# Patient Record
Sex: FEMALE | Race: BLACK OR AFRICAN AMERICAN | NOT HISPANIC OR LATINO | URBAN - METROPOLITAN AREA
[De-identification: names, ages, dates, MRNs, and addresses within clinical notes are randomized per-mention and may not be internally consistent; named-entity substitution may affect disease eponyms.]

---

## 2023-07-03 ENCOUNTER — TELEPHONE (OUTPATIENT)
Dept: PRIMARY CARE CLINIC | Facility: CLINIC | Age: 63
End: 2023-07-03
Payer: MEDICAID

## 2023-07-03 NOTE — TELEPHONE ENCOUNTER
Patient called regarding new patient appointment. Patient given number to schedule at [facility] location.  ----- Message from Bria Curry sent at 7/3/2023  9:21 AM CDT -----  Pt request a call back to be scheduled [phone].Thanks

## 2023-10-02 ENCOUNTER — HOSPITAL ENCOUNTER (OUTPATIENT)
Dept: RADIATION THERAPY | Facility: HOSPITAL | Age: 63
Discharge: HOME | End: 2023-10-02
Attending: SPECIALIST
Payer: MEDICAID

## 2023-10-10 ENCOUNTER — HOSPITAL ENCOUNTER (OUTPATIENT)
Dept: RADIATION THERAPY | Facility: HOSPITAL | Age: 63
Discharge: HOME | End: 2023-10-10
Attending: INTERNAL MEDICINE
Payer: MEDICAID

## 2023-10-10 ENCOUNTER — HOSPITAL ENCOUNTER (OUTPATIENT)
Dept: RADIOLOGY | Facility: HOSPITAL | Age: 63
Discharge: HOME | End: 2023-10-10
Attending: INTERNAL MEDICINE
Payer: MEDICAID

## 2023-10-10 ENCOUNTER — OFFICE VISIT (OUTPATIENT)
Dept: RADIATION ONCOLOGY | Facility: CLINIC | Age: 63
End: 2023-10-10
Payer: MEDICAID

## 2023-10-10 VITALS
WEIGHT: 205.94 LBS | SYSTOLIC BLOOD PRESSURE: 133 MMHG | HEART RATE: 80 BPM | HEIGHT: 60 IN | BODY MASS INDEX: 40.43 KG/M2 | TEMPERATURE: 97 F | OXYGEN SATURATION: 97 % | DIASTOLIC BLOOD PRESSURE: 79 MMHG | RESPIRATION RATE: 18 BRPM

## 2023-10-10 DIAGNOSIS — C50.412 PRIMARY MALIGNANT NEOPLASM OF UPPER OUTER QUADRANT OF BREAST, LEFT: Primary | ICD-10-CM

## 2023-10-10 PROCEDURE — 77334 RADIATION TREATMENT AID(S): CPT | Mod: TC | Performed by: SPECIALIST

## 2023-10-10 PROCEDURE — 77290 THER RAD SIMULAJ FIELD CPLX: CPT | Mod: TC | Performed by: SPECIALIST

## 2023-10-10 PROCEDURE — 77263 THER RADIOLOGY TX PLNG CPLX: CPT | Mod: ,,, | Performed by: SPECIALIST

## 2023-10-10 PROCEDURE — 4010F ACE/ARB THERAPY RXD/TAKEN: CPT | Mod: CPTII,,, | Performed by: SPECIALIST

## 2023-10-10 PROCEDURE — 77290 THER RAD SIMULAJ FIELD CPLX: CPT | Mod: 26,,, | Performed by: SPECIALIST

## 2023-10-10 PROCEDURE — 1159F MED LIST DOCD IN RCRD: CPT | Mod: CPTII,,, | Performed by: SPECIALIST

## 2023-10-10 PROCEDURE — 99212 OFFICE O/P EST SF 10 MIN: CPT | Mod: S$PBB,25,, | Performed by: SPECIALIST

## 2023-10-10 PROCEDURE — 3008F PR BODY MASS INDEX (BMI) DOCUMENTED: ICD-10-PCS | Mod: CPTII,,, | Performed by: SPECIALIST

## 2023-10-10 PROCEDURE — 3075F PR MOST RECENT SYSTOLIC BLOOD PRESS GE 130-139MM HG: ICD-10-PCS | Mod: CPTII,,, | Performed by: SPECIALIST

## 2023-10-10 PROCEDURE — 99213 OFFICE O/P EST LOW 20 MIN: CPT | Mod: PBBFAC | Performed by: SPECIALIST

## 2023-10-10 PROCEDURE — 99212 PR OFFICE/OUTPT VISIT, EST, LEVL II, 10-19 MIN: ICD-10-PCS | Mod: S$PBB,25,, | Performed by: SPECIALIST

## 2023-10-10 PROCEDURE — 77334 RADIATION TREATMENT AID(S): CPT | Mod: 26,,, | Performed by: SPECIALIST

## 2023-10-10 PROCEDURE — 77014 HC CT GUIDANCE RADIATION THERAPY FLDS PLACEMENT: CPT | Mod: TC | Performed by: SPECIALIST

## 2023-10-10 PROCEDURE — 3008F BODY MASS INDEX DOCD: CPT | Mod: CPTII,,, | Performed by: SPECIALIST

## 2023-10-10 PROCEDURE — 77334 PR  RADN TREATMENT AID(S) COMPLX: ICD-10-PCS | Mod: 26,,, | Performed by: SPECIALIST

## 2023-10-10 PROCEDURE — 3075F SYST BP GE 130 - 139MM HG: CPT | Mod: CPTII,,, | Performed by: SPECIALIST

## 2023-10-10 PROCEDURE — 4010F PR ACE/ARB THEARPY RXD/TAKEN: ICD-10-PCS | Mod: CPTII,,, | Performed by: SPECIALIST

## 2023-10-10 PROCEDURE — 77290 PR  SET RADN THERAPY FIELD COMPLEX: ICD-10-PCS | Mod: 26,,, | Performed by: SPECIALIST

## 2023-10-10 PROCEDURE — 3044F PR MOST RECENT HEMOGLOBIN A1C LEVEL <7.0%: ICD-10-PCS | Mod: CPTII,,, | Performed by: SPECIALIST

## 2023-10-10 PROCEDURE — 1159F PR MEDICATION LIST DOCUMENTED IN MEDICAL RECORD: ICD-10-PCS | Mod: CPTII,,, | Performed by: SPECIALIST

## 2023-10-10 PROCEDURE — 77263 PR  RADIATION THERAPY PLAN COMPLEX: ICD-10-PCS | Mod: ,,, | Performed by: SPECIALIST

## 2023-10-10 PROCEDURE — 3078F PR MOST RECENT DIASTOLIC BLOOD PRESSURE < 80 MM HG: ICD-10-PCS | Mod: CPTII,,, | Performed by: SPECIALIST

## 2023-10-10 PROCEDURE — 3044F HG A1C LEVEL LT 7.0%: CPT | Mod: CPTII,,, | Performed by: SPECIALIST

## 2023-10-10 PROCEDURE — 3078F DIAST BP <80 MM HG: CPT | Mod: CPTII,,, | Performed by: SPECIALIST

## 2023-10-10 PROCEDURE — 99999 PR PBB SHADOW E&M-EST. PATIENT-LVL III: CPT | Mod: PBBFAC,,,

## 2023-10-10 PROCEDURE — 99999 PR PBB SHADOW E&M-EST. PATIENT-LVL III: ICD-10-PCS | Mod: PBBFAC,,,

## 2023-10-10 RX ORDER — TIZANIDINE 4 MG/1
TABLET ORAL
COMMUNITY
Start: 2023-10-02

## 2023-10-10 RX ORDER — LISINOPRIL AND HYDROCHLOROTHIAZIDE 20; 25 MG/1; MG/1
1 TABLET ORAL EVERY MORNING
COMMUNITY
Start: 2023-09-12

## 2023-10-10 RX ORDER — ONDANSETRON 8 MG/1
TABLET, ORALLY DISINTEGRATING ORAL
COMMUNITY
Start: 2023-01-11

## 2023-10-10 RX ORDER — FAMOTIDINE 20 MG/1
20 TABLET, FILM COATED ORAL
COMMUNITY
Start: 2023-08-12

## 2023-10-10 RX ORDER — GABAPENTIN 300 MG/1
300 CAPSULE ORAL
COMMUNITY
Start: 2023-08-14

## 2023-10-10 RX ORDER — PROCHLORPERAZINE MALEATE 10 MG
10 TABLET ORAL
COMMUNITY
Start: 2023-01-11

## 2023-10-10 RX ORDER — TIRZEPATIDE 2.5 MG/.5ML
2.5 INJECTION, SOLUTION SUBCUTANEOUS
COMMUNITY
Start: 2023-09-18

## 2023-10-10 RX ORDER — PANTOPRAZOLE SODIUM 40 MG/1
40 TABLET, DELAYED RELEASE ORAL
COMMUNITY
Start: 2023-05-01

## 2023-10-10 RX ORDER — ATORVASTATIN CALCIUM 40 MG/1
40 TABLET, FILM COATED ORAL
COMMUNITY
Start: 2023-08-14

## 2023-10-10 RX ORDER — METFORMIN HYDROCHLORIDE 500 MG/1
1000 TABLET, EXTENDED RELEASE ORAL 2 TIMES DAILY
COMMUNITY
Start: 2023-09-12

## 2023-10-10 NOTE — PROGRESS NOTES
HISTORY OF PRESENT ILLNESS:  63-year-old woman, well known to me, having 1st been seen in consultation on 08/09/2023 with a triple negative ypT2 N0 M0 upper outer left breast cancer status post neoadjuvant TC Keytruda 1st on 02/02/2023, followed by AC Keytruda from 04/06/2023 08/26/2023 with good clinical response, followed by clinical progression prior to her lumpectomy on 08/02/2023 recovering a high-grade 3.5 cm infiltrating ductal carcinoma resected with 1 mm medial surgical margins.  A 3 mm high-grade DCIS was also recovered, resected with 2 mm posterior margin and negative separately submitted posterior margin.  Both sentinel lymph nodes were benign without apparent treatment effect.  She underwent oncoplastic reduction at the time of her primary resection with some wound healing difficulty in the mid inframammary fold at that time.    INTERVAL HISTORY:  She returns today for routine short interval follow-up.  She was seen by Dr. Burch in plastics in the interval and given clearance to proceed with radiotherapy.  She denies complaint related to her surgical or chemotherapeutic management, or complaints worrisome for local regional or metastatic recurrence    PHYSICAL EXAMINATION:  Vitals:    10/10/23 1318   BP: 133/79   Pulse: 80   Resp: 18   Temp: 97.4 °F (36.3 °C)     PHYSICAL EXAM:      Lymphatics:  no cervical/sclav LAD  Lungs:  CTAB  Heart:  RRR  Breast:  Complete interval closure and healing of her lambda scar in the left breast with a focus centrally consistent with small volume secondary intent healing.  The breasts remain soft and a nodular with a negative axilla    ASSESSMENT:  Interval healing sufficient to proceed with planned adjuvant radiotherapy in the setting of breast conservation    PLAN:  We again reviewed the rationale for inclusion of whole breast radiotherapy in the setting of breast conservation, improved local regional control equivalent to that achieved with mastectomy.  We  reviewed the logistics of therapy, including the planning and treatment visits, as well as possible acute and chronic side effects in great detail.  She reiterates her desire to proceed.  Informed written consent was obtained and she was given the original after scanning into the EMR     She will now undergo CT simulation as separately documented

## 2023-10-10 NOTE — PROGRESS NOTES
HISTORY OF PRESENT ILLNESS:   ***      REVIEW OF SYSTEMS:   ROS      PAST MEDICAL HISTORY:  Past Medical History:   Diagnosis Date    Allergy     Anemia     Breast cancer     Diabetes mellitus     Hypertension        PAST SURGICAL HISTORY:  Past Surgical History:   Procedure Laterality Date    HYSTERECTOMY      tubaligation         ALLERGIES:   Review of patient's allergies indicates:   Allergen Reactions    Cephalexin     Hydrocodone-acetaminoph-supp11     Propoxyphene-acetaminophen        MEDICATIONS:  Current Outpatient Medications   Medication Sig    atorvastatin (LIPITOR) 40 MG tablet Take 40 mg by mouth.    famotidine (PEPCID) 20 MG tablet Take 20 mg by mouth.    gabapentin (NEURONTIN) 300 MG capsule Take 300 mg by mouth.    lisinopriL-hydrochlorothiazide (PRINZIDE,ZESTORETIC) 20-25 mg Tab Take 1 tablet by mouth every morning.    metFORMIN (GLUCOPHAGE-XR) 500 MG ER 24hr tablet Take 1,000 mg by mouth 2 (two) times daily.    ondansetron (ZOFRAN-ODT) 8 MG TbDL DISSOLVE ONE TABLET UNDER THE TONGUE EVERY 8 HOURS AS NEEDED FOR NAUSEA    pantoprazole (PROTONIX) 40 MG tablet Take 40 mg by mouth.    prochlorperazine (COMPAZINE) 10 MG tablet Take 10 mg by mouth.    tirzepatide (MOUNJARO) 2.5 mg/0.5 mL PnIj Inject 2.5 mg into the skin every 7 days.    tiZANidine (ZANAFLEX) 4 MG tablet      No current facility-administered medications for this visit.       SOCIAL HISTORY:  Social History     Socioeconomic History    Marital status: Single   Tobacco Use    Smoking status: Former     Current packs/day: 0.00     Types: Cigarettes     Quit date:      Years since quittin.7    Smokeless tobacco: Never   Substance and Sexual Activity    Alcohol use: Not Currently    Drug use: Never       FAMILY HISTORY:  History reviewed. No pertinent family history.      PHYSICAL EXAMINATION:  Vitals:    10/10/23 1318   BP: 133/79   Pulse: 80   Resp: 18   Temp: 97.4 °F (36.3 °C)     Physical Exam    ASSESSMENT/PLAN:  There are  no diagnoses linked to this encounter.    ECOG:  ***    I spent approximately 60 minutes reviewing the available records and evaluating the patient, out of which over 50% of the time was spent face to face with the patient in counseling and coordinating this patient's care.

## 2023-10-16 PROCEDURE — 77300 PR RADIATION THERAPY,DOSIMETRY PLAN: ICD-10-PCS | Mod: 26,,, | Performed by: SPECIALIST

## 2023-10-16 PROCEDURE — 77293 RESPIRATOR MOTION MGMT SIMUL: CPT | Mod: TC | Performed by: SPECIALIST

## 2023-10-16 PROCEDURE — 77295 3-D RADIOTHERAPY PLAN: CPT | Mod: TC | Performed by: SPECIALIST

## 2023-10-16 PROCEDURE — 77295 PR 3D RADIOTHERAPY PLAN: ICD-10-PCS | Mod: 26,,, | Performed by: SPECIALIST

## 2023-10-16 PROCEDURE — 77293 RESPIRATOR MOTION MGMT SIMUL: CPT | Mod: 26,,, | Performed by: SPECIALIST

## 2023-10-16 PROCEDURE — 77295 3-D RADIOTHERAPY PLAN: CPT | Mod: 26,,, | Performed by: SPECIALIST

## 2023-10-16 PROCEDURE — 77334 RADIATION TREATMENT AID(S): CPT | Mod: TC | Performed by: SPECIALIST

## 2023-10-16 PROCEDURE — 77334 PR  RADN TREATMENT AID(S) COMPLX: ICD-10-PCS | Mod: 26,,, | Performed by: SPECIALIST

## 2023-10-16 PROCEDURE — 77293 PR RESPIRATORY MOTION MGMT SIMULATION: ICD-10-PCS | Mod: 26,,, | Performed by: SPECIALIST

## 2023-10-16 PROCEDURE — 77334 RADIATION TREATMENT AID(S): CPT | Mod: 26,,, | Performed by: SPECIALIST

## 2023-10-16 PROCEDURE — 77300 RADIATION THERAPY DOSE PLAN: CPT | Mod: 26,,, | Performed by: SPECIALIST

## 2023-10-16 PROCEDURE — 77300 RADIATION THERAPY DOSE PLAN: CPT | Mod: TC | Performed by: SPECIALIST

## 2023-10-17 ENCOUNTER — DOCUMENTATION ONLY (OUTPATIENT)
Dept: RADIATION ONCOLOGY | Facility: CLINIC | Age: 63
End: 2023-10-17
Payer: MEDICAID

## 2023-10-17 PROCEDURE — 77427 PR CHG RADIATION,MANGEMENT,5 TX'S: ICD-10-PCS | Mod: ,,, | Performed by: SPECIALIST

## 2023-10-17 PROCEDURE — 77412 RADIATION TX DELIVERY LVL 3: CPT | Performed by: SPECIALIST

## 2023-10-17 PROCEDURE — 77417 THER RADIOLOGY PORT IMAGE(S): CPT | Performed by: SPECIALIST

## 2023-10-17 PROCEDURE — 77387 GUIDANCE FOR RADJ TX DLVR: CPT | Mod: 26,,, | Performed by: SPECIALIST

## 2023-10-17 PROCEDURE — 77387 GUIDANCE FOR RADJ TX DLVR: CPT | Mod: TC | Performed by: SPECIALIST

## 2023-10-17 PROCEDURE — 77427 RADIATION TX MANAGEMENT X5: CPT | Mod: ,,, | Performed by: SPECIALIST

## 2023-10-17 PROCEDURE — 77387 PR GUIDANCE FOR RADIATION TREATMENT DELIVERY: ICD-10-PCS | Mod: 26,,, | Performed by: SPECIALIST

## 2023-10-17 NOTE — PLAN OF CARE
Day 1 outpatient xrt to left breast. Breast handout and verbal instructions given. Miaderm cream and contact info provided. Skin care and side effects reviewed. Patient verbalized understanding.

## 2023-10-18 PROCEDURE — 77412 RADIATION TX DELIVERY LVL 3: CPT | Performed by: SPECIALIST

## 2023-10-18 PROCEDURE — 77387 GUIDANCE FOR RADJ TX DLVR: CPT | Mod: TC | Performed by: SPECIALIST

## 2023-10-18 PROCEDURE — 77387 GUIDANCE FOR RADJ TX DLVR: CPT | Mod: 26,,, | Performed by: SPECIALIST

## 2023-10-18 PROCEDURE — 77387 PR GUIDANCE FOR RADIATION TREATMENT DELIVERY: ICD-10-PCS | Mod: 26,,, | Performed by: SPECIALIST

## 2023-10-19 PROCEDURE — 77387 GUIDANCE FOR RADJ TX DLVR: CPT | Mod: 26,,, | Performed by: SPECIALIST

## 2023-10-19 PROCEDURE — 77412 RADIATION TX DELIVERY LVL 3: CPT | Performed by: SPECIALIST

## 2023-10-19 PROCEDURE — 77387 GUIDANCE FOR RADJ TX DLVR: CPT | Mod: TC | Performed by: SPECIALIST

## 2023-10-19 PROCEDURE — 77387 PR GUIDANCE FOR RADIATION TREATMENT DELIVERY: ICD-10-PCS | Mod: 26,,, | Performed by: SPECIALIST

## 2023-10-20 PROCEDURE — 77387 GUIDANCE FOR RADJ TX DLVR: CPT | Mod: TC | Performed by: SPECIALIST

## 2023-10-20 PROCEDURE — 77387 GUIDANCE FOR RADJ TX DLVR: CPT | Mod: 26,,, | Performed by: SPECIALIST

## 2023-10-20 PROCEDURE — 77412 RADIATION TX DELIVERY LVL 3: CPT | Performed by: SPECIALIST

## 2023-10-20 PROCEDURE — 77387 PR GUIDANCE FOR RADIATION TREATMENT DELIVERY: ICD-10-PCS | Mod: 26,,, | Performed by: SPECIALIST

## 2023-10-23 ENCOUNTER — DOCUMENTATION ONLY (OUTPATIENT)
Dept: RADIATION ONCOLOGY | Facility: CLINIC | Age: 63
End: 2023-10-23
Payer: MEDICAID

## 2023-10-23 PROCEDURE — 77387 PR GUIDANCE FOR RADIATION TREATMENT DELIVERY: ICD-10-PCS | Mod: 26,,, | Performed by: SPECIALIST

## 2023-10-23 PROCEDURE — 77336 RADIATION PHYSICS CONSULT: CPT | Performed by: SPECIALIST

## 2023-10-23 PROCEDURE — 77387 GUIDANCE FOR RADJ TX DLVR: CPT | Mod: TC | Performed by: SPECIALIST

## 2023-10-23 PROCEDURE — 77387 GUIDANCE FOR RADJ TX DLVR: CPT | Mod: 26,,, | Performed by: SPECIALIST

## 2023-10-23 PROCEDURE — 77412 RADIATION TX DELIVERY LVL 3: CPT | Performed by: SPECIALIST

## 2023-10-23 NOTE — PLAN OF CARE
Day 5 outpatient xrt to left breast. Tolerating therapy well; no skin issues. Will continue to monitor.

## 2023-10-24 PROCEDURE — 77427 PR CHG RADIATION,MANGEMENT,5 TX'S: ICD-10-PCS | Mod: ,,, | Performed by: SPECIALIST

## 2023-10-24 PROCEDURE — 77427 RADIATION TX MANAGEMENT X5: CPT | Mod: ,,, | Performed by: SPECIALIST

## 2023-10-24 PROCEDURE — 77412 RADIATION TX DELIVERY LVL 3: CPT | Performed by: SPECIALIST

## 2023-10-24 PROCEDURE — 77387 GUIDANCE FOR RADJ TX DLVR: CPT | Mod: TC | Performed by: SPECIALIST

## 2023-10-24 PROCEDURE — 77387 GUIDANCE FOR RADJ TX DLVR: CPT | Mod: 26,,, | Performed by: SPECIALIST

## 2023-10-24 PROCEDURE — 77387 PR GUIDANCE FOR RADIATION TREATMENT DELIVERY: ICD-10-PCS | Mod: 26,,, | Performed by: SPECIALIST

## 2023-10-24 PROCEDURE — 77417 THER RADIOLOGY PORT IMAGE(S): CPT | Performed by: SPECIALIST

## 2023-10-25 PROCEDURE — 77412 RADIATION TX DELIVERY LVL 3: CPT | Performed by: SPECIALIST

## 2023-10-25 PROCEDURE — 77387 GUIDANCE FOR RADJ TX DLVR: CPT | Mod: TC | Performed by: SPECIALIST

## 2023-10-25 PROCEDURE — 77387 GUIDANCE FOR RADJ TX DLVR: CPT | Mod: 26,,, | Performed by: SPECIALIST

## 2023-10-25 PROCEDURE — 77387 PR GUIDANCE FOR RADIATION TREATMENT DELIVERY: ICD-10-PCS | Mod: 26,,, | Performed by: SPECIALIST

## 2023-10-26 PROCEDURE — 77387 GUIDANCE FOR RADJ TX DLVR: CPT | Mod: TC | Performed by: SPECIALIST

## 2023-10-26 PROCEDURE — 77387 PR GUIDANCE FOR RADIATION TREATMENT DELIVERY: ICD-10-PCS | Mod: 26,,, | Performed by: SPECIALIST

## 2023-10-26 PROCEDURE — 77412 RADIATION TX DELIVERY LVL 3: CPT | Performed by: SPECIALIST

## 2023-10-26 PROCEDURE — 77387 GUIDANCE FOR RADJ TX DLVR: CPT | Mod: 26,,, | Performed by: SPECIALIST

## 2023-10-27 PROCEDURE — 77387 GUIDANCE FOR RADJ TX DLVR: CPT | Mod: TC | Performed by: SPECIALIST

## 2023-10-27 PROCEDURE — 77387 PR GUIDANCE FOR RADIATION TREATMENT DELIVERY: ICD-10-PCS | Mod: 26,,, | Performed by: SPECIALIST

## 2023-10-27 PROCEDURE — 77387 GUIDANCE FOR RADJ TX DLVR: CPT | Mod: 26,,, | Performed by: SPECIALIST

## 2023-10-27 PROCEDURE — 77412 RADIATION TX DELIVERY LVL 3: CPT | Performed by: SPECIALIST

## 2023-10-30 ENCOUNTER — DOCUMENTATION ONLY (OUTPATIENT)
Dept: RADIATION ONCOLOGY | Facility: CLINIC | Age: 63
End: 2023-10-30
Payer: MEDICAID

## 2023-10-30 PROCEDURE — 77387 GUIDANCE FOR RADJ TX DLVR: CPT | Mod: 26,,, | Performed by: SPECIALIST

## 2023-10-30 PROCEDURE — 77336 RADIATION PHYSICS CONSULT: CPT | Performed by: SPECIALIST

## 2023-10-30 PROCEDURE — 77412 RADIATION TX DELIVERY LVL 3: CPT | Performed by: SPECIALIST

## 2023-10-30 PROCEDURE — 77387 GUIDANCE FOR RADJ TX DLVR: CPT | Mod: TC | Performed by: SPECIALIST

## 2023-10-30 PROCEDURE — 77387 PR GUIDANCE FOR RADIATION TREATMENT DELIVERY: ICD-10-PCS | Mod: 26,,, | Performed by: SPECIALIST

## 2023-10-30 NOTE — PLAN OF CARE
Day 10 outpatient xrt to left breast. subtle erythema with intact skin throughout. Will continue to monitor.

## 2023-10-31 PROCEDURE — 77387 GUIDANCE FOR RADJ TX DLVR: CPT | Mod: 26,,, | Performed by: SPECIALIST

## 2023-10-31 PROCEDURE — 77417 THER RADIOLOGY PORT IMAGE(S): CPT | Performed by: SPECIALIST

## 2023-10-31 PROCEDURE — 77412 RADIATION TX DELIVERY LVL 3: CPT | Performed by: SPECIALIST

## 2023-10-31 PROCEDURE — 77387 PR GUIDANCE FOR RADIATION TREATMENT DELIVERY: ICD-10-PCS | Mod: 26,,, | Performed by: SPECIALIST

## 2023-10-31 PROCEDURE — 77387 GUIDANCE FOR RADJ TX DLVR: CPT | Mod: TC | Performed by: SPECIALIST

## 2023-10-31 PROCEDURE — 77427 PR CHG RADIATION,MANGEMENT,5 TX'S: ICD-10-PCS | Mod: ,,, | Performed by: SPECIALIST

## 2023-10-31 PROCEDURE — 77427 RADIATION TX MANAGEMENT X5: CPT | Mod: ,,, | Performed by: SPECIALIST

## 2023-11-01 ENCOUNTER — HOSPITAL ENCOUNTER (OUTPATIENT)
Dept: RADIATION THERAPY | Facility: HOSPITAL | Age: 63
Discharge: HOME | End: 2023-11-01
Attending: SPECIALIST
Payer: MEDICAID

## 2023-11-01 PROCEDURE — 77412 RADIATION TX DELIVERY LVL 3: CPT | Performed by: SPECIALIST

## 2023-11-01 PROCEDURE — 77387 PR GUIDANCE FOR RADIATION TREATMENT DELIVERY: ICD-10-PCS | Mod: 26,,, | Performed by: SPECIALIST

## 2023-11-01 PROCEDURE — 77387 GUIDANCE FOR RADJ TX DLVR: CPT | Mod: TC | Performed by: SPECIALIST

## 2023-11-01 PROCEDURE — 77387 GUIDANCE FOR RADJ TX DLVR: CPT | Mod: 26,,, | Performed by: SPECIALIST

## 2023-11-02 PROCEDURE — 77387 GUIDANCE FOR RADJ TX DLVR: CPT | Mod: 26,,, | Performed by: SPECIALIST

## 2023-11-02 PROCEDURE — 77387 GUIDANCE FOR RADJ TX DLVR: CPT | Mod: TC | Performed by: SPECIALIST

## 2023-11-02 PROCEDURE — 77387 PR GUIDANCE FOR RADIATION TREATMENT DELIVERY: ICD-10-PCS | Mod: 26,,, | Performed by: SPECIALIST

## 2023-11-02 PROCEDURE — 77412 RADIATION TX DELIVERY LVL 3: CPT | Performed by: SPECIALIST

## 2023-11-03 PROCEDURE — 77387 GUIDANCE FOR RADJ TX DLVR: CPT | Mod: 26,,, | Performed by: SPECIALIST

## 2023-11-03 PROCEDURE — 77387 GUIDANCE FOR RADJ TX DLVR: CPT | Mod: TC | Performed by: SPECIALIST

## 2023-11-03 PROCEDURE — 77412 RADIATION TX DELIVERY LVL 3: CPT | Performed by: SPECIALIST

## 2023-11-03 PROCEDURE — 77387 PR GUIDANCE FOR RADIATION TREATMENT DELIVERY: ICD-10-PCS | Mod: 26,,, | Performed by: SPECIALIST

## 2023-11-06 ENCOUNTER — DOCUMENTATION ONLY (OUTPATIENT)
Dept: RADIATION ONCOLOGY | Facility: CLINIC | Age: 63
End: 2023-11-06
Payer: MEDICAID

## 2023-11-06 PROCEDURE — 77412 RADIATION TX DELIVERY LVL 3: CPT | Performed by: SPECIALIST

## 2023-11-06 PROCEDURE — 77387 GUIDANCE FOR RADJ TX DLVR: CPT | Mod: 26,,, | Performed by: SPECIALIST

## 2023-11-06 PROCEDURE — 77387 PR GUIDANCE FOR RADIATION TREATMENT DELIVERY: ICD-10-PCS | Mod: 26,,, | Performed by: SPECIALIST

## 2023-11-06 PROCEDURE — 77387 GUIDANCE FOR RADJ TX DLVR: CPT | Mod: TC | Performed by: SPECIALIST

## 2023-11-06 NOTE — PLAN OF CARE
Day 15 outpatient xrt to the breast. she describes discomfort consistent with a exam showing mixed mild hyperpigmentation and erythema. Subtle early breakdown at the central aspect of her lambda incision in the inframammary fold, otherwise intact throughout. Using topicals to good effect. Will continue to monitor.

## 2023-11-07 ENCOUNTER — DOCUMENTATION ONLY (OUTPATIENT)
Dept: RADIATION ONCOLOGY | Facility: CLINIC | Age: 63
End: 2023-11-07
Payer: MEDICAID

## 2023-11-07 PROCEDURE — 77412 RADIATION TX DELIVERY LVL 3: CPT | Performed by: SPECIALIST

## 2023-11-07 PROCEDURE — 77387 GUIDANCE FOR RADJ TX DLVR: CPT | Mod: 26,,, | Performed by: SPECIALIST

## 2023-11-07 PROCEDURE — 77387 PR GUIDANCE FOR RADIATION TREATMENT DELIVERY: ICD-10-PCS | Mod: 26,,, | Performed by: SPECIALIST

## 2023-11-07 PROCEDURE — 77387 GUIDANCE FOR RADJ TX DLVR: CPT | Mod: TC | Performed by: SPECIALIST

## 2023-11-07 PROCEDURE — 77336 RADIATION PHYSICS CONSULT: CPT | Performed by: SPECIALIST

## 2023-11-07 NOTE — PLAN OF CARE
Completed outpatient xrt to the breast today 11/7/23. Follow up appt made and given to the patient.

## 2023-12-08 ENCOUNTER — OFFICE VISIT (OUTPATIENT)
Dept: RADIATION ONCOLOGY | Facility: CLINIC | Age: 63
End: 2023-12-08
Payer: MEDICAID

## 2023-12-08 VITALS
OXYGEN SATURATION: 96 % | RESPIRATION RATE: 16 BRPM | WEIGHT: 201.75 LBS | DIASTOLIC BLOOD PRESSURE: 70 MMHG | HEIGHT: 60 IN | TEMPERATURE: 98 F | SYSTOLIC BLOOD PRESSURE: 111 MMHG | BODY MASS INDEX: 39.61 KG/M2 | HEART RATE: 66 BPM

## 2023-12-08 DIAGNOSIS — C50.412 PRIMARY MALIGNANT NEOPLASM OF UPPER OUTER QUADRANT OF BREAST, LEFT: Primary | ICD-10-CM

## 2023-12-08 PROCEDURE — 3008F BODY MASS INDEX DOCD: CPT | Mod: CPTII,,, | Performed by: SPECIALIST

## 2023-12-08 PROCEDURE — 3078F PR MOST RECENT DIASTOLIC BLOOD PRESSURE < 80 MM HG: ICD-10-PCS | Mod: CPTII,,, | Performed by: SPECIALIST

## 2023-12-08 PROCEDURE — 99999 PR PBB SHADOW E&M-EST. PATIENT-LVL III: CPT | Mod: PBBFAC,,, | Performed by: SPECIALIST

## 2023-12-08 PROCEDURE — 1159F PR MEDICATION LIST DOCUMENTED IN MEDICAL RECORD: ICD-10-PCS | Mod: CPTII,,, | Performed by: SPECIALIST

## 2023-12-08 PROCEDURE — 4010F PR ACE/ARB THEARPY RXD/TAKEN: ICD-10-PCS | Mod: CPTII,,, | Performed by: SPECIALIST

## 2023-12-08 PROCEDURE — 1159F MED LIST DOCD IN RCRD: CPT | Mod: CPTII,,, | Performed by: SPECIALIST

## 2023-12-08 PROCEDURE — 99999 PR PBB SHADOW E&M-EST. PATIENT-LVL III: ICD-10-PCS | Mod: PBBFAC,,, | Performed by: SPECIALIST

## 2023-12-08 PROCEDURE — 3074F PR MOST RECENT SYSTOLIC BLOOD PRESSURE < 130 MM HG: ICD-10-PCS | Mod: CPTII,,, | Performed by: SPECIALIST

## 2023-12-08 PROCEDURE — 3044F HG A1C LEVEL LT 7.0%: CPT | Mod: CPTII,,, | Performed by: SPECIALIST

## 2023-12-08 PROCEDURE — 3074F SYST BP LT 130 MM HG: CPT | Mod: CPTII,,, | Performed by: SPECIALIST

## 2023-12-08 PROCEDURE — 3078F DIAST BP <80 MM HG: CPT | Mod: CPTII,,, | Performed by: SPECIALIST

## 2023-12-08 PROCEDURE — 99213 OFFICE O/P EST LOW 20 MIN: CPT | Mod: PBBFAC | Performed by: SPECIALIST

## 2023-12-08 PROCEDURE — 99213 OFFICE O/P EST LOW 20 MIN: CPT | Mod: S$PBB,,, | Performed by: SPECIALIST

## 2023-12-08 PROCEDURE — 3044F PR MOST RECENT HEMOGLOBIN A1C LEVEL <7.0%: ICD-10-PCS | Mod: CPTII,,, | Performed by: SPECIALIST

## 2023-12-08 PROCEDURE — 3008F PR BODY MASS INDEX (BMI) DOCUMENTED: ICD-10-PCS | Mod: CPTII,,, | Performed by: SPECIALIST

## 2023-12-08 PROCEDURE — 4010F ACE/ARB THERAPY RXD/TAKEN: CPT | Mod: CPTII,,, | Performed by: SPECIALIST

## 2023-12-08 PROCEDURE — 99213 PR OFFICE/OUTPT VISIT, EST, LEVL III, 20-29 MIN: ICD-10-PCS | Mod: S$PBB,,, | Performed by: SPECIALIST

## 2023-12-08 RX ORDER — CAPECITABINE 500 MG/1
TABLET, FILM COATED ORAL 2 TIMES DAILY
COMMUNITY
Start: 2023-11-22

## 2023-12-08 NOTE — PROGRESS NOTES
Radiation oncology follow up note    HISTORY OF PRESENT ILLNESS:   C50.412 - Malignant neoplasm of upper-outer quadrant of left female breast, Diagnosed 10/11/2023 (Active) Stage IIA, ypT2, N0, M0, G3, HER2 Neg, ER Neg, WV Neg     63-year-old woman 1st seen in consultation on 08/09/2023 with a triple negative ypT2 N0 M0 upper outer left breast cancer status post neoadjuvant TC Keytruda 1st on 02/02/2023, followed by AC Keytruda from 04/06/2023 08/26/2023 with good clinical response, followed by clinical progression prior to her lumpectomy on 08/02/2023 recovering a high-grade 3.5 cm infiltrating ductal carcinoma resected with 1 mm medial surgical margins. A 3 mm high-grade DCIS was also recovered, resected with 2 mm posterior margin and negative separately submitted posterior margin. Both sentinel lymph nodes were benign without apparent treatment effect. She underwent oncoplastic reduction at the time of her primary resection with some wound healing difficulty in the mid inframammary fold resulting in modest delay in initiation of radiotherapy, delivering 42.4 Gy in 16 fractions, completed on 11/07/2023      INTERVAL HISTORY:  She returns for routine short interval follow-up.  She had tolerated therapy with the expected skin changes in today reports having gone on to experience rapid resolution over the last couple of weeks, today reporting only occasional shooting nerve pain, the physiology of which we reviewed.    Capecitabine was initiated today      PHYSICAL EXAMINATION:  Vitals:    12/08/23 0906   BP: 111/70   Pulse: 66   Resp: 16   Temp: 98.4 °F (36.9 °C)     PHYSICAL EXAM:    General:  A&O x4, NAD  Lungs:  CTAB  Heart:  RRR  Breasts:  Resolving hyperpigmentation with intact skin throughout, including the site of prior dehiscence at the 6 o'clock position.  The breasts are soft and a nodular with a negative axilla  Abdomen:  NTND      ASSESSMENT:  She has recovered appropriately and remains clinically MANISHA  with capecitabine underway      PLAN:  We reviewed the importance of continued self exam and regular mammography.   Follow up in 6 months.  She sees Dr. Ni at the end of the month and Dr. Dos Santos in January

## 2024-06-10 NOTE — PROGRESS NOTES
[facility] - Radiation Oncology Follow Up Note    Medical oncologist: Dayo Ni MD   Surgical oncologist : Liat Dos Santos MD    HISTORY OF PRESENT ILLNESS: C50.412 - Malignant neoplasm of upper-outer quadrant of left female breast, Diagnosed 10/11/2023 (Active) Stage IIA, ypT2, N0, M0, G3, HER2 Neg, ER Neg, VT Neg      63-year-old woman 1st seen in consultation on 08/09/2023 with a triple negative ypT2 N0 M0 upper outer left breast cancer    Status post neoadjuvant TC Keytruda 1st on 02/02/2023, followed by AC Keytruda from 04/06/2023 08/26/2023 with good clinical response, followed by clinical progression prior to her lumpectomy on 08/02/2023 recovering a high-grade 3.5 cm infiltrating ductal carcinoma resected with 1 mm medial surgical margins. A 3 mm high-grade DCIS was also recovered, resected with 2 mm posterior margin and negative separately submitted posterior margin. Both sentinel lymph nodes were benign without apparent treatment effect.     She underwent oncoplastic reduction at the time of her primary resection with some wound healing difficulty in the mid inframammary fold resulting in modest delay in initiation of radiotherapy, delivering 42.4 Gy in 16 fractions, completed on 11/07/2023    INTERVAL HISTORY:  She returns for her 1st six-month follow up.  She has no complaints related to therapy are worrisome for local regional or metastatic recurrence.      She underwent benign bilateral diagnostic mammography on 02/26/2024      PHYSICAL EXAMINATION:  Vitals:    06/11/24 0957   BP: (!) 119/50   Pulse: 72   Resp: 16   Temp: 97.5 °F (36.4 °C)   SpO2: 98%   Weight: 89 kg (196 lb 3.4 oz)   Height: 5' (1.524 m)      General:  A&O x4, NAD  HEENT:  PEERLA, CN II-XII intact, EOM intact,   Lymphatics:  no cervical/sclav LAD  Lungs:  CTAB  Heart:  RRR  Breasts:  Near perfect symmetry with moderate residual hyperpigmentation in the treated left breast, with no nipple  discharge dimpling or worrisome pigmentary change bilaterally.  Both breasts are soft and a nodular with negative axillae  Abdomen:  NTND +BS, no HSM      ASSESSMENT:  Clinically and mammographically MANISHA      PLAN:  Follow up in 6 months.  She also maintains regular follow up with Dr. Ni and Dr. Dos Santos

## 2024-06-11 ENCOUNTER — OFFICE VISIT (OUTPATIENT)
Dept: RADIATION ONCOLOGY | Facility: CLINIC | Age: 64
End: 2024-06-11
Payer: MEDICAID

## 2024-06-11 VITALS
HEART RATE: 72 BPM | HEIGHT: 60 IN | DIASTOLIC BLOOD PRESSURE: 50 MMHG | BODY MASS INDEX: 38.52 KG/M2 | TEMPERATURE: 98 F | OXYGEN SATURATION: 98 % | RESPIRATION RATE: 16 BRPM | WEIGHT: 196.19 LBS | SYSTOLIC BLOOD PRESSURE: 119 MMHG

## 2024-06-11 DIAGNOSIS — C50.412 PRIMARY MALIGNANT NEOPLASM OF UPPER OUTER QUADRANT OF BREAST, LEFT: Primary | ICD-10-CM

## 2024-06-11 PROCEDURE — 3078F DIAST BP <80 MM HG: CPT | Mod: CPTII,,, | Performed by: SPECIALIST

## 2024-06-11 PROCEDURE — 4010F ACE/ARB THERAPY RXD/TAKEN: CPT | Mod: CPTII,,, | Performed by: SPECIALIST

## 2024-06-11 PROCEDURE — 3008F BODY MASS INDEX DOCD: CPT | Mod: CPTII,,, | Performed by: SPECIALIST

## 2024-06-11 PROCEDURE — 99213 OFFICE O/P EST LOW 20 MIN: CPT | Mod: S$PBB,,, | Performed by: SPECIALIST

## 2024-06-11 PROCEDURE — 99999 PR PBB SHADOW E&M-EST. PATIENT-LVL III: CPT | Mod: PBBFAC,,, | Performed by: SPECIALIST

## 2024-06-11 PROCEDURE — 3044F HG A1C LEVEL LT 7.0%: CPT | Mod: CPTII,,, | Performed by: SPECIALIST

## 2024-06-11 PROCEDURE — 1159F MED LIST DOCD IN RCRD: CPT | Mod: CPTII,,, | Performed by: SPECIALIST

## 2024-06-11 PROCEDURE — 99213 OFFICE O/P EST LOW 20 MIN: CPT | Mod: PBBFAC | Performed by: SPECIALIST

## 2024-06-11 PROCEDURE — 3074F SYST BP LT 130 MM HG: CPT | Mod: CPTII,,, | Performed by: SPECIALIST

## 2024-07-31 ENCOUNTER — DOCUMENTATION ONLY (OUTPATIENT)
Dept: HEMATOLOGY/ONCOLOGY | Facility: CLINIC | Age: 64
End: 2024-07-31
Payer: MEDICAID

## 2024-07-31 NOTE — PROGRESS NOTES
Contacted per request from Dr Hines's nurse. Introduced myself as ONN with Dr Hoffmann. Pt would like a 2nd opinion regarding dx of metastatic breast cancer. Offered new pt appt 8/9 at 220. Pt agreed and verbalized understanding of place, date and time. She confirms she has all records from the last month and will bring her with her and offered to get any other records we require. Told her we were working on getting them but would check back with her on 8/7 to make sure we had all we needed and will let her know at that time. Verified pt had ONN direct contact number, [phone] and let her know we are available for any further needs. PT again verbalized understanding.

## 2024-08-09 ENCOUNTER — OFFICE VISIT (OUTPATIENT)
Dept: HEMATOLOGY/ONCOLOGY | Facility: CLINIC | Age: 64
End: 2024-08-09
Payer: MEDICAID

## 2024-08-09 ENCOUNTER — TELEPHONE (OUTPATIENT)
Dept: HEMATOLOGY/ONCOLOGY | Facility: CLINIC | Age: 64
End: 2024-08-09
Payer: MEDICAID

## 2024-08-09 VITALS
HEIGHT: 60 IN | SYSTOLIC BLOOD PRESSURE: 108 MMHG | OXYGEN SATURATION: 97 % | TEMPERATURE: 99 F | DIASTOLIC BLOOD PRESSURE: 51 MMHG | BODY MASS INDEX: 38.2 KG/M2 | HEART RATE: 83 BPM | WEIGHT: 194.56 LBS

## 2024-08-09 DIAGNOSIS — C50.412 PRIMARY MALIGNANT NEOPLASM OF UPPER OUTER QUADRANT OF BREAST, LEFT: Primary | ICD-10-CM

## 2024-08-09 DIAGNOSIS — C78.2 SECONDARY MALIGNANCY OF PARIETAL PLEURA: ICD-10-CM

## 2024-08-09 PROBLEM — I10 HYPERTENSION: Status: ACTIVE | Noted: 2024-08-09

## 2024-08-09 PROBLEM — E66.9 OBESITY, CLASS II, BMI 35-39.9: Status: ACTIVE | Noted: 2023-09-18

## 2024-08-09 PROBLEM — E66.812 OBESITY, CLASS II, BMI 35-39.9: Status: ACTIVE | Noted: 2023-09-18

## 2024-08-09 PROCEDURE — 99999 PR PBB SHADOW E&M-EST. PATIENT-LVL V: CPT | Mod: PBBFAC,,, | Performed by: INTERNAL MEDICINE

## 2024-08-09 PROCEDURE — 99215 OFFICE O/P EST HI 40 MIN: CPT | Mod: PBBFAC | Performed by: INTERNAL MEDICINE

## 2024-08-09 RX ORDER — AMLODIPINE BESYLATE 5 MG/1
5 TABLET ORAL DAILY
COMMUNITY

## 2024-08-09 RX ORDER — TRAMADOL HYDROCHLORIDE 50 MG/1
100 TABLET ORAL EVERY 6 HOURS PRN
COMMUNITY
Start: 2024-08-06 | End: 2024-08-11

## 2024-08-09 RX ORDER — FOLIC ACID 1 MG/1
1 TABLET ORAL DAILY
COMMUNITY

## 2024-08-09 RX ORDER — LANOLIN ALCOHOL/MO/W.PET/CERES
400 CREAM (GRAM) TOPICAL DAILY
COMMUNITY

## 2024-08-12 ENCOUNTER — DOCUMENTATION ONLY (OUTPATIENT)
Dept: HEMATOLOGY/ONCOLOGY | Facility: CLINIC | Age: 64
End: 2024-08-12
Payer: MEDICAID

## 2024-08-12 NOTE — PROGRESS NOTES
Contacted pt and introduced myself at ON for Dr Hoffmann. Asked pt if she was willing to make appts to get her ordered PET and MRI done. She stated that she was ok with doing that. Pt scheduled at  for PET on 8/22 and MRI at [facility] on 8/29. Provided pt with ONN direct number,[phone] for any future needs. No questions or concerns at this time.     Oncology Navigation   Intake  Date of Diagnosis: 10/10/23  Cancer Type: Breast  Type of Referral: Internal  Date of Referral: 07/31/24  Initial Nurse Navigator Contact: 07/31/24  Referral to Initial Contact Timeline (days): 0  First Appointment Available: 08/09/24  Appointment Date: 08/09/24  First Available Date vs. Scheduled Date (days): 0  Multiple appointments: No     Treatment  Current Status: Staging work-up       Medical Oncologist: Yaya Hoffmann       Procedures: MRI; PET scan  MRI Schedule Date: 08/29/24  PET Scan Schedule Date: 08/22/24    General Referrals: Palliative Care; Social Work    ER: Negative  WV: Negative  Her2: Negative       Support Systems: Spouse/significant other; Family members     Acuity      Follow Up  No follow-ups on file.

## 2024-08-13 ENCOUNTER — TELEPHONE (OUTPATIENT)
Dept: HEMATOLOGY/ONCOLOGY | Facility: CLINIC | Age: 64
End: 2024-08-13
Payer: MEDICAID

## 2024-08-13 NOTE — TELEPHONE ENCOUNTER
Swer was consulted by Dr. Hoffmann on 8/9/24 to assess pt needs.     11:18 am - Swer called pt ([phone]) to assess needs and review most recent distress score of 8. Pt reports her anxiety/distress is related to her medical bills. Swer discussed applying for financial aid with [facility] as well as various grants pt may qualify for. Pt reports she isn't active on her [employer] and she does not have a computer at home, however she reports to return swer call with pt's daughter's email address. She also requests swer mail pt resources at the address on file. Swer to mail pt list of grants as well as information on applying for financial aid. Swer to also include [employer] business card. Aside form this, pt requests no other assistance. Swer will remain available.         8/9/2024     2:09 PM 10/10/2023     1:33 PM   DISTRESS SCREENING   Distress Score 8 0 - No Distress   Practical Concerns  None of these   Social Concerns  None of these   Emotional Concerns Worry or anxiety;Anger None of these   Retire Spiritual or Religion Concerns  No   Physical Concerns Sleep;Fatigue;Pain;Loss or change of physical abilities None of these

## 2024-08-13 NOTE — TELEPHONE ENCOUNTER
Contacted pt regarding having NGS test drawn. Pt agreed to have blood drawn the same day as PET scan. Appt made for 8/22 at  lab.

## 2024-08-13 NOTE — PLAN OF CARE
[facility] - Breast    [id number]        Paclitaxel     **Always confirm dose/schedule in your pharmacy ordering system**    Patient Characteristics:  Distant Metastases or Locoregional Recurrent Disease - Unresected, M0 or Locally   Advanced Unresectable Disease Progressing after Neoadjuvant and Local   Therapies, M0, HER2 Low/Negative, ER Negative, Chemotherapy, HER2 Negative,   First Line, PD-L1 Expression Negative/Unknown and gBRCA Wildtype, or Not a   Candidate for Immunotherapy/Molecular Targeted Therapy  Therapeutic Status: Distant Metastases  HER2 Status: Negative (-)  ER Status: Negative (-)  AL Status: Negative (-)  Therapy Approach Indicated: Standard Chemotherapy/Endocrine Therapy  Line of Therapy: First Line  Intent of Therapy:  Non-Curative / Palliative Intent, Discussed with Patient

## 2024-08-14 ENCOUNTER — DOCUMENTATION ONLY (OUTPATIENT)
Dept: HEMATOLOGY/ONCOLOGY | Facility: CLINIC | Age: 64
End: 2024-08-14
Payer: MEDICAID

## 2024-08-14 ENCOUNTER — LAB VISIT (OUTPATIENT)
Dept: LAB | Facility: HOSPITAL | Age: 64
End: 2024-08-14
Attending: INTERNAL MEDICINE
Payer: MEDICAID

## 2024-08-14 DIAGNOSIS — C50.412 PRIMARY MALIGNANT NEOPLASM OF UPPER OUTER QUADRANT OF BREAST, LEFT: ICD-10-CM

## 2024-08-14 DIAGNOSIS — C78.2 SECONDARY MALIGNANCY OF PARIETAL PLEURA: Primary | ICD-10-CM

## 2024-08-14 LAB — TEMPUS BLOOD ADD-ON: NORMAL

## 2024-08-14 PROCEDURE — 36415 COLL VENOUS BLD VENIPUNCTURE: CPT | Performed by: INTERNAL MEDICINE

## 2024-08-14 NOTE — PROGRESS NOTES
After consulting with family pt decided to start tx with [facility] as soon as possible. Contacted pt to go over scheduled appts for beginning infusion. Pt will receive drug ed 8/21 at 1pm at [facility]/ Next Friday 8/23 the pt will meet with Dr Hoffmann to sign consent and  review labs. She will  have infusion PACLITAXEL following appt at 11am @ [facility]. Pt verbalized understanding of all places, dates and times. Will contact ONN for any further questions or concerns. ONN will remain available.   Oncology Navigation   Intake  Date of Diagnosis: 10/10/23  Cancer Type: Breast  Type of Referral: Internal  Date of Referral: 07/31/24  Initial Nurse Navigator Contact: 07/31/24  Referral to Initial Contact Timeline (days): 0  First Appointment Available: 08/09/24  Appointment Date: 08/09/24  First Available Date vs. Scheduled Date (days): 0  Multiple appointments: No  Start of Treatment: 08/23/24     Treatment  Current Status: Staging work-up       Medical Oncologist: Yaya Hoffmann       Procedures: MRI; PET scan  MRI Schedule Date: 08/29/24  PET Scan Schedule Date: 08/22/24    General Referrals: Palliative Care; Social Work    ER: Negative  DC: Negative  Her2: Negative       Support Systems: Spouse/significant other; Family members     Acuity      Follow Up  No follow-ups on file.

## 2024-08-19 ENCOUNTER — TELEPHONE (OUTPATIENT)
Dept: HEMATOLOGY/ONCOLOGY | Facility: CLINIC | Age: 64
End: 2024-08-19
Payer: MEDICAID

## 2024-08-19 DIAGNOSIS — C50.412 PRIMARY MALIGNANT NEOPLASM OF UPPER OUTER QUADRANT OF BREAST, LEFT: ICD-10-CM

## 2024-08-19 DIAGNOSIS — C78.2 SECONDARY MALIGNANCY OF PARIETAL PLEURA: Primary | ICD-10-CM

## 2024-08-19 NOTE — TELEPHONE ENCOUNTER
[facility] representative contacted [facility] to inform of pt's enrollment in their HH and will be enrolled this week in their palliative program. SW will remain available.

## 2024-08-21 ENCOUNTER — DOCUMENTATION ONLY (OUTPATIENT)
Dept: HEMATOLOGY/ONCOLOGY | Facility: CLINIC | Age: 64
End: 2024-08-21
Payer: MEDICAID

## 2024-08-21 DIAGNOSIS — C78.2 SECONDARY MALIGNANCY OF PARIETAL PLEURA: Primary | ICD-10-CM

## 2024-08-21 RX ORDER — LIDOCAINE AND PRILOCAINE 25; 25 MG/G; MG/G
CREAM TOPICAL
Qty: 5 G | Refills: 11 | Status: SHIPPED | OUTPATIENT
Start: 2024-08-21

## 2024-08-21 RX ORDER — ONDANSETRON 8 MG/1
8 TABLET, ORALLY DISINTEGRATING ORAL 2 TIMES DAILY
Qty: 30 TABLET | Refills: 11 | Status: SHIPPED | OUTPATIENT
Start: 2024-08-21

## 2024-08-21 NOTE — PROGRESS NOTES
Met with patient to discuss upcoming systemic therapy initiation.  Discussed patient diagnosis including staging information. Also discussed that therapy regimen prescribed involves the following drugs:  Taxol and timeline of therapy administration. Went over what to expect on first day of treatment, including what to bring with you, visitor policy, and infusion suite guidelines. Also discussed supportive and shared services available to patient, including social work, financial counseling, oncology nutrition, and oncology psychology.      Covered with patient potential side effects and symptom management. Reviewed supportive medications that will be given before, during, and after treatment. Also stressed that other side effects are possible outside of those listed. Spent additional time on signs of infection, infection prevention, and proper nutrition/hydration.    Education provided to patient  via [employer] specific drug information and chemotherapy education binder. Also provided contact information for clinic and information related to [facility] communication. Discussed communication process for after-hours needs and some common scenarios in which patient should call provider for guidance vs. immediately report to the emergency room.     Finally, patient was given my contact information. Encouraged patient to call with any questions, concerns, or needs. Patient verbalized understanding of all above information.

## 2024-08-22 ENCOUNTER — TELEPHONE (OUTPATIENT)
Dept: HEMATOLOGY/ONCOLOGY | Facility: CLINIC | Age: 64
End: 2024-08-22
Payer: MEDICAID

## 2024-08-22 DIAGNOSIS — C78.2 SECONDARY MALIGNANCY OF PARIETAL PLEURA: Primary | ICD-10-CM

## 2024-08-22 RX ORDER — HYDROCODONE BITARTRATE AND ACETAMINOPHEN 10; 325 MG/1; MG/1
1 TABLET ORAL EVERY 4 HOURS PRN
Qty: 40 TABLET | Refills: 0 | Status: SHIPPED | OUTPATIENT
Start: 2024-08-22

## 2024-08-22 RX ORDER — HYDROCODONE BITARTRATE AND ACETAMINOPHEN 10; 325 MG/1; MG/1
1 TABLET ORAL EVERY 8 HOURS PRN
COMMUNITY
Start: 2024-08-13 | End: 2024-08-22 | Stop reason: SDUPTHER

## 2024-08-22 NOTE — TELEPHONE ENCOUNTER
Pt contacted ONN to request refill of NORCO 10 by Dr Hoffmann. Dr Hoffmann sent scrip to pt preferred pharmacy. Pt Notified refill called in.

## 2024-08-23 ENCOUNTER — OFFICE VISIT (OUTPATIENT)
Dept: HEMATOLOGY/ONCOLOGY | Facility: CLINIC | Age: 64
End: 2024-08-23
Payer: MEDICAID

## 2024-08-23 ENCOUNTER — DOCUMENTATION ONLY (OUTPATIENT)
Dept: NUTRITION | Facility: CLINIC | Age: 64
End: 2024-08-23
Payer: MEDICAID

## 2024-08-23 ENCOUNTER — INFUSION (OUTPATIENT)
Dept: INFUSION THERAPY | Facility: HOSPITAL | Age: 64
End: 2024-08-23
Attending: INTERNAL MEDICINE
Payer: MEDICAID

## 2024-08-23 VITALS
HEART RATE: 87 BPM | SYSTOLIC BLOOD PRESSURE: 145 MMHG | RESPIRATION RATE: 17 BRPM | DIASTOLIC BLOOD PRESSURE: 71 MMHG | OXYGEN SATURATION: 98 % | WEIGHT: 189.06 LBS | HEIGHT: 60 IN | TEMPERATURE: 99 F | BODY MASS INDEX: 37.12 KG/M2

## 2024-08-23 VITALS
HEIGHT: 60 IN | OXYGEN SATURATION: 97 % | DIASTOLIC BLOOD PRESSURE: 59 MMHG | WEIGHT: 189.06 LBS | HEART RATE: 88 BPM | TEMPERATURE: 97 F | BODY MASS INDEX: 37.12 KG/M2 | SYSTOLIC BLOOD PRESSURE: 139 MMHG

## 2024-08-23 DIAGNOSIS — C50.412 PRIMARY MALIGNANT NEOPLASM OF UPPER OUTER QUADRANT OF BREAST, LEFT: Primary | ICD-10-CM

## 2024-08-23 DIAGNOSIS — Z79.899 IMMUNODEFICIENCY DUE TO CHEMOTHERAPY: ICD-10-CM

## 2024-08-23 DIAGNOSIS — C78.2 SECONDARY MALIGNANCY OF PARIETAL PLEURA: ICD-10-CM

## 2024-08-23 DIAGNOSIS — T45.1X5A IMMUNODEFICIENCY DUE TO CHEMOTHERAPY: ICD-10-CM

## 2024-08-23 DIAGNOSIS — D84.821 IMMUNODEFICIENCY DUE TO CHEMOTHERAPY: ICD-10-CM

## 2024-08-23 PROBLEM — Z79.69 IMMUNODEFICIENCY DUE TO CHEMOTHERAPY: Status: ACTIVE | Noted: 2024-08-23

## 2024-08-23 PROCEDURE — 99215 OFFICE O/P EST HI 40 MIN: CPT | Mod: PBBFAC | Performed by: INTERNAL MEDICINE

## 2024-08-23 PROCEDURE — 96367 TX/PROPH/DG ADDL SEQ IV INF: CPT

## 2024-08-23 PROCEDURE — 99999 PR PBB SHADOW E&M-EST. PATIENT-LVL V: CPT | Mod: PBBFAC,,, | Performed by: INTERNAL MEDICINE

## 2024-08-23 PROCEDURE — 63600175 PHARM REV CODE 636 W HCPCS: Performed by: INTERNAL MEDICINE

## 2024-08-23 PROCEDURE — 96368 THER/DIAG CONCURRENT INF: CPT

## 2024-08-23 PROCEDURE — 25000003 PHARM REV CODE 250: Performed by: INTERNAL MEDICINE

## 2024-08-23 PROCEDURE — 96413 CHEMO IV INFUSION 1 HR: CPT

## 2024-08-23 RX ORDER — PROCHLORPERAZINE EDISYLATE 5 MG/ML
10 INJECTION INTRAMUSCULAR; INTRAVENOUS ONCE AS NEEDED
Status: CANCELLED
Start: 2024-08-23

## 2024-08-23 RX ORDER — EPINEPHRINE 0.3 MG/.3ML
0.3 INJECTION SUBCUTANEOUS ONCE AS NEEDED
OUTPATIENT
Start: 2024-08-31

## 2024-08-23 RX ORDER — FAMOTIDINE 10 MG/ML
20 INJECTION INTRAVENOUS
Status: CANCELLED | OUTPATIENT
Start: 2024-08-23

## 2024-08-23 RX ORDER — DIPHENHYDRAMINE HYDROCHLORIDE 50 MG/ML
50 INJECTION INTRAMUSCULAR; INTRAVENOUS ONCE AS NEEDED
OUTPATIENT
Start: 2024-08-24

## 2024-08-23 RX ORDER — EPINEPHRINE 0.3 MG/.3ML
0.3 INJECTION SUBCUTANEOUS ONCE AS NEEDED
Status: DISCONTINUED | OUTPATIENT
Start: 2024-08-23 | End: 2024-08-23 | Stop reason: HOSPADM

## 2024-08-23 RX ORDER — EPINEPHRINE 0.3 MG/.3ML
0.3 INJECTION SUBCUTANEOUS ONCE AS NEEDED
Status: CANCELLED | OUTPATIENT
Start: 2024-08-23

## 2024-08-23 RX ORDER — PROCHLORPERAZINE EDISYLATE 5 MG/ML
10 INJECTION INTRAMUSCULAR; INTRAVENOUS ONCE AS NEEDED
Start: 2024-08-24

## 2024-08-23 RX ORDER — SODIUM CHLORIDE 0.9 % (FLUSH) 0.9 %
10 SYRINGE (ML) INJECTION
OUTPATIENT
Start: 2024-08-24

## 2024-08-23 RX ORDER — HEPARIN 100 UNIT/ML
500 SYRINGE INTRAVENOUS
Status: CANCELLED | OUTPATIENT
Start: 2024-08-23

## 2024-08-23 RX ORDER — EPINEPHRINE 0.3 MG/.3ML
0.3 INJECTION SUBCUTANEOUS ONCE AS NEEDED
OUTPATIENT
Start: 2024-08-24

## 2024-08-23 RX ORDER — SODIUM CHLORIDE 0.9 % (FLUSH) 0.9 %
10 SYRINGE (ML) INJECTION
Status: CANCELLED | OUTPATIENT
Start: 2024-08-23

## 2024-08-23 RX ORDER — SODIUM CHLORIDE 0.9 % (FLUSH) 0.9 %
10 SYRINGE (ML) INJECTION
Status: DISCONTINUED | OUTPATIENT
Start: 2024-08-23 | End: 2024-08-23 | Stop reason: HOSPADM

## 2024-08-23 RX ORDER — FAMOTIDINE 10 MG/ML
20 INJECTION INTRAVENOUS
Status: COMPLETED | OUTPATIENT
Start: 2024-08-23 | End: 2024-08-23

## 2024-08-23 RX ORDER — DIPHENHYDRAMINE HYDROCHLORIDE 50 MG/ML
50 INJECTION INTRAMUSCULAR; INTRAVENOUS ONCE AS NEEDED
Status: CANCELLED | OUTPATIENT
Start: 2024-08-23

## 2024-08-23 RX ORDER — HEPARIN 100 UNIT/ML
500 SYRINGE INTRAVENOUS
OUTPATIENT
Start: 2024-08-31

## 2024-08-23 RX ORDER — DIPHENHYDRAMINE HYDROCHLORIDE 50 MG/ML
50 INJECTION INTRAMUSCULAR; INTRAVENOUS ONCE AS NEEDED
OUTPATIENT
Start: 2024-08-31

## 2024-08-23 RX ORDER — PROCHLORPERAZINE EDISYLATE 5 MG/ML
10 INJECTION INTRAMUSCULAR; INTRAVENOUS ONCE AS NEEDED
Start: 2024-08-31

## 2024-08-23 RX ORDER — SODIUM CHLORIDE 0.9 % (FLUSH) 0.9 %
10 SYRINGE (ML) INJECTION
OUTPATIENT
Start: 2024-08-31

## 2024-08-23 RX ORDER — DIPHENHYDRAMINE HYDROCHLORIDE 50 MG/ML
50 INJECTION INTRAMUSCULAR; INTRAVENOUS ONCE AS NEEDED
Status: DISCONTINUED | OUTPATIENT
Start: 2024-08-23 | End: 2024-08-23 | Stop reason: HOSPADM

## 2024-08-23 RX ORDER — FAMOTIDINE 10 MG/ML
20 INJECTION INTRAVENOUS
OUTPATIENT
Start: 2024-08-24

## 2024-08-23 RX ORDER — HEPARIN 100 UNIT/ML
500 SYRINGE INTRAVENOUS
Status: DISCONTINUED | OUTPATIENT
Start: 2024-08-23 | End: 2024-08-23 | Stop reason: HOSPADM

## 2024-08-23 RX ORDER — FAMOTIDINE 10 MG/ML
20 INJECTION INTRAVENOUS
OUTPATIENT
Start: 2024-08-31

## 2024-08-23 RX ORDER — HEPARIN 100 UNIT/ML
500 SYRINGE INTRAVENOUS
OUTPATIENT
Start: 2024-08-24

## 2024-08-23 RX ADMIN — DEXAMETHASONE SODIUM PHOSPHATE 10 MG: 4 INJECTION, SOLUTION INTRA-ARTICULAR; INTRALESIONAL; INTRAMUSCULAR; INTRAVENOUS; SOFT TISSUE at 11:08

## 2024-08-23 RX ADMIN — FAMOTIDINE 20 MG: 10 INJECTION, SOLUTION INTRAVENOUS at 11:08

## 2024-08-23 RX ADMIN — PACLITAXEL 156 MG: 6 INJECTION, SOLUTION INTRAVENOUS at 12:08

## 2024-08-23 RX ADMIN — DIPHENHYDRAMINE HYDROCHLORIDE 50 MG: 50 INJECTION, SOLUTION INTRAMUSCULAR; INTRAVENOUS at 11:08

## 2024-08-23 RX ADMIN — HEPARIN 500 UNITS: 100 SYRINGE at 01:08

## 2024-08-23 NOTE — PROGRESS NOTES
Subjective:       Patient ID: Sherlyn Saavedra is a 64 y.o. female.    Chief Complaint: Results, Chemotherapy, and Breast Cancer    HPI:  64-year-old male female with relapsed triple negative breast carcinoma.  The patient presents today for initiation of cycle 1 day 1 with Taxol patient's imaging was not completed insurance denied PET scan CT and bone scan being ordered daughter on phone ECOG status 1    Past Medical History:   Diagnosis Date    Allergy     Anemia     Breast cancer     Diabetes mellitus     HLD (hyperlipidemia) 2015    Hyperlipidemia (Problem)      Hypertension     Primary malignant neoplasm of upper outer quadrant of breast, left 10/10/2023    Secondary malignancy of parietal pleura 2024    Type 2 diabetes mellitus with diabetic polyneuropathy, without long-term current use of insulin 2015    Formatting of this note might be different from the original.   dx in 40's; has some neuropathic symptoms in right LE; (uncertain if from DMII)  Diabetes mellitus type 2 (Problem)       No family history on file.  Social History     Socioeconomic History    Marital status: Single   Tobacco Use    Smoking status: Former     Current packs/day: 0.00     Types: Cigarettes     Quit date:      Years since quittin.6    Smokeless tobacco: Never   Substance and Sexual Activity    Alcohol use: Not Currently    Drug use: Never     Social Determinants of Health     Financial Resource Strain: Low Risk  (2024)    Received from [employer] [facility] and Its Subsidiaries and Affiliates    Overall Financial Resource Strain (CARDIA)     Difficulty of Paying Living Expenses: Not hard at all   Food Insecurity: No Food Insecurity (2024)    Received from [employer] [facility] and Its Subsidiaries and Affiliates    Hunger Vital Sign     Worried About Running Out of Food in the Last Year: Never true     Ran Out of Food in the Last Year: Never true  "  Transportation Needs: No Transportation Needs (8/5/2024)    Received from [facility] and Its Subsidiaries and Affiliates    PRAPARE - Transportation     Lack of Transportation (Medical): No     Lack of Transportation (Non-Medical): No   Housing Stability: Unknown (8/5/2024)    [facility]    Housing Stability Vital Sign     Unable to Pay for Housing in the Last Year: No     Homeless in the Last Year: No     Past Surgical History:   Procedure Laterality Date    HYSTERECTOMY      tubaligation  1987       Labs:  Lab Results   Component Value Date    WBC 5.54 08/23/2024    HGB 8.6 (L) 08/23/2024    HCT 28.9 (L) 08/23/2024    MCV 97 08/23/2024     08/23/2024     BMP  Lab Results   Component Value Date     08/23/2024    K 4.0 08/23/2024     08/23/2024    CO2 26 08/23/2024    BUN 14 08/23/2024    CREATININE 0.8 08/23/2024    CALCIUM 9.9 08/23/2024    ANIONGAP 10 08/23/2024     Lab Results   Component Value Date    ALT 17 08/23/2024    AST 32 08/23/2024    ALKPHOS 91 08/23/2024    BILITOT 0.3 08/23/2024       Lab Results   Component Value Date    IRON 82 08/14/2023    TIBC 306 08/14/2023    FERRITIN 746 (H) 08/14/2023     No results found for: "XCHGHAQP15"  Lab Results   Component Value Date    FOLATE 4.1 (L) 07/23/2024     Lab Results   Component Value Date    TSH 2.500 08/14/2023         Review of Systems   Constitutional:  Positive for fatigue. Negative for activity change, appetite change, chills, diaphoresis, fever and unexpected weight change.   HENT:  Negative for congestion, dental problem, drooling, ear discharge, ear pain, facial swelling, hearing loss, mouth sores, nosebleeds, postnasal drip, rhinorrhea, sinus pressure, sneezing, sore throat, tinnitus, trouble swallowing and voice change.    Eyes:  Negative for photophobia, pain, discharge, redness, itching and visual " disturbance.   Respiratory:  Negative for cough, choking, chest tightness, shortness of breath, wheezing and stridor.    Cardiovascular:  Negative for chest pain, palpitations and leg swelling.   Gastrointestinal:  Negative for abdominal distention, abdominal pain, anal bleeding, blood in stool, constipation, diarrhea, nausea, rectal pain and vomiting.   Endocrine: Negative for cold intolerance, heat intolerance, polydipsia, polyphagia and polyuria.   Genitourinary:  Negative for decreased urine volume, difficulty urinating, dyspareunia, dysuria, enuresis, flank pain, frequency, genital sores, hematuria, menstrual problem, pelvic pain, urgency, vaginal bleeding, vaginal discharge and vaginal pain.   Musculoskeletal:  Negative for arthralgias, back pain, gait problem, joint swelling, myalgias, neck pain and neck stiffness.   Skin:  Negative for color change, pallor and rash.   Allergic/Immunologic: Negative for environmental allergies, food allergies and immunocompromised state.   Neurological:  Positive for weakness. Negative for dizziness, tremors, seizures, syncope, facial asymmetry, speech difficulty, light-headedness, numbness and headaches.   Hematological:  Negative for adenopathy. Does not bruise/bleed easily.   Psychiatric/Behavioral:  Positive for dysphoric mood. Negative for agitation, behavioral problems, confusion, decreased concentration, hallucinations, self-injury, sleep disturbance and suicidal ideas. The patient is nervous/anxious. The patient is not hyperactive.        Objective:      Physical Exam  Vitals reviewed.   Constitutional:       General: She is not in acute distress.     Appearance: She is well-developed. She is ill-appearing. She is not diaphoretic.   HENT:      Head: Normocephalic and atraumatic.      Right Ear: External ear normal.      Left Ear: External ear normal.      Nose: Nose normal.      Right Sinus: No maxillary sinus tenderness or frontal sinus tenderness.      Left Sinus:  No maxillary sinus tenderness or frontal sinus tenderness.      Mouth/Throat:      Pharynx: No oropharyngeal exudate.   Eyes:      General: Lids are normal. No scleral icterus.        Right eye: No discharge.         Left eye: No discharge.      Conjunctiva/sclera: Conjunctivae normal.      Right eye: Right conjunctiva is not injected. No hemorrhage.     Left eye: Left conjunctiva is not injected. No hemorrhage.     Pupils: Pupils are equal, round, and reactive to light.   Neck:      Thyroid: No thyromegaly.      Vascular: No JVD.      Trachea: No tracheal deviation.   Cardiovascular:      Rate and Rhythm: Normal rate.   Pulmonary:      Effort: Pulmonary effort is normal. No respiratory distress.      Breath sounds: No stridor.   Chest:      Chest wall: No tenderness.   Abdominal:      General: Bowel sounds are normal. There is no distension.      Palpations: Abdomen is soft. There is no hepatomegaly, splenomegaly or mass.      Tenderness: There is no abdominal tenderness. There is no rebound.   Musculoskeletal:         General: No tenderness. Normal range of motion.      Cervical back: Normal range of motion and neck supple.   Lymphadenopathy:      Cervical: No cervical adenopathy.      Upper Body:      Right upper body: No supraclavicular adenopathy.      Left upper body: No supraclavicular adenopathy.   Skin:     General: Skin is dry.      Findings: No erythema or rash.   Neurological:      Mental Status: She is alert and oriented to person, place, and time.      Cranial Nerves: No cranial nerve deficit.      Motor: Weakness present.      Coordination: Coordination abnormal.      Gait: Gait abnormal.   Psychiatric:         Behavior: Behavior normal.         Thought Content: Thought content normal.         Judgment: Judgment normal.             Assessment:      1. Primary malignant neoplasm of upper outer quadrant of breast, left    2. Secondary malignancy of parietal pleura    3. Immunodeficiency due to  chemotherapy           Med Onc Chart Routing      Follow up with physician . Return to clinic in 1 week for cycle 1 day 8.  Can be seen by [facility] CBC CMP   Follow up with SANTIAGO . I would like to see if possible on cycle 1 day 15 with CBC CMP; please put head or node to discuss germ line testing   Infusion scheduling note    Injection scheduling note    Labs    Imaging   If patient can have imaging studies move followed that would be good otherwise proceed with next week   Pharmacy appointment    Other referrals         Palliative care              Plan:     Extensive conversation with patient and daughter by phone.  At this time initiation of cycle 1 day 1 imaging studies initially denied by her insurance company had to switch from PET scan to bone scan as well as CT chest abdomen pelvis variant as new baseline initiation of treatment today consent for chemotherapy signedConsented the patient to the treatment plan and the patient was educated on the planned duration of the treatment and schedule of the treatment administration.  Referral for advanced care planning made againAdvance Care Planning              Yaya Hoffmann Jr, MD FACP

## 2024-08-23 NOTE — PROGRESS NOTES
Introduced myself to new oncology patient in infusion. Gave my dietitian contact information along with guidance on when to contact me about an appointment, for concerns like significant loss of appetite and weight loss. She asked if she qualified for Boost with Cancer Services. I checked, and she had a 6% weight loss in 6 months so did not qualify. Will remain available.  Signature: Renetta Polanco, MPH, RD, LDN

## 2024-08-26 ENCOUNTER — TELEPHONE (OUTPATIENT)
Dept: HEMATOLOGY/ONCOLOGY | Facility: CLINIC | Age: 64
End: 2024-08-26
Payer: MEDICAID

## 2024-08-26 NOTE — TELEPHONE ENCOUNTER
Pt called to review schedule for SANTIAGO appt and labs. ONN verified place and times for labs and and SANTIAGO visit and place and time for infusion on Friday. Pt verbalized understanding.

## 2024-08-27 NOTE — PROGRESS NOTES
Subjective:       Patient ID: Sherlyn Saavedra is a 64 y.o. female.    Chief Complaint: Breast Cancer and Chemotherapy    Primary Oncologist/Hematologist: Dr. Hoffmann    HPI: Ms. Saavedra is a 64 year old female who is following up for her relapsed triple negative breast carcinoma. She is here for cycle 1 day 8 (3 weeks on and 1 week off).   Pending Ct smith and bone scan due to insurance denying pet scan.   Cancer Hx: diagnosed in  with triple negative breast cancer patient treated with induction chemotherapy consisting of carboplatin Taxol cyclophosphamide doxorubicin and Keytruda patient had residual disease at time of resection. In treated with Xeloda with primary breast irradiation patient has had increasing chest pain shortness of breath patient underwent imaging demonstrating a pleural effusion on the left. Recent biopsy consistent with metastatic breast carcinoma     Today: Patient states her first cycle went well. She has had some pain but controlled with norco. She has been fatigued as well. She denies any fevers, illnesses, n/v/d/c. She states her appetite is good and she has been staying hydrated. She has Bone scan later today and CT and MRI tomorrow.     Social History     Socioeconomic History    Marital status: Single   Tobacco Use    Smoking status: Former     Current packs/day: 0.00     Types: Cigarettes     Quit date:      Years since quittin.6    Smokeless tobacco: Never   Substance and Sexual Activity    Alcohol use: Not Currently    Drug use: Never     Social Determinants of Health     Financial Resource Strain: Low Risk  (2024)    Received from [facility] and Its Subsidiaries and Affiliates    Overall Financial Resource Strain (CARDIA)     Difficulty of Paying Living Expenses: Not hard at all   Food Insecurity: No Food Insecurity (2024)    Received from [facility] and Its Subsidiaries and Affiliates     Hunger Vital Sign     Worried About Running Out of Food in the Last Year: Never true     Ran Out of Food in the Last Year: Never true   Transportation Needs: No Transportation Needs (8/5/2024)    Received from [facility] and Its Subsidiaries and Affiliates    PRAPARE - Transportation     Lack of Transportation (Medical): No     Lack of Transportation (Non-Medical): No   Housing Stability: Unknown (8/5/2024)    [facility]    Housing Stability Vital Sign     Unable to Pay for Housing in the Last Year: No     Homeless in the Last Year: No       Past Medical History:   Diagnosis Date    Allergy     Anemia     Breast cancer     Diabetes mellitus     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   dx in 40's; has some neuropathic symptoms in right LE; (uncertain if from DMII)  Diabetes mellitus type 2 (Problem)         No family history on file.    Past Surgical History:   Procedure Laterality Date    HYSTERECTOMY      tubaligation  1987       Review of Systems   Constitutional:  Positive for fatigue. Negative for activity change, appetite change, chills, diaphoresis, fever and unexpected weight change.   HENT:  Negative for congestion, nosebleeds and rhinorrhea.    Respiratory:  Negative for cough and shortness of breath.    Cardiovascular:  Negative for chest pain and leg swelling.   Gastrointestinal:  Negative for abdominal pain, anal bleeding, blood in stool, constipation, diarrhea, nausea and vomiting.   Genitourinary:  Negative for hematuria.    Musculoskeletal:  Positive for arthralgias.   Skin:  Negative for color change and pallor.   Allergic/Immunologic: Positive for immunocompromised state.   Neurological:  Negative for dizziness and headaches.         Medication List with Changes/Refills   Current Medications    AMLODIPINE (NORVASC) 5 MG TABLET    Take 5 mg by mouth once daily.    ATORVASTATIN (LIPITOR) 40 MG TABLET    Take 40 mg by mouth.    BLOOD SUGAR DIAGNOSTIC (FREESTYLE TEST) STRP    Test 4 times per day    CAPECITABINE (XELODA) 500 MG TAB    Take by mouth 2 (two) times daily.    FAMOTIDINE (PEPCID) 20 MG TABLET    Take 20 mg by mouth.    FOLIC ACID (FOLVITE) 1 MG TABLET    Take 1 mg by mouth once daily.    GABAPENTIN (NEURONTIN) 300 MG CAPSULE    Take 300 mg by mouth.    HYDROCODONE-ACETAMINOPHEN (NORCO)  MG PER TABLET    Take 1 tablet by mouth every 4 (four) hours as needed for Pain.    LIDOCAINE-PRILOCAINE (EMLA) CREAM    Apply to affected area once    LISINOPRIL-HYDROCHLOROTHIAZIDE (PRINZIDE,ZESTORETIC) 20-25 MG TAB    Take 1 tablet by mouth every morning.    MAGNESIUM OXIDE (MAG-OX) 400 MG (241.3 MG MAGNESIUM) TABLET    Take 400 mg by mouth once daily.    METFORMIN (GLUCOPHAGE-XR) 500 MG ER 24HR TABLET    Take 1,000 mg by mouth 2 (two) times daily.    ONDANSETRON (ZOFRAN-ODT) 8 MG TBDL    DISSOLVE ONE TABLET UNDER THE TONGUE EVERY 8 HOURS AS NEEDED FOR NAUSEA    ONDANSETRON (ZOFRAN-ODT) 8 MG TBDL    Take 1 tablet (8 mg total) by mouth 2 (two) times daily.    PANTOPRAZOLE (PROTONIX) 40 MG TABLET    Take 40 mg by mouth.    PROCHLORPERAZINE (COMPAZINE) 10 MG TABLET    Take 10 mg by mouth.    TIRZEPATIDE (MOUNJARO) 2.5 MG/0.5 ML PNIJ    Inject 2.5 mg into the skin every 7 days.    TIZANIDINE (ZANAFLEX) 4 MG TABLET         Objective:     Vitals:    08/28/24 1025   BP: 117/63   Pulse: 79   Temp: 98 °F (36.7 °C)     Physical Exam  Vitals reviewed.   Constitutional:       General: She is not in acute distress.     Appearance: She is not  ill-appearing, toxic-appearing or diaphoretic.   HENT:      Head: Normocephalic and atraumatic.   Cardiovascular:      Rate and Rhythm: Normal rate.      Pulses: Normal pulses.   Musculoskeletal:      Right lower leg: No edema.      Left lower leg: No edema.   Skin:     General: Skin is warm.      Coloration: Skin is not jaundiced or pale.      Findings: No bruising, erythema, lesion or rash.   Neurological:      Mental Status: She is alert.      Motor: No weakness.      Gait: Gait normal.   Psychiatric:         Mood and Affect: Mood normal.         Behavior: Behavior normal.         Thought Content: Thought content normal.          Labs/Results:  Lab Results   Component Value Date    WBC 4.42 08/28/2024    RBC 3.16 (L) 08/28/2024    HGB 9.0 (L) 08/28/2024    HCT 29.9 (L) 08/28/2024    MCV 95 08/28/2024    MCH 28.5 08/28/2024    MCHC 30.1 (L) 08/28/2024    RDW 14.3 08/28/2024     08/28/2024    MPV 9.5 08/28/2024    GRAN 2.9 08/28/2024    GRAN 66.5 08/28/2024    LYMPH 1.0 08/28/2024    LYMPH 23.3 08/28/2024    MONO 0.2 (L) 08/28/2024    MONO 4.3 08/28/2024    EOS 0.2 08/28/2024    BASO 0.02 08/28/2024    EOSINOPHIL 5.2 08/28/2024    BASOPHIL 0.5 08/28/2024     CMP  Sodium   Date Value Ref Range Status   08/28/2024 142 136 - 145 mmol/L Final     Potassium   Date Value Ref Range Status   08/28/2024 4.1 3.5 - 5.1 mmol/L Final     Chloride   Date Value Ref Range Status   08/28/2024 106 95 - 110 mmol/L Final     CO2   Date Value Ref Range Status   08/28/2024 25 23 - 29 mmol/L Final     Glucose   Date Value Ref Range Status   08/28/2024 73 70 - 110 mg/dL Final     BUN   Date Value Ref Range Status   08/28/2024 15 8 - 23 mg/dL Final     Creatinine   Date Value Ref Range Status   08/28/2024 0.8 0.5 - 1.4 mg/dL Final     Calcium   Date Value Ref Range Status   08/28/2024 9.8 8.7 - 10.5 mg/dL Final     Total Protein   Date Value Ref Range Status   08/28/2024 7.6 6.0 - 8.4 g/dL Final     Albumin   Date Value Ref Range  Status   08/28/2024 4.0 3.5 - 5.2 g/dL Final     Total Bilirubin   Date Value Ref Range Status   08/28/2024 0.4 0.1 - 1.0 mg/dL Final     Comment:     For infants and newborns, interpretation of results should be based  on gestational age, weight and in agreement with clinical  observations.    Premature Infant recommended reference ranges:  Up to 24 hours.............<8.0 mg/dL  Up to 48 hours............<12.0 mg/dL  3-5 days..................<15.0 mg/dL  6-29 days.................<15.0 mg/dL       Alkaline Phosphatase   Date Value Ref Range Status   08/28/2024 84 55 - 135 U/L Final     AST   Date Value Ref Range Status   08/28/2024 40 10 - 40 U/L Final     ALT   Date Value Ref Range Status   08/28/2024 30 10 - 44 U/L Final     Anion Gap   Date Value Ref Range Status   08/28/2024 11 8 - 16 mmol/L Final     eGFR   Date Value Ref Range Status   08/28/2024 >60 >60 mL/min/1.73 m^2 Final       Assessment:     Problem List Items Addressed This Visit          Immunology/Multi System    Immunodeficiency due to chemotherapy       Oncology    Primary malignant neoplasm of upper outer quadrant of breast, left - Primary    Secondary malignancy of parietal pleura     Plan:     Primary malignant neoplasm of upper outer quadrant of breast, left, Secondary malignancy of parietal pleura  --continue with cycle 1 day 8 of taxol weeks (3 weeks on and 1 week off)  --pending baseline CT and bone scan since pet scan was denied  --ANC:2.9, plts:295, tbili:0.4, AST:40, ALT:30, alk phos:84     Follow-Up: 1 week with cbc cmp prior for cycle 1 day 15 with primary oncoliogist to discuss germ line testing    Whitley Galarza PA-C  Hematology Oncology    Route Chart for Scheduling    Med Onc Chart Routing      Follow up with physician . 1 week with cbc cmp prior for cycle 1 day 15-discuss germline testing ([place] location)   Follow up with SANTIAGO    Infusion scheduling note   1 week for cycle 1 day 15 ([place] location)   Injection scheduling note     Labs CBC and CMP   Scheduling:  Preferred lab:  Lab interval:  standing orders in   Imaging    Pharmacy appointment    Other referrals                  Treatment Plan Information   OP PACLITAXEL 3 WEEKS ON, 1 WEEK OFF Yaya Hoffmann MD   Associated diagnosis: Primary malignant neoplasm of upper outer quadrant of breast, left No Stage Recommended ypT2, pN0, cM0, G3, ER-, AK-, HER2- noted on 10/10/2023   Line of treatment: First Line  Treatment Goal: Control     Upcoming Treatment Dates - OP PACLITAXEL 3 WEEKS ON, 1 WEEK OFF    8/24/2024       Pre-Medications       diphenhydrAMINE (BENADRYL) 50 mg in 0.9% NaCl 50 mL IVPB       dexAMETHasone (DECADRON) 10 mg in 0.9% NaCl 50 mL IVPB       famotidine (PF) injection 20 mg       Chemotherapy       PACLitaxeL (TAXOL) 80 mg/m2 = 156 mg in 0.9% NaCl 250 mL chemo infusion  8/31/2024       Pre-Medications       diphenhydrAMINE (BENADRYL) 50 mg in 0.9% NaCl 50 mL IVPB       dexAMETHasone (DECADRON) 10 mg in 0.9% NaCl 50 mL IVPB       famotidine (PF) injection 20 mg       Chemotherapy       PACLitaxeL (TAXOL) 80 mg/m2 = 156 mg in 0.9% NaCl 250 mL chemo infusion  9/14/2024       Pre-Medications       diphenhydrAMINE (BENADRYL) 50 mg in 0.9% NaCl 50 mL IVPB       dexAMETHasone (DECADRON) 10 mg in 0.9% NaCl 50 mL IVPB       famotidine (PF) injection 20 mg       Chemotherapy       PACLitaxeL (TAXOL) 80 mg/m2 = 156 mg in 0.9% NaCl 250 mL chemo infusion  9/21/2024       Pre-Medications       diphenhydrAMINE (BENADRYL) 50 mg in 0.9% NaCl 50 mL IVPB       dexAMETHasone (DECADRON) 10 mg in 0.9% NaCl 50 mL IVPB       famotidine (PF) injection 20 mg       Chemotherapy       PACLitaxeL (TAXOL) 80 mg/m2 = 156 mg in 0.9% NaCl 250 mL chemo infusion

## 2024-08-28 ENCOUNTER — HOSPITAL ENCOUNTER (OUTPATIENT)
Dept: RADIOLOGY | Facility: HOSPITAL | Age: 64
Discharge: HOME | End: 2024-08-28
Attending: INTERNAL MEDICINE
Payer: MEDICAID

## 2024-08-28 ENCOUNTER — OFFICE VISIT (OUTPATIENT)
Dept: HEMATOLOGY/ONCOLOGY | Facility: CLINIC | Age: 64
End: 2024-08-28
Payer: MEDICAID

## 2024-08-28 VITALS
HEART RATE: 79 BPM | WEIGHT: 189.63 LBS | TEMPERATURE: 98 F | BODY MASS INDEX: 37.23 KG/M2 | DIASTOLIC BLOOD PRESSURE: 63 MMHG | SYSTOLIC BLOOD PRESSURE: 117 MMHG | HEIGHT: 60 IN | OXYGEN SATURATION: 98 %

## 2024-08-28 DIAGNOSIS — C78.2 SECONDARY MALIGNANCY OF PARIETAL PLEURA: ICD-10-CM

## 2024-08-28 DIAGNOSIS — D84.821 IMMUNODEFICIENCY DUE TO CHEMOTHERAPY: ICD-10-CM

## 2024-08-28 DIAGNOSIS — Z79.899 IMMUNODEFICIENCY DUE TO CHEMOTHERAPY: ICD-10-CM

## 2024-08-28 DIAGNOSIS — C50.412 PRIMARY MALIGNANT NEOPLASM OF UPPER OUTER QUADRANT OF BREAST, LEFT: Primary | ICD-10-CM

## 2024-08-28 DIAGNOSIS — T45.1X5A IMMUNODEFICIENCY DUE TO CHEMOTHERAPY: ICD-10-CM

## 2024-08-28 DIAGNOSIS — C50.412 PRIMARY MALIGNANT NEOPLASM OF UPPER OUTER QUADRANT OF BREAST, LEFT: ICD-10-CM

## 2024-08-28 PROCEDURE — 3044F HG A1C LEVEL LT 7.0%: CPT | Mod: CPTII,,,

## 2024-08-28 PROCEDURE — 3074F SYST BP LT 130 MM HG: CPT | Mod: CPTII,,,

## 2024-08-28 PROCEDURE — 4010F ACE/ARB THERAPY RXD/TAKEN: CPT | Mod: CPTII,,,

## 2024-08-28 PROCEDURE — A9503 TC99M MEDRONATE: HCPCS | Performed by: INTERNAL MEDICINE

## 2024-08-28 PROCEDURE — 3078F DIAST BP <80 MM HG: CPT | Mod: CPTII,,,

## 2024-08-28 PROCEDURE — 99215 OFFICE O/P EST HI 40 MIN: CPT | Mod: S$PBB,,,

## 2024-08-28 PROCEDURE — 78306 BONE IMAGING WHOLE BODY: CPT | Mod: 26,,, | Performed by: RADIOLOGY

## 2024-08-28 PROCEDURE — 1159F MED LIST DOCD IN RCRD: CPT | Mod: CPTII,,,

## 2024-08-28 PROCEDURE — 3008F BODY MASS INDEX DOCD: CPT | Mod: CPTII,,,

## 2024-08-28 PROCEDURE — 78306 BONE IMAGING WHOLE BODY: CPT | Mod: TC

## 2024-08-28 PROCEDURE — 99214 OFFICE O/P EST MOD 30 MIN: CPT | Mod: PBBFAC,25

## 2024-08-28 PROCEDURE — 99999 PR PBB SHADOW E&M-EST. PATIENT-LVL IV: CPT | Mod: PBBFAC,,,

## 2024-08-28 RX ORDER — TC 99M MEDRONATE 20 MG/10ML
20 INJECTION, POWDER, LYOPHILIZED, FOR SOLUTION INTRAVENOUS
Status: COMPLETED | OUTPATIENT
Start: 2024-08-28 | End: 2024-08-28

## 2024-08-28 RX ADMIN — TC 99M MEDRONATE 20 MILLICURIE: 20 INJECTION, POWDER, LYOPHILIZED, FOR SOLUTION INTRAVENOUS at 10:08

## 2024-08-29 ENCOUNTER — HOSPITAL ENCOUNTER (OUTPATIENT)
Dept: RADIOLOGY | Facility: HOSPITAL | Age: 64
Discharge: HOME | End: 2024-08-29
Attending: INTERNAL MEDICINE
Payer: MEDICAID

## 2024-08-29 ENCOUNTER — PATIENT MESSAGE (OUTPATIENT)
Dept: HEMATOLOGY/ONCOLOGY | Facility: CLINIC | Age: 64
End: 2024-08-29
Payer: MEDICAID

## 2024-08-29 DIAGNOSIS — C78.2 SECONDARY MALIGNANCY OF PARIETAL PLEURA: ICD-10-CM

## 2024-08-29 DIAGNOSIS — C50.412 PRIMARY MALIGNANT NEOPLASM OF UPPER OUTER QUADRANT OF BREAST, LEFT: ICD-10-CM

## 2024-08-29 DIAGNOSIS — C79.31 SECONDARY ADENOCARCINOMA OF BRAIN: Primary | ICD-10-CM

## 2024-08-29 PROCEDURE — 71260 CT THORAX DX C+: CPT | Mod: 26,,, | Performed by: RADIOLOGY

## 2024-08-29 PROCEDURE — A9698 NON-RAD CONTRAST MATERIALNOC: HCPCS | Performed by: INTERNAL MEDICINE

## 2024-08-29 PROCEDURE — 70553 MRI BRAIN STEM W/O & W/DYE: CPT | Mod: 26,,, | Performed by: RADIOLOGY

## 2024-08-29 PROCEDURE — A9585 GADOBUTROL INJECTION: HCPCS | Performed by: INTERNAL MEDICINE

## 2024-08-29 PROCEDURE — 74177 CT ABD & PELVIS W/CONTRAST: CPT | Mod: 26,,, | Performed by: RADIOLOGY

## 2024-08-29 PROCEDURE — 25500020 PHARM REV CODE 255: Performed by: INTERNAL MEDICINE

## 2024-08-29 PROCEDURE — 74177 CT ABD & PELVIS W/CONTRAST: CPT | Mod: TC

## 2024-08-29 PROCEDURE — 70553 MRI BRAIN STEM W/O & W/DYE: CPT | Mod: TC

## 2024-08-29 RX ORDER — GADOBUTROL 604.72 MG/ML
10 INJECTION INTRAVENOUS
Status: COMPLETED | OUTPATIENT
Start: 2024-08-29 | End: 2024-08-29

## 2024-08-29 RX ADMIN — IOHEXOL 1000 ML: 12 SOLUTION ORAL at 11:08

## 2024-08-29 RX ADMIN — GADOBUTROL 8 ML: 604.72 INJECTION INTRAVENOUS at 09:08

## 2024-08-30 ENCOUNTER — DOCUMENTATION ONLY (OUTPATIENT)
Dept: HEMATOLOGY/ONCOLOGY | Facility: CLINIC | Age: 64
End: 2024-08-30
Payer: MEDICAID

## 2024-08-30 ENCOUNTER — INFUSION (OUTPATIENT)
Dept: INFUSION THERAPY | Facility: HOSPITAL | Age: 64
End: 2024-08-30
Attending: INTERNAL MEDICINE
Payer: MEDICAID

## 2024-08-30 VITALS
HEIGHT: 60 IN | RESPIRATION RATE: 16 BRPM | DIASTOLIC BLOOD PRESSURE: 79 MMHG | SYSTOLIC BLOOD PRESSURE: 152 MMHG | TEMPERATURE: 98 F | HEART RATE: 88 BPM | OXYGEN SATURATION: 100 % | BODY MASS INDEX: 37.33 KG/M2 | WEIGHT: 190.13 LBS

## 2024-08-30 DIAGNOSIS — C50.412 PRIMARY MALIGNANT NEOPLASM OF UPPER OUTER QUADRANT OF BREAST, LEFT: Primary | ICD-10-CM

## 2024-08-30 PROCEDURE — 96367 TX/PROPH/DG ADDL SEQ IV INF: CPT

## 2024-08-30 PROCEDURE — 25000003 PHARM REV CODE 250: Performed by: INTERNAL MEDICINE

## 2024-08-30 PROCEDURE — 96413 CHEMO IV INFUSION 1 HR: CPT

## 2024-08-30 PROCEDURE — 63600175 PHARM REV CODE 636 W HCPCS: Performed by: INTERNAL MEDICINE

## 2024-08-30 PROCEDURE — 96375 TX/PRO/DX INJ NEW DRUG ADDON: CPT

## 2024-08-30 PROCEDURE — A4216 STERILE WATER/SALINE, 10 ML: HCPCS | Performed by: INTERNAL MEDICINE

## 2024-08-30 RX ORDER — FAMOTIDINE 10 MG/ML
20 INJECTION INTRAVENOUS
Status: COMPLETED | OUTPATIENT
Start: 2024-08-30 | End: 2024-08-30

## 2024-08-30 RX ORDER — HEPARIN 100 UNIT/ML
500 SYRINGE INTRAVENOUS
Status: DISCONTINUED | OUTPATIENT
Start: 2024-08-30 | End: 2024-08-30 | Stop reason: HOSPADM

## 2024-08-30 RX ORDER — SODIUM CHLORIDE 0.9 % (FLUSH) 0.9 %
10 SYRINGE (ML) INJECTION
Status: DISCONTINUED | OUTPATIENT
Start: 2024-08-30 | End: 2024-08-30 | Stop reason: HOSPADM

## 2024-08-30 RX ADMIN — FAMOTIDINE 20 MG: 10 INJECTION INTRAVENOUS at 11:08

## 2024-08-30 RX ADMIN — HEPARIN 500 UNITS: 100 SYRINGE at 02:08

## 2024-08-30 RX ADMIN — SODIUM CHLORIDE, PRESERVATIVE FREE 10 ML: 5 INJECTION INTRAVENOUS at 11:08

## 2024-08-30 RX ADMIN — SODIUM CHLORIDE: 9 INJECTION, SOLUTION INTRAVENOUS at 11:08

## 2024-08-30 RX ADMIN — DEXAMETHASONE SODIUM PHOSPHATE 10 MG: 4 INJECTION, SOLUTION INTRA-ARTICULAR; INTRALESIONAL; INTRAMUSCULAR; INTRAVENOUS; SOFT TISSUE at 11:08

## 2024-08-30 RX ADMIN — DIPHENHYDRAMINE HYDROCHLORIDE 50 MG: 50 INJECTION, SOLUTION INTRAMUSCULAR; INTRAVENOUS at 12:08

## 2024-08-30 RX ADMIN — PACLITAXEL 156 MG: 6 INJECTION, SOLUTION INTRAVENOUS at 01:08

## 2024-08-30 NOTE — PLAN OF CARE
Problem: Adult Inpatient Plan of Care  Goal: Plan of Care Review  Outcome: Progressing  Flowsheets (Taken 8/30/2024 1520)  Plan of Care Reviewed With: patient  Goal: Patient-Specific Goal (Individualized)  Outcome: Progressing  Flowsheets (Taken 8/30/2024 1520)  Individualized Care Needs: none  Anxieties, Fears or Concerns: None  Patient/Family-Specific Goals (Include Timeframe): pt will tolerate 2nd Taxol infusion without adverse reactions  Goal: Optimal Comfort and Wellbeing  Outcome: Progressing     Problem: Chemotherapy Effects  Goal: Anemia Symptom Improvement  Outcome: Progressing  Goal: Safety Maintained  Outcome: Progressing  Goal: Absence of Hematuria  Outcome: Progressing  Goal: Nausea and Vomiting Relief  Outcome: Progressing  Goal: Neurotoxicity Symptom Control  Outcome: Progressing  Goal: Absence of Infection  Outcome: Progressing  Goal: Absence of Bleeding  Outcome: Progressing

## 2024-08-30 NOTE — NURSING
Patient to unit today for Taxol #2.Tolerated treatment well. Discharged ambulatory without distress.

## 2024-08-30 NOTE — PROGRESS NOTES
YANDY met with pt to introduce self and assess for any needs/concerns that pt may have r/t start of new treatment. YANDY provided a new pt folder and explained contents. Pt stated that she has financial concerns at this time, and needs help with her household bills. Pt confirmed that she received the juana information mailed by YANDY colleague but not able to apply online for most. YANDY assisted pt with completing the [employer] and [employer] applications and will send off on pt's behalf, once Dx letter is complete. Pt will also submit receipts for the one-time [employer] grants, and has already received the one-time [facility] juana ($150) when initially diagnosed. Pt will call SW at number provided if additional needs arise, and stated that she will f/u with YANDY next week. SW will remain available.

## 2024-08-30 NOTE — DISCHARGE INSTRUCTIONS
.THANKS FOR ALLOWING ME TO CARE FOR YOU!!!! ~Yola          THANKS FOR CHOOSING [facility]!!!        [facility]  [address] [facility].  or  [id number] [place] [address] Drive  [phone] phone     [phone] fax  Hours of Operation: Monday- Friday 8:00am- 5:00pm  After hours phone  [phone]  Hematology / Oncology Physicians on call      Dr. Shun Loredo, CARLOS Kingston, CARLOS      Please call with any concerns regarding your appointment today.     .WAYS TO HELP PREVENT INFECTION        WASH YOUR HANDS OFTEN DURING THE DAY, ESPECIALLY BEFORE YOU EAT, AFTER USING THE BATHROOM, AND AFTER TOUCHING ANIMALS    STAY AWAY FROM PEOPLE WHO HAVE ILLNESSES YOU CAN CATCH; SUCH AS COLDS, FLU, CHICKEN POX    TRY TO AVOID CROWDS    STAY AWAY FROM CHILDREN WHO RECENTLY HAVE RECEIVED LIVE VIRUS VACCINES    MAINTAIN GOOD MOUTH CARE    DO NOT SQUEEZE OR SCRATCH PIMPLES    CLEAN CUTS & SCRAPES RIGHT AWAY AND DAILY UNTIL HEALED WITH WARM WATER, SOAP & AN ANTISEPTIC    AVOID CONTACT WITH LITTER BOXES, BIRD CAGES, & FISH TANKS    AVOID STANDING WATER, IE., BIRD BATHS, FLOWER POTS/VASES, OR HUMIDIFIERS    WEAR GLOVES WHEN GARDENING OR CLEANING UP AFTER OTHERS, ESPECIALLY BABIES & SMALL CHILDREN    DO NOT EAT RAW FISH, SEAFOOD, MEAT, OR EGGS    .FALL PREVENTION   Falls often occur due to slipping, tripping or losing your balance. Here are ways to reduce your risk of falling again.   Was there anything that caused your fall that can be fixed, removed or replaced?   Make your home safe by keeping walkways clear of objects you may trip over.   Use non-slip pads under rugs.   Do not walk in poorly lit areas.   Do not stand on chairs or wobbly ladders.   Use caution when reaching overhead or looking upward. This position can cause a loss of balance.   Be sure your shoes fit properly, have non-slip bottoms and are in good condition.   Be cautious when going up and down  stairs, curbs, and when walking on uneven sidewalks.   If your balance is poor, consider using a cane or walker.   If your fall was related to alcohol use, stop or limit alcohol intake.   If your fall was related to use of sleeping medicines, talk to your doctor about this. You may need to reduce your dosage at bedtime if you awaken during the night to go to the bathroom.   To reduce the need for nighttime bathroom trips:   Avoid drinking fluids for several hours before going to bed   Empty your bladder before going to bed   Men can keep a urinal at the bedside   © 0321-3976 [employer] [facility], [address] [place] [address], [place] [address]. All rights reserved. This information is not intended as a substitute for professional medical care. Always follow your healthcare professional's instructions.    .YOU HAVE STARTED ON CHEMOTHERAPY. IF YOU EXPERIENCE ANY OF THE FOLLOWING PROBLEMS, CALL THE OFFICE IMMEDIATELY.    *FEVER .0 OR GREATER    *CHILLS, ESPECIALLY SHAKING CHILLS, OR SWEATING    *A SEVERE COUGH OR SORE THROAT, OR SINUS PAIN/     PRESSURE    *REDNESS, SWELLING, OR TENDERNESS AROUND A WOUND,     SORE, PIMPLE, RECTAL AREA, OR IV SITE    *SORES OR ULCERS IN THE MOUTH    *BLISTERS ON THE LIPS OR SKIN    *FREQUENT URGENCY TO URINATE OR A BURNING FEELING   WHEN YOU URINATE    *BLOOD IN THE URINE OR STOOL    *ANY UNEXPLAINED BRUISING OR PROLONGED BLEEDING,     (NOSEBLEEDS OR BLEEDING GUMS)    *LOOSE BOWEL MOVEMENTS THAT DO NOT RESPOND TO     IMODIUM OR MORE THAN THREE TIMES A DAY    *VOMITING UNRESPONSIVE TO ANTINAUSEA MEDICINE    *ANY UNUSUAL PHYSICAL SYMPTOMS THAT BEGAN AFTER     CHEMOTHERAPY    DURING WEEKDAYS, CALL AND ASK TO SPEAK DIRECTLY TO A NURSE.  AT OTHER TIMES, CALL THE OFFICE PHONE NUMBER; THE ANSWERING SERVICE WILL CONTACT THE ON-CALL PHYSICIAN.  SOMEONE IS AVAILABLE 24 HOURS A DAY, SEVEN DAYS A WEEK.

## 2024-09-01 ENCOUNTER — PATIENT MESSAGE (OUTPATIENT)
Dept: ADMINISTRATIVE | Facility: OTHER | Age: 64
End: 2024-09-01
Payer: MEDICAID

## 2024-09-03 ENCOUNTER — TELEPHONE (OUTPATIENT)
Dept: HEMATOLOGY/ONCOLOGY | Facility: CLINIC | Age: 64
End: 2024-09-03
Payer: MEDICAID

## 2024-09-03 DIAGNOSIS — C78.2 SECONDARY MALIGNANCY OF PARIETAL PLEURA: ICD-10-CM

## 2024-09-03 RX ORDER — HYDROCODONE BITARTRATE AND ACETAMINOPHEN 10; 325 MG/1; MG/1
1 TABLET ORAL EVERY 4 HOURS PRN
Qty: 40 TABLET | Refills: 0 | Status: SHIPPED | OUTPATIENT
Start: 2024-09-03 | End: 2024-09-04 | Stop reason: ALTCHOICE

## 2024-09-03 NOTE — TELEPHONE ENCOUNTER
Pt called to speak with Whitley regarding her pain meds. Pt states that [facility] did not have the prescription. Her chart states that the medication was sent to [facility] on 8/22. MARTHA talked with PA, she also states that she will call in another prescription for pt at [facility]. MARTHA returned pt's phone call and LVM informing her of the new script called in.

## 2024-09-04 ENCOUNTER — OFFICE VISIT (OUTPATIENT)
Dept: PALLIATIVE MEDICINE | Facility: CLINIC | Age: 64
End: 2024-09-04
Payer: MEDICAID

## 2024-09-04 ENCOUNTER — DOCUMENTATION ONLY (OUTPATIENT)
Dept: HEMATOLOGY/ONCOLOGY | Facility: CLINIC | Age: 64
End: 2024-09-04
Payer: MEDICAID

## 2024-09-04 VITALS
WEIGHT: 187.63 LBS | DIASTOLIC BLOOD PRESSURE: 54 MMHG | HEART RATE: 82 BPM | OXYGEN SATURATION: 98 % | SYSTOLIC BLOOD PRESSURE: 137 MMHG | HEIGHT: 60 IN | BODY MASS INDEX: 36.84 KG/M2 | RESPIRATION RATE: 18 BRPM | TEMPERATURE: 98 F

## 2024-09-04 DIAGNOSIS — G89.3 NEOPLASM RELATED PAIN: ICD-10-CM

## 2024-09-04 DIAGNOSIS — C50.412 PRIMARY MALIGNANT NEOPLASM OF UPPER OUTER QUADRANT OF BREAST, LEFT: Primary | ICD-10-CM

## 2024-09-04 DIAGNOSIS — Z51.5 PALLIATIVE CARE ENCOUNTER: ICD-10-CM

## 2024-09-04 PROCEDURE — 99497 ADVNCD CARE PLAN 30 MIN: CPT | Mod: S$PBB,,,

## 2024-09-04 PROCEDURE — 99999 PR PBB SHADOW E&M-EST. PATIENT-LVL V: CPT | Mod: PBBFAC,,,

## 2024-09-04 PROCEDURE — 3044F HG A1C LEVEL LT 7.0%: CPT | Mod: CPTII,,,

## 2024-09-04 PROCEDURE — 3078F DIAST BP <80 MM HG: CPT | Mod: CPTII,,,

## 2024-09-04 PROCEDURE — 99497 ADVNCD CARE PLAN 30 MIN: CPT | Mod: PBBFAC

## 2024-09-04 PROCEDURE — 4010F ACE/ARB THERAPY RXD/TAKEN: CPT | Mod: CPTII,,,

## 2024-09-04 PROCEDURE — 99204 OFFICE O/P NEW MOD 45 MIN: CPT | Mod: S$PBB,,,

## 2024-09-04 PROCEDURE — 99215 OFFICE O/P EST HI 40 MIN: CPT | Mod: PBBFAC,25

## 2024-09-04 PROCEDURE — 3075F SYST BP GE 130 - 139MM HG: CPT | Mod: CPTII,,,

## 2024-09-04 PROCEDURE — 3008F BODY MASS INDEX DOCD: CPT | Mod: CPTII,,,

## 2024-09-04 RX ORDER — MORPHINE SULFATE 15 MG/1
15 TABLET ORAL EVERY 6 HOURS PRN
Qty: 28 TABLET | Refills: 0 | Status: SHIPPED | OUTPATIENT
Start: 2024-09-04 | End: 2024-09-12 | Stop reason: SDUPTHER

## 2024-09-04 RX ORDER — MORPHINE SULFATE 15 MG/1
15 TABLET ORAL EVERY 6 HOURS PRN
Qty: 28 TABLET | Refills: 0 | Status: SHIPPED | OUTPATIENT
Start: 2024-09-04 | End: 2024-09-04 | Stop reason: SDUPTHER

## 2024-09-04 RX ORDER — NALOXONE HYDROCHLORIDE 4 MG/.1ML
1 SPRAY NASAL ONCE
Qty: 1 EACH | Refills: 0 | Status: SHIPPED | OUTPATIENT
Start: 2024-09-04 | End: 2024-09-04

## 2024-09-04 NOTE — PROGRESS NOTES
Pt brought receipts/bills for the one-time [facility] juana. SW submitted AP check request for $1,000. SW awaiting signature on Dx letter, so that SW can fax juana applications to [facility] [employer] [employer] for juana assistance as well. SW will remain available.

## 2024-09-04 NOTE — PATIENT INSTRUCTIONS
Ok to try Morphine short-acting every 6 hours as needed for pain.   Please stop taking Morphine if you experience any itching.   Contact the palliative care clinic if you have any reaction to morphine  Miralax 1pack/day or Senna 2 tabs at bedtime for opioid-induced constipation    Narcan has been prescribed if there is any concern of opioid overdose.   Signs of opioid overdose include:   Loss of consciousness  Unresponsive to outside stimulus  Awake, but unable to talk  Breathing is very slow and shallow, erratic, or has stopped  For lighter skinned people, the skin tone turns bluish purple, for darker skinned people, it turns grayish or ashen.  Choking sounds, or a snore-like gurgling noise (sometimes called the death rattle)  Vomiting  Body is very limp  Face is very pale or clammy  Fingernails and lips turn blue or purplish black  Pulse (heartbeat) is slow, erratic, or not there at all.   If you administer this medicine immediately go to the nearest emergency department for evaluation and stop taking all opioids.

## 2024-09-04 NOTE — PROGRESS NOTES
Palliative Medicine Clinic Note  Consult        Consult Requested By: Dr. Yaya Hoffmann      Reason for Consult: Symptom Management/Advance Care Planning/Goals of Care Discussion    Chief Complaint: Pain          ASSESSMENT/PLAN:      Plan/Recommendations:    Problem List Items Addressed This Visit          Oncology    Primary malignant neoplasm of upper outer quadrant of breast, left - Primary     Followed by Dr. Hoffmann and Khurram. Previous oncologist at [facility].   On Paclitaxel- Palliative intent   S/p Keytruda, left lumpectomy (Aug. 2023) w/ residual disease post resection, radiation (Nov. 2023).    S/P right VATS washout with partial decortication, pleural biopsy, intercostal nerve blocks, Pleurx catheter insertion Aug 2024. Pleurx removed due to low output.    MRI: 08/29/2024: Two small nodular foci of enhancement within the left frontal lobe measuring 3 mm in size and within the left cerebellum measuring 6 mm in size consistent with central nervous system metastatic disease. Minor associated T2 hyperintensity/edema. No mass effect.   CT C/A/P: Compared to 06/29/2024, interval significant progression of disease in the right hemithorax, with development of suspicious right inguinal and right external iliac chain lymphadenopathy.              Neoplasm related pain     Pain description correlates with scans  DC [place] due to itching  Morphine IR 15mg Q6H PRN- Will continue if no itching.  Narcan RX given and explained use to pt and family. Pt and family verbalized understanding.    Miralax 1pack/day or Senna 2 tabs at bedtime for opioid-induced constipation    Pain contract- 09/04/2024  UDS- Next in-person visit             Palliative Care    Palliative care encounter       -Code status: Full Code. Pt and daughter wish to revisit at a later time  -HCPOA: Daughter: Luna West: [phone]. Pt has 1 adult son-lives in [place]. Pt is single  -GOC:  Continue cancer treatment, symptom management, maintain  independence and functional status. Pt aware treatment intent is palliative.   -See HPI for further details                     Advance Care Planning   Advance Directives:   Living Will: No    LaPOST: No    Do Not Resuscitate Status: No    Medical Power of : Yes    Agent's Name:  Daughter: Luna Saavedra   Agent's Contact Number:  [phone]    Decision Making:  Patient answered questions and Family answered questions  Goals of Care: The patient endorses that what is most important right now is to focus on remaining as independent as possible and symptom/pain control    Accordingly, we have decided that the best plan to meet the patient's goals includes continuing with treatment. Pt stated she is aware treatment intent is palliative.   Pt chose to remain Full Code for now. Her and her daughter wish to revisit this later.                Follow up: 1 week virtual to re-assess Morphine start      Plan discussed with: Patient        SUBJECTIVE:      History of Present Illness / Interval History:  Sherlyn Saavedra is 64 y.o. 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 2023) w/ residual disease post resection, radiation (Nov. 2023).    S/P right VATS washout with partial decortication, pleural biopsy, intercostal nerve blocks, Pleurx catheter insertion Aug 2024. Pleurx removed due to low output.   Followed by Dr. Hoffmann and Khurram. Previous oncologist at [facility].   PMHX: HTN, T2DM    Presents to Palliative Care Clinic for physical symptoms, advance care planning,, clarification of goals of care, and additional support..   Please see oncology notes for more details on pt's care.       9/4/24  History obtained from: Patient and medical record.   Pt attended clinic alone. Her daughter, Luna present via speaker phone. was in no acute distress. Vital signs stable on room air.   I explained the role palliative care often plays  in supporting patients with serious illness and their families regarding symptom management, advance care planning, and goals of care discussion. Pt and daughter verbalized understanding.   Pt reported persistent progressive right lower back pain over the past month. 8/10.  She is taking Norco 10mg QID PRN but no longer effective and she takes it with Benadryl due to itching. Pain hinders sleep.   Neuropathy to hands and right leg, somewhat stable at this time.   She is taking medical marijuana for appetite stimulation. She supplements meals with protein drinks. Her Bgs have been borderline low- she is taking Metformin and Moujaro. She will revisit this with her PCP.   Pt and daughter inquired about pt receiving Glutathione infusions for immune health. I have encouraged them to discuss this with Dr. Hoffmann.     We discussed advance care planning, goals of care, and code status, Full Code vs. DNR including risks, benefits, and alternatives..   She wishes to continue cancer treatment, symptom management, maintain independence and functional status. Pt stated she is aware treatment intent is palliative.   Pt chose to remain Full Code for now. Her and her daughter wish to revisit this later.   Elected HCPOA is daughter: Luna West: [phone], lives in [place]. Pt has 1 adult son lives in [place]. She is single.         ROS:  Review of Systems   Constitutional:  Positive for appetite change (Decreased) and fatigue. Negative for activity change and unexpected weight change.   HENT:  Negative for hearing loss, sore throat, trouble swallowing and voice change.    Eyes:  Negative for visual disturbance.   Respiratory:  Positive for shortness of breath (Mild exertional). Negative for cough, chest tightness and wheezing.    Cardiovascular:  Negative for chest pain, palpitations and leg swelling.   Gastrointestinal:  Negative for abdominal pain, blood in stool, constipation, nausea, rectal pain, vomiting and reflux.    Genitourinary:  Negative for bladder incontinence, difficulty urinating, flank pain, hematuria, nocturia, pelvic pain and urgency.   Musculoskeletal:  Positive for back pain, leg pain and myalgias. Negative for arthralgias and neck pain.   Integumentary:  Negative for wound.   Allergic/Immunologic: Negative for environmental allergies, food allergies and immunocompromised state.   Neurological:  Positive for numbness. Negative for dizziness, tremors, weakness, headaches, memory loss and coordination difficulties.   Hematological:  Negative for adenopathy. Does not bruise/bleed easily.   Psychiatric/Behavioral:  Positive for depressed mood (Due to cancer diagnosis) and sleep disturbance (Due to pain). Negative for agitation, behavioral problems, confusion, decreased concentration, dysphoric mood, self-injury and suicidal ideas. The patient is nervous/anxious (Due to cancer diagnosis).        Review of Symptoms      Symptom Assessment (ESAS 0-10 Scale)  Pain:  8  Dyspnea:  2  Anxiety:  6  Nausea:  0  Depression:  6  Anorexia:  8  Fatigue:  6  Insomnia:  8  Restlessness:  8  Agitation:  8     CAM / Delirium:  Negative  Constipation:  Negative  Diarrhea:  Negative    Anxiety:  Is nervous/anxious (Due to cancer diagnosis)  Constipation:  No constipation    Bowel Management Plan (BMP):  Yes      Comments:  Miralax/Senna    Pain Assessment:    Location(s): back    Back       Location: lower and right        Quality: Aching        Quantity: 8/10 in intensity        Chronicity: Onset 1 month(s) ago, gradually worsening        Aggravating Factors: Activity        Alleviating Factors: Opiates       Associated Symptoms: Myalgias    Modified Melo Scale:  0    Performance Status:  90    ECOG Performance Status ndGndrndanddndend:nd nd2nd Living Arrangements:  Lives alone    Psychosocial/Cultural:   See Palliative Psychosocial Note: Yes  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 Daughter, Luna in [place], Son in [place].   **Primary  to  Follow**  Palliative Care  Consult: No            Medications:    Current Outpatient Medications:     amLODIPine (NORVASC) 5 MG tablet, Take 5 mg by mouth once daily., Disp: , Rfl:     atorvastatin (LIPITOR) 40 MG tablet, Take 40 mg by mouth., Disp: , Rfl:     blood sugar diagnostic (FREESTYLE TEST) Strp, Test 4 times per day, Disp: , Rfl:     capecitabine (XELODA) 500 MG Tab, Take by mouth 2 (two) times daily., Disp: , Rfl:     famotidine (PEPCID) 20 MG tablet, Take 20 mg by mouth., Disp: , Rfl:     folic acid (FOLVITE) 1 MG tablet, Take 1 mg by mouth once daily., Disp: , Rfl:     gabapentin (NEURONTIN) 300 MG capsule, Take 300 mg by mouth., Disp: , Rfl:     HYDROcodone-acetaminophen (NORCO)  mg per tablet, Take 1 tablet by mouth every 4 (four) hours as needed for Pain., Disp: 40 tablet, Rfl: 0    LIDOcaine-prilocaine (EMLA) cream, Apply to affected area once, Disp: 5 g, Rfl: 11    lisinopriL-hydrochlorothiazide (PRINZIDE,ZESTORETIC) 20-25 mg Tab, Take 1 tablet by mouth every morning., Disp: , Rfl:     magnesium oxide (MAG-OX) 400 mg (241.3 mg magnesium) tablet, Take 400 mg by mouth once daily., Disp: , Rfl:     metFORMIN (GLUCOPHAGE-XR) 500 MG ER 24hr tablet, Take 1,000 mg by mouth 2 (two) times daily., Disp: , Rfl:     ondansetron (ZOFRAN-ODT) 8 MG TbDL, DISSOLVE ONE TABLET UNDER THE TONGUE EVERY 8 HOURS AS NEEDED FOR NAUSEA, Disp: , Rfl:     ondansetron (ZOFRAN-ODT) 8 MG TbDL, Take 1 tablet (8 mg total) by mouth 2 (two) times daily., Disp: 30 tablet, Rfl: 11    pantoprazole (PROTONIX) 40 MG tablet, Take 40 mg by mouth., Disp: , Rfl:     prochlorperazine (COMPAZINE) 10 MG tablet, Take 10 mg by mouth., Disp: , Rfl:     tirzepatide (MOUNJARO) 2.5 mg/0.5 mL PnIj, Inject 2.5 mg into the skin every 7 days., Disp: , Rfl:     tiZANidine (ZANAFLEX) 4 MG tablet, , Disp: , Rfl:   No current facility-administered medications for this visit.      External  database queried on 09/04/2024  by AKUA SRIVASTAVA  AYDEN CAMARGO PMP:        Review of patient's allergies indicates:   Allergen Reactions    Ace inhibitors Swelling    Lisinopril Rash    Cephalexin      Hives    Hydrocodone      Hives    Hydrocodone-acetaminoph-supp11     Propoxyphene      Hives    Propoxyphene n-acetaminophen     Propoxyphene-acetaminophen            OBJECTIVE:         Physical Exam:  Vitals: Temp: 97.8 °F (36.6 °C) (09/04/24 0947)  Pulse: 82 (09/04/24 0947)  Resp: 18 (09/04/24 0947)  BP: (!) 137/54 (09/04/24 0947)  SpO2: 98 % (09/04/24 0947)    Physical Exam  Vitals and nursing note reviewed.   Constitutional:       General: She is not in acute distress.     Appearance: Normal appearance. She is normal weight. She is not ill-appearing.   HENT:      Head: Normocephalic and atraumatic.      Nose: Nose normal. No congestion or rhinorrhea.      Mouth/Throat:      Mouth: Mucous membranes are moist.      Pharynx: Oropharynx is clear. No oropharyngeal exudate or posterior oropharyngeal erythema.   Eyes:      General: No scleral icterus.        Right eye: No discharge.         Left eye: No discharge.      Conjunctiva/sclera: Conjunctivae normal.      Pupils: Pupils are equal, round, and reactive to light.   Cardiovascular:      Rate and Rhythm: Normal rate and regular rhythm.      Pulses: Normal pulses.      Heart sounds: Normal heart sounds. No murmur heard.     No gallop.   Pulmonary:      Effort: Pulmonary effort is normal. No respiratory distress.      Breath sounds: No stridor. Examination of the right-middle field reveals decreased breath sounds. Examination of the right-lower field reveals decreased breath sounds. Decreased breath sounds present. No wheezing.   Chest:      Chest wall: No tenderness.   Abdominal:      General: Bowel sounds are normal. There is no distension.      Palpations: Abdomen is soft.      Tenderness: There is no abdominal tenderness.   Genitourinary:     Comments: Deferred  Musculoskeletal:         General: No swelling or  tenderness. Normal range of motion.      Cervical back: Normal range of motion and neck supple.      Right lower leg: No edema.      Left lower leg: No edema.   Skin:     General: Skin is warm and dry.      Capillary Refill: Capillary refill takes less than 2 seconds.      Coloration: Skin is not jaundiced or pale.      Findings: No rash.   Neurological:      Mental Status: She is alert and oriented to person, place, and time.      Motor: No weakness.   Psychiatric:         Attention and Perception: Attention and perception normal.         Mood and Affect: Mood and affect normal.         Speech: Speech normal.         Behavior: Behavior normal. Behavior is cooperative.         Thought Content: Thought content normal. Thought content does not include suicidal ideation. Thought content does not include suicidal plan.         Cognition and Memory: Cognition and memory normal.         Judgment: Judgment normal.         Labs: BMP  Lab Results   Component Value Date     08/28/2024    K 4.1 08/28/2024     08/28/2024    CO2 25 08/28/2024    BUN 15 08/28/2024    CREATININE 0.8 08/28/2024    CALCIUM 9.8 08/28/2024    ANIONGAP 11 08/28/2024    EGFRNORACEVR >60 08/28/2024     Lab Results   Component Value Date    WBC 4.42 08/28/2024    HGB 9.0 (L) 08/28/2024    HCT 29.9 (L) 08/28/2024    MCV 95 08/28/2024     08/28/2024             Imaging: MRI: 08/29/2024: Two small nodular foci of enhancement within the left frontal lobe measuring 3 mm in size and within the left cerebellum measuring 6 mm in size consistent with central nervous system metastatic disease. Minor associated T2 hyperintensity/edema. No mass effect.     CT C/A/P: Compared to 06/29/2024, interval significant progression of disease in the right hemithorax, with development of suspicious right inguinal and right external iliac chain lymphadenopathy.          I spent a total of 70 minutes on the day of the visit. This includes face to face time in  discussion of goals of care, symptom assessment, coordination of care and emotional support.  This also includes non-face to face time preparing to see the patient (eg, review of tests/imaging), obtaining and/or reviewing separately obtained history, documenting clinical information in the electronic or other health record, independently interpreting results and communicating results to the patient/family/caregiver, or care coordinator.     Additional 20 min time spent on a voluntary advance care planning and /or goals of care discussion, providing emotional support, formulating and communicating prognosis and exploring burden/benefit of various approaches of treatment.       AKUA HENSLEY, NP

## 2024-09-06 ENCOUNTER — INFUSION (OUTPATIENT)
Dept: INFUSION THERAPY | Facility: HOSPITAL | Age: 64
End: 2024-09-06
Attending: INTERNAL MEDICINE
Payer: MEDICAID

## 2024-09-06 ENCOUNTER — LAB VISIT (OUTPATIENT)
Dept: LAB | Facility: HOSPITAL | Age: 64
End: 2024-09-06
Attending: INTERNAL MEDICINE
Payer: MEDICAID

## 2024-09-06 ENCOUNTER — OFFICE VISIT (OUTPATIENT)
Dept: HEMATOLOGY/ONCOLOGY | Facility: CLINIC | Age: 64
End: 2024-09-06
Payer: MEDICAID

## 2024-09-06 VITALS
TEMPERATURE: 97 F | DIASTOLIC BLOOD PRESSURE: 76 MMHG | WEIGHT: 190.25 LBS | BODY MASS INDEX: 37.35 KG/M2 | HEIGHT: 60 IN | SYSTOLIC BLOOD PRESSURE: 130 MMHG | RESPIRATION RATE: 16 BRPM | HEART RATE: 79 BPM | OXYGEN SATURATION: 98 %

## 2024-09-06 VITALS
BODY MASS INDEX: 37.35 KG/M2 | OXYGEN SATURATION: 98 % | WEIGHT: 190.25 LBS | HEIGHT: 60 IN | DIASTOLIC BLOOD PRESSURE: 62 MMHG | HEART RATE: 81 BPM | TEMPERATURE: 97 F | SYSTOLIC BLOOD PRESSURE: 120 MMHG

## 2024-09-06 DIAGNOSIS — D84.821 IMMUNODEFICIENCY DUE TO CHEMOTHERAPY: ICD-10-CM

## 2024-09-06 DIAGNOSIS — E66.9 OBESITY, CLASS II, BMI 35-39.9: ICD-10-CM

## 2024-09-06 DIAGNOSIS — C79.31 SECONDARY ADENOCARCINOMA OF BRAIN: ICD-10-CM

## 2024-09-06 DIAGNOSIS — C50.412 PRIMARY MALIGNANT NEOPLASM OF UPPER OUTER QUADRANT OF BREAST, LEFT: Primary | ICD-10-CM

## 2024-09-06 DIAGNOSIS — Z79.899 IMMUNODEFICIENCY DUE TO CHEMOTHERAPY: ICD-10-CM

## 2024-09-06 DIAGNOSIS — T45.1X5A IMMUNODEFICIENCY DUE TO CHEMOTHERAPY: ICD-10-CM

## 2024-09-06 DIAGNOSIS — C78.2 SECONDARY MALIGNANCY OF PARIETAL PLEURA: ICD-10-CM

## 2024-09-06 DIAGNOSIS — E11.42 TYPE 2 DIABETES MELLITUS WITH DIABETIC POLYNEUROPATHY, WITHOUT LONG-TERM CURRENT USE OF INSULIN: ICD-10-CM

## 2024-09-06 DIAGNOSIS — C50.412 PRIMARY MALIGNANT NEOPLASM OF UPPER OUTER QUADRANT OF BREAST, LEFT: ICD-10-CM

## 2024-09-06 LAB
ALBUMIN SERPL BCP-MCNC: 3.9 G/DL (ref 3.5–5.2)
ALP SERPL-CCNC: 78 U/L (ref 55–135)
ALT SERPL W/O P-5'-P-CCNC: 29 U/L (ref 10–44)
ANION GAP SERPL CALC-SCNC: 10 MMOL/L (ref 8–16)
AST SERPL-CCNC: 36 U/L (ref 10–40)
BASOPHILS # BLD AUTO: 0.02 K/UL (ref 0–0.2)
BASOPHILS NFR BLD: 0.6 % (ref 0–1.9)
BILIRUB SERPL-MCNC: 0.3 MG/DL (ref 0.1–1)
BUN SERPL-MCNC: 9 MG/DL (ref 8–23)
CALCIUM SERPL-MCNC: 9.4 MG/DL (ref 8.7–10.5)
CHLORIDE SERPL-SCNC: 107 MMOL/L (ref 95–110)
CO2 SERPL-SCNC: 25 MMOL/L (ref 23–29)
CREAT SERPL-MCNC: 0.8 MG/DL (ref 0.5–1.4)
DIFFERENTIAL METHOD BLD: ABNORMAL
EOSINOPHIL # BLD AUTO: 0.2 K/UL (ref 0–0.5)
EOSINOPHIL NFR BLD: 5.1 % (ref 0–8)
ERYTHROCYTE [DISTWIDTH] IN BLOOD BY AUTOMATED COUNT: 15 % (ref 11.5–14.5)
EST. GFR  (NO RACE VARIABLE): >60 ML/MIN/1.73 M^2
GLUCOSE SERPL-MCNC: 81 MG/DL (ref 70–110)
HCT VFR BLD AUTO: 29.5 % (ref 37–48.5)
HGB BLD-MCNC: 8.7 G/DL (ref 12–16)
IMM GRANULOCYTES # BLD AUTO: 0.02 K/UL (ref 0–0.04)
IMM GRANULOCYTES NFR BLD AUTO: 0.6 % (ref 0–0.5)
LYMPHOCYTES # BLD AUTO: 1.1 K/UL (ref 1–4.8)
LYMPHOCYTES NFR BLD: 32.6 % (ref 18–48)
MCH RBC QN AUTO: 27.6 PG (ref 27–31)
MCHC RBC AUTO-ENTMCNC: 29.5 G/DL (ref 32–36)
MCV RBC AUTO: 94 FL (ref 82–98)
MONOCYTES # BLD AUTO: 0.2 K/UL (ref 0.3–1)
MONOCYTES NFR BLD: 5.7 % (ref 4–15)
NEUTROPHILS # BLD AUTO: 1.8 K/UL (ref 1.8–7.7)
NEUTROPHILS NFR BLD: 55.4 % (ref 38–73)
NRBC BLD-RTO: 0 /100 WBC
PLATELET # BLD AUTO: 310 K/UL (ref 150–450)
PMV BLD AUTO: 10.1 FL (ref 9.2–12.9)
POTASSIUM SERPL-SCNC: 3.7 MMOL/L (ref 3.5–5.1)
PROT SERPL-MCNC: 6.9 G/DL (ref 6–8.4)
RBC # BLD AUTO: 3.15 M/UL (ref 4–5.4)
SODIUM SERPL-SCNC: 142 MMOL/L (ref 136–145)
WBC # BLD AUTO: 3.31 K/UL (ref 3.9–12.7)

## 2024-09-06 PROCEDURE — 25000003 PHARM REV CODE 250: Performed by: INTERNAL MEDICINE

## 2024-09-06 PROCEDURE — 96367 TX/PROPH/DG ADDL SEQ IV INF: CPT

## 2024-09-06 PROCEDURE — 99214 OFFICE O/P EST MOD 30 MIN: CPT | Mod: PBBFAC,25 | Performed by: INTERNAL MEDICINE

## 2024-09-06 PROCEDURE — 99999 PR PBB SHADOW E&M-EST. PATIENT-LVL IV: CPT | Mod: PBBFAC,,, | Performed by: INTERNAL MEDICINE

## 2024-09-06 PROCEDURE — 36415 COLL VENOUS BLD VENIPUNCTURE: CPT | Performed by: INTERNAL MEDICINE

## 2024-09-06 PROCEDURE — 63600175 PHARM REV CODE 636 W HCPCS: Performed by: INTERNAL MEDICINE

## 2024-09-06 PROCEDURE — 96375 TX/PRO/DX INJ NEW DRUG ADDON: CPT

## 2024-09-06 PROCEDURE — 96413 CHEMO IV INFUSION 1 HR: CPT

## 2024-09-06 PROCEDURE — 85025 COMPLETE CBC W/AUTO DIFF WBC: CPT | Performed by: INTERNAL MEDICINE

## 2024-09-06 PROCEDURE — A4216 STERILE WATER/SALINE, 10 ML: HCPCS | Performed by: INTERNAL MEDICINE

## 2024-09-06 PROCEDURE — 80053 COMPREHEN METABOLIC PANEL: CPT | Performed by: INTERNAL MEDICINE

## 2024-09-06 RX ORDER — NALOXONE HYDROCHLORIDE 4 MG/.1ML
SPRAY NASAL
COMMUNITY
Start: 2024-09-04

## 2024-09-06 RX ORDER — FAMOTIDINE 10 MG/ML
20 INJECTION INTRAVENOUS
Status: COMPLETED | OUTPATIENT
Start: 2024-09-06 | End: 2024-09-06

## 2024-09-06 RX ORDER — SODIUM CHLORIDE 0.9 % (FLUSH) 0.9 %
10 SYRINGE (ML) INJECTION
Status: DISCONTINUED | OUTPATIENT
Start: 2024-09-06 | End: 2024-09-06 | Stop reason: HOSPADM

## 2024-09-06 RX ORDER — HEPARIN 100 UNIT/ML
500 SYRINGE INTRAVENOUS
Status: DISCONTINUED | OUTPATIENT
Start: 2024-09-06 | End: 2024-09-06 | Stop reason: HOSPADM

## 2024-09-06 RX ADMIN — DEXAMETHASONE SODIUM PHOSPHATE 10 MG: 4 INJECTION, SOLUTION INTRA-ARTICULAR; INTRALESIONAL; INTRAMUSCULAR; INTRAVENOUS; SOFT TISSUE at 09:09

## 2024-09-06 RX ADMIN — FAMOTIDINE 20 MG: 10 INJECTION INTRAVENOUS at 08:09

## 2024-09-06 RX ADMIN — DIPHENHYDRAMINE HYDROCHLORIDE 50 MG: 50 INJECTION, SOLUTION INTRAMUSCULAR; INTRAVENOUS at 08:09

## 2024-09-06 RX ADMIN — HEPARIN 500 UNITS: 100 SYRINGE at 10:09

## 2024-09-06 RX ADMIN — Medication 10 ML: at 10:09

## 2024-09-06 RX ADMIN — PACLITAXEL 156 MG: 6 INJECTION, SOLUTION INTRAVENOUS at 09:09

## 2024-09-06 NOTE — DISCHARGE INSTRUCTIONS
.[facility]  [address] [facility]  [address] [address]  [phone] phone     [phone] fax  Hours of Operation: Monday- Friday 8:00am- 5:00pm  After hours phone  [phone]  Hematology / Oncology Physicians on call    Dr. Shun Wiggins        Nurse Practitioners:    Taylor Morataya, CARLOS Morfin, CARLOS Forte, CARLOS Guajardo, CARLOS Galarza, PA      Please don't hesitate to call if you have any concerns.       .WAYS TO HELP PREVENT INFECTION        WASH YOUR HANDS OFTEN DURING THE DAY, ESPECIALLY BEFORE YOU EAT, AFTER USING THE BATHROOM, AND AFTER TOUCHING ANIMALS    STAY AWAY FROM PEOPLE WHO HAVE ILLNESSES YOU CAN CATCH; SUCH AS COLDS, FLU, CHICKEN POX    TRY TO AVOID CROWDS    STAY AWAY FROM CHILDREN WHO RECENTLY HAVE RECEIVED LIVE VIRUS VACCINES    MAINTAIN GOOD MOUTH CARE    DO NOT SQUEEZE OR SCRATCH PIMPLES    CLEAN CUTS & SCRAPES RIGHT AWAY AND DAILY UNTIL HEALED WITH WARM WATER, SOAP & AN ANTISEPTIC    AVOID CONTACT WITH LITTER BOXES, BIRD CAGES, & FISH TANKS    AVOID STANDING WATER, IE., BIRD BATHS, FLOWER POTS/VASES, OR HUMIDIFIERS    WEAR GLOVES WHEN GARDENING OR CLEANING UP AFTER OTHERS, ESPECIALLY BABIES & SMALL CHILDREN    DO NOT EAT RAW FISH, SEAFOOD, MEAT, OR EGGS

## 2024-09-06 NOTE — PLAN OF CARE
Problem: Adult Inpatient Plan of Care  Goal: Plan of Care Review  Outcome: Progressing  Flowsheets (Taken 9/6/2024 1010)  Plan of Care Reviewed With: patient  Goal: Patient-Specific Goal (Individualized)  Outcome: Progressing  Flowsheets (Taken 9/6/2024 1010)  Individualized Care Needs: patient likes feet elevated, pillow, blanket, and TV  Anxieties, Fears or Concerns: None  Patient/Family-Specific Goals (Include Timeframe): patient tolerated treatment well with no adverse reactions.     Problem: Fall Injury Risk  Goal: Absence of Fall and Fall-Related Injury  Outcome: Progressing     Problem: Chemotherapy Effects  Goal: Anemia Symptom Improvement  Outcome: Progressing  Goal: Nausea and Vomiting Relief  Outcome: Progressing

## 2024-09-06 NOTE — PROGRESS NOTES
Subjective:       Patient ID: Sherlyn Saavedra is a 64 y.o. female.    Chief Complaint: Results, Chemotherapy, and Breast Cancer    HPI:  64-year-old female presents for cycle 1 day 3 of single agent Taxol.  Patient has PDL1 <1%.  ECOG status 1 does report decreasing discomfort involving her right chest wall.     Past Medical History:   Diagnosis Date    Allergy     Anemia     Breast cancer     Diabetes mellitus     HLD (hyperlipidemia) 2015    Hyperlipidemia (Problem)      Hypertension     Primary malignant neoplasm of upper outer quadrant of breast, left 10/10/2023    Secondary malignancy of parietal pleura 2024    Type 2 diabetes mellitus with diabetic polyneuropathy, without long-term current use of insulin 2015    Formatting of this note might be different from the original.   dx in 40's; has some neuropathic symptoms in right LE; (uncertain if from DMII)  Diabetes mellitus type 2 (Problem)       No family history on file.  Social History     Socioeconomic History    Marital status: Single   Tobacco Use    Smoking status: Former     Current packs/day: 0.00     Types: Cigarettes     Quit date:      Years since quittin.6    Smokeless tobacco: Never   Substance and Sexual Activity    Alcohol use: Not Currently    Drug use: Never     Social Determinants of Health     Financial Resource Strain: Low Risk  (2024)    Received from [facility]    Overall Financial Resource Strain (CARDIA)     Difficulty of Paying Living Expenses: Not hard at all   Food Insecurity: No Food Insecurity (2024)    Received from [facility]    Hunger Vital Sign     Worried About Running Out of Food in the Last Year: Never true     Ran Out of Food in the Last Year: Never true   Transportation Needs: No Transportation Needs (2024)    Received from [facility]    PRAPARE - Transportation     Lack of Transportation (Medical): No     Lack of Transportation (Non-Medical): No   Housing Stability: Unknown (8/5/2024)    Received from [facility]    Housing Stability Vital Sign     Unable to Pay for Housing in the Last Year: No     Homeless in the Last Year: No     Past Surgical History:   Procedure Laterality Date    HYSTERECTOMY      tubaligation  1987       Labs:  Lab Results   Component Value Date    WBC 3.31 (L) 09/06/2024    HGB 8.7 (L) 09/06/2024    HCT 29.5 (L) 09/06/2024    MCV 94 09/06/2024     09/06/2024     BMP  Lab Results   Component Value Date     08/28/2024    K 4.1 08/28/2024     08/28/2024    CO2 25 08/28/2024    BUN 15 08/28/2024    CREATININE 0.8 08/28/2024    CALCIUM 9.8 08/28/2024    ANIONGAP 11 08/28/2024     Lab Results   Component Value Date    ALT 30 08/28/2024    AST 40 08/28/2024    ALKPHOS 84 08/28/2024    BILITOT 0.4 08/28/2024       Lab Results   Component Value Date    IRON 82 08/14/2023    TIBC 306 08/14/2023    FERRITIN 746 (H) 08/14/2023     No results found for: "[id number]"  Lab Results   Component Value Date    FOLATE 4.1 (L) 07/23/2024     Lab Results   Component Value Date    TSH 2.500 08/14/2023         Review of Systems   Constitutional:  Negative for activity change, appetite change, chills, diaphoresis, fatigue, fever and unexpected weight change.   HENT:  Negative for congestion, dental problem, drooling, ear discharge, ear pain, facial swelling, hearing loss, mouth sores, nosebleeds, postnasal drip, rhinorrhea, sinus pressure, sneezing, sore throat, tinnitus, trouble swallowing and voice change.    Eyes:  Negative for photophobia, pain, discharge, redness, itching and visual disturbance.   Respiratory:  Negative for cough, choking, chest tightness, shortness of breath, " wheezing and stridor.    Cardiovascular:  Negative for chest pain, palpitations and leg swelling.   Gastrointestinal:  Negative for abdominal distention, abdominal pain, anal bleeding, blood in stool, constipation, diarrhea, nausea, rectal pain and vomiting.   Endocrine: Negative for cold intolerance, heat intolerance, polydipsia, polyphagia and polyuria.   Genitourinary:  Negative for decreased urine volume, difficulty urinating, dyspareunia, dysuria, enuresis, flank pain, frequency, genital sores, hematuria, menstrual problem, pelvic pain, urgency, vaginal bleeding, vaginal discharge and vaginal pain.   Musculoskeletal:  Negative for arthralgias, back pain, gait problem, joint swelling, myalgias, neck pain and neck stiffness.   Skin:  Negative for color change, pallor and rash.   Allergic/Immunologic: Negative for environmental allergies, food allergies and immunocompromised state.   Neurological:  Negative for dizziness, tremors, seizures, syncope, facial asymmetry, speech difficulty, weakness, light-headedness, numbness and headaches.   Hematological:  Negative for adenopathy. Does not bruise/bleed easily.   Psychiatric/Behavioral:  Negative for agitation, behavioral problems, confusion, decreased concentration, dysphoric mood, hallucinations, self-injury, sleep disturbance and suicidal ideas. The patient is not nervous/anxious and is not hyperactive.        Objective:      Physical Exam  Vitals reviewed.   Constitutional:       General: She is not in acute distress.     Appearance: She is well-developed. She is not diaphoretic.   HENT:      Head: Normocephalic and atraumatic.      Right Ear: External ear normal.      Left Ear: External ear normal.      Nose: Nose normal.      Right Sinus: No maxillary sinus tenderness or frontal sinus tenderness.      Left Sinus: No maxillary sinus tenderness or frontal sinus tenderness.      Mouth/Throat:      Pharynx: No oropharyngeal exudate.   Eyes:      General: Lids are  normal. No scleral icterus.        Right eye: No discharge.         Left eye: No discharge.      Conjunctiva/sclera: Conjunctivae normal.      Right eye: Right conjunctiva is not injected. No hemorrhage.     Left eye: Left conjunctiva is not injected. No hemorrhage.     Pupils: Pupils are equal, round, and reactive to light.   Neck:      Thyroid: No thyromegaly.      Vascular: No JVD.      Trachea: No tracheal deviation.   Cardiovascular:      Rate and Rhythm: Normal rate.      Heart sounds: Normal heart sounds.   Pulmonary:      Effort: Pulmonary effort is normal. No respiratory distress.      Breath sounds: No stridor.   Chest:      Chest wall: No tenderness.   Abdominal:      General: Bowel sounds are normal. There is no distension.      Palpations: Abdomen is soft. There is no hepatomegaly, splenomegaly or mass.      Tenderness: There is no abdominal tenderness. There is no rebound.   Musculoskeletal:         General: No tenderness. Normal range of motion.      Cervical back: Normal range of motion and neck supple.   Lymphadenopathy:      Cervical: No cervical adenopathy.      Upper Body:      Right upper body: No supraclavicular adenopathy.      Left upper body: No supraclavicular adenopathy.   Skin:     General: Skin is dry.      Findings: No erythema or rash.   Neurological:      Mental Status: She is alert and oriented to person, place, and time.      Cranial Nerves: No cranial nerve deficit.      Coordination: Coordination normal.   Psychiatric:         Behavior: Behavior normal.         Thought Content: Thought content normal.         Judgment: Judgment normal.             Assessment:      1. Primary malignant neoplasm of upper outer quadrant of breast, left    2. Immunodeficiency due to chemotherapy    3. Secondary malignancy of parietal pleura    4. Obesity, Class II, BMI 35-39.9    5. Type 2 diabetes mellitus with diabetic polyneuropathy, without long-term current use of insulin    6. Secondary  adenocarcinoma of brain           Med Onc Chart Routing      Follow up with physician . Return to clinic in 2 weeks to begin cycle 2 day 1 CBC CMP   Follow up with [facility]    Infusion scheduling note    Injection scheduling note Taxol day 1 815 Q 28 days   Labs    Imaging    Pharmacy appointment    Other referrals                   Plan:      Clinical response with decrease pain involving right chest continue with Taxol cycle 1 day 15 today.  Return in 2 weeks with repeat CBC and CMP.  Does not have PDL1 status consistent with use of Keytruda in this setting for triple negative breast cancer does have other potential actionable mutation we will discuss and I discussed with her further maybe in options once disease is under control.  In addition patient does have 2 abnormal areas in brain will be seen by radiation oncology talked about possibility of observation versus stereotactic radiation        Yaya Hoffmann Jr, MD FACP

## 2024-09-09 ENCOUNTER — TELEPHONE (OUTPATIENT)
Dept: PALLIATIVE MEDICINE | Facility: CLINIC | Age: 64
End: 2024-09-09
Payer: MEDICAID

## 2024-09-09 NOTE — TELEPHONE ENCOUNTER
----- Message from Pippa Geiger sent at 9/9/2024  8:22 AM CDT -----  Type:  Needs Medical Advice    Who Called: pt  Symptoms (please be specific): na   How long has patient had these symptoms:  na  Pharmacy name and phone #:  na  Would the patient rather a call back or a response via [facility]? call  Best Call Back Number: [phone]    Additional Information: pt requesting a call to discuss as she hasn't started medication yet and was advised to follow up at appt after she starts medication

## 2024-09-12 ENCOUNTER — TELEPHONE (OUTPATIENT)
Dept: RADIATION ONCOLOGY | Facility: CLINIC | Age: 64
End: 2024-09-12
Payer: MEDICAID

## 2024-09-12 ENCOUNTER — OFFICE VISIT (OUTPATIENT)
Dept: PALLIATIVE MEDICINE | Facility: CLINIC | Age: 64
End: 2024-09-12
Payer: MEDICAID

## 2024-09-12 DIAGNOSIS — G89.3 NEOPLASM RELATED PAIN: ICD-10-CM

## 2024-09-12 DIAGNOSIS — Z51.5 PALLIATIVE CARE ENCOUNTER: ICD-10-CM

## 2024-09-12 DIAGNOSIS — C50.412 PRIMARY MALIGNANT NEOPLASM OF UPPER OUTER QUADRANT OF BREAST, LEFT: Primary | ICD-10-CM

## 2024-09-12 RX ORDER — MORPHINE SULFATE 15 MG/1
15 TABLET ORAL EVERY 6 HOURS PRN
Qty: 28 TABLET | Refills: 0 | Status: SHIPPED | OUTPATIENT
Start: 2024-09-12 | End: 2024-09-19

## 2024-09-12 NOTE — TELEPHONE ENCOUNTER
Spoke with patient to notify her that appt today was cancelled due to power loss at the clinic and we will call back to r/s once clinic reopens. Patient verbalized understanding and call ended well.

## 2024-09-12 NOTE — PROGRESS NOTES
Pt could not hear me on audio visit, and she noted she has not picked up Morphine from pharmacy. Will re-assess pain once pt starts using Morphine.

## 2024-09-13 ENCOUNTER — DOCUMENTATION ONLY (OUTPATIENT)
Dept: HEMATOLOGY/ONCOLOGY | Facility: CLINIC | Age: 64
End: 2024-09-13
Payer: MEDICAID

## 2024-09-13 DIAGNOSIS — C50.412 PRIMARY MALIGNANT NEOPLASM OF UPPER OUTER QUADRANT OF BREAST, LEFT: Primary | ICD-10-CM

## 2024-09-13 NOTE — PROGRESS NOTES
Post C1 check in. Pt reports minimal symptoms - mostly pain in her lower back . PT reports that she is planning on going to get her pain pills at the pharmacy - she had not been able to do so before because they had been out. Pt had questions about moving around. ONN advised that daily movement continues to be important as long as she is doing so safely. Pt requested a referral to physical therapy. ONN notified provider of this request. No other questions or concerns at this time. Call ended well.    Oncology Navigation   Intake  Date of Diagnosis: 10/10/23  Cancer Type: Breast  Type of Referral: Internal  Date of Referral: 24  Initial Nurse Navigator Contact: 24  Referral to Initial Contact Timeline (days): 0  First Appointment Available: 24  Appointment Date: 24  First Available Date vs. Scheduled Date (days): 0  Multiple appointments: No  Start of Treatment: 24     Treatment  Current Status: Staging work-up       Medical Oncologist: Yaya Hoffmann       Procedures: MRI; PET scan  MRI Schedule Date: 24  PET Scan Schedule Date: 24    General Referrals: Palliative Care; Social Work    ER: Negative  ND: Negative  Her2: Negative       Support Systems: Spouse/significant other; Family members     Acuity  Treatment Tolerability: Minimal symptoms  ECO  Comorbidities in Medical History: 1   Needed: 0  Support: 0  Verbalizes Financial Concerns: 0  Transportation: 0  History of noncompliance/frequent no shows and cancellations: 0  Verbalizes the need for more education: 0  Other Factors (+1 for Each): 0  Navigation Acuity: 1     Follow Up  No follow-ups on file.

## 2024-09-17 ENCOUNTER — OFFICE VISIT (OUTPATIENT)
Dept: PALLIATIVE MEDICINE | Facility: CLINIC | Age: 64
End: 2024-09-17
Payer: MEDICAID

## 2024-09-17 ENCOUNTER — OFFICE VISIT (OUTPATIENT)
Dept: RADIATION ONCOLOGY | Facility: CLINIC | Age: 64
End: 2024-09-17
Payer: MEDICAID

## 2024-09-17 VITALS
HEIGHT: 60 IN | TEMPERATURE: 98 F | DIASTOLIC BLOOD PRESSURE: 54 MMHG | RESPIRATION RATE: 18 BRPM | BODY MASS INDEX: 38.69 KG/M2 | OXYGEN SATURATION: 97 % | HEART RATE: 88 BPM | SYSTOLIC BLOOD PRESSURE: 114 MMHG | WEIGHT: 197.06 LBS

## 2024-09-17 DIAGNOSIS — Z51.5 PALLIATIVE CARE ENCOUNTER: ICD-10-CM

## 2024-09-17 DIAGNOSIS — L98.9 BENIGN SKIN LESION OF THIGH: ICD-10-CM

## 2024-09-17 DIAGNOSIS — C79.31 SECONDARY ADENOCARCINOMA OF BRAIN: ICD-10-CM

## 2024-09-17 DIAGNOSIS — G89.3 NEOPLASM RELATED PAIN: ICD-10-CM

## 2024-09-17 DIAGNOSIS — C50.412 PRIMARY MALIGNANT NEOPLASM OF UPPER OUTER QUADRANT OF BREAST, LEFT: Primary | ICD-10-CM

## 2024-09-17 DIAGNOSIS — M89.9 LESION OF BONE OF RIGHT LOWER LEG: ICD-10-CM

## 2024-09-17 PROCEDURE — 3044F HG A1C LEVEL LT 7.0%: CPT | Mod: CPTII,,,

## 2024-09-17 PROCEDURE — 99214 OFFICE O/P EST MOD 30 MIN: CPT | Mod: S$PBB,,, | Performed by: SPECIALIST

## 2024-09-17 PROCEDURE — 3044F HG A1C LEVEL LT 7.0%: CPT | Mod: CPTII,,, | Performed by: SPECIALIST

## 2024-09-17 PROCEDURE — 99999 PR PBB SHADOW E&M-EST. PATIENT-LVL V: CPT | Mod: PBBFAC,,, | Performed by: SPECIALIST

## 2024-09-17 PROCEDURE — 4010F ACE/ARB THERAPY RXD/TAKEN: CPT | Mod: CPTII,,, | Performed by: SPECIALIST

## 2024-09-17 PROCEDURE — 3074F SYST BP LT 130 MM HG: CPT | Mod: CPTII,,, | Performed by: SPECIALIST

## 2024-09-17 PROCEDURE — 3078F DIAST BP <80 MM HG: CPT | Mod: CPTII,,, | Performed by: SPECIALIST

## 2024-09-17 PROCEDURE — 1159F MED LIST DOCD IN RCRD: CPT | Mod: CPTII,,, | Performed by: SPECIALIST

## 2024-09-17 PROCEDURE — 4010F ACE/ARB THERAPY RXD/TAKEN: CPT | Mod: CPTII,,,

## 2024-09-17 PROCEDURE — 99215 OFFICE O/P EST HI 40 MIN: CPT | Mod: PBBFAC | Performed by: SPECIALIST

## 2024-09-17 PROCEDURE — 3008F BODY MASS INDEX DOCD: CPT | Mod: CPTII,,, | Performed by: SPECIALIST

## 2024-09-17 PROCEDURE — 99214 OFFICE O/P EST MOD 30 MIN: CPT | Mod: S$PBB,,,

## 2024-09-17 RX ORDER — MORPHINE SULFATE 15 MG/1
15 TABLET ORAL EVERY 4 HOURS PRN
Qty: 84 TABLET | Refills: 0 | Status: SHIPPED | OUTPATIENT
Start: 2024-09-17 | End: 2024-12-10

## 2024-09-17 NOTE — PROGRESS NOTES
[facility] - Radiation Oncology Follow Up Note    Medical oncologist: Dayo Ni MD   Surgical oncologist : Liat Dos Santos MD     HISTORY OF PRESENT ILLNESS: C50.412 - Malignant neoplasm of upper-outer quadrant of left female breast, Diagnosed 10/11/2023 (Active) Stage IIA, ypT2, N0, M0, G3, HER2 Neg, ER Neg, NE Neg      63-year-old woman 1st seen in consultation on 08/09/2023 with a triple negative ypT2 N0 M0 upper outer left breast cancer     Status post neoadjuvant TC Keytruda 1st on 02/02/2023, followed by AC Keytruda from 04/06/2023 08/26/2023 with good clinical response, followed by clinical progression prior to her lumpectomy on 08/02/2023 recovering a high-grade 3.5 cm infiltrating ductal carcinoma resected with 1 mm medial surgical margins. A 3 mm high-grade DCIS was also recovered, resected with 2 mm posterior margin and negative separately submitted posterior margin. Both sentinel lymph nodes were benign without apparent treatment effect.      She underwent oncoplastic reduction at the time of her primary resection with some wound healing difficulty in the mid inframammary fold resulting in modest delay in initiation of radiotherapy, delivering 42.4 Gy in 16 fractions, completed on 11/07/2023     She underwent benign bilateral diagnostic mammography on 02/26/2024      INTERVAL HISTORY:  She returns 3 months prior to her scheduled six-month follow up.  She has developed recurrent metastatic disease in the interval, and remains disease free in the breasts    CT of the a chest bdomen and pelvis on 06/29/2024 by report described a moderate pleural effusion on the right with a 6 mm heterogeneously enhancing lesion in the right lung base surrounded by the pleural effusion, possibly representing atelectatic lung or developing abscess/necrosis.  Primary parenchymal mass not excluded.  No other malignant findings    Thoracentesis on the same day recovered 1.6 L of bloody  pleural fluid    Thoracentesis on 07/1924 recovered 20 cc of serosanguineous fluid    Pleural biopsy and additional right thoracentesis on 08/05/2024 recovered malignant tissue most consistent with metastatic breast cancer.  Tempus testing showed no potential actionable variance and no treatment options    Bone scan on 08/28/2024 was benign    Brain MRI on 08/29/2024, which I have reviewed today, shows a pair of enhancing lesions consistent with metastatic disease, 6.4 mm in the left cerebellum, shown below, and a very faint nodular area of enhancement in the left frontal lobe near the vertex measuring up to 3 mm, which I am unable to appreciate        She denies any neurologic complaints including balance problems, headache, nausea, vision changes, or mental status changes.    CT of the chest abdomen and pelvis on 08/29/2024 shows interval significant progression of disease in the right hemithorax compared to 06/29/2024, as well as suspicious right inguinal and right external iliac chain adenopathy    Single agent Taxol was initiated on 08/28/2024        PHYSICAL EXAMINATION:  Vitals:    09/17/24 1432   BP: (!) 114/54   Pulse: 88   Resp: 18   Temp: 97.7 °F (36.5 °C)   SpO2: 97%   Weight: 89.4 kg (197 lb 1.5 oz)   Height: 5' (1.524 m)      General:  A&O x4, NAD  HEENT:  PEERLA, CN II-XII intact, EOM intact,   Lymphatics:  no cervical/sclav LAD  Lungs:  CTAB  Heart:  RRR  Abdomen:  NTND +BS, no HSM      ASSESSMENT:  Thoracic recurrence of breast cancer, along with a solitary asymptomatic left cerebellar metastasis.  Single agent Taxol is underway      PLAN:  She is scheduled for six-month follow up.  She will likely benefit from SBRT to her brain metastasis, but that can not be delivered while on systemic therapy.  I have asked her to request inclusion of brain imaging during all forthcoming radiographic restaging.

## 2024-09-17 NOTE — PROGRESS NOTES
Palliative Medicine Clinic Note  Follow-up           Consult Requested By: Dr. Hoffmann      Reason for Consult: Symptom Management/Advance Care Planning/Goals of Care Discussion    Chief Complaint: Lower back pain/Morphine trial          ASSESSMENT/PLAN:      Plan/Recommendations:    Problem List Items Addressed This Visit          Derm    Skin lesion of right thigh     Pt reported lesion has been painful since last month.   Messaged Dr. Hoffmann for f/u.             Oncology    Primary malignant neoplasm of upper outer quadrant of breast, left - Primary     Followed by Dr. Hoffmann and Khurram. Previous oncologist at [facility].   On Paclitaxel- Palliative intent   S/p Keytruda, left lumpectomy (Aug. 2023) w/ residual disease post resection, radiation (Nov. 2023).    S/P right VATS washout with partial decortication, pleural biopsy, intercostal nerve blocks, Pleurx catheter insertion Aug 2024. Pleurx removed due to low output.    MRI: 08/29/2024: Two small nodular foci of enhancement within the left frontal lobe measuring 3 mm in size and within the left cerebellum measuring 6 mm in size consistent with central nervous system metastatic disease. Minor associated T2 hyperintensity/edema. No mass effect.   CT C/A/P: Compared to 06/29/2024, interval significant progression of disease in the right hemithorax, with development of suspicious right inguinal and right external iliac chain lymphadenopathy.              Relevant Medications    morphine (MSIR) 15 MG tablet    Neoplasm related pain     Pain description correlates with scans  Morphine IR 15mg Q4H PRN- no itching/rash  Potentially add long-acting Morphine  Narcan RX given and explained use to pt and family. Pt and family verbalized understanding.    Miralax 1pack/day or Senna 2 tabs at bedtime for opioid-induced constipation    Pain contract- 09/04/2024  UDS- Next in-person visit.          Relevant Medications    morphine (MSIR) 15 MG tablet       Orthopedic    RESOLVED:  Lesion of bone of right thigh       Palliative Care    Palliative care encounter       -Code status: Full Code. Pt and daughter wish to revisit at a later time  -HCPOA: Daughter: Luna Saavedra: [phone]. Pt has 1 adult son-lives in [place]. Pt is single  -GOC:  Continue cancer treatment, symptom management, maintain independence and functional status. Pt aware treatment intent is palliative.   -See HPI for further details          Relevant Medications    morphine (MSIR) 15 MG tablet                     Advance Care Planning   Advance Directives:   Living Will: No    LaPOST: No    Do Not Resuscitate Status: No    Medical Power of : Yes    Agent's Name:  Daughter: Luna Saavedra   Agent's Contact Number:  [phone]    Decision Making:  Patient answered questions and Family answered questions  Goals of Care: The patient endorses that what is most important right now is to focus on remaining as independent as possible and symptom/pain control    Accordingly, we have decided that the best plan to meet the patient's goals includes continuing with treatment. Pt stated she is aware treatment intent is palliative.   Pt chose to remain Full Code for now. Her and her daughter wish to revisit this later.                  Follow up: 1 month     Plan discussed with: Patient        SUBJECTIVE:      History of Present Illness / Interval History:  Sherlyn Saavedra is 64 y.o. 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 2023) w/ residual disease post resection, radiation (Nov. 2023).    S/P right VATS washout with partial decortication, pleural biopsy, intercostal nerve blocks, Pleurx catheter insertion Aug 2024. Pleurx removed due to low output.   Followed by Dr. Hoffmann and Khurram. Previous oncologist at [facility].   PMHX: HTN, T2DM    Presents to Palliative Care Clinic for physical symptoms, advance care planning,, clarification of  goals of care, and additional support..   Please see oncology notes for more details on pt's care.       9/17/24: Pt attended clinic with her friend, Mr. Torres. She was in no acute distress. Vital signs stable on room air.   She reported lower back pain has improved with Morphine IR 15mg, however, it does not last 6 hours. No itching/rash with Morphine.   She noted a palpable/painful lesion on her right lateral thigh. She noticed it about a month ago, but it was not painful then.     Past Visits:    09/04/2024: History obtained from: Patient and medical record.   Pt attended clinic alone. Her daughter, Luna present via speaker phone. was in no acute distress. Vital signs stable on room air.   I explained the role palliative care often plays in supporting patients with serious illness and their families regarding symptom management, advance care planning, and goals of care discussion. Pt and daughter verbalized understanding.   Pt reported persistent progressive right lower back pain over the past month. 8/10.  She is taking Norco 10mg QID PRN but no longer effective and she takes it with Benadryl due to itching. Pain hinders sleep.   Neuropathy to hands and right leg, somewhat stable at this time.   She is taking medical marijuana for appetite stimulation. She supplements meals with protein drinks. Her Bgs have been borderline low- she is taking Metformin and Moujaro. She will revisit this with her PCP.   Pt and daughter inquired about pt receiving Glutathione infusions for immune health. I have encouraged them to discuss this with Dr. Hoffmann.     We discussed advance care planning, goals of care, and code status, Full Code vs. DNR including risks, benefits, and alternatives..   She wishes to continue cancer treatment, symptom management, maintain independence and functional status. Pt stated she is aware treatment intent is palliative.   Pt chose to remain Full Code for now. Her and her daughter wish to  revisit this later.   Elected HCPOA is daughter: Luna West: [phone], lives in [place]. Pt has 1 adult son lives in [place]. She is single.         ROS:  Review of Systems   Constitutional:  Positive for appetite change (Decreased) and fatigue. Negative for activity change and unexpected weight change.   HENT:  Negative for hearing loss, sore throat, trouble swallowing and voice change.    Eyes:  Negative for visual disturbance.   Respiratory:  Negative for cough, chest tightness, shortness of breath (Mild exertional) and wheezing.    Cardiovascular:  Negative for chest pain, palpitations and leg swelling.   Gastrointestinal:  Negative for abdominal pain, blood in stool, constipation, nausea, rectal pain, vomiting and reflux.   Genitourinary:  Negative for bladder incontinence, difficulty urinating, flank pain, hematuria, nocturia, pelvic pain and urgency.   Musculoskeletal:  Positive for back pain, leg pain and myalgias. Negative for arthralgias and neck pain.   Integumentary:  Negative for wound.   Allergic/Immunologic: Negative for environmental allergies, food allergies and immunocompromised state.   Neurological:  Positive for numbness. Negative for dizziness, tremors, weakness, headaches, memory loss and coordination difficulties.   Hematological:  Negative for adenopathy. Does not bruise/bleed easily.   Psychiatric/Behavioral:  Positive for sleep disturbance (Due to pain). Negative for agitation, behavioral problems, confusion, decreased concentration, depressed mood (Due to cancer diagnosis), dysphoric mood, self-injury and suicidal ideas. The patient is not nervous/anxious (Due to cancer diagnosis).        Review of Symptoms      Symptom Assessment (ESAS 0-10 Scale)  Pain:  7  Dyspnea:  0  Anxiety:  0  Nausea:  0  Depression:  0  Anorexia:  0  Fatigue:  0  Insomnia:  8  Restlessness:  0  Agitation:  0     CAM / Delirium:  Negative  Constipation:  Negative  Diarrhea:  Negative    Anxiety:  Is not  nervous/anxious (Due to cancer diagnosis)  Constipation:  No constipation    Bowel Management Plan (BMP):  Yes      Comments:  Miralax/Senna    Pain Assessment:    Location(s): back    Back       Location: lower and right        Quality: Aching        Quantity: 7/10 in intensity        Chronicity: Onset 1 month(s) ago, gradually improving since Morphine IR        Aggravating Factors: Activity        Alleviating Factors: Opiates       Associated Symptoms: Myalgias    Modified Melo Scale:  0    Performance Status:  90    ECOG Performance Status ndGndrndanddndend:nd nd2nd Living Arrangements:  Lives alone    Psychosocial/Cultural:   See Palliative Psychosocial Note: Yes  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 Daughter, Luna in [place], Son in [place].   **Primary  to Follow**  Palliative Care  Consult: No            Medications:    Current Outpatient Medications:     amLODIPine (NORVASC) 5 MG tablet, Take 5 mg by mouth once daily., Disp: , Rfl:     atorvastatin (LIPITOR) 40 MG tablet, Take 40 mg by mouth., Disp: , Rfl:     blood sugar diagnostic (FREESTYLE TEST) Strp, Test 4 times per day, Disp: , Rfl:     capecitabine (XELODA) 500 MG Tab, Take by mouth 2 (two) times daily., Disp: , Rfl:     clindamycin (CLEOCIN) 300 MG capsule, Take 1 capsule (300 mg total) by mouth every 8 (eight) hours. for 7 days, Disp: 21 capsule, Rfl: 0    famotidine (PEPCID) 20 MG tablet, Take 20 mg by mouth., Disp: , Rfl:     folic acid (FOLVITE) 1 MG tablet, Take 1 mg by mouth once daily., Disp: , Rfl:     gabapentin (NEURONTIN) 300 MG capsule, Take 300 mg by mouth., Disp: , Rfl:     LIDOcaine-prilocaine (EMLA) cream, Apply to affected area once, Disp: 5 g, Rfl: 11    lisinopriL-hydrochlorothiazide (PRINZIDE,ZESTORETIC) 20-25 mg Tab, Take 1 tablet by mouth every morning., Disp: , Rfl:     magnesium oxide (MAG-OX) 400 mg (241.3 mg magnesium) tablet, Take 400 mg by mouth once daily., Disp: , Rfl:     metFORMIN (GLUCOPHAGE-XR) 500 MG ER  24hr tablet, Take 1,000 mg by mouth 2 (two) times daily., Disp: , Rfl:     morphine (MSIR) 15 MG tablet, Take 1 tablet (15 mg total) by mouth every 4 (four) hours as needed for Pain (Max 6 doses/day.)., Disp: 84 tablet, Rfl: 0    naloxone (NARCAN) 4 mg/actuation [place], , Disp: , Rfl:     ondansetron (ZOFRAN-ODT) 8 MG TbDL, DISSOLVE ONE TABLET UNDER THE TONGUE EVERY 8 HOURS AS NEEDED FOR NAUSEA, Disp: , Rfl:     ondansetron (ZOFRAN-ODT) 8 MG TbDL, Take 1 tablet (8 mg total) by mouth 2 (two) times daily., Disp: 30 tablet, Rfl: 11    pantoprazole (PROTONIX) 40 MG tablet, Take 40 mg by mouth., Disp: , Rfl:     prochlorperazine (COMPAZINE) 10 MG tablet, Take 10 mg by mouth., Disp: , Rfl:     tirzepatide (MOUNJARO) 2.5 mg/0.5 mL PnIj, Inject 2.5 mg into the skin every 7 days., Disp: , Rfl:     tiZANidine (ZANAFLEX) 4 MG tablet, , Disp: , Rfl:       External  database queried on 09/24/2024  by AKUA CAMARGO :          Review of patient's allergies indicates:   Allergen Reactions    Ace inhibitors Swelling    Lisinopril Rash    Hydrocodone Itching     Hives    Hydrocodone-acetaminoph-supp11 Itching    Percocet [oxycodone-acetaminophen] Itching     Pt had to take an antihistamine to improve itching     Cephalexin      Hives    Propoxyphene      Hives    Propoxyphene n-acetaminophen     Propoxyphene-acetaminophen            OBJECTIVE:         Physical Exam:  Vitals:      Physical Exam  Vitals and nursing note reviewed.   Constitutional:       General: She is not in acute distress.     Appearance: Normal appearance. She is normal weight. She is not ill-appearing.   HENT:      Head: Normocephalic and atraumatic.      Nose: Nose normal. No congestion or rhinorrhea.      Mouth/Throat:      Mouth: Mucous membranes are moist.      Pharynx: Oropharynx is clear. No oropharyngeal exudate or posterior oropharyngeal erythema.   Eyes:      General: No scleral icterus.        Right eye: No discharge.         Left eye: No  discharge.      Conjunctiva/sclera: Conjunctivae normal.      Pupils: Pupils are equal, round, and reactive to light.   Cardiovascular:      Rate and Rhythm: Normal rate and regular rhythm.      Pulses: Normal pulses.      Heart sounds: Normal heart sounds. No murmur heard.     No gallop.   Pulmonary:      Effort: Pulmonary effort is normal. No respiratory distress.      Breath sounds: No stridor. Examination of the right-middle field reveals decreased breath sounds. Examination of the right-lower field reveals decreased breath sounds. Decreased breath sounds present. No wheezing.   Chest:      Chest wall: No tenderness.   Abdominal:      General: Bowel sounds are normal. There is no distension.      Palpations: Abdomen is soft.      Tenderness: There is no abdominal tenderness.   Genitourinary:     Comments: Deferred  Musculoskeletal:         General: No swelling or tenderness. Normal range of motion.      Cervical back: Normal range of motion and neck supple.      Right lower leg: No edema.      Left lower leg: No edema.   Skin:     General: Skin is warm and dry.      Capillary Refill: Capillary refill takes less than 2 seconds.      Coloration: Skin is not jaundiced or pale.      Findings: Lesion present. No rash.             Comments: Tender right lateral thigh lesion.    Neurological:      Mental Status: She is alert and oriented to person, place, and time.      Motor: No weakness.      Gait: Gait abnormal.   Psychiatric:         Attention and Perception: Attention and perception normal.         Mood and Affect: Mood and affect normal.         Speech: Speech normal.         Behavior: Behavior normal. Behavior is cooperative.         Thought Content: Thought content normal. Thought content does not include suicidal ideation. Thought content does not include suicidal plan.         Cognition and Memory: Cognition and memory normal.         Judgment: Judgment normal.           Labs: BMP  Lab Results   Component  Value Date     09/24/2024    K 3.9 09/24/2024     09/24/2024    CO2 26 09/24/2024    BUN 15 09/24/2024    CREATININE 0.9 09/24/2024    CALCIUM 9.2 09/24/2024    ANIONGAP 9 09/24/2024    EGFRNORACEVR >60 09/24/2024     Lab Results   Component Value Date    WBC 3.83 (L) 09/24/2024    HGB 8.3 (L) 09/24/2024    HCT 27.6 (L) 09/24/2024    MCV 93 09/24/2024     09/24/2024             Imaging: MRI: 08/29/2024: Two small nodular foci of enhancement within the left frontal lobe measuring 3 mm in size and within the left cerebellum measuring 6 mm in size consistent with central nervous system metastatic disease. Minor associated T2 hyperintensity/edema. No mass effect.     CT C/A/P:08/29/2024: Compared to 06/29/2024, interval significant progression of disease in the right hemithorax, with development of suspicious right inguinal and right external iliac chain lymphadenopathy.          I spent a total of 35 minutes on the day of the visit. This includes face to face time in discussion of goals of care, symptom assessment, coordination of care and emotional support.  This also includes non-face to face time preparing to see the patient (eg, review of tests/imaging), obtaining and/or reviewing separately obtained history, documenting clinical information in the electronic or other health record, independently interpreting results and communicating results to the patient/family/caregiver, or care coordinator.       AKUA HENSLEY NP

## 2024-09-17 NOTE — PATIENT INSTRUCTIONS
Ok to take Morphine 15mg short-acting every four hours as needed for pain  Miralax 1pack/day or Senna 2 tabs at bedtime for opioid-induced constipation    Narcan has been prescribed if there is any concern of opioid overdose.   Signs of opioid overdose include:   Loss of consciousness  Unresponsive to outside stimulus  Awake, but unable to talk  Breathing is very slow and shallow, erratic, or has stopped  For lighter skinned people, the skin tone turns bluish purple, for darker skinned people, it turns grayish or ashen.  Choking sounds, or a snore-like gurgling noise (sometimes called the death rattle)  Vomiting  Body is very limp  Face is very pale or clammy  Fingernails and lips turn blue or purplish black  Pulse (heartbeat) is slow, erratic, or not there at all.   If you administer this medicine immediately go to the nearest emergency department for evaluation and stop taking all opioids.

## 2024-09-18 ENCOUNTER — TELEPHONE (OUTPATIENT)
Dept: HEMATOLOGY/ONCOLOGY | Facility: CLINIC | Age: 64
End: 2024-09-18
Payer: MEDICAID

## 2024-09-18 PROBLEM — Z51.5 PALLIATIVE CARE ENCOUNTER: Status: ACTIVE | Noted: 2024-09-18

## 2024-09-18 PROBLEM — G89.3 NEOPLASM RELATED PAIN: Status: ACTIVE | Noted: 2024-09-18

## 2024-09-18 NOTE — ASSESSMENT & PLAN NOTE
Pain description correlates with scans  DC [place] due to itching  Morphine IR 15mg Q6H PRN- Will continue if no itching.  Narcan RX given and explained use to pt and family. Pt and family verbalized understanding.    Miralax 1pack/day or Senna 2 tabs at bedtime for opioid-induced constipation    Pain contract- 09/04/2024  UDS- Next in-person visit

## 2024-09-18 NOTE — ASSESSMENT & PLAN NOTE
-Code status: Full Code. Pt and daughter wish to revisit at a later time  -HCPOA: Daughter: Luna West: [phone]. Pt has 1 adult son-lives in [place]. Pt is single  -GOC:  Continue cancer treatment, symptom management, maintain independence and functional status. Pt aware treatment intent is palliative.   -See HPI for further details

## 2024-09-18 NOTE — TELEPHONE ENCOUNTER
Returned pt's call. Pt states that she is having trouble getting her morphine script approved by insurance. Notified palliative provider of issue.

## 2024-09-18 NOTE — ASSESSMENT & PLAN NOTE
Followed by Dr. Hoffmann and Khurram. Previous oncologist at [facility].   On Paclitaxel- Palliative intent   S/p Keytruda, left lumpectomy (Aug. 2023) w/ residual disease post resection, radiation (Nov. 2023).    S/P right VATS washout with partial decortication, pleural biopsy, intercostal nerve blocks, Pleurx catheter insertion Aug 2024. Pleurx removed due to low output.    MRI: 08/29/2024: Two small nodular foci of enhancement within the left frontal lobe measuring 3 mm in size and within the left cerebellum measuring 6 mm in size consistent with central nervous system metastatic disease. Minor associated T2 hyperintensity/edema. No mass effect.   CT C/A/P: Compared to 06/29/2024, interval significant progression of disease in the right hemithorax, with development of suspicious right inguinal and right external iliac chain lymphadenopathy.

## 2024-09-20 ENCOUNTER — OFFICE VISIT (OUTPATIENT)
Dept: HEMATOLOGY/ONCOLOGY | Facility: CLINIC | Age: 64
End: 2024-09-20
Payer: MEDICAID

## 2024-09-20 ENCOUNTER — INFUSION (OUTPATIENT)
Dept: INFUSION THERAPY | Facility: HOSPITAL | Age: 64
End: 2024-09-20
Attending: INTERNAL MEDICINE
Payer: MEDICAID

## 2024-09-20 ENCOUNTER — LAB VISIT (OUTPATIENT)
Dept: LAB | Facility: HOSPITAL | Age: 64
End: 2024-09-20
Attending: INTERNAL MEDICINE
Payer: MEDICAID

## 2024-09-20 ENCOUNTER — DOCUMENTATION ONLY (OUTPATIENT)
Dept: HEMATOLOGY/ONCOLOGY | Facility: CLINIC | Age: 64
End: 2024-09-20
Payer: MEDICAID

## 2024-09-20 VITALS
OXYGEN SATURATION: 98 % | DIASTOLIC BLOOD PRESSURE: 65 MMHG | TEMPERATURE: 97 F | HEART RATE: 91 BPM | RESPIRATION RATE: 18 BRPM | SYSTOLIC BLOOD PRESSURE: 148 MMHG

## 2024-09-20 VITALS
TEMPERATURE: 97 F | HEIGHT: 60 IN | SYSTOLIC BLOOD PRESSURE: 117 MMHG | DIASTOLIC BLOOD PRESSURE: 58 MMHG | HEART RATE: 82 BPM | OXYGEN SATURATION: 100 % | BODY MASS INDEX: 39.47 KG/M2 | WEIGHT: 201.06 LBS

## 2024-09-20 DIAGNOSIS — C50.412 PRIMARY MALIGNANT NEOPLASM OF UPPER OUTER QUADRANT OF BREAST, LEFT: Primary | ICD-10-CM

## 2024-09-20 DIAGNOSIS — E66.9 OBESITY, CLASS II, BMI 35-39.9: ICD-10-CM

## 2024-09-20 DIAGNOSIS — G89.3 NEOPLASM RELATED PAIN: ICD-10-CM

## 2024-09-20 DIAGNOSIS — T45.1X5A IMMUNODEFICIENCY DUE TO CHEMOTHERAPY: ICD-10-CM

## 2024-09-20 DIAGNOSIS — D84.821 IMMUNODEFICIENCY DUE TO CHEMOTHERAPY: ICD-10-CM

## 2024-09-20 DIAGNOSIS — C78.2 SECONDARY MALIGNANCY OF PARIETAL PLEURA: ICD-10-CM

## 2024-09-20 DIAGNOSIS — Z79.899 IMMUNODEFICIENCY DUE TO CHEMOTHERAPY: ICD-10-CM

## 2024-09-20 DIAGNOSIS — C79.31 SECONDARY ADENOCARCINOMA OF BRAIN: ICD-10-CM

## 2024-09-20 DIAGNOSIS — E11.42 TYPE 2 DIABETES MELLITUS WITH DIABETIC POLYNEUROPATHY, WITHOUT LONG-TERM CURRENT USE OF INSULIN: ICD-10-CM

## 2024-09-20 DIAGNOSIS — C50.412 PRIMARY MALIGNANT NEOPLASM OF UPPER OUTER QUADRANT OF BREAST, LEFT: ICD-10-CM

## 2024-09-20 DIAGNOSIS — E78.00 PURE HYPERCHOLESTEROLEMIA: ICD-10-CM

## 2024-09-20 LAB
ALBUMIN SERPL BCP-MCNC: 3.7 G/DL (ref 3.5–5.2)
ALP SERPL-CCNC: 87 U/L (ref 55–135)
ALT SERPL W/O P-5'-P-CCNC: 19 U/L (ref 10–44)
ANION GAP SERPL CALC-SCNC: 9 MMOL/L (ref 8–16)
AST SERPL-CCNC: 36 U/L (ref 10–40)
BASOPHILS # BLD AUTO: 0.02 K/UL (ref 0–0.2)
BASOPHILS NFR BLD: 0.5 % (ref 0–1.9)
BILIRUB SERPL-MCNC: 0.3 MG/DL (ref 0.1–1)
BUN SERPL-MCNC: 17 MG/DL (ref 8–23)
CALCIUM SERPL-MCNC: 9.3 MG/DL (ref 8.7–10.5)
CHLORIDE SERPL-SCNC: 107 MMOL/L (ref 95–110)
CO2 SERPL-SCNC: 27 MMOL/L (ref 23–29)
CREAT SERPL-MCNC: 0.8 MG/DL (ref 0.5–1.4)
DIFFERENTIAL METHOD BLD: ABNORMAL
EOSINOPHIL # BLD AUTO: 0.1 K/UL (ref 0–0.5)
EOSINOPHIL NFR BLD: 3.4 % (ref 0–8)
ERYTHROCYTE [DISTWIDTH] IN BLOOD BY AUTOMATED COUNT: 16 % (ref 11.5–14.5)
EST. GFR  (NO RACE VARIABLE): >60 ML/MIN/1.73 M^2
GLUCOSE SERPL-MCNC: 106 MG/DL (ref 70–110)
HCT VFR BLD AUTO: 28.4 % (ref 37–48.5)
HGB BLD-MCNC: 8.6 G/DL (ref 12–16)
IMM GRANULOCYTES # BLD AUTO: 0.01 K/UL (ref 0–0.04)
IMM GRANULOCYTES NFR BLD AUTO: 0.3 % (ref 0–0.5)
LDH SERPL L TO P-CCNC: 1509 U/L (ref 110–260)
LYMPHOCYTES # BLD AUTO: 0.8 K/UL (ref 1–4.8)
LYMPHOCYTES NFR BLD: 21 % (ref 18–48)
MAGNESIUM SERPL-MCNC: 1.5 MG/DL (ref 1.6–2.6)
MCH RBC QN AUTO: 27.9 PG (ref 27–31)
MCHC RBC AUTO-ENTMCNC: 30.3 G/DL (ref 32–36)
MCV RBC AUTO: 92 FL (ref 82–98)
MONOCYTES # BLD AUTO: 0.4 K/UL (ref 0.3–1)
MONOCYTES NFR BLD: 11.2 % (ref 4–15)
NEUTROPHILS # BLD AUTO: 2.5 K/UL (ref 1.8–7.7)
NEUTROPHILS NFR BLD: 63.6 % (ref 38–73)
NRBC BLD-RTO: 0 /100 WBC
PLATELET # BLD AUTO: 256 K/UL (ref 150–450)
PMV BLD AUTO: 9.6 FL (ref 9.2–12.9)
POTASSIUM SERPL-SCNC: 4 MMOL/L (ref 3.5–5.1)
PROT SERPL-MCNC: 7 G/DL (ref 6–8.4)
RBC # BLD AUTO: 3.08 M/UL (ref 4–5.4)
SODIUM SERPL-SCNC: 143 MMOL/L (ref 136–145)
WBC # BLD AUTO: 3.85 K/UL (ref 3.9–12.7)

## 2024-09-20 PROCEDURE — 83735 ASSAY OF MAGNESIUM: CPT | Performed by: INTERNAL MEDICINE

## 2024-09-20 PROCEDURE — 36415 COLL VENOUS BLD VENIPUNCTURE: CPT | Performed by: INTERNAL MEDICINE

## 2024-09-20 PROCEDURE — 99999 PR PBB SHADOW E&M-EST. PATIENT-LVL V: CPT | Mod: PBBFAC,,, | Performed by: INTERNAL MEDICINE

## 2024-09-20 PROCEDURE — 96375 TX/PRO/DX INJ NEW DRUG ADDON: CPT

## 2024-09-20 PROCEDURE — 83615 LACTATE (LD) (LDH) ENZYME: CPT | Performed by: INTERNAL MEDICINE

## 2024-09-20 PROCEDURE — 85025 COMPLETE CBC W/AUTO DIFF WBC: CPT | Performed by: INTERNAL MEDICINE

## 2024-09-20 PROCEDURE — 99215 OFFICE O/P EST HI 40 MIN: CPT | Mod: PBBFAC,25 | Performed by: INTERNAL MEDICINE

## 2024-09-20 PROCEDURE — 82728 ASSAY OF FERRITIN: CPT | Performed by: INTERNAL MEDICINE

## 2024-09-20 PROCEDURE — 96413 CHEMO IV INFUSION 1 HR: CPT

## 2024-09-20 PROCEDURE — 83540 ASSAY OF IRON: CPT | Performed by: INTERNAL MEDICINE

## 2024-09-20 PROCEDURE — 96367 TX/PROPH/DG ADDL SEQ IV INF: CPT

## 2024-09-20 PROCEDURE — 63600175 PHARM REV CODE 636 W HCPCS: Performed by: INTERNAL MEDICINE

## 2024-09-20 PROCEDURE — 80053 COMPREHEN METABOLIC PANEL: CPT | Performed by: INTERNAL MEDICINE

## 2024-09-20 PROCEDURE — 83010 ASSAY OF HAPTOGLOBIN QUANT: CPT | Performed by: INTERNAL MEDICINE

## 2024-09-20 PROCEDURE — 25000003 PHARM REV CODE 250: Performed by: INTERNAL MEDICINE

## 2024-09-20 PROCEDURE — A4216 STERILE WATER/SALINE, 10 ML: HCPCS | Performed by: INTERNAL MEDICINE

## 2024-09-20 RX ORDER — SODIUM CHLORIDE 0.9 % (FLUSH) 0.9 %
10 SYRINGE (ML) INJECTION
Status: CANCELLED | OUTPATIENT
Start: 2024-09-20

## 2024-09-20 RX ORDER — DIPHENHYDRAMINE HYDROCHLORIDE 50 MG/ML
50 INJECTION INTRAMUSCULAR; INTRAVENOUS ONCE AS NEEDED
OUTPATIENT
Start: 2024-10-04

## 2024-09-20 RX ORDER — SODIUM CHLORIDE 0.9 % (FLUSH) 0.9 %
10 SYRINGE (ML) INJECTION
OUTPATIENT
Start: 2024-09-27

## 2024-09-20 RX ORDER — EPINEPHRINE 0.3 MG/.3ML
0.3 INJECTION SUBCUTANEOUS ONCE AS NEEDED
Status: CANCELLED | OUTPATIENT
Start: 2024-09-20

## 2024-09-20 RX ORDER — PROCHLORPERAZINE EDISYLATE 5 MG/ML
10 INJECTION INTRAMUSCULAR; INTRAVENOUS ONCE AS NEEDED
Status: CANCELLED
Start: 2024-09-20

## 2024-09-20 RX ORDER — HEPARIN 100 UNIT/ML
500 SYRINGE INTRAVENOUS
Status: CANCELLED | OUTPATIENT
Start: 2024-09-20

## 2024-09-20 RX ORDER — EPINEPHRINE 0.3 MG/.3ML
0.3 INJECTION SUBCUTANEOUS ONCE AS NEEDED
OUTPATIENT
Start: 2024-10-04

## 2024-09-20 RX ORDER — HEPARIN 100 UNIT/ML
500 SYRINGE INTRAVENOUS
OUTPATIENT
Start: 2024-10-04

## 2024-09-20 RX ORDER — FAMOTIDINE 10 MG/ML
20 INJECTION INTRAVENOUS
OUTPATIENT
Start: 2024-09-27

## 2024-09-20 RX ORDER — DIPHENHYDRAMINE HYDROCHLORIDE 50 MG/ML
50 INJECTION INTRAMUSCULAR; INTRAVENOUS ONCE AS NEEDED
Status: CANCELLED | OUTPATIENT
Start: 2024-09-20

## 2024-09-20 RX ORDER — SODIUM CHLORIDE 0.9 % (FLUSH) 0.9 %
10 SYRINGE (ML) INJECTION
OUTPATIENT
Start: 2024-10-04

## 2024-09-20 RX ORDER — HEPARIN 100 UNIT/ML
500 SYRINGE INTRAVENOUS
Status: DISCONTINUED | OUTPATIENT
Start: 2024-09-20 | End: 2024-09-20 | Stop reason: HOSPADM

## 2024-09-20 RX ORDER — EPINEPHRINE 0.3 MG/.3ML
0.3 INJECTION SUBCUTANEOUS ONCE AS NEEDED
OUTPATIENT
Start: 2024-09-27

## 2024-09-20 RX ORDER — FAMOTIDINE 10 MG/ML
20 INJECTION INTRAVENOUS
Status: CANCELLED | OUTPATIENT
Start: 2024-09-20

## 2024-09-20 RX ORDER — DIPHENHYDRAMINE HYDROCHLORIDE 50 MG/ML
50 INJECTION INTRAMUSCULAR; INTRAVENOUS ONCE AS NEEDED
OUTPATIENT
Start: 2024-09-27

## 2024-09-20 RX ORDER — FAMOTIDINE 10 MG/ML
20 INJECTION INTRAVENOUS
Status: COMPLETED | OUTPATIENT
Start: 2024-09-20 | End: 2024-09-20

## 2024-09-20 RX ORDER — PROCHLORPERAZINE EDISYLATE 5 MG/ML
10 INJECTION INTRAMUSCULAR; INTRAVENOUS ONCE AS NEEDED
Start: 2024-10-04

## 2024-09-20 RX ORDER — SODIUM CHLORIDE 0.9 % (FLUSH) 0.9 %
10 SYRINGE (ML) INJECTION
Status: DISCONTINUED | OUTPATIENT
Start: 2024-09-20 | End: 2024-09-20 | Stop reason: HOSPADM

## 2024-09-20 RX ORDER — HEPARIN 100 UNIT/ML
500 SYRINGE INTRAVENOUS
OUTPATIENT
Start: 2024-09-27

## 2024-09-20 RX ORDER — PROCHLORPERAZINE EDISYLATE 5 MG/ML
10 INJECTION INTRAMUSCULAR; INTRAVENOUS ONCE AS NEEDED
Start: 2024-09-27

## 2024-09-20 RX ORDER — FAMOTIDINE 10 MG/ML
20 INJECTION INTRAVENOUS
OUTPATIENT
Start: 2024-10-04

## 2024-09-20 RX ADMIN — SODIUM CHLORIDE 10 MG: 9 INJECTION, SOLUTION INTRAVENOUS at 09:09

## 2024-09-20 RX ADMIN — Medication 10 ML: at 11:09

## 2024-09-20 RX ADMIN — DIPHENHYDRAMINE HYDROCHLORIDE 50 MG: 50 INJECTION, SOLUTION INTRAMUSCULAR; INTRAVENOUS at 09:09

## 2024-09-20 RX ADMIN — SODIUM CHLORIDE: 9 INJECTION, SOLUTION INTRAVENOUS at 10:09

## 2024-09-20 RX ADMIN — HEPARIN 500 UNITS: 100 SYRINGE at 11:09

## 2024-09-20 RX ADMIN — FAMOTIDINE 20 MG: 10 INJECTION, SOLUTION INTRAVENOUS at 09:09

## 2024-09-20 RX ADMIN — PACLITAXEL 156 MG: 6 INJECTION, SOLUTION INTRAVENOUS at 09:09

## 2024-09-20 NOTE — PROGRESS NOTES
Subjective:       Patient ID: Sherlyn Saavedra is a 64 y.o. female.    Chief Complaint: Results, Breast Cancer, and Chemotherapy    HPI:  64-year-old female history of metastatic breast cancer patient continues with beginning cycle 2 day 1 of single agent Taxol variant CPS less than 1 patient has 2 abnormal areas in brain continuing follow variant ECOG status 2    Past Medical History:   Diagnosis Date    Allergy     Anemia     Breast cancer     Diabetes mellitus     HLD (hyperlipidemia) 2015    Hyperlipidemia (Problem)      Hypertension     Primary malignant neoplasm of upper outer quadrant of breast, left 10/10/2023    Secondary malignancy of parietal pleura 2024    Type 2 diabetes mellitus with diabetic polyneuropathy, without long-term current use of insulin 2015    Formatting of this note might be different from the original.   dx in 40's; has some neuropathic symptoms in right LE; (uncertain if from DMII)  Diabetes mellitus type 2 (Problem)       No family history on file.  Social History     Socioeconomic History    Marital status: Single   Tobacco Use    Smoking status: Former     Current packs/day: 0.00     Types: Cigarettes     Quit date:      Years since quittin.7    Smokeless tobacco: Never   Substance and Sexual Activity    Alcohol use: Not Currently    Drug use: Never     Social Determinants of Health     Financial Resource Strain: Medium Risk (2024)    Overall Financial Resource Strain (CARDIA)     Difficulty of Paying Living Expenses: Somewhat hard   Food Insecurity: Food Insecurity Present (2024)    Hunger Vital Sign     Worried About Running Out of Food in the Last Year: Sometimes true     Ran Out of Food in the Last Year: Sometimes true   Transportation Needs: No Transportation Needs (2024)    Received from [facility]    PRAPARE - Transportation     Lack of Transportation (Medical): No  "    Lack of Transportation (Non-Medical): No   Physical Activity: Inactive (9/12/2024)    Exercise Vital Sign     Days of Exercise per Week: 0 days     Minutes of Exercise per Session: 0 min   Stress: Stress Concern Present (9/12/2024)    [employer] [place] [employer] - Occupational Stress Questionnaire     Feeling of Stress : To some extent   Housing Stability: Unknown (9/12/2024)    Housing Stability Vital Sign     Unable to Pay for Housing in the Last Year: No     Past Surgical History:   Procedure Laterality Date    HYSTERECTOMY      tubaligation  1987       Labs:  Lab Results   Component Value Date    WBC 3.85 (L) 09/20/2024    HGB 8.6 (L) 09/20/2024    HCT 28.4 (L) 09/20/2024    MCV 92 09/20/2024     09/20/2024     BMP  Lab Results   Component Value Date     09/20/2024    K 4.0 09/20/2024     09/20/2024    CO2 27 09/20/2024    BUN 17 09/20/2024    CREATININE 0.8 09/20/2024    CALCIUM 9.3 09/20/2024    ANIONGAP 9 09/20/2024     Lab Results   Component Value Date    ALT 19 09/20/2024    AST 36 09/20/2024    ALKPHOS 87 09/20/2024    BILITOT 0.3 09/20/2024       Lab Results   Component Value Date    IRON 82 08/14/2023    TIBC 306 08/14/2023    FERRITIN 746 (H) 08/14/2023     No results found for: "[id number]"  Lab Results   Component Value Date    FOLATE 4.1 (L) 07/23/2024     Lab Results   Component Value Date    TSH 2.500 08/14/2023         Review of Systems   Constitutional:  Positive for fatigue. Negative for activity change, appetite change, chills, diaphoresis, fever and unexpected weight change.   HENT:  Negative for congestion, dental problem, drooling, ear discharge, ear pain, facial swelling, hearing loss, mouth sores, nosebleeds, postnasal drip, rhinorrhea, sinus pressure, sneezing, sore throat, tinnitus, trouble swallowing and voice change.    Eyes:  Negative for photophobia, pain, discharge, redness, itching and visual disturbance.   Respiratory:  Negative for cough, " choking, chest tightness, shortness of breath, wheezing and stridor.    Cardiovascular:  Negative for chest pain, palpitations and leg swelling.   Gastrointestinal:  Negative for abdominal distention, abdominal pain, anal bleeding, blood in stool, constipation, diarrhea, nausea, rectal pain and vomiting.   Endocrine: Negative for cold intolerance, heat intolerance, polydipsia, polyphagia and polyuria.   Genitourinary:  Negative for decreased urine volume, difficulty urinating, dyspareunia, dysuria, enuresis, flank pain, frequency, genital sores, hematuria, menstrual problem, pelvic pain, urgency, vaginal bleeding, vaginal discharge and vaginal pain.   Musculoskeletal:  Positive for arthralgias. Negative for back pain, gait problem, joint swelling, myalgias, neck pain and neck stiffness.   Skin:  Negative for color change, pallor and rash.   Allergic/Immunologic: Negative for environmental allergies, food allergies and immunocompromised state.   Neurological:  Positive for weakness. Negative for dizziness, tremors, seizures, syncope, facial asymmetry, speech difficulty, light-headedness, numbness and headaches.   Hematological:  Negative for adenopathy. Does not bruise/bleed easily.   Psychiatric/Behavioral:  Positive for dysphoric mood. Negative for agitation, behavioral problems, confusion, decreased concentration, hallucinations, self-injury, sleep disturbance and suicidal ideas. The patient is nervous/anxious. The patient is not hyperactive.        Objective:      Physical Exam  Vitals reviewed.   Constitutional:       General: She is not in acute distress.     Appearance: She is well-developed. She is not diaphoretic.   HENT:      Head: Normocephalic and atraumatic.      Right Ear: External ear normal.      Left Ear: External ear normal.      Nose: Nose normal.      Right Sinus: No maxillary sinus tenderness or frontal sinus tenderness.      Left Sinus: No maxillary sinus tenderness or frontal sinus tenderness.       Mouth/Throat:      Pharynx: No oropharyngeal exudate.   Eyes:      General: Lids are normal. No scleral icterus.        Right eye: No discharge.         Left eye: No discharge.      Conjunctiva/sclera: Conjunctivae normal.      Right eye: Right conjunctiva is not injected. No hemorrhage.     Left eye: Left conjunctiva is not injected. No hemorrhage.     Pupils: Pupils are equal, round, and reactive to light.   Neck:      Thyroid: No thyromegaly.      Vascular: No JVD.      Trachea: No tracheal deviation.   Cardiovascular:      Rate and Rhythm: Normal rate.   Pulmonary:      Effort: Pulmonary effort is normal. No respiratory distress.      Breath sounds: No stridor.   Chest:      Chest wall: No tenderness.   Abdominal:      General: Bowel sounds are normal. There is no distension.      Palpations: Abdomen is soft. There is no hepatomegaly, splenomegaly or mass.      Tenderness: There is no abdominal tenderness. There is no rebound.   Musculoskeletal:         General: No tenderness. Normal range of motion.      Cervical back: Normal range of motion and neck supple.   Lymphadenopathy:      Cervical: No cervical adenopathy.      Upper Body:      Right upper body: No supraclavicular adenopathy.      Left upper body: No supraclavicular adenopathy.   Skin:     General: Skin is dry.      Findings: No erythema or rash.   Neurological:      Mental Status: She is alert and oriented to person, place, and time.      Cranial Nerves: No cranial nerve deficit.      Motor: Weakness present.      Coordination: Coordination normal.      Gait: Gait abnormal.   Psychiatric:         Behavior: Behavior normal.         Thought Content: Thought content normal.         Judgment: Judgment normal.             Assessment:      1. Primary malignant neoplasm of upper outer quadrant of breast, left    2. Secondary malignancy of parietal pleura    3. Secondary adenocarcinoma of brain    4. Neoplasm related pain    5. Type 2 diabetes mellitus  with diabetic polyneuropathy, without long-term current use of insulin    6. Obesity, Class II, BMI 35-39.9    7. Immunodeficiency due to chemotherapy    8. Pure hypercholesterolemia           Med Onc Chart Routing      Follow up with physician . Return in 1 month CBC CMP with CT chest abdomen pelvis and MRI of brain prior   Follow up with [facility] . Can be seen by [facility] for day 8 and day 15   Infusion scheduling note    Injection scheduling note Taxol day 01/08/2015 on 20/1 day cycle.   Labs   Scheduling:  Preferred lab:  Lab interval:  Serum iron TIBC ferritin and haptoglobin today draw clinic in infusion   Imaging   Repeat CT chest abdomen pelvis as MRI brain prior to follow-up in 1 month   Pharmacy appointment    Other referrals                   Plan:     Proceed with cycle 2 day 1 of treatment.  Will check iron levels haptoglobin marked elevation of LDH.  At this time continue with current follow-up as detailed orders written reviewed can be seen by [facility] 15 variant return in 1 month repeat staging with CBC CMP CT chest abdomen pelvis MRI brain.        Yaya Hoffmann Jr, MD FACP

## 2024-09-20 NOTE — DISCHARGE INSTRUCTIONS
[facility]  [address]  [address]  [phone] phone     [phone] fax  Hours of Operation: Monday- Friday 8:00am- 5:00pm  After hours phone  [phone]  Hematology / Oncology Physicians on call      BAYRON Andrew Dr., NP Phaon Dunbar, NP Khelsea Conley, FNP    Please call with any concerns regarding your appointment today.

## 2024-09-20 NOTE — PLAN OF CARE
Discussed plan of care with pt. Addressed any and ongoing concerns. Pt denies   Problem: Adult Inpatient Plan of Care  Goal: Plan of Care Review  Outcome: Progressing  Goal: Patient-Specific Goal (Individualized)  Outcome: Progressing  Flowsheets (Taken 9/20/2024 0852)  Individualized Care Needs: Reclined position, pillow and blanket from home along with snacks  Anxieties, Fears or Concerns: none  Patient/Family-Specific Goals (Include Timeframe): will tolerate treatment without any adverse affects  Goal: Absence of Hospital-Acquired Illness or Injury  Outcome: Progressing  Intervention: Identify and Manage Fall Risk  Flowsheets (Taken 9/20/2024 0852)  Safety Promotion/Fall Prevention: in recliner, wheels locked  Intervention: Prevent Infection  Flowsheets (Taken 9/20/2024 0852)  Infection Prevention:   equipment surfaces disinfected   hand hygiene promoted   personal protective equipment utilized  Goal: Optimal Comfort and Wellbeing  Outcome: Progressing  Intervention: Provide Person-Centered Care  Flowsheets (Taken 9/20/2024 0852)  Trust Relationship/Rapport:   care explained   questions encouraged   reassurance provided   emotional support provided   choices provided   thoughts/feelings acknowledged   empathic listening provided   questions answered

## 2024-09-21 DIAGNOSIS — D50.0 IRON DEFICIENCY ANEMIA DUE TO CHRONIC BLOOD LOSS: Primary | ICD-10-CM

## 2024-09-21 LAB
FERRITIN SERPL-MCNC: 406 NG/ML (ref 20–300)
HAPTOGLOB SERPL-MCNC: 209 MG/DL (ref 30–250)
IRON SERPL-MCNC: 29 UG/DL (ref 30–160)
SATURATED IRON: 10 % (ref 20–50)
TOTAL IRON BINDING CAPACITY: 283 UG/DL (ref 250–450)
TRANSFERRIN SERPL-MCNC: 191 MG/DL (ref 200–375)

## 2024-09-21 RX ORDER — SODIUM CHLORIDE 0.9 % (FLUSH) 0.9 %
10 SYRINGE (ML) INJECTION
Status: CANCELLED | OUTPATIENT
Start: 2024-09-28

## 2024-09-21 RX ORDER — HEPARIN 100 UNIT/ML
500 SYRINGE INTRAVENOUS
Status: CANCELLED | OUTPATIENT
Start: 2024-09-21

## 2024-09-21 RX ORDER — SODIUM CHLORIDE 0.9 % (FLUSH) 0.9 %
10 SYRINGE (ML) INJECTION
Status: CANCELLED | OUTPATIENT
Start: 2024-09-21

## 2024-09-21 RX ORDER — HEPARIN 100 UNIT/ML
500 SYRINGE INTRAVENOUS
Status: CANCELLED | OUTPATIENT
Start: 2024-09-28

## 2024-09-23 RX ORDER — DIPHENHYDRAMINE HYDROCHLORIDE 50 MG/ML
50 INJECTION INTRAMUSCULAR; INTRAVENOUS ONCE AS NEEDED
Status: CANCELLED | OUTPATIENT
Start: 2024-09-23

## 2024-09-23 RX ORDER — SODIUM CHLORIDE 0.9 % (FLUSH) 0.9 %
10 SYRINGE (ML) INJECTION
Status: CANCELLED | OUTPATIENT
Start: 2024-09-23

## 2024-09-23 RX ORDER — EPINEPHRINE 0.3 MG/.3ML
0.3 INJECTION SUBCUTANEOUS ONCE AS NEEDED
Status: CANCELLED | OUTPATIENT
Start: 2024-09-23

## 2024-09-23 RX ORDER — HEPARIN 100 UNIT/ML
500 SYRINGE INTRAVENOUS
Status: CANCELLED | OUTPATIENT
Start: 2024-09-23

## 2024-09-24 ENCOUNTER — LAB VISIT (OUTPATIENT)
Dept: LAB | Facility: HOSPITAL | Age: 64
End: 2024-09-24
Attending: INTERNAL MEDICINE
Payer: MEDICAID

## 2024-09-24 ENCOUNTER — CLINICAL SUPPORT (OUTPATIENT)
Dept: REHABILITATION | Facility: HOSPITAL | Age: 64
End: 2024-09-24
Attending: INTERNAL MEDICINE
Payer: MEDICAID

## 2024-09-24 ENCOUNTER — OFFICE VISIT (OUTPATIENT)
Dept: HEMATOLOGY/ONCOLOGY | Facility: CLINIC | Age: 64
End: 2024-09-24
Payer: MEDICAID

## 2024-09-24 VITALS
TEMPERATURE: 98 F | OXYGEN SATURATION: 98 % | SYSTOLIC BLOOD PRESSURE: 143 MMHG | DIASTOLIC BLOOD PRESSURE: 60 MMHG | WEIGHT: 203.81 LBS | HEART RATE: 84 BPM | BODY MASS INDEX: 40.01 KG/M2 | HEIGHT: 60 IN

## 2024-09-24 DIAGNOSIS — Z79.899 IMMUNODEFICIENCY DUE TO CHEMOTHERAPY: ICD-10-CM

## 2024-09-24 DIAGNOSIS — D84.821 IMMUNODEFICIENCY DUE TO CHEMOTHERAPY: ICD-10-CM

## 2024-09-24 DIAGNOSIS — C79.31 SECONDARY ADENOCARCINOMA OF BRAIN: ICD-10-CM

## 2024-09-24 DIAGNOSIS — M54.50 CHRONIC RIGHT-SIDED LOW BACK PAIN WITHOUT SCIATICA: Primary | ICD-10-CM

## 2024-09-24 DIAGNOSIS — G89.3 NEOPLASM RELATED PAIN: ICD-10-CM

## 2024-09-24 DIAGNOSIS — C50.412 PRIMARY MALIGNANT NEOPLASM OF UPPER OUTER QUADRANT OF BREAST, LEFT: Primary | ICD-10-CM

## 2024-09-24 DIAGNOSIS — C50.412 PRIMARY MALIGNANT NEOPLASM OF UPPER OUTER QUADRANT OF BREAST, LEFT: ICD-10-CM

## 2024-09-24 DIAGNOSIS — C78.2 SECONDARY MALIGNANCY OF PARIETAL PLEURA: ICD-10-CM

## 2024-09-24 DIAGNOSIS — T45.1X5A IMMUNODEFICIENCY DUE TO CHEMOTHERAPY: ICD-10-CM

## 2024-09-24 DIAGNOSIS — D50.0 IRON DEFICIENCY ANEMIA DUE TO CHRONIC BLOOD LOSS: ICD-10-CM

## 2024-09-24 DIAGNOSIS — G89.29 CHRONIC RIGHT-SIDED LOW BACK PAIN WITHOUT SCIATICA: Primary | ICD-10-CM

## 2024-09-24 DIAGNOSIS — L02.91 ABSCESS: ICD-10-CM

## 2024-09-24 PROBLEM — M89.9 LESION OF BONE OF RIGHT LOWER LEG: Status: ACTIVE | Noted: 2024-09-24

## 2024-09-24 PROBLEM — M89.9 LESION OF BONE OF RIGHT LOWER LEG: Status: RESOLVED | Noted: 2024-09-24 | Resolved: 2024-09-24

## 2024-09-24 PROBLEM — M54.9 CHRONIC RIGHT-SIDED BACK PAIN: Status: ACTIVE | Noted: 2024-09-24

## 2024-09-24 PROBLEM — L98.9 BENIGN SKIN LESION OF THIGH: Status: ACTIVE | Noted: 2024-09-24

## 2024-09-24 LAB
ALBUMIN SERPL BCP-MCNC: 3.6 G/DL (ref 3.5–5.2)
ALP SERPL-CCNC: 73 U/L (ref 55–135)
ALT SERPL W/O P-5'-P-CCNC: 25 U/L (ref 10–44)
ANION GAP SERPL CALC-SCNC: 9 MMOL/L (ref 8–16)
AST SERPL-CCNC: 36 U/L (ref 10–40)
BASOPHILS # BLD AUTO: 0.02 K/UL (ref 0–0.2)
BASOPHILS NFR BLD: 0.5 % (ref 0–1.9)
BILIRUB SERPL-MCNC: 0.4 MG/DL (ref 0.1–1)
BUN SERPL-MCNC: 15 MG/DL (ref 8–23)
CALCIUM SERPL-MCNC: 9.2 MG/DL (ref 8.7–10.5)
CHLORIDE SERPL-SCNC: 106 MMOL/L (ref 95–110)
CO2 SERPL-SCNC: 26 MMOL/L (ref 23–29)
CREAT SERPL-MCNC: 0.9 MG/DL (ref 0.5–1.4)
DIFFERENTIAL METHOD BLD: ABNORMAL
EOSINOPHIL # BLD AUTO: 0.1 K/UL (ref 0–0.5)
EOSINOPHIL NFR BLD: 2.1 % (ref 0–8)
ERYTHROCYTE [DISTWIDTH] IN BLOOD BY AUTOMATED COUNT: 16.1 % (ref 11.5–14.5)
EST. GFR  (NO RACE VARIABLE): >60 ML/MIN/1.73 M^2
GLUCOSE SERPL-MCNC: 127 MG/DL (ref 70–110)
HCT VFR BLD AUTO: 27.6 % (ref 37–48.5)
HGB BLD-MCNC: 8.3 G/DL (ref 12–16)
IMM GRANULOCYTES # BLD AUTO: 0.02 K/UL (ref 0–0.04)
IMM GRANULOCYTES NFR BLD AUTO: 0.5 % (ref 0–0.5)
LYMPHOCYTES # BLD AUTO: 0.8 K/UL (ref 1–4.8)
LYMPHOCYTES NFR BLD: 20.1 % (ref 18–48)
MCH RBC QN AUTO: 27.9 PG (ref 27–31)
MCHC RBC AUTO-ENTMCNC: 30.1 G/DL (ref 32–36)
MCV RBC AUTO: 93 FL (ref 82–98)
MONOCYTES # BLD AUTO: 0.2 K/UL (ref 0.3–1)
MONOCYTES NFR BLD: 6.3 % (ref 4–15)
NEUTROPHILS # BLD AUTO: 2.7 K/UL (ref 1.8–7.7)
NEUTROPHILS NFR BLD: 70.5 % (ref 38–73)
NRBC BLD-RTO: 0 /100 WBC
PLATELET # BLD AUTO: 249 K/UL (ref 150–450)
PMV BLD AUTO: 9.8 FL (ref 9.2–12.9)
POTASSIUM SERPL-SCNC: 3.9 MMOL/L (ref 3.5–5.1)
PROT SERPL-MCNC: 6.7 G/DL (ref 6–8.4)
RBC # BLD AUTO: 2.98 M/UL (ref 4–5.4)
SODIUM SERPL-SCNC: 141 MMOL/L (ref 136–145)
WBC # BLD AUTO: 3.83 K/UL (ref 3.9–12.7)

## 2024-09-24 PROCEDURE — 97112 NEUROMUSCULAR REEDUCATION: CPT | Mod: PN

## 2024-09-24 PROCEDURE — 97161 PT EVAL LOW COMPLEX 20 MIN: CPT | Mod: PN

## 2024-09-24 PROCEDURE — 99214 OFFICE O/P EST MOD 30 MIN: CPT | Mod: PBBFAC

## 2024-09-24 PROCEDURE — 3044F HG A1C LEVEL LT 7.0%: CPT | Mod: CPTII,,,

## 2024-09-24 PROCEDURE — 80053 COMPREHEN METABOLIC PANEL: CPT | Performed by: INTERNAL MEDICINE

## 2024-09-24 PROCEDURE — 36415 COLL VENOUS BLD VENIPUNCTURE: CPT | Performed by: INTERNAL MEDICINE

## 2024-09-24 PROCEDURE — 3077F SYST BP >= 140 MM HG: CPT | Mod: CPTII,,,

## 2024-09-24 PROCEDURE — 4010F ACE/ARB THERAPY RXD/TAKEN: CPT | Mod: CPTII,,,

## 2024-09-24 PROCEDURE — 1159F MED LIST DOCD IN RCRD: CPT | Mod: CPTII,,,

## 2024-09-24 PROCEDURE — 85025 COMPLETE CBC W/AUTO DIFF WBC: CPT | Performed by: INTERNAL MEDICINE

## 2024-09-24 PROCEDURE — 3078F DIAST BP <80 MM HG: CPT | Mod: CPTII,,,

## 2024-09-24 PROCEDURE — 99215 OFFICE O/P EST HI 40 MIN: CPT | Mod: S$PBB,,,

## 2024-09-24 PROCEDURE — 99999 PR PBB SHADOW E&M-EST. PATIENT-LVL IV: CPT | Mod: PBBFAC,,,

## 2024-09-24 PROCEDURE — 3008F BODY MASS INDEX DOCD: CPT | Mod: CPTII,,,

## 2024-09-24 RX ORDER — CLINDAMYCIN HYDROCHLORIDE 300 MG/1
300 CAPSULE ORAL EVERY 8 HOURS
Qty: 21 CAPSULE | Refills: 0 | Status: SHIPPED | OUTPATIENT
Start: 2024-09-24 | End: 2024-10-01

## 2024-09-24 NOTE — ASSESSMENT & PLAN NOTE
-Code status: Full Code. Pt and daughter wish to revisit at a later time  -HCPOA: Daughter: Luna West: [phone]. Pt has 1 adult son-lives in [place]. Pt is single  -GOC:  Continue cancer treatment, symptom management, maintain independence and functional status. Pt aware treatment intent is palliative.   -See HPI for further details

## 2024-09-24 NOTE — ASSESSMENT & PLAN NOTE
Pain description correlates with scans  Morphine IR 15mg Q4H PRN- no itching/rash  Potentially add long-acting Morphine  Narcan RX given and explained use to pt and family. Pt and family verbalized understanding.    Miralax 1pack/day or Senna 2 tabs at bedtime for opioid-induced constipation    Pain contract- 09/04/2024  UDS- Next in-person visit.

## 2024-09-24 NOTE — PROGRESS NOTES
Subjective:       Patient ID: Sherlyn Saavedra is a 64 y.o. female.    Chief Complaint: Breast Cancer    Primary Oncologist/Hematologist: Dr. Hoffmann    HPI: Ms. Saavedra is a 64 year old female who is following up for her relapsed triple negative breast carcinoma. She is here for cycle 2 day 8 (3 weeks on and 1 week off).   Bone scan on 2024 was benign . Brain MRI 24 showed 2 new lesions. She will be reassessed after systemic therapy for possibly brain xrt.   Cancer Hx: diagnosed in  with triple negative breast cancer patient treated with induction chemotherapy consisting of carboplatin Taxol cyclophosphamide doxorubicin and Keytruda patient had residual disease at time of resection. In treated with Xeloda with primary breast irradiation patient has had increasing chest pain shortness of breath patient underwent imaging demonstrating a pleural effusion on the left. Recent biopsy consistent with metastatic breast carcinoma     Today: She does have possible abscess/cellulitis on right upper back. She denies any bleeding, drainage, change in soaps or detergents, or new clothes. She has not tried anything or applied anything to area. The area is right on bra line. She denies any fevers, n/v/d/c.     Social History     Socioeconomic History    Marital status: Single   Tobacco Use    Smoking status: Former     Current packs/day: 0.00     Types: Cigarettes     Quit date:      Years since quittin.    Smokeless tobacco: Never   Substance and Sexual Activity    Alcohol use: Not Currently    Drug use: Never     Social Determinants of Health     Financial Resource Strain: Medium Risk (2024)    Overall Financial Resource Strain (CARDIA)     Difficulty of Paying Living Expenses: Somewhat hard   Food Insecurity: Food Insecurity Present (2024)    Hunger Vital Sign     Worried About Running Out of Food in the Last Year: Sometimes true     Ran Out of Food in the Last Year: Sometimes true   Transportation  Needs: No Transportation Needs (8/5/2024)    Received from [facility] Subsidi[facility] and Affiliates    PRAPARE - Transportation     Lack of Transportation (Medical): No     Lack of Transportation (Non-Medical): No   Physical Activity: Inactive (9/12/2024)    Exercise Vital Sign     Days of Exercise per Week: 0 days     Minutes of Exercise per Session: 0 min   Stress: Stress Concern Present (9/12/2024)    [employer] [employer] - Occupational Stress Questionnaire     Feeling of Stress : To some extent   Housing Stability: Unknown (9/12/2024)    Housing Stability Vital Sign     Unable to Pay for Housing in the Last Year: No       Past Medical History:   Diagnosis Date    Allergy     Anemia     Breast cancer     Diabetes mellitus     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   dx in 40's; has some neuropathic symptoms in right LE; (uncertain if from DMII)  Diabetes mellitus type 2 (Problem)         No family history on file.    Past Surgical History:   Procedure Laterality Date    HYSTERECTOMY      tubaligation  1987       Review of Systems   Constitutional:  Positive for fatigue. Negative for activity change, appetite change, chills, diaphoresis, fever and unexpected weight change.   HENT:  Negative for congestion, nosebleeds and rhinorrhea.    Respiratory:  Negative for cough and shortness of breath.    Cardiovascular:  Negative for chest pain and leg swelling.   Gastrointestinal:  Negative for abdominal pain, anal bleeding, blood in stool, constipation, diarrhea, nausea and vomiting.   Genitourinary:  Negative for  hematuria.   Musculoskeletal:  Positive for arthralgias.   Skin:  Negative for color change and pallor.        R back abscess   Allergic/Immunologic: Positive for immunocompromised state.   Neurological:  Negative for dizziness and headaches.         Medication List with Changes/Refills   New Medications    CLINDAMYCIN (CLEOCIN) 300 MG CAPSULE    Take 1 capsule (300 mg total) by mouth every 8 (eight) hours. for 7 days   Current Medications    AMLODIPINE (NORVASC) 5 MG TABLET    Take 5 mg by mouth once daily.    ATORVASTATIN (LIPITOR) 40 MG TABLET    Take 40 mg by mouth.    BLOOD SUGAR DIAGNOSTIC (FREESTYLE TEST) STRP    Test 4 times per day    CAPECITABINE (XELODA) 500 MG TAB    Take by mouth 2 (two) times daily.    FAMOTIDINE (PEPCID) 20 MG TABLET    Take 20 mg by mouth.    FOLIC ACID (FOLVITE) 1 MG TABLET    Take 1 mg by mouth once daily.    GABAPENTIN (NEURONTIN) 300 MG CAPSULE    Take 300 mg by mouth.    LIDOCAINE-PRILOCAINE (EMLA) CREAM    Apply to affected area once    LISINOPRIL-HYDROCHLOROTHIAZIDE (PRINZIDE,ZESTORETIC) 20-25 MG TAB    Take 1 tablet by mouth every morning.    MAGNESIUM OXIDE (MAG-OX) 400 MG (241.3 MG MAGNESIUM) TABLET    Take 400 mg by mouth once daily.    METFORMIN (GLUCOPHAGE-XR) 500 MG ER 24HR TABLET    Take 1,000 mg by mouth 2 (two) times daily.    MORPHINE (MSIR) 15 MG TABLET    Take 1 tablet (15 mg total) by mouth every 4 (four) hours as needed for Pain (Max 6 doses/day.).    NALOXONE (NARCAN) 4 MG/ACTUATION SPRY        ONDANSETRON (ZOFRAN-ODT) 8 MG TBDL    DISSOLVE ONE TABLET UNDER THE TONGUE EVERY 8 HOURS AS NEEDED FOR NAUSEA    ONDANSETRON (ZOFRAN-ODT) 8 MG TBDL    Take 1 tablet (8 mg total) by mouth 2 (two) times daily.    PANTOPRAZOLE (PROTONIX) 40 MG TABLET    Take 40 mg by mouth.    PROCHLORPERAZINE (COMPAZINE) 10 MG TABLET    Take 10 mg by mouth.    TIRZEPATIDE (MOUNJARO) 2.5 MG/0.5 ML PNIJ    Inject 2.5 mg into the skin every 7 days.    TIZANIDINE (ZANAFLEX) 4 MG TABLET          Objective:     Vitals:    09/24/24 1244   BP: (!) 143/60   Pulse: 84   Temp: 97.9 °F (36.6 °C)       Physical Exam  Vitals reviewed.   Constitutional:       General: She is not in acute distress.     Appearance: She is not ill-appearing, toxic-appearing or diaphoretic.   HENT:      Head: Normocephalic and atraumatic.   Cardiovascular:      Rate and Rhythm: Normal rate.      Pulses: Normal pulses.   Musculoskeletal:      Right lower leg: No edema.      Left lower leg: No edema.   Skin:     General: Skin is warm.      Coloration: Skin is not jaundiced or pale.      Findings: No bruising, erythema, lesion or rash.             Comments: R back area of induration. No drainage, or bleeding from area. Indurated with slight erythema.    Neurological:      Mental Status: She is alert.      Motor: No weakness.      Gait: Gait normal.   Psychiatric:         Mood and Affect: Mood normal.         Behavior: Behavior normal.         Thought Content: Thought content normal.          Labs/Results:  Lab Results   Component Value Date    WBC 3.83 (L) 09/24/2024    RBC 2.98 (L) 09/24/2024    HGB 8.3 (L) 09/24/2024    HCT 27.6 (L) 09/24/2024    MCV 93 09/24/2024    MCH 27.9 09/24/2024    MCHC 30.1 (L) 09/24/2024    RDW 16.1 (H) 09/24/2024     09/24/2024    MPV 9.8 09/24/2024    GRAN 2.7 09/24/2024    GRAN 70.5 09/24/2024    LYMPH 0.8 (L) 09/24/2024    LYMPH 20.1 09/24/2024    MONO 0.2 (L) 09/24/2024    MONO 6.3 09/24/2024    EOS 0.1 09/24/2024    BASO 0.02 09/24/2024    EOSINOPHIL 2.1 09/24/2024    BASOPHIL 0.5 09/24/2024     CMP  Sodium   Date Value Ref Range Status   09/24/2024 141 136 - 145 mmol/L Final     Potassium   Date Value Ref Range Status   09/24/2024 3.9 3.5 - 5.1 mmol/L Final     Chloride   Date Value Ref Range Status   09/24/2024 106 95 - 110 mmol/L Final     CO2   Date Value Ref Range Status   09/24/2024 26 23 - 29 mmol/L Final     Glucose   Date Value Ref Range Status   09/24/2024 127 (H) 70 - 110 mg/dL Final      BUN   Date Value Ref Range Status   09/24/2024 15 8 - 23 mg/dL Final     Creatinine   Date Value Ref Range Status   09/24/2024 0.9 0.5 - 1.4 mg/dL Final     Calcium   Date Value Ref Range Status   09/24/2024 9.2 8.7 - 10.5 mg/dL Final     Total Protein   Date Value Ref Range Status   09/24/2024 6.7 6.0 - 8.4 g/dL Final     Albumin   Date Value Ref Range Status   09/24/2024 3.6 3.5 - 5.2 g/dL Final     Total Bilirubin   Date Value Ref Range Status   09/24/2024 0.4 0.1 - 1.0 mg/dL Final     Comment:     For infants and newborns, interpretation of results should be based  on gestational age, weight and in agreement with clinical  observations.    Premature Infant recommended reference ranges:  Up to 24 hours.............<8.0 mg/dL  Up to 48 hours............<12.0 mg/dL  3-5 days..................<15.0 mg/dL  6-29 days.................<15.0 mg/dL       Alkaline Phosphatase   Date Value Ref Range Status   09/24/2024 73 55 - 135 U/L Final     AST   Date Value Ref Range Status   09/24/2024 36 10 - 40 U/L Final     ALT   Date Value Ref Range Status   09/24/2024 25 10 - 44 U/L Final     Anion Gap   Date Value Ref Range Status   09/24/2024 9 8 - 16 mmol/L Final     eGFR   Date Value Ref Range Status   09/24/2024 >60 >60 mL/min/1.73 m^2 Final       Assessment:     Problem List Items Addressed This Visit          Immunology/Multi System    Immunodeficiency due to chemotherapy       Oncology    Primary malignant neoplasm of upper outer quadrant of breast, left - Primary    Secondary malignancy of parietal pleura    Secondary adenocarcinoma of brain    Neoplasm related pain    Iron deficiency anemia due to chronic blood loss     Other Visit Diagnoses       Abscess        Relevant Medications    clindamycin (CLEOCIN) 300 MG capsule          Plan:     Primary malignant neoplasm of upper outer quadrant of breast, left, Secondary malignancy of parietal pleura  --continue with cycle 2 day 8 of taxol weeks (3 weeks on and 1 week  off)  --ANC:2.7 plts:249 tbili:0.4 AST:36 ALT:25 alk phos:73  --Bone scan on 08/28/2024 was benign   --Brain MRI 8/29/24 showed 2 new lesions. She will be reassessed after systemic therapy for possibly brain xrt.     Possible abscess  --start clindamycin for 7 days  Wear loose clothing, without bra since it is in bra line when able.   --can cover area with gauze or bandage when needed    Follow-Up: 1 week with cbc cmp prior for cycle 2 day 15. 3 weeks with cbc cmp CT c/a/p and MRI prior for with primary oncologist for next cycle.     Whitley Galarza PA-C  Hematology Oncology    Route Chart for Scheduling    Med Onc Chart Routing      Follow up with physician . 3 weeks with cbc cmp and CT c/a/p and MRI brain prior with primary oncologist   Follow up with [facility] . 1 week from friday with cbc cmp priro for cycle 2 day 15   Infusion scheduling note   1 week from friday leung cycle 2 day 15.   Injection scheduling note    Labs CBC and CMP   Scheduling:  Preferred lab:  Lab interval:  standing orders in   Imaging   3 weeks MRI brain and Ct c/a/p prior to appt with MD   Pharmacy appointment    Other referrals                  Treatment Plan Information   OP PACLITAXEL 3 WEEKS ON, 1 WEEK OFF Yaya Hoffmann MD   Associated diagnosis: Primary malignant neoplasm of upper outer quadrant of breast, left No Stage Recommended ypT2, pN0, cM0, G3, ER-, TX-, HER2- noted on 10/10/2023   Line of treatment: First Line  Treatment Goal: Control     Upcoming Treatment Dates - OP PACLITAXEL 3 WEEKS ON, 1 WEEK OFF    9/27/2024       Pre-Medications       diphenhydrAMINE (BENADRYL) 50 mg in 0.9% NaCl 50 mL IVPB       dexAMETHasone (DECADRON) 10 mg in 0.9% NaCl 50 mL IVPB       famotidine (PF) injection 20 mg       Chemotherapy       PACLitaxeL (TAXOL) 80 mg/m2 = 156 mg in 0.9% NaCl 250 mL chemo infusion  10/4/2024       Pre-Medications       diphenhydrAMINE (BENADRYL) 50 mg in 0.9% NaCl 50 mL IVPB       dexAMETHasone (DECADRON) 10 mg in 0.9%  NaCl 50 mL IVPB       famotidine (PF) injection 20 mg       Chemotherapy       PACLitaxeL (TAXOL) 80 mg/m2 = 156 mg in 0.9% NaCl 250 mL chemo infusion  10/18/2024       Pre-Medications       diphenhydrAMINE (BENADRYL) 50 mg in 0.9% NaCl 50 mL IVPB       dexAMETHasone (DECADRON) 10 mg in 0.9% NaCl 50 mL IVPB       famotidine (PF) injection 20 mg       Chemotherapy       PACLitaxeL (TAXOL) 80 mg/m2 = 156 mg in 0.9% NaCl 250 mL chemo infusion  10/25/2024       Pre-Medications       diphenhydrAMINE (BENADRYL) 50 mg in 0.9% NaCl 50 mL IVPB       dexAMETHasone (DECADRON) 10 mg in 0.9% NaCl 50 mL IVPB       famotidine (PF) injection 20 mg       Chemotherapy       PACLitaxeL (TAXOL) 80 mg/m2 = 156 mg in 0.9% NaCl 250 mL chemo infusion    Therapy Plan Information  FERRLECIT (FERRIC GLUCONATE) QW for Iron deficiency anemia due to chronic blood loss, noted on 9/21/2024  Medications  ferric gluconate (FERRLECIT) 125 mg in 0.9% NaCl 100 mL IVPB  125 mg, Intravenous, 1 time a week  Anaphylaxis/Hypersensitivity  EPINEPHrine (EPIPEN) 0.3 mg/0.3 mL pen injection 0.3 mg  0.3 mg, Intramuscular, PRN  diphenhydrAMINE injection 50 mg  50 mg, Intravenous, PRN  hydrocortisone sodium succinate injection 100 mg  100 mg, Intravenous, PRN  Flushes  heparin, porcine (PF) 100 unit/mL injection flush 500 Units  500 Units, Intravenous, PRN  sodium chloride 0.9% flush 10 mL  10 mL, Intravenous, 1 time a week  0.9% NaCl 100 mL flush bag  Intravenous, 1 time a week      No therapy plan of the specified type found.    No therapy plan of the specified type found.

## 2024-09-24 NOTE — ASSESSMENT & PLAN NOTE
Followed by Dr. Hoffmann and Khurram. Previous oncologist at [facility].   On Paclitaxel- Palliative intent   S/p Keytruda, left lumpectomy (Aug. 2023) w/ residual disease post resection, radiation (Nov. 2023).    S/P right VATS washout with partial decortication, pleural biopsy, intercostal nerve blocks, Pleurx catheter insertion Aug 2024. Pleurx removed due to low output.    MRI: 08/29/2024: Two small nodular foci of enhancement within the left frontal lobe measuring 3 mm in size and within the left cerebellum measuring 6 mm in size consistent with central nervous system metastatic disease. Minor associated T2 hyperintensity/edema. No mass effect.   CT C/A/P: Compared to 06/29/2024, interval significant progression of disease in the right hemithorax, with development of suspicious right inguinal and right external iliac chain lymphadenopathy.

## 2024-09-24 NOTE — PLAN OF CARE
[facility]   Physical Therapy Initial Evaluation        Date: 9/24/2024   Name: Sherlyn Saavedra  Clinic Number: [id number]    Therapy Diagnosis:   Encounter Diagnoses   Name Primary?    Primary malignant neoplasm of upper outer quadrant of breast, left     Chronic right-sided low back pain without sciatica Yes      Physician: Yaya Hoffmann MD     Physician Orders: PT Eval and Treat  Medical Diagnosis from Referral: primary malignant neoplasm of upper outer quadrant of breast, left  Evaluation Date: 9/24/2024  Authorization Period Expiration: 9/13/2025  Plan of Care Expiration: 12/3/2024  Visit # / Visits authorized: 1/1  FOTO: 0/3 (needs a BACK FOTO at next visit and fill out goals)    Precautions: Standard and cancer    Time In: 2:30 pm  Time Out: 3:20 pm  Total Billable Time (timed & untimed codes): 20 minutes     SUBJECTIVE     History of current condition - Sherlyn is a 64 y.o. female s/p left breast surgery Aug 22, 2023 (lumpectomy). 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 She had fluid removed from around her lungs about a month ago. She is currently on morphine pills for back pain and was referred to PT for exercises to help manage pain/increase strength    Pain:  Current 6/10, worst 9/10, best 5/10   Location: back, right side      Prior Therapy: N/A  Prior Level of Function: Independent and pain free with all ADL, IADL, community mobility and functional activities.   Current Level of Function: Independent with all ADL, IADL, community mobility and functional activities with reports of increased pain and need for increased time and frequent breaks.        Pts goals: Pt reported goals are to decrease overall pain levels in order to return to prior functional level.       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   dx in 40's; has some neuropathic symptoms in right LE; (uncertain if from DMII)  Diabetes mellitus type 2 (Problem)         Surgical History:   Sherlyn Saavedra  has a past surgical history that includes Hysterectomy and tubaligation (1987).    Medications:   Sherlyn has a current medication list which includes the following prescription(s): amlodipine, atorvastatin, blood sugar diagnostic, capecitabine, clindamycin, famotidine, folic acid, gabapentin, lidocaine-prilocaine, lisinopril-hydrochlorothiazide, magnesium oxide, metformin, morphine, naloxone, ondansetron, ondansetron, pantoprazole, prochlorperazine, mounjaro, and tizanidine.    Allergies:   Review of patient's allergies indicates:   Allergen Reactions    Ace inhibitors Swelling    Lisinopril Rash    Hydrocodone Itching     Hives    Hydrocodone-acetaminoph-supp11 Itching    Percocet [oxycodone-acetaminophen] Itching     Pt had to take an antihistamine to improve itching     Cephalexin      Hives    Propoxyphene      Hives    Propoxyphene n-acetaminophen     Propoxyphene-acetaminophen         OBJECTIVE     AROM:    Thoracolumbar   AROM  (%) Pain/Dysfunction with Movement Goal   Flexion 100%                 -   Extension 25%                 76%       STRENGTH:     Lower Extremity  Strength RIGHT   LEFT GOAL   Hip Flexion  []1  []2  [x]3  []4  []5      [x]+ []- []1  []2  [x]3  []4  []5      [x]+ []- []1  []2  []3  [x]4  []5      [x]+ []-  R/L    Hip Extension  []1  []2  [x]3  []4  []5      []+ []- []1  []2  [x]3  []4  []5      [x]+ []- []1  []2  []3  [x]4  []5      [x]+ []-  R/L    Hip Abduction  []1  []2  [x]3  []4  []5      []+ [x]- []1  []2  [x]3  []4  []5       []+ [x]- []1  []2  []3  [x]4  []5      []+ []-  R/L    Hip Internal rotation  []1  []2  []3  [x]4  []5      []+ []- []1  []2  []3  [x]4  []5      []+ []- []1  []2  []3  []4  []5      []+ []-     Hip External rotation  []1  []2  []3  [x]4  []5      [x]+ []- []1  []2  []3  [x]4  []5      [x]+ []- []1  []2  []3  []4  []5      []+ []-    Knee Flexion []1  []2  []3  [x]4  []5      [x]+ []- []1  []2  []3  [x]4  []5      [x]+ []- []1  []2  []3  []4  []5      []+ []-     Knee Extension []1  []2  []3  []4  [x]5      []+ []- []1  []2  []3  []4  [x]5      []+ []- []1  []2  []3  []4  []5      []+ []-         RANGE OF MOTION: (*) pain and/or dysfunction    Hip AROM/PROM Right Left Notes GOAL   Hip Flexion (120º) 120 120  -   Hip Extension (30º) 15 20  30 B   Hip Abduction (45º) 45 45  -   Hip Internal Rotation  (45º) - -  -   Hip ER (45º) 30 30  45       POSTURE:  Pt presents with postural abnormalities which include:    [x] Forward Head   [] Increased Lumbar Lordosis   [x] Rounded Shoulder   [] Genu Recurvatum   [] Increased Thoracic Kyphosis [] Genu Valgus   [] Trunk Deviated    [] Pes Planus   [] Scapular Winging    [] Other:       FUNCTION:     CMS Impairment/Limitation/Restriction for FOTO back Survey    Therapist reviewed FOTO scores for hSerlyn on 9/24/2024.   FOTO documents entered into [employer] - see Media section.    Functional Score: -% (will provide at next session)         TREATMENT   Total Treatment time separate from Evaluation: (20) minutes       CPT Intervention  Back pain/hip strength Duration / Intensity  9/24/2024   NR Pelvic tilt with marching X 10   TE Prone prop on forearms + SA presses X 10   NR Posterior pelvic tilt + bridge X 10                                              PLAN Impaired core and B hip strength; chronic back pain           CPT Codes available for Billing:   (--) minutes of Manual therapy (MT) to improve pain and ROM.  (3) minutes of Therapeutic Exercise (TE) to develop   strength, endurance,  range of motion, and flexibility.  (17) minutes of Neuromuscular Re-Education (NR)  to improve: Balance, Coordination, Kinesthetic, Sense, Proprioception, and Posture.  (--) minutes of Therapeutic Activities (TA) to improve functional performance.  Unattended Electrical Stimulation (ES) for muscle performance or pain modulation.  Vasopneumatic Device Therapy () for management of swelling/edema. (59840)  BFR: Blood flow restriction applied during exercise  NP or (-): Not Performed       Home Exercises and Patient Education Provided    Education provided:   PURPOSE: Patient educated on the impairments noted above and the effects of physical therapy intervention to improve overall condition and QOL.   EXERCISE: Patient was educated on all the above exercise prior/during/after for proper posture, positioning, and execution for safe performance with home exercise program.   STRENGTH: Patient educated on the importance of improved core and extremity strength in order to improve alignment of the spine and extremities with static positions and dynamic movement.   POSTURE: Patient educated on postural awareness to reduce stress and maintain optimal alignment of the spine with static positions and dynamic movement       Written Home Exercises Provided: yes.  Exercises were reviewed and Sherlyn was able to demonstrate them prior to the end of the session.  Sherlyn demonstrated good understanding of the education provided.     See EMR under Patient Instructions for exercises provided 9/24/2024.    ASSESSMENT   Sherlyn is a 64 y.o. female referred to outpatient Physical Therapy with a medical diagnosis of malignant neoplasm of left breast. Sherlyn's chief complaint was back pain. She wants to decrease back pain and increase overall strength. Pt presents with impairments including: impairments list: ROM, strength, and posture.    Pt prognosis is Excellent.   Pt will benefit from skilled outpatient Physical Therapy to address the  deficits stated above and in the chart below, provide pt/family education, and to maximize pt's level of independence.     Plan of care discussed with patient: Yes  Pt's spiritual, cultural and educational needs considered and patient is agreeable to the plan of care and goals as stated below:     Anticipated Barriers for therapy: none    Medical Necessity is demonstrated by the following  History  Co-morbidities and personal factors that may impact the plan of care [] LOW: no personal factors / co-morbidities  [x] MODERATE: 1-2 personal factors / co-morbidities  [] HIGH: 3+ personal factors / co-morbidities    Moderate / High Support Documentation:   Co-morbidities affecting plan of care: cancer/chemo    Personal Factors:   no deficits     Examination  Body Structures and Functions, activity limitations and participation restrictions that may impact the plan of care [x] LOW: addressing 1-2 elements  [] MODERATE: 3+ elements  [] HIGH: 4+ elements (please support below)    Moderate / High Support Documentation: -     Clinical Presentation [x] LOW: stable  [] MODERATE: Evolving  [] HIGH: Unstable     Decision Making/ Complexity Score: low         GOALS:      Short Term Goals:  5 weeks Status  Date Met   PAIN: Pt will report worst pain of 6/10 in order to progress toward max functional ability and improve quality of life. [x] Progressing  [] Met  [] Not Met    FUNCTION: Patient will demonstrate improved function as indicated by a functional score that is greater than or equal to (to be determined) out of 100 on FOTO. [x] Progressing  [] Met  [] Not Met    MOBILITY: Patient will improve AROM to 50% of stated goals, listed in objective measures above, in order to progress towards independence with functional activities.  [x] Progressing  [] Met  [] Not Met    STRENGTH: Patient will improve strength to 50% of stated goals, listed in objective measures above, in order to progress towards independence with functional  activities. [x] Progressing  [] Met  [] Not Met    POSTURE: Patient will correct postural deviations in sitting and standing, to decrease pain and promote long term stability.  [x] Progressing  [] Met  [] Not Met    HEP: Patient will demonstrate independence with HEP in order to progress toward functional independence. [x] Progressing  [] Met  [] Not Met    -- [] Progressing  [] Met  [] Not Met      Long Term Goals:  10 weeks Status Date Met   PAIN: Pt will report worst pain of 3/10 in order to progress toward max functional ability and improve quality of life [x] Progressing  [] Met  [] Not Met    FUNCTION: Patient will demonstrate improved function as indicated by a functional score that is greater than or equal to (to be determined) out of 100 on FOTO. [x] Progressing  [] Met  [] Not Met    MOBILITY: Patient will improve AROM to stated goals, listed in objective measures above, in order to return to maximal functional potential and improve quality of life.  [x] Progressing  [] Met  [] Not Met    STRENGTH: Patient will improve strength to stated goals, listed in objective measures above, in order to improve functional independence and quality of life.  [x] Progressing  [] Met  [] Not Met    7.  Patient will return to normal ADL's, IADL's, community involvement, recreational activities, and work-related activities with less than or equal to 3/10 pain and maximal function.  [x] Progressing  [] Met  [] Not Met    8.  - [] Progressing  [] Met  [] Not Met        PLAN   Plan of care Certification: 9/24/2024 to 12/3/2024     Outpatient Physical Therapy 1-2 times weekly for 10 weeks to include any combination of the following interventions: virtual visits, dry needling, modalities, electrical stimulation (IFC, Pre-Mod, Attended with Functional Dry Needling), Cervical/Lumbar Traction, Gait Training, Manual Therapy, Neuromuscular Re-ed, Patient Education, Self Care, Therapeutic Activites, and Therapeutic Exercise     Thank  you for this referral.    These services are reasonable and necessary for the conditions set forth above while under my care.    Cynthia Schumacher, PT,CLT-[email or web address]

## 2024-09-27 ENCOUNTER — INFUSION (OUTPATIENT)
Dept: INFUSION THERAPY | Facility: HOSPITAL | Age: 64
End: 2024-09-27
Attending: INTERNAL MEDICINE
Payer: MEDICAID

## 2024-09-27 VITALS
WEIGHT: 202.81 LBS | DIASTOLIC BLOOD PRESSURE: 68 MMHG | OXYGEN SATURATION: 98 % | SYSTOLIC BLOOD PRESSURE: 126 MMHG | RESPIRATION RATE: 18 BRPM | BODY MASS INDEX: 39.61 KG/M2 | HEART RATE: 74 BPM | TEMPERATURE: 98 F

## 2024-09-27 DIAGNOSIS — C50.412 PRIMARY MALIGNANT NEOPLASM OF UPPER OUTER QUADRANT OF BREAST, LEFT: ICD-10-CM

## 2024-09-27 DIAGNOSIS — D50.0 IRON DEFICIENCY ANEMIA DUE TO CHRONIC BLOOD LOSS: Primary | ICD-10-CM

## 2024-09-27 PROCEDURE — 96375 TX/PRO/DX INJ NEW DRUG ADDON: CPT

## 2024-09-27 PROCEDURE — 25000003 PHARM REV CODE 250: Performed by: INTERNAL MEDICINE

## 2024-09-27 PROCEDURE — 63600175 PHARM REV CODE 636 W HCPCS: Performed by: INTERNAL MEDICINE

## 2024-09-27 PROCEDURE — 96413 CHEMO IV INFUSION 1 HR: CPT

## 2024-09-27 PROCEDURE — 96367 TX/PROPH/DG ADDL SEQ IV INF: CPT

## 2024-09-27 RX ORDER — HEPARIN 100 UNIT/ML
500 SYRINGE INTRAVENOUS
Status: DISCONTINUED | OUTPATIENT
Start: 2024-09-27 | End: 2024-09-27 | Stop reason: HOSPADM

## 2024-09-27 RX ORDER — HEPARIN 100 UNIT/ML
500 SYRINGE INTRAVENOUS
OUTPATIENT
Start: 2024-10-04

## 2024-09-27 RX ORDER — SODIUM CHLORIDE 0.9 % (FLUSH) 0.9 %
10 SYRINGE (ML) INJECTION
OUTPATIENT
Start: 2024-10-04

## 2024-09-27 RX ORDER — EPINEPHRINE 0.3 MG/.3ML
0.3 INJECTION SUBCUTANEOUS ONCE AS NEEDED
OUTPATIENT
Start: 2024-10-04

## 2024-09-27 RX ORDER — DIPHENHYDRAMINE HYDROCHLORIDE 50 MG/ML
50 INJECTION INTRAMUSCULAR; INTRAVENOUS ONCE AS NEEDED
OUTPATIENT
Start: 2024-10-04

## 2024-09-27 RX ORDER — FAMOTIDINE 10 MG/ML
20 INJECTION INTRAVENOUS
Status: COMPLETED | OUTPATIENT
Start: 2024-09-27 | End: 2024-09-27

## 2024-09-27 RX ADMIN — DEXAMETHASONE SODIUM PHOSPHATE 10 MG: 4 INJECTION, SOLUTION INTRA-ARTICULAR; INTRALESIONAL; INTRAMUSCULAR; INTRAVENOUS; SOFT TISSUE at 09:09

## 2024-09-27 RX ADMIN — FAMOTIDINE 20 MG: 10 INJECTION INTRAVENOUS at 10:09

## 2024-09-27 RX ADMIN — HEPARIN 500 UNITS: 100 SYRINGE at 01:09

## 2024-09-27 RX ADMIN — DIPHENHYDRAMINE HYDROCHLORIDE 50 MG: 50 INJECTION, SOLUTION INTRAMUSCULAR; INTRAVENOUS at 09:09

## 2024-09-27 RX ADMIN — PACLITAXEL 156 MG: 6 INJECTION, SOLUTION INTRAVENOUS at 10:09

## 2024-09-27 RX ADMIN — SODIUM CHLORIDE 125 MG: 9 INJECTION, SOLUTION INTRAVENOUS at 11:09

## 2024-09-27 NOTE — PLAN OF CARE
Discussed plan of care with pt. Addressed any and ongoing concerns. Pt denies    Problem: Adult Inpatient Plan of Care  Goal: Plan of Care Review  Outcome: Progressing  Flowsheets (Taken 9/27/2024 0931)  Plan of Care Reviewed With: patient  Goal: Absence of Hospital-Acquired Illness or Injury  Outcome: Progressing  Intervention: Identify and Manage Fall Risk  Flowsheets (Taken 9/27/2024 0931)  Safety Promotion/Fall Prevention:   in recliner, wheels locked   room near unit station   nonskid shoes/socks when out of bed  Intervention: Prevent Infection  Flowsheets (Taken 9/27/2024 0931)  Infection Prevention:   hand hygiene promoted   equipment surfaces disinfected  Goal: Optimal Comfort and Wellbeing  Outcome: Progressing  Intervention: Monitor Pain and Promote Comfort  Flowsheets (Taken 9/27/2024 0931)  Pain Management Interventions:   quiet environment facilitated   relaxation techniques promoted   warm blanket provided  Intervention: Provide Person-Centered Care  Flowsheets (Taken 9/27/2024 0931)  Trust Relationship/Rapport:   reassurance provided   care explained   choices provided   thoughts/feelings acknowledged   emotional support provided   empathic listening provided   questions answered   questions encouraged

## 2024-09-30 ENCOUNTER — TELEPHONE (OUTPATIENT)
Dept: HEMATOLOGY/ONCOLOGY | Facility: CLINIC | Age: 64
End: 2024-09-30
Payer: MEDICAID

## 2024-09-30 NOTE — TELEPHONE ENCOUNTER
----- Message from Whitley Galarza PA-C sent at 9/30/2024  1:23 PM CDT -----  Contact: Sherlyn  If nothing is relieving the pain she needs to go to ER.  ----- Message -----  From: Sierra Marte MA  Sent: 9/30/2024   1:15 PM CDT  To: Whitley Galarza PA-C    Patient c/o back pain, right side pain, stomach issues all symptoms started on yesterday. Patient been taking Morphine every 4 hours for pain. She was told she can't take Tylenol or Ibuprofen. Pain feels like a aching sensation. When she press down on the area the pain worsen. Patient tried a cold pad and a massager both didn't help with pain. No constipation stools are normal. Please advise.  ----- Message -----  From: Sandra Ferro  Sent: 9/30/2024  12:50 PM CDT  To: Ge[facility] Arreaga Staff    Pt is calling in regards to her back and side is still hurting.please call back at .[phone]           Thanks  FELICITAS

## 2024-10-01 ENCOUNTER — HOSPITAL ENCOUNTER (EMERGENCY)
Facility: HOSPITAL | Age: 64
Discharge: HOME | End: 2024-10-01
Attending: EMERGENCY MEDICINE
Payer: MEDICAID

## 2024-10-01 VITALS
BODY MASS INDEX: 39.51 KG/M2 | TEMPERATURE: 99 F | RESPIRATION RATE: 16 BRPM | SYSTOLIC BLOOD PRESSURE: 147 MMHG | HEART RATE: 68 BPM | OXYGEN SATURATION: 100 % | HEIGHT: 60 IN | WEIGHT: 201.25 LBS | DIASTOLIC BLOOD PRESSURE: 64 MMHG

## 2024-10-01 DIAGNOSIS — R10.9 FLANK PAIN: ICD-10-CM

## 2024-10-01 DIAGNOSIS — G89.3 CANCER RELATED PAIN: Primary | ICD-10-CM

## 2024-10-01 LAB
ALBUMIN SERPL BCP-MCNC: 3.4 G/DL (ref 3.5–5.2)
ALBUMIN SERPL BCP-MCNC: 4 G/DL (ref 3.5–5.2)
ALP SERPL-CCNC: 74 U/L (ref 55–135)
ALP SERPL-CCNC: 82 U/L (ref 55–135)
ALT SERPL W/O P-5'-P-CCNC: 23 U/L (ref 10–44)
ALT SERPL W/O P-5'-P-CCNC: 33 U/L (ref 10–44)
ANION GAP SERPL CALC-SCNC: 12 MMOL/L (ref 8–16)
ANION GAP SERPL CALC-SCNC: 16 MMOL/L (ref 8–16)
APTT PPP: 28.6 SEC (ref 21–32)
AST SERPL-CCNC: 34 U/L (ref 10–40)
AST SERPL-CCNC: 47 U/L (ref 10–40)
BASOPHILS # BLD AUTO: 0.01 K/UL (ref 0–0.2)
BASOPHILS # BLD AUTO: 0.01 K/UL (ref 0–0.2)
BASOPHILS NFR BLD: 0.2 % (ref 0–1.9)
BASOPHILS NFR BLD: 0.3 % (ref 0–1.9)
BILIRUB SERPL-MCNC: 0.4 MG/DL (ref 0.1–1)
BILIRUB SERPL-MCNC: 0.5 MG/DL (ref 0.1–1)
BILIRUB UR QL STRIP: NEGATIVE
BNP SERPL-MCNC: 47 PG/ML (ref 0–99)
BUN SERPL-MCNC: 10 MG/DL (ref 8–23)
BUN SERPL-MCNC: 11 MG/DL (ref 8–23)
CALCIUM SERPL-MCNC: 9 MG/DL (ref 8.7–10.5)
CALCIUM SERPL-MCNC: 9.6 MG/DL (ref 8.7–10.5)
CHLORIDE SERPL-SCNC: 106 MMOL/L (ref 95–110)
CHLORIDE SERPL-SCNC: 107 MMOL/L (ref 95–110)
CLARITY UR: CLEAR
CO2 SERPL-SCNC: 17 MMOL/L (ref 23–29)
CO2 SERPL-SCNC: 22 MMOL/L (ref 23–29)
COLOR UR: YELLOW
CREAT SERPL-MCNC: 0.8 MG/DL (ref 0.5–1.4)
CREAT SERPL-MCNC: 0.8 MG/DL (ref 0.5–1.4)
DIFFERENTIAL METHOD BLD: ABNORMAL
DIFFERENTIAL METHOD BLD: ABNORMAL
EOSINOPHIL # BLD AUTO: 0.1 K/UL (ref 0–0.5)
EOSINOPHIL # BLD AUTO: 0.1 K/UL (ref 0–0.5)
EOSINOPHIL NFR BLD: 1.7 % (ref 0–8)
EOSINOPHIL NFR BLD: 1.8 % (ref 0–8)
ERYTHROCYTE [DISTWIDTH] IN BLOOD BY AUTOMATED COUNT: 16.6 % (ref 11.5–14.5)
ERYTHROCYTE [DISTWIDTH] IN BLOOD BY AUTOMATED COUNT: 16.6 % (ref 11.5–14.5)
EST. GFR  (NO RACE VARIABLE): >60 ML/MIN/1.73 M^2
EST. GFR  (NO RACE VARIABLE): >60 ML/MIN/1.73 M^2
GLUCOSE SERPL-MCNC: 120 MG/DL (ref 70–110)
GLUCOSE SERPL-MCNC: 89 MG/DL (ref 70–110)
GLUCOSE UR QL STRIP: NEGATIVE
HCT VFR BLD AUTO: 29.9 % (ref 37–48.5)
HCT VFR BLD AUTO: 30.4 % (ref 37–48.5)
HGB BLD-MCNC: 9.1 G/DL (ref 12–16)
HGB BLD-MCNC: 9.3 G/DL (ref 12–16)
HGB UR QL STRIP: NEGATIVE
IMM GRANULOCYTES # BLD AUTO: 0.01 K/UL (ref 0–0.04)
IMM GRANULOCYTES # BLD AUTO: 0.01 K/UL (ref 0–0.04)
IMM GRANULOCYTES NFR BLD AUTO: 0.2 % (ref 0–0.5)
IMM GRANULOCYTES NFR BLD AUTO: 0.3 % (ref 0–0.5)
INR PPP: 1 (ref 0.8–1.2)
KETONES UR QL STRIP: ABNORMAL
LEUKOCYTE ESTERASE UR QL STRIP: NEGATIVE
LYMPHOCYTES # BLD AUTO: 0.4 K/UL (ref 1–4.8)
LYMPHOCYTES # BLD AUTO: 0.7 K/UL (ref 1–4.8)
LYMPHOCYTES NFR BLD: 18 % (ref 18–48)
LYMPHOCYTES NFR BLD: 8.4 % (ref 18–48)
MCH RBC QN AUTO: 27.7 PG (ref 27–31)
MCH RBC QN AUTO: 27.7 PG (ref 27–31)
MCHC RBC AUTO-ENTMCNC: 30.4 G/DL (ref 32–36)
MCHC RBC AUTO-ENTMCNC: 30.6 G/DL (ref 32–36)
MCV RBC AUTO: 91 FL (ref 82–98)
MCV RBC AUTO: 91 FL (ref 82–98)
MONOCYTES # BLD AUTO: 0.1 K/UL (ref 0.3–1)
MONOCYTES # BLD AUTO: 0.1 K/UL (ref 0.3–1)
MONOCYTES NFR BLD: 2.4 % (ref 4–15)
MONOCYTES NFR BLD: 2.5 % (ref 4–15)
NEUTROPHILS # BLD AUTO: 2.8 K/UL (ref 1.8–7.7)
NEUTROPHILS # BLD AUTO: 3.9 K/UL (ref 1.8–7.7)
NEUTROPHILS NFR BLD: 77.2 % (ref 38–73)
NEUTROPHILS NFR BLD: 87 % (ref 38–73)
NITRITE UR QL STRIP: NEGATIVE
NRBC BLD-RTO: 0 /100 WBC
NRBC BLD-RTO: 0 /100 WBC
OHS QRS DURATION: 76 MS
OHS QTC CALCULATION: 478 MS
PH UR STRIP: 6 [PH] (ref 5–8)
PLATELET # BLD AUTO: 265 K/UL (ref 150–450)
PLATELET # BLD AUTO: 278 K/UL (ref 150–450)
PMV BLD AUTO: 10.3 FL (ref 9.2–12.9)
PMV BLD AUTO: 10.4 FL (ref 9.2–12.9)
POTASSIUM SERPL-SCNC: 3.9 MMOL/L (ref 3.5–5.1)
POTASSIUM SERPL-SCNC: 4.4 MMOL/L (ref 3.5–5.1)
PROT SERPL-MCNC: 6.1 G/DL (ref 6–8.4)
PROT SERPL-MCNC: 7.3 G/DL (ref 6–8.4)
PROT UR QL STRIP: ABNORMAL
PROTHROMBIN TIME: 11.9 SEC (ref 9–12.5)
RBC # BLD AUTO: 3.29 M/UL (ref 4–5.4)
RBC # BLD AUTO: 3.36 M/UL (ref 4–5.4)
SODIUM SERPL-SCNC: 139 MMOL/L (ref 136–145)
SODIUM SERPL-SCNC: 141 MMOL/L (ref 136–145)
SP GR UR STRIP: 1.02 (ref 1–1.03)
TROPONIN I SERPL DL<=0.01 NG/ML-MCNC: <0.006 NG/ML (ref 0–0.03)
URN SPEC COLLECT METH UR: ABNORMAL
UROBILINOGEN UR STRIP-ACNC: NEGATIVE EU/DL
WBC # BLD AUTO: 3.61 K/UL (ref 3.9–12.7)
WBC # BLD AUTO: 4.52 K/UL (ref 3.9–12.7)

## 2024-10-01 PROCEDURE — 81003 URINALYSIS AUTO W/O SCOPE: CPT | Performed by: EMERGENCY MEDICINE

## 2024-10-01 PROCEDURE — 99285 EMERGENCY DEPT VISIT HI MDM: CPT | Mod: 25,27

## 2024-10-01 PROCEDURE — 85610 PROTHROMBIN TIME: CPT | Performed by: EMERGENCY MEDICINE

## 2024-10-01 PROCEDURE — 93010 ELECTROCARDIOGRAM REPORT: CPT | Mod: ,,, | Performed by: INTERNAL MEDICINE

## 2024-10-01 PROCEDURE — 63600175 PHARM REV CODE 636 W HCPCS: Performed by: NURSE PRACTITIONER

## 2024-10-01 PROCEDURE — 80053 COMPREHEN METABOLIC PANEL: CPT | Performed by: EMERGENCY MEDICINE

## 2024-10-01 PROCEDURE — 85730 THROMBOPLASTIN TIME PARTIAL: CPT | Performed by: EMERGENCY MEDICINE

## 2024-10-01 PROCEDURE — 96375 TX/PRO/DX INJ NEW DRUG ADDON: CPT

## 2024-10-01 PROCEDURE — 96376 TX/PRO/DX INJ SAME DRUG ADON: CPT

## 2024-10-01 PROCEDURE — 63600175 PHARM REV CODE 636 W HCPCS: Performed by: EMERGENCY MEDICINE

## 2024-10-01 PROCEDURE — 99285 EMERGENCY DEPT VISIT HI MDM: CPT | Mod: 25

## 2024-10-01 PROCEDURE — 85025 COMPLETE CBC W/AUTO DIFF WBC: CPT | Mod: 91 | Performed by: NURSE PRACTITIONER

## 2024-10-01 PROCEDURE — 93005 ELECTROCARDIOGRAM TRACING: CPT

## 2024-10-01 PROCEDURE — 25500020 PHARM REV CODE 255: Performed by: EMERGENCY MEDICINE

## 2024-10-01 PROCEDURE — 85025 COMPLETE CBC W/AUTO DIFF WBC: CPT | Performed by: EMERGENCY MEDICINE

## 2024-10-01 PROCEDURE — 96372 THER/PROPH/DIAG INJ SC/IM: CPT | Performed by: NURSE PRACTITIONER

## 2024-10-01 PROCEDURE — 84484 ASSAY OF TROPONIN QUANT: CPT | Performed by: EMERGENCY MEDICINE

## 2024-10-01 PROCEDURE — 83880 ASSAY OF NATRIURETIC PEPTIDE: CPT | Performed by: EMERGENCY MEDICINE

## 2024-10-01 PROCEDURE — 96374 THER/PROPH/DIAG INJ IV PUSH: CPT

## 2024-10-01 PROCEDURE — 80053 COMPREHEN METABOLIC PANEL: CPT | Mod: 91 | Performed by: NURSE PRACTITIONER

## 2024-10-01 RX ORDER — HYDROMORPHONE HYDROCHLORIDE 2 MG/1
2 TABLET ORAL EVERY 4 HOURS PRN
Qty: 30 TABLET | Refills: 0 | Status: SHIPPED | OUTPATIENT
Start: 2024-10-01 | End: 2024-10-02 | Stop reason: SDUPTHER

## 2024-10-01 RX ORDER — HYDROMORPHONE HYDROCHLORIDE 1 MG/ML
1 INJECTION, SOLUTION INTRAMUSCULAR; INTRAVENOUS; SUBCUTANEOUS
Status: COMPLETED | OUTPATIENT
Start: 2024-10-01 | End: 2024-10-01

## 2024-10-01 RX ORDER — HEPARIN 100 UNIT/ML
5 SYRINGE INTRAVENOUS
Status: COMPLETED | OUTPATIENT
Start: 2024-10-01 | End: 2024-10-01

## 2024-10-01 RX ORDER — ONDANSETRON HYDROCHLORIDE 2 MG/ML
4 INJECTION, SOLUTION INTRAVENOUS
Status: COMPLETED | OUTPATIENT
Start: 2024-10-01 | End: 2024-10-01

## 2024-10-01 RX ADMIN — ONDANSETRON 4 MG: 2 INJECTION INTRAMUSCULAR; INTRAVENOUS at 07:10

## 2024-10-01 RX ADMIN — HYDROMORPHONE HYDROCHLORIDE 1 MG: 1 INJECTION, SOLUTION INTRAMUSCULAR; INTRAVENOUS; SUBCUTANEOUS at 10:10

## 2024-10-01 RX ADMIN — HYDROMORPHONE HYDROCHLORIDE 1 MG: 1 INJECTION, SOLUTION INTRAMUSCULAR; INTRAVENOUS; SUBCUTANEOUS at 01:10

## 2024-10-01 RX ADMIN — HYDROMORPHONE HYDROCHLORIDE 1 MG: 1 INJECTION, SOLUTION INTRAMUSCULAR; INTRAVENOUS; SUBCUTANEOUS at 07:10

## 2024-10-01 RX ADMIN — HEPARIN 500 UNITS: 100 SYRINGE at 01:10

## 2024-10-01 RX ADMIN — IOHEXOL 100 ML: 350 INJECTION, SOLUTION INTRAVENOUS at 12:10

## 2024-10-01 NOTE — DISCHARGE INSTRUCTIONS
Discontinue your other home pain medications.  This includes morphine and hydrocodone.  Use Dilaudid tablets as prescribed.  Follow up with Dr. Hoffmann at next available return as needed

## 2024-10-01 NOTE — ED PROVIDER NOTES
SCRIBE #1 NOTE: I, Nabil Hall, am scribing for, and in the presence of, Nils Khan Jr., MD. I have scribed the entire note.       History     Chief Complaint   Patient presents with    Back Pain     Pt c/o R mid and lower back pain. States that she was seen at [facility] ER last night and was d/c. Hx of breast CA last chemo tx was Friday. Pain meds taken last @ 4:30a     Review of patient's allergies indicates:   Allergen Reactions    Ace inhibitors Swelling    Lisinopril Rash    Hydrocodone Itching     Hives    Hydrocodone-acetaminoph-supp11 Itching    Percocet [oxycodone-acetaminophen] Itching     Pt had to take an antihistamine to improve itching     Cephalexin      Hives    Propoxyphene      Hives    Propoxyphene n-acetaminophen     Propoxyphene-acetaminophen          History of Present Illness     HPI    10/1/2024, 6:48 AM  History obtained from the patient      History of Present Illness: Sherlyn Saavedra is a 64 y.o. female patient with a PMHx of breast cancer, DMII, HLD, HTN, and anemia who presents to the Emergency Department for evaluation of right-sided back pain which onset gradually 2-3 months ago. Pt states that pain onset after lung surgery to remove fluid. Pain worsened yesterday. Pt is currently undergoing hemotherapy for breast cancer. Pt was prescribed hydrocodone and morphine by her oncologist (Dr. Yaya Hoffmann). Pt last took her pain meds at 4:30 AM. Pt Symptoms are constant and moderate in severity. Pt denies any trauma. Associated sxs include SOB. Patient denies any fever, chills, dysuria, chest pain, and all other sxs at this time. No further complaints or concerns at this time.       Arrival mode: Personal vehicle      PCP: No, Primary Doctor        Past Medical History:  Past Medical History:   Diagnosis Date    Allergy     Anemia     Breast cancer     Diabetes mellitus     HLD (hyperlipidemia) 03/04/2015    Hyperlipidemia (Problem)      Hypertension     Primary malignant neoplasm of  upper outer quadrant of breast, left 10/10/2023    Secondary malignancy of parietal pleura 2024    Type 2 diabetes mellitus with diabetic polyneuropathy, without long-term current use of insulin 2015    Formatting of this note might be different from the original.   dx in 40's; has some neuropathic symptoms in right LE; (uncertain if from DMII)  Diabetes mellitus type 2 (Problem)         Past Surgical History:  Past Surgical History:   Procedure Laterality Date    HYSTERECTOMY      tubaligation           Family History:  No family history on file.    Social History:  Social History     Tobacco Use    Smoking status: Former     Current packs/day: 0.00     Types: Cigarettes     Quit date:      Years since quittin.7    Smokeless tobacco: Never   Substance and Sexual Activity    Alcohol use: Not Currently    Drug use: Never    Sexual activity: Not on file        Review of Systems     Review of Systems   Constitutional:  Negative for chills and fever.   HENT:  Negative for sore throat.    Respiratory:  Positive for shortness of breath.    Cardiovascular:  Negative for chest pain.   Gastrointestinal:  Negative for nausea.   Genitourinary:  Negative for difficulty urinating and dysuria.   Musculoskeletal:  Positive for back pain (right-sided).   Skin:  Negative for rash.   Neurological:  Negative for weakness.   Hematological:  Does not bruise/bleed easily.   All other systems reviewed and are negative.       Physical Exam     Initial Vitals [10/01/24 0642]   BP Pulse Resp Temp SpO2   (!) 161/71 78 18 98.7 °F (37.1 °C) 98 %      MAP       --          Physical Exam  Nursing Notes and Vital Signs Reviewed.  Constitutional: Patient is in no acute distress. Well-developed and well-nourished.  Head: Atraumatic. Normocephalic.  Eyes:  EOM intact.  No scleral icterus.  ENT: Mucous membranes are moist.  Nares clear   Neck:  Full ROM. No JVD.  Cardiovascular: Regular rate. Regular rhythm No murmurs, rubs,  or gallops. Distal pulses are 2+ and symmetric  Pulmonary/Chest: No respiratory distress. Clear to auscultation bilaterally. No wheezing or rales.  Equal chest wall rise bilaterally  Abdominal: Soft and non-distended.  There is no tenderness.  No rebound, guarding, or rigidity. Good bowel sounds.  Genitourinary: No CVA tenderness.  No suprapubic tenderness  Musculoskeletal: Moves all extremities. No obvious deformities.  5 x 5 strength in all extremities   Skin: Warm and dry.  Neurological:  Alert, awake, and appropriate.  Normal speech.  No acute focal neurological deficits are appreciated.  Two through 12 intact bilaterally.  Psychiatric: Normal affect. Good eye contact. Appropriate in content.       ED Course   Procedures  ED Vital Signs:  Vitals:    10/01/24 0642 10/01/24 0725 10/01/24 1147 10/01/24 1300   BP: (!) 161/71  (!) 142/63    Pulse: 78  71    Resp: 18 18 14 18   Temp: 98.7 °F (37.1 °C)      TempSrc: Oral      SpO2: 98%  100%    Weight: 91.3 kg (201 lb 4.5 oz)      Height: 5' (1.524 m)       10/01/24 1304 10/01/24 1316   BP: (!) 147/64    Pulse:  68   Resp:  16   Temp:     TempSrc:     SpO2:  100%   Weight:     Height:         Abnormal Lab Results:  Labs Reviewed   CBC W/ AUTO DIFFERENTIAL - Abnormal       Result Value    WBC 4.52      RBC 3.29 (*)     Hemoglobin 9.1 (*)     Hematocrit 29.9 (*)     MCV 91      MCH 27.7      MCHC 30.4 (*)     RDW 16.6 (*)     Platelets 278      MPV 10.4      Immature Granulocytes 0.2      Gran # (ANC) 3.9      Immature Grans (Abs) 0.01      Lymph # 0.4 (*)     Mono # 0.1 (*)     Eos # 0.1      Baso # 0.01      nRBC 0      Gran % 87.0 (*)     Lymph % 8.4 (*)     Mono % 2.4 (*)     Eosinophil % 1.8      Basophil % 0.2      Differential Method Automated     URINALYSIS, REFLEX TO URINE CULTURE - Abnormal    Specimen UA Urine, Clean Catch      Color, UA Yellow      Appearance, UA Clear      pH, UA 6.0      Specific Gravity, UA 1.025      Protein, UA Trace (*)     Glucose, UA  Negative      Ketones, UA 1+ (*)     Bilirubin (UA) Negative      Occult Blood UA Negative      Nitrite, UA Negative      Urobilinogen, UA Negative      Leukocytes, UA Negative      Narrative:     Specimen Source->Urine   COMPREHENSIVE METABOLIC PANEL - Abnormal    Sodium 141      Potassium 3.9      Chloride 107      CO2 22 (*)     Glucose 120 (*)     BUN 11      Creatinine 0.8      Calcium 9.0      Total Protein 6.1      Albumin 3.4 (*)     Total Bilirubin 0.4      Alkaline Phosphatase 74      AST 34      ALT 23      eGFR >60      Anion Gap 12     B-TYPE NATRIURETIC PEPTIDE    BNP 47     TROPONIN I    Troponin I <0.006     PROTIME-INR    Prothrombin Time 11.9      INR 1.0     APTT    aPTT 28.6          All Lab Results:  Results for orders placed or performed during the hospital encounter of 10/01/24   EKG 12-lead    Collection Time: 10/01/24  7:15 AM   Result Value Ref Range    QRS Duration 76 ms    [facility] QTC Calculation 478 ms   CBC auto differential    Collection Time: 10/01/24  7:17 AM   Result Value Ref Range    WBC 4.52 3.90 - 12.70 K/uL    RBC 3.29 (L) 4.00 - 5.40 M/uL    Hemoglobin 9.1 (L) 12.0 - 16.0 g/dL    Hematocrit 29.9 (L) 37.0 - 48.5 %    MCV 91 82 - 98 fL    MCH 27.7 27.0 - 31.0 pg    MCHC 30.4 (L) 32.0 - 36.0 g/dL    RDW 16.6 (H) 11.5 - 14.5 %    Platelets 278 150 - 450 K/uL    MPV 10.4 9.2 - 12.9 fL    Immature Granulocytes 0.2 0.0 - 0.5 %    Gran # (ANC) 3.9 1.8 - 7.7 K/uL    Immature Grans (Abs) 0.01 0.00 - 0.04 K/uL    Lymph # 0.4 (L) 1.0 - 4.8 K/uL    Mono # 0.1 (L) 0.3 - 1.0 K/uL    Eos # 0.1 0.0 - 0.5 K/uL    Baso # 0.01 0.00 - 0.20 K/uL    nRBC 0 0 /100 WBC    Gran % 87.0 (H) 38.0 - 73.0 %    Lymph % 8.4 (L) 18.0 - 48.0 %    Mono % 2.4 (L) 4.0 - 15.0 %    Eosinophil % 1.8 0.0 - 8.0 %    Basophil % 0.2 0.0 - 1.9 %    Differential Method Automated    Brain natriuretic peptide    Collection Time: 10/01/24  7:17 AM   Result Value Ref Range    BNP 47 0 - 99 pg/mL   Troponin I    Collection Time:  10/01/24  7:49 AM   Result Value Ref Range    Troponin I <0.006 0.000 - 0.026 ng/mL   Comprehensive metabolic panel    Collection Time: 10/01/24  7:49 AM   Result Value Ref Range    Sodium 141 136 - 145 mmol/L    Potassium 3.9 3.5 - 5.1 mmol/L    Chloride 107 95 - 110 mmol/L    CO2 22 (L) 23 - 29 mmol/L    Glucose 120 (H) 70 - 110 mg/dL    BUN 11 8 - 23 mg/dL    Creatinine 0.8 0.5 - 1.4 mg/dL    Calcium 9.0 8.7 - 10.5 mg/dL    Total Protein 6.1 6.0 - 8.4 g/dL    Albumin 3.4 (L) 3.5 - 5.2 g/dL    Total Bilirubin 0.4 0.1 - 1.0 mg/dL    Alkaline Phosphatase 74 55 - 135 U/L    AST 34 10 - 40 U/L    ALT 23 10 - 44 U/L    eGFR >60 >60 mL/min/1.73 m^2    Anion Gap 12 8 - 16 mmol/L   Protime-INR    Collection Time: 10/01/24  7:49 AM   Result Value Ref Range    Prothrombin Time 11.9 9.0 - 12.5 sec    INR 1.0 0.8 - 1.2   APTT    Collection Time: 10/01/24  7:49 AM   Result Value Ref Range    aPTT 28.6 21.0 - 32.0 sec   Urinalysis, Reflex to Urine Culture Urine, Clean Catch    Collection Time: 10/01/24  9:17 AM    Specimen: Urine, Clean Catch   Result Value Ref Range    Specimen UA Urine, Clean Catch     Color, UA Yellow Yellow, Straw, Pippa    Appearance, UA Clear Clear    pH, UA 6.0 5.0 - 8.0    Specific Gravity, UA 1.025 1.005 - 1.030    Protein, UA Trace (A) Negative    Glucose, UA Negative Negative    Ketones, UA 1+ (A) Negative    Bilirubin (UA) Negative Negative    Occult Blood UA Negative Negative    Nitrite, UA Negative Negative    Urobilinogen, UA Negative <2.0 EU/dL    Leukocytes, UA Negative Negative         Imaging Results:  Imaging Results              CTA Chest Non-Coronary (PE Studies) (Final result)  Result time 10/01/24 12:32:37      Final result by Ravi BeckWilliamMD arabella (10/01/24 12:32:37)                   Impression:      No evidence of pulmonary embolism.  Multiple essentially stable masses involving the right hemithorax with a large loculated right pleural effusion and compressive atelectasis of  the right lower lobe.    All CT scans at this facility use dose modulation, iterative reconstruction and/or weight based dosing when appropriate to reduce radiation dose to as low as reasonably achievable.      Electronically signed by: Ravi Beck MD  Date:    10/01/2024  Time:    12:32               Narrative:    EXAMINATION:  CTA CHEST NON CORONARY (PE STUDIES)    CLINICAL HISTORY:  Pulmonary embolism (PE) suspected, high prob;    TECHNIQUE:  Standard CTA of the chest performed with 100 cc Omnipaque 350 utilizing 3-D MIP reformations.    COMPARISON:  CT chest abdomen pelvis 08/29/2024.    FINDINGS:  MediPort catheter tip at the SVC level.    No evidence of pulmonary embolism.  There is atherosclerosis of thoracic aorta.  No evidence of dissection.  No coronary artery calcifications.  Heart is normal in size.  No pericardial effusions.    Multiple masses seen involving the right hemithorax including a 3.5 cm mass at the apex and multiple masses in the subdiaphragmatic region.  Largest subdiaphragmatic mass measures 8.6 x 7.6 cm.  Associated large complex loculated pleural effusion.  There is compressive atelectasis of the right lower lobe.    Left lung is essentially clear.                                       The EKG was ordered, reviewed, and independently interpreted by the ED provider.  Interpretation time: 7:15  Rate: 71 BPM  Rhythm: normal sinus rhythm  Interpretation: Low voltage QRS. Possible anterolateral infarct, age undetermined. No STEMI.             The Emergency Provider reviewed the vital signs and test results, which are outlined above.     ED Discussion       12:42 PM: Discussed pt's case with Dr. Hoffmann ([facility]) who recommends sending pt home with 30 tablets Dilaudid.    12:44 PM: Reassessed pt at this time. Discussed with pt all pertinent ED information and results. Discussed pt dx and plan of tx. Gave pt all f/u and return to the ED instructions. All questions and concerns were  addressed at this time. Pt expresses understanding of information and instructions, and is comfortable with plan to discharge. Pt is stable for discharge.    I discussed with patient and/or family/caretaker that evaluation in the ED does not suggest any emergent or life threatening medical conditions requiring immediate intervention beyond what was provided in the ED, and I believe patient is safe for discharge.  Regardless, an unremarkable evaluation in the ED does not preclude the development or presence of a serious of life threatening condition. As such, patient was instructed to return immediately for any worsening or change in current symptoms.        ED Course as of 10/01/24 1413   Tue Oct 01, 2024   0812 Cardiac monitor interpretation  Independent interpretation  Indication: Pleuritic pain  Normal sinus rhythm.  Rate 72.  No STEMI [RT]      ED Course User Index  [RT] Nils Khan Jr., MD     Medical Decision Making  Differential diagnosis: Pleural effusion, PE, pleurisy, pneumonia, musculoskeletal pain, cancer pain    Patient was evaluated history physical examination.  Due to underlying cancer PE protocol was undertaken light of her pleural effusion noted on chest x-ray patient was done.  This was showing no PE.  Patient was workup is negative.  It was no evidence of infection.  Patient was has a known effusion which has been surgically drained in the past.  I will use Dilaudid tablets for pain control close follow up with heme Onc.  Patient was verbalized understanding agreement with the plan of care.  Pain was well-controlled in the ED.  She was stable safe for discharge in my opinion    Amount and/or Complexity of Data Reviewed  External Data Reviewed: labs and notes.  Labs: ordered. Decision-making details documented in ED Course.     Details: UA negative troponin undetectable coags normal glucose 120 but otherwise normal CMP.  BNP is 47.  H&H is 9129.9.  Radiology: ordered. Decision-making details  documented in ED Course.     Details: This is CT imaging shows no evidence of embolism.  Patient was has a fusion.  He was stable  ECG/medicine tests: ordered and independent interpretation performed. Decision-making details documented in ED Course.     Details: No STEMI  Discussion of management or test interpretation with external provider(s): I discussed the case with Dr. Hoffmann.  He was amenable she was me prescribing Dilaudid tablets.  He will see in follow-up for further pain control    Risk  OTC drugs.  Prescription drug management.  Parenteral controlled substances.  Decision regarding hospitalization.                ED Medication(s):  Medications   ondansetron injection 4 mg (4 mg Intravenous Given 10/1/24 0725)   HYDROmorphone injection 1 mg (1 mg Intravenous Given 10/1/24 0725)   iohexoL (OMNIPAQUE 350) injection 100 mL (100 mLs Intravenous Given 10/1/24 1220)   HYDROmorphone injection 1 mg (1 mg Intravenous Given 10/1/24 1300)   heparin, porcine (PF) 100 unit/mL injection flush 500 Units (500 Units Intravenous Given 10/1/24 1300)       Discharge Medication List as of 10/1/2024 12:44 PM        START taking these medications    Details   HYDROmorphone (DILAUDID) 2 MG tablet Take 1 tablet (2 mg total) by mouth every 4 (four) hours as needed for Pain., Starting Tue 10/1/2024, Normal              Follow-up Information       Yaya Hoffmann MD.    Specialty: Hematology and Oncology  Contact information:  [id number]  [place] [address]  [phone]                                 Scribe Attestation:   Scribe #1: I performed the above scribed service and the documentation accurately describes the services I performed. I attest to the accuracy of the note.     Attending:   Physician Attestation Statement for Scribe #1: I, Nils Khan Jr., MD, personally performed the services described in this documentation, as scribed by Nabil Hall, in my presence, and it is both accurate and  complete.           Clinical Impression       ICD-10-CM ICD-9-CM   1. Cancer related pain  G89.3 338.3   2. Flank pain  R10.9 789.09       Disposition:   Disposition: Discharged  Condition: Stable         Nils Khan Jr., MD  10/01/24 3961

## 2024-10-02 ENCOUNTER — HOSPITAL ENCOUNTER (EMERGENCY)
Facility: HOSPITAL | Age: 64
Discharge: HOME | End: 2024-10-02
Attending: EMERGENCY MEDICINE
Payer: MEDICAID

## 2024-10-02 ENCOUNTER — OFFICE VISIT (OUTPATIENT)
Dept: HEMATOLOGY/ONCOLOGY | Facility: CLINIC | Age: 64
End: 2024-10-02
Payer: MEDICAID

## 2024-10-02 ENCOUNTER — TELEPHONE (OUTPATIENT)
Dept: HEMATOLOGY/ONCOLOGY | Facility: CLINIC | Age: 64
End: 2024-10-02
Payer: MEDICAID

## 2024-10-02 VITALS
HEART RATE: 71 BPM | BODY MASS INDEX: 39 KG/M2 | DIASTOLIC BLOOD PRESSURE: 57 MMHG | SYSTOLIC BLOOD PRESSURE: 132 MMHG | TEMPERATURE: 98 F | HEIGHT: 60 IN | OXYGEN SATURATION: 98 % | WEIGHT: 198.63 LBS

## 2024-10-02 VITALS
HEIGHT: 60 IN | HEART RATE: 72 BPM | RESPIRATION RATE: 14 BRPM | OXYGEN SATURATION: 98 % | WEIGHT: 199.75 LBS | SYSTOLIC BLOOD PRESSURE: 158 MMHG | TEMPERATURE: 98 F | BODY MASS INDEX: 39.22 KG/M2 | DIASTOLIC BLOOD PRESSURE: 70 MMHG

## 2024-10-02 DIAGNOSIS — D84.821 IMMUNODEFICIENCY DUE TO CHEMOTHERAPY: ICD-10-CM

## 2024-10-02 DIAGNOSIS — Z79.899 IMMUNODEFICIENCY DUE TO CHEMOTHERAPY: ICD-10-CM

## 2024-10-02 DIAGNOSIS — G89.29 CHRONIC RIGHT-SIDED BACK PAIN, UNSPECIFIED BACK LOCATION: Primary | ICD-10-CM

## 2024-10-02 DIAGNOSIS — C50.412 PRIMARY MALIGNANT NEOPLASM OF UPPER OUTER QUADRANT OF BREAST, LEFT: Primary | ICD-10-CM

## 2024-10-02 DIAGNOSIS — I10 PRIMARY HYPERTENSION: ICD-10-CM

## 2024-10-02 DIAGNOSIS — T45.1X5A IMMUNODEFICIENCY DUE TO CHEMOTHERAPY: ICD-10-CM

## 2024-10-02 DIAGNOSIS — G89.3 CANCER RELATED PAIN: ICD-10-CM

## 2024-10-02 DIAGNOSIS — C78.2 SECONDARY MALIGNANCY OF PARIETAL PLEURA: ICD-10-CM

## 2024-10-02 DIAGNOSIS — M54.9 CHRONIC RIGHT-SIDED BACK PAIN, UNSPECIFIED BACK LOCATION: Primary | ICD-10-CM

## 2024-10-02 DIAGNOSIS — C79.31 SECONDARY ADENOCARCINOMA OF BRAIN: ICD-10-CM

## 2024-10-02 LAB
AMPHET+METHAMPHET UR QL: NEGATIVE
BARBITURATES UR QL SCN>200 NG/ML: NEGATIVE
BENZODIAZ UR QL SCN>200 NG/ML: NEGATIVE
BZE UR QL SCN: NEGATIVE
CANNABINOIDS UR QL SCN: ABNORMAL
CREAT UR-MCNC: 174 MG/DL (ref 15–325)
METHADONE UR QL SCN>300 NG/ML: NEGATIVE
OPIATES UR QL SCN: ABNORMAL
PCP UR QL SCN>25 NG/ML: NEGATIVE
TOXICOLOGY INFORMATION: ABNORMAL

## 2024-10-02 PROCEDURE — 99214 OFFICE O/P EST MOD 30 MIN: CPT | Mod: PBBFAC,25 | Performed by: INTERNAL MEDICINE

## 2024-10-02 PROCEDURE — 3078F DIAST BP <80 MM HG: CPT | Mod: CPTII,,, | Performed by: INTERNAL MEDICINE

## 2024-10-02 PROCEDURE — 3044F HG A1C LEVEL LT 7.0%: CPT | Mod: CPTII,,, | Performed by: INTERNAL MEDICINE

## 2024-10-02 PROCEDURE — 80307 DRUG TEST PRSMV CHEM ANLYZR: CPT | Performed by: EMERGENCY MEDICINE

## 2024-10-02 PROCEDURE — 99999 PR PBB SHADOW E&M-EST. PATIENT-LVL IV: CPT | Mod: PBBFAC,,, | Performed by: INTERNAL MEDICINE

## 2024-10-02 PROCEDURE — 63600175 PHARM REV CODE 636 W HCPCS: Performed by: EMERGENCY MEDICINE

## 2024-10-02 PROCEDURE — 3075F SYST BP GE 130 - 139MM HG: CPT | Mod: CPTII,,, | Performed by: INTERNAL MEDICINE

## 2024-10-02 PROCEDURE — 99215 OFFICE O/P EST HI 40 MIN: CPT | Mod: S$PBB,,, | Performed by: INTERNAL MEDICINE

## 2024-10-02 PROCEDURE — 1159F MED LIST DOCD IN RCRD: CPT | Mod: CPTII,,, | Performed by: INTERNAL MEDICINE

## 2024-10-02 PROCEDURE — 96372 THER/PROPH/DIAG INJ SC/IM: CPT | Performed by: EMERGENCY MEDICINE

## 2024-10-02 PROCEDURE — 4010F ACE/ARB THERAPY RXD/TAKEN: CPT | Mod: CPTII,,, | Performed by: INTERNAL MEDICINE

## 2024-10-02 PROCEDURE — 3008F BODY MASS INDEX DOCD: CPT | Mod: CPTII,,, | Performed by: INTERNAL MEDICINE

## 2024-10-02 RX ORDER — ONDANSETRON 8 MG/1
8 TABLET, ORALLY DISINTEGRATING ORAL EVERY 8 HOURS PRN
Qty: 60 TABLET | Refills: 5 | Status: SHIPPED | OUTPATIENT
Start: 2024-10-02

## 2024-10-02 RX ORDER — OLANZAPINE 5 MG/1
5 TABLET ORAL NIGHTLY
Qty: 30 TABLET | Refills: 0 | Status: SHIPPED | OUTPATIENT
Start: 2024-10-02 | End: 2024-10-02 | Stop reason: CLARIF

## 2024-10-02 RX ORDER — DEXAMETHASONE 4 MG/1
8 TABLET ORAL DAILY
Qty: 24 TABLET | Refills: 3 | Status: SHIPPED | OUTPATIENT
Start: 2024-10-03 | End: 2024-10-02 | Stop reason: CLARIF

## 2024-10-02 RX ORDER — KETOROLAC TROMETHAMINE 30 MG/ML
30 INJECTION, SOLUTION INTRAMUSCULAR; INTRAVENOUS
Status: COMPLETED | OUTPATIENT
Start: 2024-10-02 | End: 2024-10-02

## 2024-10-02 RX ORDER — MORPHINE SULFATE 30 MG/1
30 TABLET, FILM COATED, EXTENDED RELEASE ORAL 2 TIMES DAILY
Qty: 60 TABLET | Refills: 0 | Status: SHIPPED | OUTPATIENT
Start: 2024-10-02 | End: 2024-10-03 | Stop reason: SDUPTHER

## 2024-10-02 RX ORDER — HYDROMORPHONE HYDROCHLORIDE 2 MG/1
4 TABLET ORAL
Qty: 40 TABLET | Refills: 0 | Status: SHIPPED | OUTPATIENT
Start: 2024-10-02 | End: 2024-10-03

## 2024-10-02 RX ORDER — OLANZAPINE 5 MG/1
5 TABLET ORAL NIGHTLY
Qty: 8 TABLET | Refills: 11 | Status: SHIPPED | OUTPATIENT
Start: 2024-10-02

## 2024-10-02 RX ORDER — DEXAMETHASONE 4 MG/1
8 TABLET ORAL DAILY
Qty: 24 TABLET | Refills: 11 | Status: SHIPPED | OUTPATIENT
Start: 2024-10-03

## 2024-10-02 RX ADMIN — KETOROLAC TROMETHAMINE 30 MG: 30 INJECTION, SOLUTION INTRAMUSCULAR; INTRAVENOUS at 01:10

## 2024-10-02 NOTE — PROGRESS NOTES
Subjective:       Patient ID: Sherlyn Saavedra is a 64 y.o. female.    Chief Complaint: Results, Pain, and Breast Cancer    HPI:  64-year-old female history of triple negative breast cancer patient has had 4 doses of Taxol with unfortunately what appears to be progressive disease with increasing pain involving right chest and imaging demonstrating progressive disease in biopsy-proven pleura.  ECOG status 2    Past Medical History:   Diagnosis Date    Allergy     Anemia     Breast cancer     Diabetes mellitus     HLD (hyperlipidemia) 2015    Hyperlipidemia (Problem)      Hypertension     Primary malignant neoplasm of upper outer quadrant of breast, left 10/10/2023    Secondary malignancy of parietal pleura 2024    Type 2 diabetes mellitus with diabetic polyneuropathy, without long-term current use of insulin 2015    Formatting of this note might be different from the original.   dx in 40's; has some neuropathic symptoms in right LE; (uncertain if from DMII)  Diabetes mellitus type 2 (Problem)       No family history on file.  Social History     Socioeconomic History    Marital status: Single   Tobacco Use    Smoking status: Former     Current packs/day: 0.00     Types: Cigarettes     Quit date:      Years since quittin.7    Smokeless tobacco: Never   Substance and Sexual Activity    Alcohol use: Not Currently    Drug use: Never    Sexual activity: Not Currently     Social Drivers of Health     Financial Resource Strain: Medium Risk (2024)    Overall Financial Resource Strain (CARDIA)     Difficulty of Paying Living Expenses: Somewhat hard   Food Insecurity: Food Insecurity Present (2024)    Hunger Vital Sign     Worried About Running Out of Food in the Last Year: Sometimes true     Ran Out of Food in the Last Year: Sometimes true   Transportation Needs: No Transportation Needs (2024)    Received from [facility] Subsidiaries and  "Affiliates    PRAPARE - Transportation     Lack of Transportation (Medical): No     Lack of Transportation (Non-Medical): No   Physical Activity: Inactive (9/12/2024)    Exercise Vital Sign     Days of Exercise per Week: 0 days     Minutes of Exercise per Session: 0 min   Stress: Stress Concern Present (9/12/2024)    [employer] - Occupational Stress Questionnaire     Feeling of Stress : To some extent   Housing Stability: Unknown (9/12/2024)    Housing Stability Vital Sign     Unable to Pay for Housing in the Last Year: No     Past Surgical History:   Procedure Laterality Date    HYSTERECTOMY      tubaligation  1987       Labs:  Lab Results   Component Value Date    WBC 3.61 (L) 10/01/2024    HGB 9.3 (L) 10/01/2024    HCT 30.4 (L) 10/01/2024    MCV 91 10/01/2024     10/01/2024     BMP  Lab Results   Component Value Date     10/01/2024    K 4.4 10/01/2024     10/01/2024    CO2 17 (L) 10/01/2024    BUN 10 10/01/2024    CREATININE 0.8 10/01/2024    CALCIUM 9.6 10/01/2024    ANIONGAP 16 10/01/2024     Lab Results   Component Value Date    ALT 33 10/01/2024    AST 47 (H) 10/01/2024    ALKPHOS 82 10/01/2024    BILITOT 0.5 10/01/2024       Lab Results   Component Value Date    IRON 29 (L) 09/20/2024    TIBC 283 09/20/2024    FERRITIN 406 (H) 09/20/2024     No results found for: "[id number]"  Lab Results   Component Value Date    FOLATE 4.1 (L) 07/23/2024     Lab Results   Component Value Date    TSH 2.500 08/14/2023         Review of Systems   Constitutional:  Negative for activity change, appetite change, chills, diaphoresis, fatigue, fever and unexpected weight change.   HENT:  Negative for congestion, dental problem, drooling, ear discharge, ear pain, facial swelling, hearing loss, mouth sores, nosebleeds, postnasal drip, rhinorrhea, sinus pressure, sneezing, sore throat, tinnitus, trouble swallowing and voice change.    Eyes:  Negative for photophobia, pain, discharge, " redness, itching and visual disturbance.   Respiratory:  Negative for cough, choking, chest tightness, shortness of breath, wheezing and stridor.    Cardiovascular:  Positive for chest pain. Negative for palpitations and leg swelling.   Gastrointestinal:  Negative for abdominal distention, abdominal pain, anal bleeding, blood in stool, constipation, diarrhea, nausea, rectal pain and vomiting.   Endocrine: Negative for cold intolerance, heat intolerance, polydipsia, polyphagia and polyuria.   Genitourinary:  Negative for decreased urine volume, difficulty urinating, dyspareunia, dysuria, enuresis, flank pain, frequency, genital sores, hematuria, menstrual problem, pelvic pain, urgency, vaginal bleeding, vaginal discharge and vaginal pain.   Musculoskeletal:  Negative for arthralgias, back pain, gait problem, joint swelling, myalgias, neck pain and neck stiffness.   Skin:  Negative for color change, pallor and rash.   Allergic/Immunologic: Negative for environmental allergies, food allergies and immunocompromised state.   Neurological:  Positive for weakness. Negative for dizziness, tremors, seizures, syncope, facial asymmetry, speech difficulty, light-headedness, numbness and headaches.   Hematological:  Negative for adenopathy. Does not bruise/bleed easily.   Psychiatric/Behavioral:  Positive for dysphoric mood. Negative for agitation, behavioral problems, confusion, decreased concentration, hallucinations, self-injury, sleep disturbance and suicidal ideas. The patient is nervous/anxious. The patient is not hyperactive.        Objective:      Physical Exam  Vitals reviewed.   Constitutional:       General: She is not in acute distress.     Appearance: She is well-developed. She is not diaphoretic.   HENT:      Head: Normocephalic and atraumatic.      Right Ear: External ear normal.      Left Ear: External ear normal.      Nose: Nose normal.      Right Sinus: No maxillary sinus tenderness or frontal sinus tenderness.       Left Sinus: No maxillary sinus tenderness or frontal sinus tenderness.      Mouth/Throat:      Pharynx: No oropharyngeal exudate.   Eyes:      General: Lids are normal. No scleral icterus.        Right eye: No discharge.         Left eye: No discharge.      Conjunctiva/sclera: Conjunctivae normal.      Right eye: Right conjunctiva is not injected. No hemorrhage.     Left eye: Left conjunctiva is not injected. No hemorrhage.     Pupils: Pupils are equal, round, and reactive to light.   Neck:      Thyroid: No thyromegaly.      Vascular: No JVD.      Trachea: No tracheal deviation.   Cardiovascular:      Rate and Rhythm: Normal rate.   Pulmonary:      Effort: Pulmonary effort is normal. No respiratory distress.      Breath sounds: No stridor.   Chest:      Chest wall: No tenderness.   Abdominal:      General: Bowel sounds are normal. There is no distension.      Palpations: Abdomen is soft. There is no hepatomegaly, splenomegaly or mass.      Tenderness: There is no abdominal tenderness. There is no rebound.   Musculoskeletal:         General: No tenderness. Normal range of motion.      Cervical back: Normal range of motion and neck supple.   Lymphadenopathy:      Cervical: No cervical adenopathy.      Upper Body:      Right upper body: No supraclavicular adenopathy.      Left upper body: No supraclavicular adenopathy.   Skin:     General: Skin is dry.      Findings: No erythema or rash.   Neurological:      Mental Status: She is alert and oriented to person, place, and time.      Cranial Nerves: No cranial nerve deficit.      Motor: Weakness present.      Coordination: Coordination abnormal.      Gait: Gait abnormal.   Psychiatric:         Behavior: Behavior normal.         Thought Content: Thought content normal.         Judgment: Judgment normal.             Assessment:      1. Primary malignant neoplasm of upper outer quadrant of breast, left    2. Cancer related pain    3. Secondary malignancy of parietal  pleura    4. Secondary adenocarcinoma of brain    5. Immunodeficiency due to chemotherapy           Med Onc Chart Routing      Follow up with physician    Follow up with [facility]    Infusion scheduling note    Injection scheduling note OP SACITUZUMAB GOVITECAN-HZIY Q3W   Labs    Imaging    Pharmacy appointment    Other referrals                   Plan:     Patient seen urgently.  At this time patient unfortunately appears to have progressive disease demonstrated on imaging studies of patient's CTA previous chest in abdomen pelvis variant at this time I have begun the patient on MS Contin 30 mg Q 12 hours along with increased to 4 mg Q 3 breakthrough pain.  Patient has been consented forConsented the patient to the treatment plan and the patient was educated on the planned duration of the treatment and schedule of the treatment administration.OP SACITUZUMAB GOVITECAN-HZIY Q3W as per via pathways.  Discussed implications.  She has a advanced care planningAdvance Care Planning  she has requested that the last 4 images be placed on a disc for her review with the family daughter was on phone during entire visit discussed above             Yaya Hoffmann Jr, MD FACP

## 2024-10-02 NOTE — ED PROVIDER NOTES
SCRIBE #1 NOTE: I, Gunnar Cerda, am scribing for, and in the presence of, Sydni Quintero MD. I have scribed the entire note.       History     Chief Complaint   Patient presents with    Back Pain     Pt. Returns to ED for back pain. Pt. Was seen early today for similar symptoms and was DC, but has had no relief. Pt. Was prescribed demerol upon initial visit with no change in pain.     Review of patient's allergies indicates:   Allergen Reactions    Ace inhibitors Swelling    Lisinopril Rash    Hydrocodone Itching     Hives    Hydrocodone-acetaminoph-supp11 Itching    Percocet [oxycodone-acetaminophen] Itching     Pt had to take an antihistamine to improve itching     Cephalexin      Hives    Propoxyphene      Hives    Propoxyphene n-acetaminophen     Propoxyphene-acetaminophen          History of Present Illness     HPI    10/2/2024, 12:39 AM  History obtained from the patient      History of Present Illness: Sherlyn Saavedra is a 64 y.o. female patient with a PMHx of DM, HLD, HTN who presents to the Emergency Department for evaluation of chronic right lower back pain. Patient reports she was seen here yesterday for similar complaints and received a hydromorphone injection which improved her pain. She states her pain returned prompting her to come back to the ED. She notes she was given prescription for 2 mg hydromorphone which she states did not improve her pain. Symptoms are constant and moderate in severity. No mitigating or exacerbating factors reported. She describes her pain to radiate around her right side and into her right lower abdominal crease. Patient denies any N/V, fever, chills, CP, SOB, urinary symptoms, constipation, diarrhea, and all other sxs at this time. No further complaints or concerns at this time.       Arrival mode: Personal vehicle     PCP: Nan, Primary Doctor        Past Medical History:  Past Medical History:   Diagnosis Date    Allergy     Anemia     Breast cancer     Diabetes mellitus     HLD  (hyperlipidemia) 2015    Hyperlipidemia (Problem)      Hypertension     Primary malignant neoplasm of upper outer quadrant of breast, left 10/10/2023    Secondary malignancy of parietal pleura 2024    Type 2 diabetes mellitus with diabetic polyneuropathy, without long-term current use of insulin 2015    Formatting of this note might be different from the original.   dx in 40's; has some neuropathic symptoms in right LE; (uncertain if from DMII)  Diabetes mellitus type 2 (Problem)         Past Surgical History:  Past Surgical History:   Procedure Laterality Date    HYSTERECTOMY      tubaligation           Family History:  No family history on file.    Social History:  Social History     Tobacco Use    Smoking status: Former     Current packs/day: 0.00     Types: Cigarettes     Quit date:      Years since quittin.7    Smokeless tobacco: Never   Substance and Sexual Activity    Alcohol use: Not Currently    Drug use: Never    Sexual activity: Not Currently        Review of Systems     Review of Systems   Constitutional:  Negative for fever.   HENT:  Negative for sore throat.    Respiratory:  Negative for shortness of breath.    Cardiovascular:  Negative for chest pain.   Gastrointestinal:  Positive for abdominal pain (Right lower abdomen). Negative for constipation, diarrhea, nausea and vomiting.   Genitourinary:  Negative for dysuria.   Musculoskeletal:  Positive for back pain (Right lower).   Skin:  Negative for rash.   Neurological:  Negative for weakness.   Hematological:  Does not bruise/bleed easily.   All other systems reviewed and are negative.       Physical Exam     Initial Vitals [10/01/24 2126]   BP Pulse Resp Temp SpO2   (!) 199/77 77 16 98.4 °F (36.9 °C) 97 %      MAP       --          Physical Exam   Nursing Notes and Vital Signs Reviewed.  Constitutional: Patient is in no acute distress. Well-developed and well-nourished.  Head: Atraumatic. Normocephalic.  Eyes: PERRL. EOM  intact. Conjunctivae are not pale. No scleral icterus.  ENT: Mucous membranes are moist. Oropharynx is clear and symmetric.    Neck: Supple. Full ROM. No lymphadenopathy.  Cardiovascular: Regular rate. Regular rhythm. No murmurs, rubs, or gallops. Distal pulses are 2+ and symmetric.  Pulmonary/Chest: No respiratory distress. Clear to auscultation bilaterally. No wheezing or rales.  Abdominal: Soft and non-distended.  There is no tenderness.  No rebound, guarding, or rigidity. Good bowel sounds.  Genitourinary: No CVA tenderness  Musculoskeletal: Moves all extremities. No obvious deformities. No edema. No calf tenderness.  Skin: Warm and dry.  Neurological:  Alert, awake, and appropriate.  Normal speech.  No acute focal neurological deficits are appreciated.  Psychiatric: Normal affect. Good eye contact. Appropriate in content.     ED Course   Procedures  ED Vital Signs:  Vitals:    10/01/24 2126 10/01/24 2240 10/02/24 0154 10/02/24 0231   BP: (!) 199/77  (!) 177/82 (!) 158/70   Pulse: 77  78 72   Resp: 16 18 15 14   Temp: 98.4 °F (36.9 °C)  98.5 °F (36.9 °C) 98 °F (36.7 °C)   TempSrc: Oral  Oral Oral   SpO2: 97%  100% 98%   Weight: 90.6 kg (199 lb 11.8 oz)      Height: 5' (1.524 m)          Abnormal Lab Results:  Labs Reviewed   CBC W/ AUTO DIFFERENTIAL - Abnormal       Result Value    WBC 3.61 (*)     RBC 3.36 (*)     Hemoglobin 9.3 (*)     Hematocrit 30.4 (*)     MCV 91      MCH 27.7      MCHC 30.6 (*)     RDW 16.6 (*)     Platelets 265      MPV 10.3      Immature Granulocytes 0.3      Gran # (ANC) 2.8      Immature Grans (Abs) 0.01      Lymph # 0.7 (*)     Mono # 0.1 (*)     Eos # 0.1      Baso # 0.01      nRBC 0      Gran % 77.2 (*)     Lymph % 18.0      Mono % 2.5 (*)     Eosinophil % 1.7      Basophil % 0.3      Differential Method Automated     COMPREHENSIVE METABOLIC PANEL - Abnormal    Sodium 139      Potassium 4.4      Chloride 106      CO2 17 (*)     Glucose 89      BUN 10      Creatinine 0.8      Calcium  9.6      Total Protein 7.3      Albumin 4.0      Total Bilirubin 0.5      Alkaline Phosphatase 82      AST 47 (*)     ALT 33      eGFR >60      Anion Gap 16     DRUG SCREEN PANEL, URINE EMERGENCY - Abnormal    Benzodiazepines Negative      Methadone metabolites Negative      Cocaine (Metab.) Negative      Opiate Scrn, Ur Presumptive Positive (*)     Barbiturate Screen, Ur Negative      Amphetamine Screen, Ur Negative      THC Presumptive Positive (*)     Phencyclidine Negative      Creatinine, Urine 174.0      Toxicology Information SEE COMMENT      Narrative:     Specimen Source->Urine        All Lab Results:  Results for orders placed or performed during the hospital encounter of 10/02/24   CBC auto differential    Collection Time: 10/01/24 10:42 PM   Result Value Ref Range    WBC 3.61 (L) 3.90 - 12.70 K/uL    RBC 3.36 (L) 4.00 - 5.40 M/uL    Hemoglobin 9.3 (L) 12.0 - 16.0 g/dL    Hematocrit 30.4 (L) 37.0 - 48.5 %    MCV 91 82 - 98 fL    MCH 27.7 27.0 - 31.0 pg    MCHC 30.6 (L) 32.0 - 36.0 g/dL    RDW 16.6 (H) 11.5 - 14.5 %    Platelets 265 150 - 450 K/uL    MPV 10.3 9.2 - 12.9 fL    Immature Granulocytes 0.3 0.0 - 0.5 %    Gran # (ANC) 2.8 1.8 - 7.7 K/uL    Immature Grans (Abs) 0.01 0.00 - 0.04 K/uL    Lymph # 0.7 (L) 1.0 - 4.8 K/uL    Mono # 0.1 (L) 0.3 - 1.0 K/uL    Eos # 0.1 0.0 - 0.5 K/uL    Baso # 0.01 0.00 - 0.20 K/uL    nRBC 0 0 /100 WBC    Gran % 77.2 (H) 38.0 - 73.0 %    Lymph % 18.0 18.0 - 48.0 %    Mono % 2.5 (L) 4.0 - 15.0 %    Eosinophil % 1.7 0.0 - 8.0 %    Basophil % 0.3 0.0 - 1.9 %    Differential Method Automated    Comprehensive metabolic panel    Collection Time: 10/01/24 10:42 PM   Result Value Ref Range    Sodium 139 136 - 145 mmol/L    Potassium 4.4 3.5 - 5.1 mmol/L    Chloride 106 95 - 110 mmol/L    CO2 17 (L) 23 - 29 mmol/L    Glucose 89 70 - 110 mg/dL    BUN 10 8 - 23 mg/dL    Creatinine 0.8 0.5 - 1.4 mg/dL    Calcium 9.6 8.7 - 10.5 mg/dL    Total Protein 7.3 6.0 - 8.4 g/dL    Albumin 4.0  3.5 - 5.2 g/dL    Total Bilirubin 0.5 0.1 - 1.0 mg/dL    Alkaline Phosphatase 82 55 - 135 U/L    AST 47 (H) 10 - 40 U/L    ALT 33 10 - 44 U/L    eGFR >60 >60 mL/min/1.73 m^2    Anion Gap 16 8 - 16 mmol/L   Drug screen panel, emergency    Collection Time: 10/02/24 12:49 AM   Result Value Ref Range    Benzodiazepines Negative Negative    Methadone metabolites Negative Negative    Cocaine (Metab.) Negative Negative    Opiate Scrn, Ur Presumptive Positive (A) Negative    Barbiturate Screen, Ur Negative Negative    Amphetamine Screen, Ur Negative Negative    THC Presumptive Positive (A) Negative    Phencyclidine Negative Negative    Creatinine, Urine 174.0 15.0 - 325.0 mg/dL    Toxicology Information SEE COMMENT          Imaging Results:  Imaging Results              CT Abdomen Pelvis  Without Contrast (Final result)  Result time 10/02/24 02:04:45   Procedure changed from CT Abdomen Pelvis With IV Contrast NO Oral Contrast     Final result by Priyank Ronquillo MD (10/02/24 02:04:45)                   Impression:      Redemonstration of extensive masslike nodular soft tissue density at the right lung base and subdiaphragmatic region, similar to prior examination.  There is an adjacent nodular soft tissue density within the subcutaneous soft tissues with dimensions as above, possibly representing extrapleural/subcutaneous metastatic extension.  This appears significantly increased in size compared to prior study of 08/29/2024.  Correlation with physical examination advised.    Residual contrast material within the collecting systems and bladder from prior CTA examination.  Indeterminate 2.5 cm left lower pole renal hypodensity.  Recommend nonemergent dedicated renal ultrasound follow-up for further characterization.    Mild urinary bladder wall thickening favored to relate to nondistention, although correlation with urinalysis advised.    Nonspecific 3.4 cm soft tissue density within the left adnexal region.  This is not  well appreciated on prior examinations.  Unclear whether this represents ovarian tissue versus left pelvic sidewall lymphadenopathy.  Close attention on follow-up imaging advised.    Redemonstration of right external iliac chain and inguinal lymph node enlargement, similar to prior examination.    Partially visualized soft tissue nodularity within the superficial subcutaneous soft tissues overlying the lateral aspect of the right hip.  Correlation with physical examination advised.    Additional findings as above.      Electronically signed by: Priyank Ronquillo MD  Date:    10/02/2024  Time:    02:04               Narrative:    EXAMINATION:  CT ABDOMEN PELVIS WITHOUT CONTRAST    CLINICAL HISTORY:  RLQ abdominal pain (Age >= 14y);    TECHNIQUE:  Low dose axial images, sagittal and coronal reformations were obtained from the lung bases to the pubic symphysis without IV contrast.  Oral contrast was not administered.    COMPARISON:  CTA chest 10/01/2024, CT 08/29/2024    FINDINGS:  Please note assessment of the solid organs is limited without IV contrast    Stable abnormal appearance of the right lung base demonstrating extensive masslike nodular soft tissue density at the right lung base and in the subdiaphragmatic location, similar to prior examination and better assessed on prior CTA chest examination.  There is a small volume of right-sided pleural fluid.  There is a 4.2 x 3.2 cm nodular soft tissue density within the adjacent subcutaneous soft tissues, possibly representing extrapleural/subcutaneous metastatic extension (axial series 2, image 37).  There are stable mild peripheral fibrotic changes within the anterior aspect of the lingula.  There is a stable 0.4 cm pulmonary nodule within the lingula.    There is mild nonspecific skin thickening of the left breast.    There is a partially visualized central venous catheter terminating in the distal SVC.  There is small volume pericardial fluid present.  There are  scattered coronary artery calcifications.    No focal hepatic abnormality identified on this limited noncontrast examination.  There is high-density material in the gallbladder lumen presumably reflecting vicarious excretion of IV contrast.  There is no intra-or extrahepatic biliary ductal dilatation.    The stomach, spleen, pancreas, and adrenal glands are unremarkable.    There is residual contrast material within the collecting systems and ureters from prior CTA chest examination.  Kidneys are normal in size and location.  There is an indeterminate 2.5 cm left lower pole renal hypodensity (for example coronal series 601, image 80).  There is no significant hydronephrosis.  There is mild urinary bladder wall thickening favored relate to nondistention.  Clinical correlation with urinalysis advised.  There is contrast material within the bladder lumen.    Uterus appears to be surgically absent.  There is a nonspecific 3.4 x 1.8 cm soft tissue density within the left adnexal region, not well appreciated on prior examinations.  There is trace free fluid in the pelvis.    The abdominal aorta is normal in course and caliber with mild atherosclerotic calcification along its course.    The visualized loops of small and large bowel show no evidence of obstruction or inflammation. There is scattered retained stool throughout the colon.  There is no CT evidence of acute appendicitis. There is no free intraperitoneal air or portal venous gas.  There is a small fat containing umbilical hernia.    There are multiple abnormally enlarged right external iliac chain and inguinal lymph nodes.  For example, the previously demonstrated right external iliac chain lymph node measures 2.4 x 2.0 cm and the right inguinal lymph node measures 1.4 x 1.7 cm.  There is partially visualized nonspecific soft tissue nodularity within the superficial subcutaneous soft tissues lateral to the right hip (for example axial series 2, image 178).   Correlation with physical examination advised.  There is mild diffuse body wall anasarca.    Osseous structures demonstrate degenerative changes.                                      The Emergency Provider reviewed the vital signs and test results, which are outlined above.     ED Discussion       2:41 AM: Reassessed pt at this time. Discussed with pt all pertinent ED information and results. Discussed pt dx and plan of tx. Gave pt all f/u and return to the ED instructions. All questions and concerns were addressed at this time. Pt expresses understanding of information and instructions, and is comfortable with plan to discharge. Pt is stable for discharge.    I discussed with patient and/or family/caretaker that evaluation in the ED does not suggest any emergent or life threatening medical conditions requiring immediate intervention beyond what was provided in the ED, and I believe patient is safe for discharge.  Regardless, an unremarkable evaluation in the ED does not preclude the development or presence of a serious of life threatening condition. As such, patient was instructed to return immediately for any worsening or change in current symptoms.         Medical Decision Making  Amount and/or Complexity of Data Reviewed  Labs: ordered. Decision-making details documented in ED Course.  Radiology: ordered. Decision-making details documented in ED Course.                 ED Medication(s):  Medications   HYDROmorphone injection 1 mg (1 mg Intramuscular Given 10/1/24 5650)   ketorolac injection 30 mg (30 mg Intramuscular Given 10/2/24 0145)       Discharge Medication List as of 10/2/2024  2:31 AM           Follow-up Information       Yaya Hoffmann MD In 1 day.    Specialty: Hematology and Oncology  Contact information:  [id number]  [place] [address]  [phone]               [facility] - Emergency Dept..    Specialty: Emergency Medicine  Why: As needed, If symptoms worsen  Contact information:  [id number]  [facility] [address]  [phone]                               Scribe Attestation:   Scribe #1: I performed the above scribed service and the documentation accurately describes the services I performed. I attest to the accuracy of the note.     Attending:   Physician Attestation Statement for Scribe #1: I, Sydni Quintero MD, personally performed the services described in this documentation, as scribed by Gunnar Cerda, in my presence, and it is both accurate and complete.           Clinical Impression       ICD-10-CM ICD-9-CM   1. Chronic right-sided back pain, unspecified back location  M54.9 724.5    G89.29 338.29   2. Primary hypertension  I10 401.9       Disposition:   Disposition: Discharged  Condition: Stable         Sydni Quintero MD  10/02/24 0501

## 2024-10-02 NOTE — TELEPHONE ENCOUNTER
PT called to report that she is still an a large amount of pain even after 3 visits to the ED this week. I have notified pt's palliative care provider.      1130 Pt called back to check on progress. Again msg pallative provider. Provider msged back they will get her scheduled for a visit today.     1245 Pt has 130 appt with Palliative provider and Dr Hoffmann states that he will see the pt also. Pt made aware

## 2024-10-02 NOTE — FIRST PROVIDER EVALUATION
Medical screening examination initiated.  I have conducted a focused provider triage encounter, findings are as follows:    Brief history of present illness:  64-year-old female who presents to ER complaining of right-sided flank pain that radiates to right groin for 2 days.  Describes pain as a constant ache in nature.  Associated symptoms of decreased appetite and nausea.  Reports temporary relief with Dilaudid injection.  Reports no relief with p.o. Dilaudid.    Vitals:    10/01/24 2126   BP: (!) 199/77   BP Location: Right arm   Pulse: 77   Resp: 16   Temp: 98.4 °F (36.9 °C)   TempSrc: Oral   SpO2: 97%   Weight: 90.6 kg (199 lb 11.8 oz)   Height: 5' (1.524 m)       Pertinent physical exam:  Uncomfortable    Brief workup plan:  Labs    Preliminary workup initiated; this workup will be continued and followed by the physician or advanced practice provider that is assigned to the patient when roomed.

## 2024-10-02 NOTE — PLAN OF CARE
DISCONTINUE ON PATHWAY REGIMEN - Breast    [id number]        Paclitaxel     **Always confirm dose/schedule in your pharmacy ordering system**    REASON: Disease Progression  PRIOR TREATMENT: [id number]  TREATMENT RESPONSE: Progressive Disease (PD)    START ON PATHWAY REGIMEN - Breast    [id number]        Sacituzumab govitecan-hziy (Trodelvy)     **Always confirm dose/schedule in your pharmacy ordering system**    Patient Characteristics:  Distant Metastases or Locoregional Recurrent Disease - Unresected, M0 or Locally   Advanced Unresectable Disease Progressing after Neoadjuvant and Local   Therapies, M0, HER2 Low/Negative, ER Negative, Chemotherapy, HER2 Negative,   Second Line, PD-L1 Expression Negative/Unknown or  Not a Candidate for   Immunotherapy,  and gBRCA Wildtype  Therapeutic Status: Distant Metastases  HER2 Status: Negative (-)  ER Status: Negative (-)  HI Status: Negative (-)  Therapy Approach Indicated: Standard Chemotherapy/Endocrine Therapy  Line of Therapy: Second Line  Intent of Therapy:  Non-Curative / Palliative Intent, Discussed with Patient

## 2024-10-03 ENCOUNTER — OFFICE VISIT (OUTPATIENT)
Dept: PALLIATIVE MEDICINE | Facility: CLINIC | Age: 64
End: 2024-10-03
Payer: MEDICAID

## 2024-10-03 DIAGNOSIS — R11.2 NAUSEA AND VOMITING, UNSPECIFIED VOMITING TYPE: ICD-10-CM

## 2024-10-03 DIAGNOSIS — T40.2X5A OPIOID-INDUCED CONSTIPATION: ICD-10-CM

## 2024-10-03 DIAGNOSIS — C78.2 SECONDARY MALIGNANCY OF PARIETAL PLEURA: Primary | ICD-10-CM

## 2024-10-03 DIAGNOSIS — K59.03 OPIOID-INDUCED CONSTIPATION: ICD-10-CM

## 2024-10-03 DIAGNOSIS — C50.412 PRIMARY MALIGNANT NEOPLASM OF UPPER OUTER QUADRANT OF BREAST, LEFT: Primary | ICD-10-CM

## 2024-10-03 DIAGNOSIS — G89.3 NEOPLASM RELATED PAIN: ICD-10-CM

## 2024-10-03 DIAGNOSIS — Z51.5 PALLIATIVE CARE ENCOUNTER: ICD-10-CM

## 2024-10-03 RX ORDER — HYDROMORPHONE HYDROCHLORIDE 4 MG/1
4 TABLET ORAL
Qty: 40 TABLET | Refills: 0 | Status: SHIPPED | OUTPATIENT
Start: 2024-10-03 | End: 2024-10-04 | Stop reason: SDUPTHER

## 2024-10-03 RX ORDER — MORPHINE SULFATE 30 MG/1
30 TABLET, FILM COATED, EXTENDED RELEASE ORAL 2 TIMES DAILY
Qty: 60 TABLET | Refills: 0 | Status: SHIPPED | OUTPATIENT
Start: 2024-10-03

## 2024-10-03 RX ORDER — HYDROMORPHONE HYDROCHLORIDE 4 MG/1
4 TABLET ORAL EVERY 12 HOURS PRN
Qty: 40 TABLET | Refills: 0 | Status: SHIPPED | OUTPATIENT
Start: 2024-10-03 | End: 2024-10-03 | Stop reason: SDUPTHER

## 2024-10-03 NOTE — ASSESSMENT & PLAN NOTE
Due to progressive disease/stable  MS Contin 30mg BID  Hydromorphone IR 4mg Q4H PRN  [facility] by Dr. Hoffmann.   Narcan RX given and explained use to pt and family. Pt and family verbalized understanding.    Miralax 1pack/day or Senna 2 tabs at bedtime for opioid-induced constipation    Pain contract- 09/04/2024  UDS- Next in-person visit.

## 2024-10-03 NOTE — ASSESSMENT & PLAN NOTE
Diagnosed in 2022; initially treated with Carbo/Taxol then AC/Keytruda. Residual disease on resection; treated with Xeloda and XRT. 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 Initially treated with Taxol x 4 cycles. Changed to Trodelvy every 3 weeks due to interval progression.

## 2024-10-03 NOTE — ASSESSMENT & PLAN NOTE
Miralax 1pack/day or Senna 2 tabs at bedtime up to TID  MOM if Miralax/Senna are not effective  Linzess not indicated unless other constipation strategies are not effective.

## 2024-10-03 NOTE — ASSESSMENT & PLAN NOTE
Followed by Dr. Hoffmann and Khurram. Previous oncologist at [facility].   Now on Sacituzumab-Govitecan-Palliative intent   Paclitaxel D/C'd due to progressive disease.   S/p Keytruda, left lumpectomy (Aug. 2023) w/ residual disease post resection, radiation (Nov. 2023).    S/P right VATS washout with partial decortication, pleural biopsy, intercostal nerve blocks, Pleurx catheter insertion Aug 2024. Pleurx removed due to low output.   CT A/P: 10/02/2024: Redemonstration of extensive masslike nodular soft tissue density at the right lung base and subdiaphragmatic region, similar to prior examination.  There is an adjacent nodular soft tissue density within the subcutaneous soft tissues with dimensions as above, possibly representing extrapleural/subcutaneous metastatic extension.  This appears significantly increased in size compared to prior study of 08/29/2024.  Correlation with physical examination advised.  Residual contrast material within the collecting systems and bladder from prior CTA examination.  Indeterminate 2.5 cm left lower pole renal hypodensity.  Recommend nonemergent dedicated renal ultrasound follow-up for further characterization.  Mild urinary bladder wall thickening favored to relate to nondistention, although correlation with urinalysis advised.  Nonspecific 3.4 cm soft tissue density within the left adnexal region.  This is not well appreciated on prior examinations.  Unclear whether this represents ovarian tissue versus left pelvic sidewall lymphadenopathy.  Close attention on follow-up imaging advised.  Redemonstration of right external iliac chain and inguinal lymph node enlargement, similar to prior examination.  Partially visualized soft tissue nodularity within the superficial subcutaneous soft tissues overlying the lateral aspect of the right hip.  Correlation with physical examination advised.  CTA Chest: 10/01/2024: No evidence of pulmonary embolism. Multiple essentially stable masses  involving the right hemithorax with a large loculated right pleural effusion and compressive atelectasis of the right lower lobe.   MRI Brain: 08/29/2024: Two small nodular foci of enhancement within the left frontal lobe measuring 3 mm in size and within the left cerebellum measuring 6 mm in size consistent with central nervous system metastatic disease. Minor associated T2 hyperintensity/edema. No mass effect.   Thoracentesis not currently indicated per verbal communication with Dr. Hoffmann on 10/3/2024- Discussed with pt over telephone call on 10/3/2024. Pt verbalized understanding.

## 2024-10-03 NOTE — PROGRESS NOTES
Subjective:       Patient ID: Sherlyn Saavedra is a 64 y.o. female.    Chief Complaint:   1. Primary malignant neoplasm of upper outer quadrant of breast, left  No Stage Recommended (ypT2, pN0, cM0, G3, ER-, OH-, HER2-)       2. Secondary malignancy of parietal pleura          Current Treatment:  OP SACITUZUMAB GOVITECAN-HZIY Q3W    FERRLECIT (FERRIC GLUCONATE) QW     Treatment History:    HPI: This is a 64 year old  woman with allergies, HTN, type 2 diabetes, and hyperlipidemia who is seen in Hem/Onc for triple negative breast cancer diagnosed in 2022. She was seen initially in 8/2024 and reported being treated with induction Carbo/Taxol and AC/Keytruda. She had residual disease at the time of resection and was treated with Xeloda and XRT. She had increasing chest pain and shortness of breath; follow up imaging revealed a left pleural effusion. She underwent a right VATS on 8/5/2024; pathology revealed metastatic breast cancer. Germline testing was negative for germ line mutation.     Plan was to treat with single agent Taxol plus or minus Keytruda based off of CPS score. PDL <1%. She started Taxol 3 weeks on 1 week off.     Staging imaging revealed two small nodular foci of enhancement within the left frontal lobe measuring 3 mm in size and within the left cerebellum measuring 6 mm in size consistent with central nervous system metastatic disease. Bone scan was negative for mets. CT C/A/P done in late 8/2024 demonstrated significant interval progression of disease in the right hemithorax, with development of suspicious right inguinal and right external iliac chain lymphadenopathy. In light of interval progression during Taxol therapy, her treatment changed to Trodelvy days 1 & 8 every 3 weeks.     Her primary Hematologist/Oncologist is Dr. Hoffmann.    Interval History: Patient presents for follow up on Trodelvy; She is scheduled to receive C1D1 today. She presents with a significant other and reports an  improvement in her pain since starting MS Contin and Dilaudid. She states her appetite and sleep has been poor recently due to pain. WBC 3.87; ANC WNL. Plts WNL. She has anemia and iron deficiency and is scheduled to receive Ferrlecit. CMP is pending at time of visit; however, Trodelvy has not been approved by her insurance yet. Will proceed with Ferrlecit today. At the conclusion of the visit, she asks if there is a higher dose of the Dilaudid than 4mg. Discussed that this dose was just increased from 2mg yesterday and that she needs to give the medications time to work before considering an increase or change in meds. Of note, she reports 3/10 back pain today.     Reviewed labs with patient:   CBC:   Recent Labs   Lab 10/01/24  2242   WBC 3.61 L   RBC 3.36 L   Hemoglobin 9.3 L   Hematocrit 30.4 L   Platelets 265   MCV 91   MCH 27.7   MCHC 30.6 L     CMP:  Recent Labs   Lab 10/01/24  2242   Glucose 89   Calcium 9.6   Albumin 4.0   Total Protein 7.3   Sodium 139   Potassium 4.4   CO2 17 L   Chloride 106   BUN 10   Creatinine 0.8   Alkaline Phosphatase 82   ALT 33   AST 47 H   Total Bilirubin 0.5     The patient location is: [facility]  The chief complaint leading to consultation is: breast cancer    Visit type: audiovisual    Face to Face time with patient: 19 minutes  28 minutes of total time spent on the encounter, which includes face to face time and non-face to face time preparing to see the patient (eg, review of tests), Obtaining and/or reviewing separately obtained history, Documenting clinical information in the electronic or other health record, Independently interpreting results (not separately reported) and communicating results to the patient/family/caregiver, or Care coordination (not separately reported).     Each patient to whom he or she provides medical services by telemedicine is:  (1) informed of the relationship between the physician and patient and the respective role of any other health care provider  with respect to management of the patient; and (2) notified that he or she may decline to receive medical services by telemedicine and may withdraw from such care at any time.    Social History     Socioeconomic History    Marital status: Single   Tobacco Use    Smoking status: Former     Current packs/day: 0.00     Types: Cigarettes     Quit date:      Years since quittin.7    Smokeless tobacco: Never   Substance and Sexual Activity    Alcohol use: Not Currently    Drug use: Never    Sexual activity: Not Currently     Social Drivers of Health     Financial Resource Strain: Medium Risk (2024)    Overall Financial Resource Strain (CARDIA)     Difficulty of Paying Living Expenses: Somewhat hard   Food Insecurity: Food Insecurity Present (2024)    Hunger Vital Sign     Worried About Running Out of Food in the Last Year: Sometimes true     Ran Out of Food in the Last Year: Sometimes true   Transportation Needs: No Transportation Needs (2024)    Received from [facility]    PRAPARE - Transportation     Lack of Transportation (Medical): No     Lack of Transportation (Non-Medical): No   Physical Activity: Inactive (2024)    Exercise Vital Sign     Days of Exercise per Week: 0 days     Minutes of Exercise per Session: 0 min   Stress: Stress Concern Present (2024)    [employer] - Occupational Stress Questionnaire     Feeling of Stress : To some extent   Housing Stability: Unknown (2024)    Housing Stability Vital Sign     Unable to Pay for Housing in the Last Year: No     Past Medical History:   Diagnosis Date    Allergy     Anemia     Breast cancer     Diabetes mellitus     HLD (hyperlipidemia) 2015    Hyperlipidemia (Problem)      Hypertension     Primary malignant neoplasm of upper outer quadrant of breast, left 10/10/2023    Secondary malignancy of parietal pleura 2024     Type 2 diabetes mellitus with diabetic polyneuropathy, without long-term current use of insulin 03/04/2015    Formatting of this note might be different from the original.   dx in 40's; has some neuropathic symptoms in right LE; (uncertain if from DMII)  Diabetes mellitus type 2 (Problem)       No family history on file.  Past Surgical History:   Procedure Laterality Date    HYSTERECTOMY      tubaligation  1987     Review of Systems   Constitutional:  Positive for appetite change (decreased due to pain) and fatigue.   HENT:  Negative for mouth sores, rhinorrhea and sore throat.    Eyes: Negative.    Respiratory: Negative.     Cardiovascular: Negative.    Gastrointestinal:  Negative for constipation, diarrhea, nausea and vomiting.   Endocrine: Negative.    Genitourinary: Negative.    Musculoskeletal:  Positive for back pain (lower right side around to stomach; 3/10, relieved by new pain meds).   Integumentary:  Negative.   Allergic/Immunologic: Negative.    Neurological:  Negative for weakness and numbness.   Hematological: Negative.    Psychiatric/Behavioral:  Positive for sleep disturbance (due to pain).        Medication List with Changes/Refills   Current Medications    AMLODIPINE (NORVASC) 5 MG TABLET    Take 5 mg by mouth once daily.    ATORVASTATIN (LIPITOR) 40 MG TABLET    Take 40 mg by mouth.    BLOOD SUGAR DIAGNOSTIC (FREESTYLE TEST) STRP    Test 4 times per day    CAPECITABINE (XELODA) 500 MG TAB    Take by mouth 2 (two) times daily.    DEXAMETHASONE (DECADRON) 4 MG TAB    Take 2 tablets (8 mg total) by mouth once daily. Take 2 tablets (8 mg total) by mouth once daily on days 2,3,4 and 9,10,11 of each chemotherapy cycle.    FAMOTIDINE (PEPCID) 20 MG TABLET    Take 20 mg by mouth.    FOLIC ACID (FOLVITE) 1 MG TABLET    Take 1 mg by mouth once daily.    GABAPENTIN (NEURONTIN) 300 MG CAPSULE    Take 300 mg by mouth.    HYDROMORPHONE (DILAUDID) 4 MG TABLET    Take 1 tablet (4 mg total) by mouth every 3  (three) hours as needed for Pain.    LIDOCAINE-PRILOCAINE (EMLA) CREAM    Apply to affected area once    LISINOPRIL-HYDROCHLOROTHIAZIDE (PRINZIDE,ZESTORETIC) 20-25 MG TAB    Take 1 tablet by mouth every morning.    MAGNESIUM OXIDE (MAG-OX) 400 MG (241.3 MG MAGNESIUM) TABLET    Take 400 mg by mouth once daily.    METFORMIN (GLUCOPHAGE-XR) 500 MG ER 24HR TABLET    Take 1,000 mg by mouth 2 (two) times daily.    MORPHINE (MS CONTIN) 30 MG 12 HR TABLET    Take 1 tablet (30 mg total) by mouth 2 (two) times daily.    NALOXONE (NARCAN) 4 MG/ACTUATION SPRY        OLANZAPINE (ZYPREXA) 5 MG TABLET    Take 1 tablet (5 mg total) by mouth every evening. days 1-4 and 8-11 of each chemotherapy cycle.    ONDANSETRON (ZOFRAN-ODT) 8 MG TBDL    DISSOLVE ONE TABLET UNDER THE TONGUE EVERY 8 HOURS AS NEEDED FOR NAUSEA    ONDANSETRON (ZOFRAN-ODT) 8 MG TBDL    Take 1 tablet (8 mg total) by mouth 2 (two) times daily.    ONDANSETRON (ZOFRAN-ODT) 8 MG TBDL    Take 1 tablet (8 mg total) by mouth every 8 (eight) hours as needed (nausea/vomiting).    PANTOPRAZOLE (PROTONIX) 40 MG TABLET    Take 40 mg by mouth.    PROCHLORPERAZINE (COMPAZINE) 10 MG TABLET    Take 10 mg by mouth.    TIRZEPATIDE (MOUNJARO) 2.5 MG/0.5 ML PNIJ    Inject 2.5 mg into the skin every 7 days.    TIZANIDINE (ZANAFLEX) 4 MG TABLET         Objective:   There were no vitals filed for this visit.  Physical Exam     Unable to assess due to virtual visit.    (1) Restricted in physically strenuous activity, ambulatory and able to do work of light nature  Assessment:     Problem List Items Addressed This Visit          Oncology    Primary malignant neoplasm of upper outer quadrant of breast, left - Primary     Diagnosed in 2022; initially treated with Carbo/Taxol then AC/Keytruda. Residual disease on resection; treated with Xeloda and XRT. 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 Initially treated with Taxol x 4 cycles. Changed to Trodelvy every 3 weeks due to interval  progression.          Secondary malignancy of parietal pleura     Plan:     Primary malignant neoplasm of upper outer quadrant of breast, left    Secondary malignancy of parietal pleura    Labs reviewed; CMP pending at time of visit.   Ok to proceed with C1D1 of Ferrlecit today. Trodelvy not yet approved.  If Trodelvy is approved within a week, can use today's labs to treat.   Follow up 1 week after the start of Trodelvy with CBC and Comprehensive Metabolic Panel for toxicity check prior to C1D8.    Route Chart for Scheduling    Med Onc Chart Routing      Follow up with physician    Follow up with [facility] Other. 1 week from the start of Trodelvy   Infusion scheduling note   C1D1 Trodelvy upon approval; 1 week later for C1D8 Trodelvy   Injection scheduling note    Labs CBC and CMP   Scheduling:  Preferred lab:  Lab interval:  1 week after the start of Trodelvy   Imaging None      Pharmacy appointment No pharmacy appointment needed      Other referrals       No additional referrals needed             I will review assessment/plan with collaborating physician.      ELPIDIO Granados

## 2024-10-03 NOTE — ASSESSMENT & PLAN NOTE
-Code status: Full Code. Pt and daughter wish to revisit at a later time  -HCPOA: Daughter: Luna West: [phone]. Pt has 1 adult son-lives in [place]. Pt is single  -GOC:  Continue cancer treatment, symptom management, maintain independence and functional status. Pt aware treatment intent is palliative.   -See HPI for further details

## 2024-10-03 NOTE — PATIENT INSTRUCTIONS
For constipation:   Take Miralax 1 pack every day   Senna (Senokot) 2 tablets every evening up to 2 tablets three times a day.  Ok to use Milk of magnesia for constipation if the first two options don't work  I will ask Dr. Hoffmann to change your prescriptions to [facility], not [facility].   Take Zofran 20-30 mins prior to opioids to prevent nausea.

## 2024-10-03 NOTE — PROGRESS NOTES
Palliative Medicine Clinic Note  Follow-up           Consult Requested By: Dr. Hoffmann      Reason for Consult: Symptom Management/Advance Care Planning/Goals of Care Discussion    Chief Complaint: Increased pain           ASSESSMENT/PLAN:      Plan/Recommendations:    Problem List Items Addressed This Visit          Oncology    Primary malignant neoplasm of upper outer quadrant of breast, left - Primary     Followed by Dr. Hoffmann and Khurram. Previous oncologist at [facility].   Now on Sacituzumab-Govitecan-Palliative intent   Paclitaxel D/C'd due to progressive disease.   S/p Keytruda, left lumpectomy (Aug. 2023) w/ residual disease post resection, radiation (Nov. 2023).    S/P right VATS washout with partial decortication, pleural biopsy, intercostal nerve blocks, Pleurx catheter insertion Aug 2024. Pleurx removed due to low output.   CT A/P: 10/02/2024: Redemonstration of extensive masslike nodular soft tissue density at the right lung base and subdiaphragmatic region, similar to prior examination.  There is an adjacent nodular soft tissue density within the subcutaneous soft tissues with dimensions as above, possibly representing extrapleural/subcutaneous metastatic extension.  This appears significantly increased in size compared to prior study of 08/29/2024.  Correlation with physical examination advised.  Residual contrast material within the collecting systems and bladder from prior CTA examination.  Indeterminate 2.5 cm left lower pole renal hypodensity.  Recommend nonemergent dedicated renal ultrasound follow-up for further characterization.  Mild urinary bladder wall thickening favored to relate to nondistention, although correlation with urinalysis advised.  Nonspecific 3.4 cm soft tissue density within the left adnexal region.  This is not well appreciated on prior examinations.  Unclear whether this represents ovarian tissue versus left pelvic sidewall lymphadenopathy.  Close attention on follow-up imaging  advised.  Redemonstration of right external iliac chain and inguinal lymph node enlargement, similar to prior examination.  Partially visualized soft tissue nodularity within the superficial subcutaneous soft tissues overlying the lateral aspect of the right hip.  Correlation with physical examination advised.  CTA Chest: 10/01/2024: No evidence of pulmonary embolism. Multiple essentially stable masses involving the right hemithorax with a large loculated right pleural effusion and compressive atelectasis of the right lower lobe.   MRI Brain: 08/29/2024: Two small nodular foci of enhancement within the left frontal lobe measuring 3 mm in size and within the left cerebellum measuring 6 mm in size consistent with central nervous system metastatic disease. Minor associated T2 hyperintensity/edema. No mass effect.   Thoracentesis not currently indicated per verbal communication with Dr. Hoffmann on 10/3/2024- Discussed with pt over telephone call on 10/3/2024. Pt verbalized understanding.                Neoplasm related pain       Due to progressive disease/stable  MS Contin 30mg BID  Hydromorphone IR 4mg Q4H PRN  RX sent to [facility] by Dr. Hoffmann.   Narcan RX given and explained use to pt and family. Pt and family verbalized understanding.    Miralax 1pack/day or Senna 2 tabs at bedtime for opioid-induced constipation    Pain contract- 09/04/2024  UDS- Next in-person visit.             GI    Opioid-induced constipation     Miralax 1pack/day or Senna 2 tabs at bedtime up to TID  MOM if Miralax/Senna are not effective  Linzess not indicated unless other constipation strategies are not effective.          Nausea & vomiting     Likely related to opoids  Zofran 20-30 mins prior to taking opioids.             Palliative Care    Palliative care encounter       -Code status: Full Code. Pt and daughter wish to revisit at a later time  -HCPOA: Daughter: Luna West: [phone]. Pt has 1 adult son-lives in  [place]. Pt is single  -GOC:  Continue cancer treatment, symptom management, maintain independence and functional status. Pt aware treatment intent is palliative.   -See HPI for further details                       Advance Care Planning   Advance Directives:   Living Will: No    [place]POST: No    Do Not Resuscitate Status: No    Medical Power of : Yes    Agent's Name:  Daughter: Luna Saavedra   Agent's Contact Number:  [phone]    Decision Making:  Patient answered questions and Family answered questions  Goals of Care: The patient endorses that what is most important right now is to focus on remaining as independent as possible and symptom/pain control    Accordingly, we have decided that the best plan to meet the patient's goals includes continuing with treatment. Pt stated she is aware treatment intent is palliative.   Pt chose to remain Full Code for now. Her and her daughter wish to revisit this later.          Follow up: 2 weeks- Scheduled.      Plan discussed with: Patient        SUBJECTIVE:      History of Present Illness / Interval History:  Sherlyn Saavedra is 64 y.o. female with Recurrent Metastatic Left Breast Cancer to Pluera/Brain  Now on Sacituzumab-Govitecan-Palliative intent   Paclitaxel D/C'd due to progressive disease.   S/p Keytruda, left lumpectomy (Aug. 2023) w/ residual disease post resection, radiation (Nov. 2023).    S/P right VATS washout with partial decortication, pleural biopsy, intercostal nerve blocks, Pleurx catheter insertion Aug 2024. Pleurx removed due to low output.   Followed by Dr. Hoffmann and Khurram. Previous oncologist at [facility].   PMHX: HTN, T2DM    Presents to Palliative Care Clinic for physical symptoms, advance care planning,, clarification of goals of care, and additional support..   Please see oncology notes for more details on pt's care.       10/3/24:    The patient location is: [facility]  The chief complaint leading to consultation is:  Follow-up     Visit  type: audiovisual     Face to Face time with patient:  10 minutes  35 minutes of total time spent on the encounter, which includes face to face time and non-face to face time preparing to see the patient (eg, review of tests), Obtaining and/or reviewing separately obtained history, Documenting clinical information in the electronic or other health record, Independently interpreting results (not separately reported) and communicating results to the patient/family/caregiver, or Care coordination (not separately reported).      Each patient to whom he or she provides medical services by telemedicine is:  (1) informed of the relationship between the physician and patient and the respective role of any other health care provider with respect to management of the patient; and (2) notified that he or she may decline to receive medical services by telemedicine and may withdraw from such care at any time.     Notes:   Pt seen via audiovisual feed. A&O x3. No acute distress.   She reported progressive right chest/back/right lateral abd pain over the past week requiring ED visits two days ago. Pain is now better managed with MSContin 30mg BID and Hydromorphone 4mg Q4H PRN.   She is requesting narcotic rx be sent to [facility]  Some nausea/vomiting this morning. Constipation slightly managed with MOM/Miralax. She requested Linzess prescription. She has not tried Senna.   She wants to clarify with Dr. Hoffmann if she needs Thoracentesis due to her CT chest showing a pleural effusion.   Called pt back following visit. Informed her Dr. Hoffmann said she does not require thoracentesis at this time, as imaging represents cancer progression, not pleural effusion. Pt verbalized understanding,  Informed pt pain med rx have been sent to [facility] as she requested.   Informed pt to take Zofran 20-30 mins prior to taking opioid to prevent nausea.     Past Visits:    09/17/2024:  Pt attended clinic with her friend, .  Brian. She was in no acute distress. Vital signs stable on room air.   She reported lower back pain has improved with Morphine IR 15mg, however, it does not last 6 hours. No itching/rash with Morphine.   She noted a palpable/painful lesion on her right lateral thigh. She noticed it about a month ago, but it was not painful then.       09/04/2024: History obtained from: Patient and medical record.   Pt attended clinic alone. Her daughter, Luna present via speaker phone. was in no acute distress. Vital signs stable on room air.   I explained the role palliative care often plays in supporting patients with serious illness and their families regarding symptom management, advance care planning, and goals of care discussion. Pt and daughter verbalized understanding.   Pt reported persistent progressive right lower back pain over the past month. 8/10.  She is taking Norco 10mg QID PRN but no longer effective and she takes it with Benadryl due to itching. Pain hinders sleep.   Neuropathy to hands and right leg, somewhat stable at this time.   She is taking medical marijuana for appetite stimulation. She supplements meals with protein drinks. Her Bgs have been borderline low- she is taking Metformin and Moujaro. She will revisit this with her PCP.   Pt and daughter inquired about pt receiving Glutathione infusions for immune health. I have encouraged them to discuss this with Dr. Hoffmann.     We discussed advance care planning, goals of care, and code status, Full Code vs. DNR including risks, benefits, and alternatives..   She wishes to continue cancer treatment, symptom management, maintain independence and functional status. Pt stated she is aware treatment intent is palliative.   Pt chose to remain Full Code for now. Her and her daughter wish to revisit this later.   Elected HCPOA is daughter: Luna West: [phone], lives in [place]. Pt has 1 adult son lives in [place]. She is single.         ROS:  Review of  Systems   Constitutional:  Positive for appetite change (Decreased) and fatigue. Negative for activity change and unexpected weight change.   HENT:  Negative for hearing loss, sore throat, trouble swallowing and voice change.    Eyes:  Negative for visual disturbance.   Respiratory:  Negative for cough, chest tightness, shortness of breath (Mild exertional) and wheezing.    Cardiovascular:  Negative for chest pain, palpitations and leg swelling.   Gastrointestinal:  Positive for abdominal pain, constipation, nausea and vomiting. Negative for blood in stool, rectal pain and reflux.   Genitourinary:  Negative for bladder incontinence, difficulty urinating, flank pain, hematuria, nocturia, pelvic pain and urgency.   Musculoskeletal:  Positive for back pain, leg pain and myalgias. Negative for arthralgias and neck pain.   Integumentary:  Negative for wound.   Allergic/Immunologic: Negative for environmental allergies, food allergies and immunocompromised state.   Neurological:  Positive for numbness. Negative for dizziness, tremors, weakness, headaches, memory loss and coordination difficulties.   Hematological:  Negative for adenopathy. Does not bruise/bleed easily.   Psychiatric/Behavioral:  Positive for sleep disturbance (Due to pain). Negative for agitation, behavioral problems, confusion, decreased concentration, depressed mood (Due to cancer diagnosis), dysphoric mood, self-injury and suicidal ideas. The patient is not nervous/anxious (Due to cancer diagnosis).        Review of Symptoms      Symptom Assessment (ESAS 0-10 Scale)  Pain:  3  Dyspnea:  0  Anxiety:  0  Nausea:  7  Depression:  0  Anorexia:  0  Fatigue:  0  Insomnia:  0  Restlessness:  0  Agitation:  0     CAM / Delirium:  Negative  Constipation:  Positive  Diarrhea:  Negative    Anxiety:  Is not nervous/anxious (Due to cancer diagnosis)  Constipation:  Constipation    Bowel Management Plan (BMP):  Yes      Comments:  Miralax/Senna    Pain  Assessment:    Location(s): back    Back       Location: lower and right        Quality: Aching        Quantity: 3/10 in intensity        Chronicity: Onset 1 month(s) ago, gradually improving since Hydromorphone        Aggravating Factors: Activity        Alleviating Factors: Opiates       Associated Symptoms: Myalgias    Modified Melo Scale:  0    Performance Status:  90    ECOG Performance Status ndGndrndanddndend:nd nd2nd Living Arrangements:  Lives alone    Psychosocial/Cultural:   See Palliative Psychosocial Note: Yes  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 Daughter, Luna in [place], Son in [place].   **Primary  to Follow**  Palliative Care  Consult: No            Medications:    Current Outpatient Medications:     amLODIPine (NORVASC) 5 MG tablet, Take 5 mg by mouth once daily., Disp: , Rfl:     atorvastatin (LIPITOR) 40 MG tablet, Take 40 mg by mouth., Disp: , Rfl:     blood sugar diagnostic (FREESTYLE TEST) Strp, Test 4 times per day, Disp: , Rfl:     capecitabine (XELODA) 500 MG Tab, Take by mouth 2 (two) times daily., Disp: , Rfl:     dexAMETHasone (DECADRON) 4 MG Tab, Take 2 tablets (8 mg total) by mouth once daily. Take 2 tablets (8 mg total) by mouth once daily on days 2,3,4 and 9,10,11 of each chemotherapy cycle., Disp: 24 tablet, Rfl: 11    famotidine (PEPCID) 20 MG tablet, Take 20 mg by mouth., Disp: , Rfl:     folic acid (FOLVITE) 1 MG tablet, Take 1 mg by mouth once daily., Disp: , Rfl:     gabapentin (NEURONTIN) 300 MG capsule, Take 300 mg by mouth., Disp: , Rfl:     HYDROmorphone (DILAUDID) 4 MG tablet, Take 1 tablet (4 mg total) by mouth every 12 (twelve) hours as needed for Pain., Disp: 40 tablet, Rfl: 0    LIDOcaine-prilocaine (EMLA) cream, Apply to affected area once, Disp: 5 g, Rfl: 11    lisinopriL-hydrochlorothiazide (PRINZIDE,ZESTORETIC) 20-25 mg Tab, Take 1 tablet by mouth every morning., Disp: , Rfl:     magnesium oxide (MAG-OX) 400 mg (241.3 mg magnesium) tablet, Take 400 mg  by mouth once daily., Disp: , Rfl:     metFORMIN (GLUCOPHAGE-XR) 500 MG ER 24hr tablet, Take 1,000 mg by mouth 2 (two) times daily., Disp: , Rfl:     morphine (MS CONTIN) 30 MG 12 hr tablet, Take 1 tablet (30 mg total) by mouth 2 (two) times daily., Disp: 60 tablet, Rfl: 0    naloxone (NARCAN) 4 mg/actuation Gays, , Disp: , Rfl:     OLANZapine (ZYPREXA) 5 MG tablet, Take 1 tablet (5 mg total) by mouth every evening. days 1-4 and 8-11 of each chemotherapy cycle., Disp: 8 tablet, Rfl: 11    ondansetron (ZOFRAN-ODT) 8 MG TbDL, DISSOLVE ONE TABLET UNDER THE TONGUE EVERY 8 HOURS AS NEEDED FOR NAUSEA, Disp: , Rfl:     ondansetron (ZOFRAN-ODT) 8 MG TbDL, Take 1 tablet (8 mg total) by mouth 2 (two) times daily., Disp: 30 tablet, Rfl: 11    ondansetron (ZOFRAN-ODT) 8 MG TbDL, Take 1 tablet (8 mg total) by mouth every 8 (eight) hours as needed (nausea/vomiting)., Disp: 60 tablet, Rfl: 5    pantoprazole (PROTONIX) 40 MG tablet, Take 40 mg by mouth., Disp: , Rfl:     prochlorperazine (COMPAZINE) 10 MG tablet, Take 10 mg by mouth., Disp: , Rfl:     tirzepatide (MOUNJARO) 2.5 mg/0.5 mL PnIj, Inject 2.5 mg into the skin every 7 days., Disp: , Rfl:     tiZANidine (ZANAFLEX) 4 MG tablet, , Disp: , Rfl:       External  database queried on 10/03/2024  by AKUA CAMARGO :          Review of patient's allergies indicates:   Allergen Reactions    Ace inhibitors Swelling    Lisinopril Rash    Hydrocodone Itching     Hives    Hydrocodone-acetaminoph-supp11 Itching    Percocet [oxycodone-acetaminophen] Itching     Pt had to take an antihistamine to improve itching     Cephalexin      Hives    Propoxyphene      Hives    Propoxyphene n-acetaminophen     Propoxyphene-acetaminophen            OBJECTIVE:         Physical Exam:  Vitals:      Physical Exam  Vitals reviewed: Limited due to virtual visit.   Constitutional:       General: She is not in acute distress.     Appearance: She is ill-appearing.   Eyes:      General: No  scleral icterus.        Right eye: No discharge.         Left eye: No discharge.      Conjunctiva/sclera: Conjunctivae normal.   Pulmonary:      Effort: Pulmonary effort is normal. No respiratory distress.   Neurological:      Mental Status: She is alert and oriented to person, place, and time.   Psychiatric:         Attention and Perception: Attention and perception normal.         Mood and Affect: Mood and affect normal.         Speech: Speech normal.         Behavior: Behavior normal. Behavior is cooperative.         Thought Content: Thought content normal. Thought content does not include suicidal ideation. Thought content does not include suicidal plan.         Cognition and Memory: Cognition and memory normal.         Judgment: Judgment normal.           Labs: BMP  Lab Results   Component Value Date     10/01/2024    K 4.4 10/01/2024     10/01/2024    CO2 17 (L) 10/01/2024    BUN 10 10/01/2024    CREATININE 0.8 10/01/2024    CALCIUM 9.6 10/01/2024    ANIONGAP 16 10/01/2024    EGFRNORACEVR >60 10/01/2024     Lab Results   Component Value Date    WBC 3.61 (L) 10/01/2024    HGB 9.3 (L) 10/01/2024    HCT 30.4 (L) 10/01/2024    MCV 91 10/01/2024     10/01/2024             Imaging: CT A/P: 10/02/2024: Redemonstration of extensive masslike nodular soft tissue density at the right lung base and subdiaphragmatic region, similar to prior examination.  There is an adjacent nodular soft tissue density within the subcutaneous soft tissues with dimensions as above, possibly representing extrapleural/subcutaneous metastatic extension.  This appears significantly increased in size compared to prior study of 08/29/2024.  Correlation with physical examination advised.  Residual contrast material within the collecting systems and bladder from prior CTA examination.  Indeterminate 2.5 cm left lower pole renal hypodensity.  Recommend nonemergent dedicated renal ultrasound follow-up for further  characterization.  Mild urinary bladder wall thickening favored to relate to nondistention, although correlation with urinalysis advised.  Nonspecific 3.4 cm soft tissue density within the left adnexal region.  This is not well appreciated on prior examinations.  Unclear whether this represents ovarian tissue versus left pelvic sidewall lymphadenopathy.  Close attention on follow-up imaging advised.  Redemonstration of right external iliac chain and inguinal lymph node enlargement, similar to prior examination.  Partially visualized soft tissue nodularity within the superficial subcutaneous soft tissues overlying the lateral aspect of the right hip.  Correlation with physical examination advised.    CTA Chest: 10/01/2024: No evidence of pulmonary embolism. Multiple essentially stable masses involving the right hemithorax with a large loculated right pleural effusion and compressive atelectasis of the right lower lobe.     MRI Brain: 08/29/2024: Two small nodular foci of enhancement within the left frontal lobe measuring 3 mm in size and within the left cerebellum measuring 6 mm in size consistent with central nervous system metastatic disease. Minor associated T2 hyperintensity/edema. No mass effect.          I spent a total of 35 minutes on the day of the visit. This includes face to face time in discussion of goals of care, symptom assessment, coordination of care and emotional support.  This also includes non-face to face time preparing to see the patient (eg, review of tests/imaging), obtaining and/or reviewing separately obtained history, documenting clinical information in the electronic or other health record, independently interpreting results and communicating results to the patient/family/caregiver, or care coordinator.       AKUA HENSLEY NP

## 2024-10-04 ENCOUNTER — PATIENT MESSAGE (OUTPATIENT)
Dept: INFUSION THERAPY | Facility: HOSPITAL | Age: 64
End: 2024-10-04

## 2024-10-04 ENCOUNTER — INFUSION (OUTPATIENT)
Dept: INFUSION THERAPY | Facility: HOSPITAL | Age: 64
End: 2024-10-04
Attending: INTERNAL MEDICINE
Payer: MEDICAID

## 2024-10-04 ENCOUNTER — TELEPHONE (OUTPATIENT)
Dept: HEMATOLOGY/ONCOLOGY | Facility: CLINIC | Age: 64
End: 2024-10-04
Payer: MEDICAID

## 2024-10-04 ENCOUNTER — LAB VISIT (OUTPATIENT)
Dept: LAB | Facility: HOSPITAL | Age: 64
End: 2024-10-04
Attending: INTERNAL MEDICINE
Payer: MEDICAID

## 2024-10-04 ENCOUNTER — OFFICE VISIT (OUTPATIENT)
Dept: HEMATOLOGY/ONCOLOGY | Facility: CLINIC | Age: 64
End: 2024-10-04
Payer: MEDICAID

## 2024-10-04 VITALS
DIASTOLIC BLOOD PRESSURE: 59 MMHG | HEIGHT: 60 IN | WEIGHT: 197.31 LBS | TEMPERATURE: 98 F | BODY MASS INDEX: 38.74 KG/M2 | HEART RATE: 71 BPM | SYSTOLIC BLOOD PRESSURE: 145 MMHG

## 2024-10-04 VITALS
RESPIRATION RATE: 16 BRPM | OXYGEN SATURATION: 98 % | HEIGHT: 60 IN | TEMPERATURE: 98 F | WEIGHT: 197.31 LBS | BODY MASS INDEX: 38.74 KG/M2 | DIASTOLIC BLOOD PRESSURE: 62 MMHG | SYSTOLIC BLOOD PRESSURE: 119 MMHG | HEART RATE: 68 BPM

## 2024-10-04 DIAGNOSIS — D50.0 IRON DEFICIENCY ANEMIA DUE TO CHRONIC BLOOD LOSS: Primary | ICD-10-CM

## 2024-10-04 DIAGNOSIS — C50.412 PRIMARY MALIGNANT NEOPLASM OF UPPER OUTER QUADRANT OF BREAST, LEFT: ICD-10-CM

## 2024-10-04 DIAGNOSIS — C78.2 SECONDARY MALIGNANCY OF PARIETAL PLEURA: ICD-10-CM

## 2024-10-04 DIAGNOSIS — C50.412 PRIMARY MALIGNANT NEOPLASM OF UPPER OUTER QUADRANT OF BREAST, LEFT: Primary | ICD-10-CM

## 2024-10-04 LAB
ALBUMIN SERPL BCP-MCNC: 3.9 G/DL (ref 3.5–5.2)
ALP SERPL-CCNC: 83 U/L (ref 55–135)
ALT SERPL W/O P-5'-P-CCNC: 67 U/L (ref 10–44)
ANION GAP SERPL CALC-SCNC: 11 MMOL/L (ref 8–16)
AST SERPL-CCNC: 60 U/L (ref 10–40)
BASOPHILS # BLD AUTO: 0.02 K/UL (ref 0–0.2)
BASOPHILS NFR BLD: 0.5 % (ref 0–1.9)
BILIRUB SERPL-MCNC: 0.4 MG/DL (ref 0.1–1)
BUN SERPL-MCNC: 9 MG/DL (ref 8–23)
CALCIUM SERPL-MCNC: 9.6 MG/DL (ref 8.7–10.5)
CHLORIDE SERPL-SCNC: 105 MMOL/L (ref 95–110)
CO2 SERPL-SCNC: 25 MMOL/L (ref 23–29)
CREAT SERPL-MCNC: 1 MG/DL (ref 0.5–1.4)
DIFFERENTIAL METHOD BLD: ABNORMAL
EOSINOPHIL # BLD AUTO: 0.1 K/UL (ref 0–0.5)
EOSINOPHIL NFR BLD: 3.4 % (ref 0–8)
ERYTHROCYTE [DISTWIDTH] IN BLOOD BY AUTOMATED COUNT: 16.7 % (ref 11.5–14.5)
EST. GFR  (NO RACE VARIABLE): >60 ML/MIN/1.73 M^2
GLUCOSE SERPL-MCNC: 123 MG/DL (ref 70–110)
HCT VFR BLD AUTO: 28.2 % (ref 37–48.5)
HGB BLD-MCNC: 8.6 G/DL (ref 12–16)
IMM GRANULOCYTES # BLD AUTO: 0.03 K/UL (ref 0–0.04)
IMM GRANULOCYTES NFR BLD AUTO: 0.8 % (ref 0–0.5)
LYMPHOCYTES # BLD AUTO: 0.8 K/UL (ref 1–4.8)
LYMPHOCYTES NFR BLD: 20.4 % (ref 18–48)
MCH RBC QN AUTO: 27.7 PG (ref 27–31)
MCHC RBC AUTO-ENTMCNC: 30.5 G/DL (ref 32–36)
MCV RBC AUTO: 91 FL (ref 82–98)
MONOCYTES # BLD AUTO: 0.3 K/UL (ref 0.3–1)
MONOCYTES NFR BLD: 8.3 % (ref 4–15)
NEUTROPHILS # BLD AUTO: 2.6 K/UL (ref 1.8–7.7)
NEUTROPHILS NFR BLD: 66.6 % (ref 38–73)
NRBC BLD-RTO: 0 /100 WBC
PLATELET # BLD AUTO: 299 K/UL (ref 150–450)
PMV BLD AUTO: 10.5 FL (ref 9.2–12.9)
POTASSIUM SERPL-SCNC: 3.8 MMOL/L (ref 3.5–5.1)
PROT SERPL-MCNC: 7 G/DL (ref 6–8.4)
RBC # BLD AUTO: 3.1 M/UL (ref 4–5.4)
SODIUM SERPL-SCNC: 141 MMOL/L (ref 136–145)
WBC # BLD AUTO: 3.87 K/UL (ref 3.9–12.7)

## 2024-10-04 PROCEDURE — 80053 COMPREHEN METABOLIC PANEL: CPT | Performed by: INTERNAL MEDICINE

## 2024-10-04 PROCEDURE — 63600175 PHARM REV CODE 636 W HCPCS: Performed by: INTERNAL MEDICINE

## 2024-10-04 PROCEDURE — 96365 THER/PROPH/DIAG IV INF INIT: CPT

## 2024-10-04 PROCEDURE — 36415 COLL VENOUS BLD VENIPUNCTURE: CPT | Performed by: INTERNAL MEDICINE

## 2024-10-04 PROCEDURE — A4216 STERILE WATER/SALINE, 10 ML: HCPCS | Performed by: INTERNAL MEDICINE

## 2024-10-04 PROCEDURE — 25000003 PHARM REV CODE 250: Performed by: INTERNAL MEDICINE

## 2024-10-04 PROCEDURE — 85025 COMPLETE CBC W/AUTO DIFF WBC: CPT | Performed by: INTERNAL MEDICINE

## 2024-10-04 RX ORDER — SODIUM CHLORIDE 0.9 % (FLUSH) 0.9 %
10 SYRINGE (ML) INJECTION
OUTPATIENT
Start: 2024-10-11

## 2024-10-04 RX ORDER — SODIUM CHLORIDE 0.9 % (FLUSH) 0.9 %
10 SYRINGE (ML) INJECTION
Status: DISCONTINUED | OUTPATIENT
Start: 2024-10-04 | End: 2024-10-04 | Stop reason: HOSPADM

## 2024-10-04 RX ORDER — HYDROMORPHONE HYDROCHLORIDE 4 MG/1
4 TABLET ORAL
Qty: 40 TABLET | Refills: 0 | Status: SHIPPED | OUTPATIENT
Start: 2024-10-04 | End: 2025-10-04

## 2024-10-04 RX ORDER — HEPARIN 100 UNIT/ML
500 SYRINGE INTRAVENOUS
OUTPATIENT
Start: 2024-10-11

## 2024-10-04 RX ORDER — EPINEPHRINE 0.3 MG/.3ML
0.3 INJECTION SUBCUTANEOUS ONCE AS NEEDED
OUTPATIENT
Start: 2024-10-11

## 2024-10-04 RX ORDER — HEPARIN 100 UNIT/ML
500 SYRINGE INTRAVENOUS
Status: DISCONTINUED | OUTPATIENT
Start: 2024-10-04 | End: 2024-10-04 | Stop reason: HOSPADM

## 2024-10-04 RX ORDER — DIPHENHYDRAMINE HYDROCHLORIDE 50 MG/ML
50 INJECTION INTRAMUSCULAR; INTRAVENOUS ONCE AS NEEDED
OUTPATIENT
Start: 2024-10-11

## 2024-10-04 RX ADMIN — SODIUM CHLORIDE: 9 INJECTION, SOLUTION INTRAVENOUS at 10:10

## 2024-10-04 RX ADMIN — HEPARIN 500 UNITS: 100 SYRINGE at 11:10

## 2024-10-04 RX ADMIN — Medication 10 ML: at 11:10

## 2024-10-04 RX ADMIN — SODIUM CHLORIDE 125 MG: 9 INJECTION, SOLUTION INTRAVENOUS at 10:10

## 2024-10-04 NOTE — NURSING
[facility]. [facility] tolerated infusion well. Discharged without distress or complaints. Ambulatory and accompanied by significant other.

## 2024-10-04 NOTE — DISCHARGE INSTRUCTIONS
.THANKS FOR ALLOWING ME TO CARE FOR YOU!!!! ~Yola          THANKS FOR CHOOSING [facility]!!!        [facility]  [address] [facility].  or  [id number] [place] [address] Drive  [phone] phone     [phone] fax  Hours of Operation: Monday- Friday 8:00am- 5:00pm  After hours phone  [phone]  Hematology / Oncology Physicians on call      Dr. Shun Loredo, CARLOS [place], CARLOS      Please call with any concerns regarding your appointment today.    .WAYS TO HELP PREVENT INFECTION        WASH YOUR HANDS OFTEN DURING THE DAY, ESPECIALLY BEFORE YOU EAT, AFTER USING THE BATHROOM, AND AFTER TOUCHING ANIMALS    STAY AWAY FROM PEOPLE WHO HAVE ILLNESSES YOU CAN CATCH; SUCH AS COLDS, FLU, CHICKEN POX    TRY TO AVOID CROWDS    STAY AWAY FROM CHILDREN WHO RECENTLY HAVE RECEIVED LIVE VIRUS VACCINES    MAINTAIN GOOD MOUTH CARE    DO NOT SQUEEZE OR SCRATCH PIMPLES    CLEAN CUTS & SCRAPES RIGHT AWAY AND DAILY UNTIL HEALED WITH WARM WATER, SOAP & AN ANTISEPTIC    AVOID CONTACT WITH LITTER BOXES, BIRD CAGES, & FISH TANKS    AVOID STANDING WATER, IE., BIRD BATHS, FLOWER POTS/VASES, OR HUMIDIFIERS    WEAR GLOVES WHEN GARDENING OR CLEANING UP AFTER OTHERS, ESPECIALLY BABIES & SMALL CHILDREN    DO NOT EAT RAW FISH, SEAFOOD, MEAT, OR EGGS    .FALL PREVENTION   Falls often occur due to slipping, tripping or losing your balance. Here are ways to reduce your risk of falling again.   Was there anything that caused your fall that can be fixed, removed or replaced?   Make your home safe by keeping walkways clear of objects you may trip over.   Use non-slip pads under rugs.   Do not walk in poorly lit areas.   Do not stand on chairs or wobbly ladders.   Use caution when reaching overhead or looking upward. This position can cause a loss of balance.   Be sure your shoes fit properly, have non-slip bottoms and are in good condition.   Be cautious when going up and down  stairs, curbs, and when walking on uneven sidewalks.   If your balance is poor, consider using a cane or walker.   If your fall was related to alcohol use, stop or limit alcohol intake.   If your fall was related to use of sleeping medicines, talk to your doctor about this. You may need to reduce your dosage at bedtime if you awaken during the night to go to the bathroom.   To reduce the need for nighttime bathroom trips:   Avoid drinking fluids for several hours before going to bed   Empty your bladder before going to bed   Men can keep a urinal at the bedside   © 0000-4390 [facility], [address] [place] [address], [place] [address]. All rights reserved. This information is not intended as a substitute for professional medical care. Always follow your healthcare professional's instructions.

## 2024-10-04 NOTE — TELEPHONE ENCOUNTER
Returned pt's call. LVM asking her to call me if she still needs help pulling her prescriptions over. Sent msg to provider's clinic nurse to resend prescriptions that are not able to be pulled over to the [facility].

## 2024-10-04 NOTE — PLAN OF CARE
Problem: Adult Inpatient Plan of Care  Goal: Plan of Care Review  10/4/2024 1349 by Yola Hendricks RN  Outcome: Progressing  10/4/2024 1349 by Yola Hendricks RN  Outcome: Progressing  Flowsheets (Taken 10/4/2024 1349)  Plan of Care Reviewed With: patient  Goal: Absence of Hospital-Acquired Illness or Injury  10/4/2024 1349 by Yola Hendricks RN  Outcome: Progressing  10/4/2024 1349 by Yola Hendricks RN  Outcome: Progressing  Goal: Optimal Comfort and Wellbeing  10/4/2024 1349 by Yola Hendricks RN  Outcome: Progressing  10/4/2024 1349 by Yola Hendricks RN  Outcome: Progressing  Intervention: Provide Person-Centered Care  Flowsheets (Taken 10/4/2024 1349)  Trust Relationship/Rapport:   care explained   choices provided   emotional support provided   empathic listening provided   questions answered   questions encouraged   reassurance provided   thoughts/feelings acknowledged     Problem: Anemia  Goal: Anemia Symptom Improvement  10/4/2024 1349 by Yola Hendricks RN  Outcome: Progressing  10/4/2024 1349 by Yola Hendricks RN  Outcome: Progressing  Intervention: Monitor and Manage Anemia  Flowsheets (Taken 10/4/2024 1349)  Safety Promotion/Fall Prevention:   assistive device/personal item within reach   instructed to call staff for mobility   in recliner, wheels locked   high risk medications identified

## 2024-10-07 ENCOUNTER — DOCUMENTATION ONLY (OUTPATIENT)
Dept: HEMATOLOGY/ONCOLOGY | Facility: CLINIC | Age: 64
End: 2024-10-07
Payer: MEDICAID

## 2024-10-07 NOTE — PROGRESS NOTES
Contacted pt to advise tx has been approved by insurance company. Pt scheduled to start Trodelvy at  on 10/10 at 830 after labs and VV with Dr Hoffmann. Pt scheduled for tx education wed 10/9 at . Pt verbalized understanding to all this. No further questions or concerns at this time.

## 2024-10-09 ENCOUNTER — DOCUMENTATION ONLY (OUTPATIENT)
Dept: HEMATOLOGY/ONCOLOGY | Facility: CLINIC | Age: 64
End: 2024-10-09
Payer: MEDICAID

## 2024-10-09 DIAGNOSIS — C78.2 SECONDARY MALIGNANCY OF PARIETAL PLEURA: ICD-10-CM

## 2024-10-09 DIAGNOSIS — C50.412 PRIMARY MALIGNANT NEOPLASM OF UPPER OUTER QUADRANT OF BREAST, LEFT: ICD-10-CM

## 2024-10-09 RX ORDER — DEXAMETHASONE 4 MG/1
8 TABLET ORAL DAILY
Qty: 24 TABLET | Refills: 11 | Status: SHIPPED | OUTPATIENT
Start: 2024-10-09

## 2024-10-09 RX ORDER — OLANZAPINE 5 MG/1
5 TABLET ORAL NIGHTLY
Qty: 8 TABLET | Refills: 11 | Status: SHIPPED | OUTPATIENT
Start: 2024-10-09

## 2024-10-09 RX ORDER — ONDANSETRON 8 MG/1
8 TABLET, ORALLY DISINTEGRATING ORAL EVERY 8 HOURS PRN
Qty: 60 TABLET | Refills: 5 | Status: SHIPPED | OUTPATIENT
Start: 2024-10-09

## 2024-10-09 RX ORDER — HYDROMORPHONE HYDROCHLORIDE 4 MG/1
4 TABLET ORAL
Qty: 40 TABLET | Refills: 0 | Status: SHIPPED | OUTPATIENT
Start: 2024-10-09 | End: 2024-10-10 | Stop reason: SDUPTHER

## 2024-10-09 NOTE — TELEPHONE ENCOUNTER
----- Message from LEXI Hayden sent at 10/9/2024  2:51 PM CDT -----  Regarding: RXs for Trodelvy tomorrow  Dr. Hoffmann,  Mrs. Saavedra is here today for teaching for Trodelvy (to start tomorrow).  Her take home meds were sent to [facility], but she cannot fill anything there any more.  She is asking if they could be sent to [facility] here at [place].  Would you mind re-sending them?    Thank you so much!!  Jackelyn

## 2024-10-10 ENCOUNTER — INFUSION (OUTPATIENT)
Dept: INFUSION THERAPY | Facility: HOSPITAL | Age: 64
End: 2024-10-10
Attending: INTERNAL MEDICINE
Payer: MEDICAID

## 2024-10-10 ENCOUNTER — OFFICE VISIT (OUTPATIENT)
Dept: HEMATOLOGY/ONCOLOGY | Facility: CLINIC | Age: 64
End: 2024-10-10
Payer: MEDICAID

## 2024-10-10 ENCOUNTER — SOCIAL WORK (OUTPATIENT)
Dept: HEMATOLOGY/ONCOLOGY | Facility: CLINIC | Age: 64
End: 2024-10-10
Payer: MEDICAID

## 2024-10-10 VITALS
TEMPERATURE: 98 F | HEART RATE: 89 BPM | SYSTOLIC BLOOD PRESSURE: 152 MMHG | OXYGEN SATURATION: 97 % | DIASTOLIC BLOOD PRESSURE: 71 MMHG | WEIGHT: 197.31 LBS | BODY MASS INDEX: 38.53 KG/M2 | RESPIRATION RATE: 17 BRPM

## 2024-10-10 DIAGNOSIS — C78.2 SECONDARY MALIGNANCY OF PARIETAL PLEURA: ICD-10-CM

## 2024-10-10 DIAGNOSIS — C50.412 PRIMARY MALIGNANT NEOPLASM OF UPPER OUTER QUADRANT OF BREAST, LEFT: Primary | ICD-10-CM

## 2024-10-10 DIAGNOSIS — G89.3 NEOPLASM RELATED PAIN: ICD-10-CM

## 2024-10-10 DIAGNOSIS — C79.31 SECONDARY ADENOCARCINOMA OF BRAIN: ICD-10-CM

## 2024-10-10 PROCEDURE — 96368 THER/DIAG CONCURRENT INF: CPT

## 2024-10-10 PROCEDURE — 96375 TX/PRO/DX INJ NEW DRUG ADDON: CPT

## 2024-10-10 PROCEDURE — 25000003 PHARM REV CODE 250: Performed by: INTERNAL MEDICINE

## 2024-10-10 PROCEDURE — 96367 TX/PROPH/DG ADDL SEQ IV INF: CPT

## 2024-10-10 PROCEDURE — 96415 CHEMO IV INFUSION ADDL HR: CPT

## 2024-10-10 PROCEDURE — 63600175 PHARM REV CODE 636 W HCPCS: Performed by: INTERNAL MEDICINE

## 2024-10-10 PROCEDURE — 96413 CHEMO IV INFUSION 1 HR: CPT

## 2024-10-10 RX ORDER — HYDROMORPHONE HYDROCHLORIDE 4 MG/1
4 TABLET ORAL
Qty: 40 TABLET | Refills: 0 | Status: SHIPPED | OUTPATIENT
Start: 2024-10-10 | End: 2025-10-10

## 2024-10-10 RX ORDER — PROCHLORPERAZINE EDISYLATE 5 MG/ML
5 INJECTION INTRAMUSCULAR; INTRAVENOUS ONCE AS NEEDED
Status: CANCELLED
Start: 2024-10-10

## 2024-10-10 RX ORDER — PROCHLORPERAZINE EDISYLATE 5 MG/ML
5 INJECTION INTRAMUSCULAR; INTRAVENOUS ONCE AS NEEDED
Start: 2024-10-17

## 2024-10-10 RX ORDER — ATROPINE SULFATE 0.4 MG/ML
0.4 INJECTION, SOLUTION ENDOTRACHEAL; INTRAMEDULLARY; INTRAMUSCULAR; INTRAVENOUS; SUBCUTANEOUS ONCE AS NEEDED
Status: CANCELLED | OUTPATIENT
Start: 2024-10-10

## 2024-10-10 RX ORDER — FAMOTIDINE 10 MG/ML
20 INJECTION INTRAVENOUS
OUTPATIENT
Start: 2024-10-17

## 2024-10-10 RX ORDER — DIPHENHYDRAMINE HYDROCHLORIDE 50 MG/ML
25 INJECTION INTRAMUSCULAR; INTRAVENOUS
Status: CANCELLED
Start: 2024-10-10

## 2024-10-10 RX ORDER — ACETAMINOPHEN 325 MG/1
650 TABLET ORAL
Status: DISCONTINUED | OUTPATIENT
Start: 2024-10-10 | End: 2024-10-10 | Stop reason: HOSPADM

## 2024-10-10 RX ORDER — ACETAMINOPHEN 325 MG/1
650 TABLET ORAL
OUTPATIENT
Start: 2024-10-17

## 2024-10-10 RX ORDER — ACETAMINOPHEN 325 MG/1
650 TABLET ORAL
Status: CANCELLED | OUTPATIENT
Start: 2024-10-10

## 2024-10-10 RX ORDER — SODIUM CHLORIDE 0.9 % (FLUSH) 0.9 %
10 SYRINGE (ML) INJECTION
Status: CANCELLED | OUTPATIENT
Start: 2024-10-10

## 2024-10-10 RX ORDER — HEPARIN 100 UNIT/ML
500 SYRINGE INTRAVENOUS
Status: DISCONTINUED | OUTPATIENT
Start: 2024-10-10 | End: 2024-10-10 | Stop reason: HOSPADM

## 2024-10-10 RX ORDER — DIPHENHYDRAMINE HYDROCHLORIDE 50 MG/ML
25 INJECTION INTRAMUSCULAR; INTRAVENOUS
Status: COMPLETED | OUTPATIENT
Start: 2024-10-10 | End: 2024-10-10

## 2024-10-10 RX ORDER — EPINEPHRINE 0.3 MG/.3ML
0.3 INJECTION SUBCUTANEOUS ONCE AS NEEDED
OUTPATIENT
Start: 2024-10-17

## 2024-10-10 RX ORDER — DIPHENHYDRAMINE HYDROCHLORIDE 50 MG/ML
50 INJECTION INTRAMUSCULAR; INTRAVENOUS ONCE AS NEEDED
Status: CANCELLED | OUTPATIENT
Start: 2024-10-10

## 2024-10-10 RX ORDER — DIPHENHYDRAMINE HYDROCHLORIDE 50 MG/ML
50 INJECTION INTRAMUSCULAR; INTRAVENOUS ONCE AS NEEDED
OUTPATIENT
Start: 2024-10-17

## 2024-10-10 RX ORDER — EPINEPHRINE 0.3 MG/.3ML
0.3 INJECTION SUBCUTANEOUS ONCE AS NEEDED
Status: CANCELLED | OUTPATIENT
Start: 2024-10-10

## 2024-10-10 RX ORDER — DIPHENHYDRAMINE HYDROCHLORIDE 50 MG/ML
25 INJECTION INTRAMUSCULAR; INTRAVENOUS
Start: 2024-10-17

## 2024-10-10 RX ORDER — FAMOTIDINE 10 MG/ML
20 INJECTION INTRAVENOUS
Status: COMPLETED | OUTPATIENT
Start: 2024-10-10 | End: 2024-10-10

## 2024-10-10 RX ORDER — HEPARIN 100 UNIT/ML
500 SYRINGE INTRAVENOUS
OUTPATIENT
Start: 2024-10-17

## 2024-10-10 RX ORDER — HEPARIN 100 UNIT/ML
500 SYRINGE INTRAVENOUS
Status: CANCELLED | OUTPATIENT
Start: 2024-10-10

## 2024-10-10 RX ORDER — SODIUM CHLORIDE 0.9 % (FLUSH) 0.9 %
10 SYRINGE (ML) INJECTION
Status: DISCONTINUED | OUTPATIENT
Start: 2024-10-10 | End: 2024-10-10 | Stop reason: HOSPADM

## 2024-10-10 RX ORDER — ATROPINE SULFATE 0.4 MG/ML
0.4 INJECTION, SOLUTION ENDOTRACHEAL; INTRAMEDULLARY; INTRAMUSCULAR; INTRAVENOUS; SUBCUTANEOUS ONCE AS NEEDED
OUTPATIENT
Start: 2024-10-17

## 2024-10-10 RX ORDER — FAMOTIDINE 10 MG/ML
20 INJECTION INTRAVENOUS
Status: CANCELLED | OUTPATIENT
Start: 2024-10-10

## 2024-10-10 RX ORDER — ATROPINE SULFATE 1 MG/ML
0.4 INJECTION, SOLUTION INTRAMUSCULAR; INTRAVENOUS; SUBCUTANEOUS ONCE AS NEEDED
Status: COMPLETED | OUTPATIENT
Start: 2024-10-10 | End: 2024-10-10

## 2024-10-10 RX ORDER — SODIUM CHLORIDE 0.9 % (FLUSH) 0.9 %
10 SYRINGE (ML) INJECTION
OUTPATIENT
Start: 2024-10-17

## 2024-10-10 RX ADMIN — ATROPINE SULFATE 0.4 MG: 1 INJECTION, SOLUTION INTRAVENOUS at 09:10

## 2024-10-10 RX ADMIN — DIPHENHYDRAMINE HYDROCHLORIDE 25 MG: 50 INJECTION, SOLUTION INTRAMUSCULAR; INTRAVENOUS at 09:10

## 2024-10-10 RX ADMIN — DEXAMETHASONE SODIUM PHOSPHATE 0.25 MG: 4 INJECTION, SOLUTION INTRA-ARTICULAR; INTRALESIONAL; INTRAMUSCULAR; INTRAVENOUS; SOFT TISSUE at 09:10

## 2024-10-10 RX ADMIN — HEPARIN 500 UNITS: 100 SYRINGE at 01:10

## 2024-10-10 RX ADMIN — FAMOTIDINE 20 MG: 10 INJECTION INTRAVENOUS at 09:10

## 2024-10-10 RX ADMIN — SODIUM CHLORIDE 900 MG: 0.9 INJECTION, SOLUTION INTRAVENOUS at 09:10

## 2024-10-10 RX ADMIN — APREPITANT 130 MG: 130 INJECTION, EMULSION INTRAVENOUS at 09:10

## 2024-10-10 NOTE — PROGRESS NOTES
Met with patient and  to discuss upcoming systemic therapy initiation.  Discussed patient diagnosis including staging information. Also discussed that therapy regimen prescribed involves the following drugs: Trodelvy and timeline of therapy administration. Went over what to expect on first day of treatment, including what to bring with you, visitor policy, and infusion suite guidelines. Also discussed supportive and shared services available to patient, including social work, financial counseling, oncology nutrition, and oncology psychology.      Covered with patient and  potential side effects and symptom management. Reviewed supportive medications that will be given before, during, and after treatment. Also stressed that other side effects are possible outside of those listed. Spent additional time on signs of infection, infection prevention, and proper nutrition/hydration.    Education provided to patient and  via [facility] specific drug information and chemotherapy education binder supplement information.  Pt has had chemo previously and has a chemo education binderfrom that time.  Also provided contact information for clinic and information related to [facility] communication. Discussed communication process for after-hours needs and some common scenarios in which patient and  should call provider for guidance vs. immediately report to the emergency room.     Finally, patient and  were given my contact information. Encouraged patient and  to call with any questions, concerns, or needs. Patient and  verbalized understanding of all above information.

## 2024-10-10 NOTE — TELEPHONE ENCOUNTER
----- Message from Nurse Bello sent at 10/10/2024  8:11 AM CDT -----  Good morning,    Patient is requesting Dr. Hoffmann sent her Hydromorphone to [facility] on [facility].    Thanks,  Eugenia

## 2024-10-10 NOTE — PROGRESS NOTES
Subjective:       Patient ID: Sherlyn Saavedra is a 64 y.o. female.    Chief Complaint: Results, Pain, and Breast Cancer    HPI:  64-year-old female presents for cycle 1 day 1OP SACITUZUMAB GOVITECAN-HZIY Q3 recent hospitalization pain control.  Currently with MS Contin 30 mg Q 12 hours along with Dilaudid breakthrough pain patient appears to be more comfortable seen in virtual visit prior to initiation of 1st cycle ECOG status 1    Past Medical History:   Diagnosis Date    Allergy     Anemia     Breast cancer     Diabetes mellitus     HLD (hyperlipidemia) 2015    Hyperlipidemia (Problem)      Hypertension     Primary malignant neoplasm of upper outer quadrant of breast, left 10/10/2023    Secondary malignancy of parietal pleura 2024    Type 2 diabetes mellitus with diabetic polyneuropathy, without long-term current use of insulin 2015    Formatting of this note might be different from the original.   dx in 40's; has some neuropathic symptoms in right LE; (uncertain if from DMII)  Diabetes mellitus type 2 (Problem)       No family history on file.  Social History     Socioeconomic History    Marital status: Single   Tobacco Use    Smoking status: Former     Current packs/day: 0.00     Types: Cigarettes     Quit date:      Years since quittin.7    Smokeless tobacco: Never   Substance and Sexual Activity    Alcohol use: Not Currently    Drug use: Never    Sexual activity: Not Currently     Social Drivers of Health     Financial Resource Strain: Medium Risk (2024)    Overall Financial Resource Strain (CARDIA)     Difficulty of Paying Living Expenses: Somewhat hard   Food Insecurity: Food Insecurity Present (2024)    Hunger Vital Sign     Worried About Running Out of Food in the Last Year: Sometimes true     Ran Out of Food in the Last Year: Sometimes true   Transportation Needs: No Transportation Needs (2024)    Received from [facility] and [facility]  "Subsidiaries and Affiliates    PRAPARE - Transportation     Lack of Transportation (Medical): No     Lack of Transportation (Non-Medical): No   Physical Activity: Inactive (9/12/2024)    Exercise Vital Sign     Days of Exercise per Week: 0 days     Minutes of Exercise per Session: 0 min   Stress: Stress Concern Present (9/12/2024)    [employer] - Occupational Stress Questionnaire     Feeling of Stress : To some extent   Housing Stability: Unknown (9/12/2024)    Housing Stability Vital Sign     Unable to Pay for Housing in the Last Year: No     Past Surgical History:   Procedure Laterality Date    HYSTERECTOMY      tubaligation  1987       Labs:  Lab Results   Component Value Date    WBC 3.87 (L) 10/04/2024    HGB 8.6 (L) 10/04/2024    HCT 28.2 (L) 10/04/2024    MCV 91 10/04/2024     10/04/2024     BMP  Lab Results   Component Value Date     10/04/2024    K 3.8 10/04/2024     10/04/2024    CO2 25 10/04/2024    BUN 9 10/04/2024    CREATININE 1.0 10/04/2024    CALCIUM 9.6 10/04/2024    ANIONGAP 11 10/04/2024     Lab Results   Component Value Date    ALT 67 (H) 10/04/2024    AST 60 (H) 10/04/2024    ALKPHOS 83 10/04/2024    BILITOT 0.4 10/04/2024       Lab Results   Component Value Date    IRON 29 (L) 09/20/2024    TIBC 283 09/20/2024    FERRITIN 406 (H) 09/20/2024     No results found for: "[id number]"  Lab Results   Component Value Date    FOLATE 4.1 (L) 07/23/2024     Lab Results   Component Value Date    TSH 2.500 08/14/2023         Review of Systems   Constitutional:  Negative for activity change, appetite change, chills, diaphoresis, fatigue, fever and unexpected weight change.   HENT:  Negative for congestion, dental problem, drooling, ear discharge, ear pain, facial swelling, hearing loss, mouth sores, nosebleeds, postnasal drip, rhinorrhea, sinus pressure, sneezing, sore throat, tinnitus, trouble swallowing and voice change.    Eyes:  Negative for photophobia, " pain, discharge, redness, itching and visual disturbance.   Respiratory:  Negative for cough, choking, chest tightness, shortness of breath, wheezing and stridor.    Cardiovascular:  Positive for chest pain. Negative for palpitations and leg swelling.   Gastrointestinal:  Negative for abdominal distention, abdominal pain, anal bleeding, blood in stool, constipation, diarrhea, nausea, rectal pain and vomiting.   Endocrine: Negative for cold intolerance, heat intolerance, polydipsia, polyphagia and polyuria.   Genitourinary:  Negative for decreased urine volume, difficulty urinating, dyspareunia, dysuria, enuresis, flank pain, frequency, genital sores, hematuria, menstrual problem, pelvic pain, urgency, vaginal bleeding, vaginal discharge and vaginal pain.   Musculoskeletal:  Negative for arthralgias, back pain, gait problem, joint swelling, myalgias, neck pain and neck stiffness.   Skin:  Negative for color change, pallor and rash.   Allergic/Immunologic: Negative for environmental allergies, food allergies and immunocompromised state.   Neurological:  Negative for dizziness, tremors, seizures, syncope, facial asymmetry, speech difficulty, weakness, light-headedness, numbness and headaches.   Hematological:  Negative for adenopathy. Does not bruise/bleed easily.   Psychiatric/Behavioral:  Negative for agitation, behavioral problems, confusion, decreased concentration, dysphoric mood, hallucinations, self-injury, sleep disturbance and suicidal ideas. The patient is not nervous/anxious and is not hyperactive.        Objective:      Physical Exam  Constitutional:       Appearance: Normal appearance. She is obese. She is ill-appearing.   Neurological:      Mental Status: She is alert.             Assessment:      1. Primary malignant neoplasm of upper outer quadrant of breast, left    2. Neoplasm related pain    3. Secondary adenocarcinoma of brain    4. Secondary malignancy of parietal pleura           Med Onc Chart  Routing      Follow up with physician . Return to see either see myself or APAP CBC CMP for cycle 1 day 8   Follow up with SANTIAGO . If patient is seen by APAP on day 8 then I should be seen patient at beginning of cycle 2   Infusion scheduling note    Injection scheduling note Return one-week cycle 1 day 8OP SACITUZUMAB GOVITECAN-HZIY Q3   Labs    Imaging    Pharmacy appointment    Other referrals                   Plan:     The patient location is:  Home  The chief complaint leading to consultation is:  Breast cancer    Visit type: audiovisual    Face to Face time with patient: 25 minutes of total time spent on the encounter, which includes face to face time and non-face to face time preparing to see the patient (eg, review of tests), Obtaining and/or reviewing separately obtained history, Documenting clinical information in the electronic or other health record, Independently interpreting results (not separately reported) and communicating results to the patient/family/caregiver, or Care coordination (not separately reported).         Each patient to whom he or she provides medical services by telemedicine is:  (1) informed of the relationship between the physician and patient and the respective role of any other health care provider with respect to management of the patient; and (2) notified that he or she may decline to receive medical services by telemedicine and may withdraw from such care at any time.    Notes:  Patient appears more comfortable recent evaluation in currently on MS Contin Dilaudid.  Initiation of cycle 1 day 1OP SACITUZUMAB GOVITECAN-HZIY Q3 can be seen either by myself or APAP for day 8 patient is seen by APAP on day 8 I should see if beginning of cycle 2 day 1 continue with current plan course of treatment        Yaya Hoffmann Jr, MD FACP

## 2024-10-10 NOTE — PROGRESS NOTES
YANDY kay met with pt at chairside to see how pt was doing. Pt explained that she went to sleep early last night so she woke up refreshed and well-rested. Pt stated her new infusion medication was going well so far.    YANDY kay asked pt if she received any updates from grants/funding resources that Luz KEBEDE, applied pt for a few weeks ago. Pt verbalized that she did not get any mail, phone calls, or emails from grants/funding sources.     YANDY kay asked pt if she would be interested in [facility] juana application. Pt agreeable. YANDY kay submitted application for pt. YANDY kay explained to pt that we will contact her with any updates on applications that were submitted for her.     Pt thanked YANDY kay for visiting with her at chairside and for helping with funding applications. YANDY kay will remain available.

## 2024-10-11 ENCOUNTER — TELEPHONE (OUTPATIENT)
Dept: PALLIATIVE MEDICINE | Facility: CLINIC | Age: 64
End: 2024-10-11
Payer: MEDICAID

## 2024-10-11 ENCOUNTER — INFUSION (OUTPATIENT)
Dept: INFUSION THERAPY | Facility: HOSPITAL | Age: 64
End: 2024-10-11
Attending: INTERNAL MEDICINE
Payer: MEDICAID

## 2024-10-11 ENCOUNTER — TELEPHONE (OUTPATIENT)
Dept: HEMATOLOGY/ONCOLOGY | Facility: CLINIC | Age: 64
End: 2024-10-11
Payer: MEDICAID

## 2024-10-11 VITALS
OXYGEN SATURATION: 98 % | RESPIRATION RATE: 18 BRPM | SYSTOLIC BLOOD PRESSURE: 151 MMHG | HEART RATE: 75 BPM | TEMPERATURE: 98 F | DIASTOLIC BLOOD PRESSURE: 66 MMHG

## 2024-10-11 DIAGNOSIS — C78.2 SECONDARY MALIGNANCY OF PARIETAL PLEURA: ICD-10-CM

## 2024-10-11 DIAGNOSIS — D50.0 IRON DEFICIENCY ANEMIA DUE TO CHRONIC BLOOD LOSS: Primary | ICD-10-CM

## 2024-10-11 LAB
ONEOME COMMENT: NORMAL
ONEOME METHOD: NORMAL

## 2024-10-11 PROCEDURE — 25000003 PHARM REV CODE 250: Performed by: INTERNAL MEDICINE

## 2024-10-11 PROCEDURE — 96365 THER/PROPH/DIAG IV INF INIT: CPT

## 2024-10-11 PROCEDURE — 63600175 PHARM REV CODE 636 W HCPCS: Performed by: INTERNAL MEDICINE

## 2024-10-11 PROCEDURE — A4216 STERILE WATER/SALINE, 10 ML: HCPCS | Performed by: INTERNAL MEDICINE

## 2024-10-11 RX ORDER — SODIUM CHLORIDE 0.9 % (FLUSH) 0.9 %
10 SYRINGE (ML) INJECTION
OUTPATIENT
Start: 2024-10-18

## 2024-10-11 RX ORDER — HEPARIN 100 UNIT/ML
500 SYRINGE INTRAVENOUS
Status: DISCONTINUED | OUTPATIENT
Start: 2024-10-11 | End: 2024-10-11 | Stop reason: HOSPADM

## 2024-10-11 RX ORDER — EPINEPHRINE 0.3 MG/.3ML
0.3 INJECTION SUBCUTANEOUS ONCE AS NEEDED
OUTPATIENT
Start: 2024-10-18

## 2024-10-11 RX ORDER — SODIUM CHLORIDE 0.9 % (FLUSH) 0.9 %
10 SYRINGE (ML) INJECTION
Status: DISCONTINUED | OUTPATIENT
Start: 2024-10-11 | End: 2024-10-11 | Stop reason: HOSPADM

## 2024-10-11 RX ORDER — HEPARIN 100 UNIT/ML
500 SYRINGE INTRAVENOUS
OUTPATIENT
Start: 2024-10-18

## 2024-10-11 RX ORDER — HYDROMORPHONE HYDROCHLORIDE 4 MG/1
4 TABLET ORAL
Qty: 40 TABLET | Refills: 0 | Status: CANCELLED | OUTPATIENT
Start: 2024-10-11 | End: 2025-10-11

## 2024-10-11 RX ORDER — DIPHENHYDRAMINE HYDROCHLORIDE 50 MG/ML
50 INJECTION INTRAMUSCULAR; INTRAVENOUS ONCE AS NEEDED
OUTPATIENT
Start: 2024-10-18

## 2024-10-11 RX ADMIN — Medication 10 ML: at 11:10

## 2024-10-11 RX ADMIN — HEPARIN 500 UNITS: 100 SYRINGE at 11:10

## 2024-10-11 RX ADMIN — SODIUM CHLORIDE 125 MG: 9 INJECTION, SOLUTION INTRAVENOUS at 10:10

## 2024-10-11 NOTE — TELEPHONE ENCOUNTER
----- Message from Elizabeth sent at 10/11/2024  9:47 AM CDT -----  Regarding: pt. needs Med. refill  Pt. Is here in Infusion right now and is asking for Maureen. Says she need Med. Refill that was spoke about yest.

## 2024-10-11 NOTE — TELEPHONE ENCOUNTER
Contacted pt to let her know we have cancelled her appt for imaging on 10/14 per her conversation with Dr Hoffmann. Pt verbalized understanding.

## 2024-10-11 NOTE — TELEPHONE ENCOUNTER
Nurse reached out to pt regarding her refill on her pain medication, pt refill request was sent to [facility], not [facility], pt is going to request all pain meds refill go to [facility]., however she will  her meds today from [facility] pt is out of meds, but refill was give per dr ingram.

## 2024-10-11 NOTE — DISCHARGE INSTRUCTIONS
[facility]  [address] [address]  [address]  [phone] phone     [phone] fax  Hours of Operation: Monday- Friday 8:00am- 5:00pm  After hours phone  [phone]  Hematology / Oncology Physicians on call      BAYRON Andrew Dr., NP Phaon Dunbar, NP Khelsea Conley, FNP    Please call with any concerns regarding your appointment today.

## 2024-10-11 NOTE — TELEPHONE ENCOUNTER
Nurse reached out to Dr Hoffmann to disregard the refill message, pt has pain meds at [facility] to .

## 2024-10-11 NOTE — PLAN OF CARE
Problem: Adult Inpatient Plan of Care  Goal: Plan of Care Review  Outcome: Progressing  Flowsheets (Taken 10/11/2024 1012)  Plan of Care Reviewed With: patient  Goal: Patient-Specific Goal (Individualized)  Outcome: Progressing  Flowsheets (Taken 10/11/2024 1012)  Individualized Care Needs: Feet elevated in recliner for comfort measures.  Anxieties, Fears or Concerns: patient concerned about pain med rx being sent in  Patient/Family-Specific Goals (Include Timeframe): tolerate treatment without adverse reaction  Goal: Optimal Comfort and Wellbeing  Outcome: Progressing  Intervention: Provide Person-Centered Care  Flowsheets (Taken 10/11/2024 1012)  Trust Relationship/Rapport:   care explained   questions encouraged   choices provided   emotional support provided   reassurance provided   thoughts/feelings acknowledged   empathic listening provided   questions answered

## 2024-10-14 ENCOUNTER — DOCUMENTATION ONLY (OUTPATIENT)
Dept: HEMATOLOGY/ONCOLOGY | Facility: CLINIC | Age: 64
End: 2024-10-14
Payer: MEDICAID

## 2024-10-14 NOTE — PROGRESS NOTES
"  Check in with pt post C1D1. Pt reports that she is v tired and did have moments of confusion over the weekend. She reports that the confusion has resolved on its own. Pt states that the week went "ok" and that she feels like she can continue on if this is as bad as it gets. No questions or concerns at this time.     Oncology Navigation   Intake  Date of Diagnosis: 10/10/23  Cancer Type: Breast  Type of Referral: Internal  Date of Referral: 24  Initial Nurse Navigator Contact: 24  Referral to Initial Contact Timeline (days): 0  First Appointment Available: 24  Appointment Date: 24  First Available Date vs. Scheduled Date (days): 0  Multiple appointments: No  Start of Treatment: 24     Treatment  Current Status: Staging work-up       Medical Oncologist: Yaya Hoffmann  Immunotherapy: Initiated  Immunotherapy Name: Nisha  Start Date: 10/10/24       Procedures: MRI; PET scan  MRI Schedule Date: 24  PET Scan Schedule Date: 24    General Referrals: Palliative Care; Social Work    ER: Negative  [place]: Negative  Her2: Negative       Support Systems: Spouse/significant other; Family members     Acuity  Treatment Tolerability: Minimal symptoms  ECO  Comorbidities in Medical History: 1   Needed: 0  Support: 0  Verbalizes Financial Concerns: 0  Transportation: 0  History of noncompliance/frequent no shows and cancellations: 0  Verbalizes the need for more education: 0  Other Factors (+1 for Each): 0  Navigation Acuity: 1     Follow Up  No follow-ups on file.       "

## 2024-10-17 ENCOUNTER — INFUSION (OUTPATIENT)
Dept: INFUSION THERAPY | Facility: HOSPITAL | Age: 64
End: 2024-10-17
Attending: INTERNAL MEDICINE
Payer: MEDICAID

## 2024-10-17 ENCOUNTER — OFFICE VISIT (OUTPATIENT)
Dept: HEMATOLOGY/ONCOLOGY | Facility: CLINIC | Age: 64
End: 2024-10-17
Payer: MEDICAID

## 2024-10-17 ENCOUNTER — OFFICE VISIT (OUTPATIENT)
Dept: PALLIATIVE MEDICINE | Facility: CLINIC | Age: 64
End: 2024-10-17
Payer: MEDICAID

## 2024-10-17 ENCOUNTER — TELEPHONE (OUTPATIENT)
Dept: HEMATOLOGY/ONCOLOGY | Facility: CLINIC | Age: 64
End: 2024-10-17

## 2024-10-17 ENCOUNTER — LAB VISIT (OUTPATIENT)
Dept: LAB | Facility: HOSPITAL | Age: 64
End: 2024-10-17
Attending: INTERNAL MEDICINE
Payer: MEDICAID

## 2024-10-17 VITALS
OXYGEN SATURATION: 98 % | HEIGHT: 60 IN | RESPIRATION RATE: 18 BRPM | SYSTOLIC BLOOD PRESSURE: 160 MMHG | HEART RATE: 89 BPM | BODY MASS INDEX: 37.94 KG/M2 | DIASTOLIC BLOOD PRESSURE: 65 MMHG | TEMPERATURE: 98 F | WEIGHT: 193.25 LBS

## 2024-10-17 VITALS
SYSTOLIC BLOOD PRESSURE: 130 MMHG | OXYGEN SATURATION: 97 % | TEMPERATURE: 97 F | WEIGHT: 193.25 LBS | DIASTOLIC BLOOD PRESSURE: 60 MMHG | HEIGHT: 60 IN | BODY MASS INDEX: 37.94 KG/M2 | HEART RATE: 91 BPM

## 2024-10-17 VITALS
HEART RATE: 88 BPM | OXYGEN SATURATION: 100 % | TEMPERATURE: 97 F | HEIGHT: 60 IN | WEIGHT: 193.13 LBS | BODY MASS INDEX: 37.92 KG/M2 | RESPIRATION RATE: 18 BRPM | SYSTOLIC BLOOD PRESSURE: 149 MMHG | DIASTOLIC BLOOD PRESSURE: 61 MMHG

## 2024-10-17 DIAGNOSIS — C50.412 PRIMARY MALIGNANT NEOPLASM OF UPPER OUTER QUADRANT OF BREAST, LEFT: ICD-10-CM

## 2024-10-17 DIAGNOSIS — C78.2 SECONDARY MALIGNANCY OF PARIETAL PLEURA: ICD-10-CM

## 2024-10-17 DIAGNOSIS — C79.2 SECONDARY CANCER OF SKIN: ICD-10-CM

## 2024-10-17 DIAGNOSIS — D84.821 IMMUNODEFICIENCY DUE TO CHEMOTHERAPY: ICD-10-CM

## 2024-10-17 DIAGNOSIS — Z79.899 IMMUNODEFICIENCY DUE TO CHEMOTHERAPY: ICD-10-CM

## 2024-10-17 DIAGNOSIS — T45.1X5A IMMUNODEFICIENCY DUE TO CHEMOTHERAPY: ICD-10-CM

## 2024-10-17 DIAGNOSIS — C50.412 PRIMARY MALIGNANT NEOPLASM OF UPPER OUTER QUADRANT OF BREAST, LEFT: Primary | ICD-10-CM

## 2024-10-17 DIAGNOSIS — G89.3 NEOPLASM RELATED PAIN: ICD-10-CM

## 2024-10-17 DIAGNOSIS — Z51.5 PALLIATIVE CARE ENCOUNTER: ICD-10-CM

## 2024-10-17 DIAGNOSIS — D50.0 IRON DEFICIENCY ANEMIA DUE TO CHRONIC BLOOD LOSS: Primary | ICD-10-CM

## 2024-10-17 DIAGNOSIS — C79.31 SECONDARY ADENOCARCINOMA OF BRAIN: ICD-10-CM

## 2024-10-17 LAB
ALBUMIN SERPL BCP-MCNC: 3.9 G/DL (ref 3.5–5.2)
ALP SERPL-CCNC: 93 U/L (ref 55–135)
ALT SERPL W/O P-5'-P-CCNC: 53 U/L (ref 10–44)
ANION GAP SERPL CALC-SCNC: 11 MMOL/L (ref 8–16)
AST SERPL-CCNC: 29 U/L (ref 10–40)
BASOPHILS # BLD AUTO: 0.01 K/UL (ref 0–0.2)
BASOPHILS NFR BLD: 0.3 % (ref 0–1.9)
BILIRUB SERPL-MCNC: 0.3 MG/DL (ref 0.1–1)
BUN SERPL-MCNC: 13 MG/DL (ref 8–23)
CALCIUM SERPL-MCNC: 8.9 MG/DL (ref 8.7–10.5)
CHLORIDE SERPL-SCNC: 103 MMOL/L (ref 95–110)
CO2 SERPL-SCNC: 25 MMOL/L (ref 23–29)
CREAT SERPL-MCNC: 0.9 MG/DL (ref 0.5–1.4)
DIFFERENTIAL METHOD BLD: ABNORMAL
EOSINOPHIL # BLD AUTO: 0.1 K/UL (ref 0–0.5)
EOSINOPHIL NFR BLD: 4.3 % (ref 0–8)
ERYTHROCYTE [DISTWIDTH] IN BLOOD BY AUTOMATED COUNT: 16.8 % (ref 11.5–14.5)
EST. GFR  (NO RACE VARIABLE): >60 ML/MIN/1.73 M^2
GLUCOSE SERPL-MCNC: 173 MG/DL (ref 70–110)
HCT VFR BLD AUTO: 31.2 % (ref 37–48.5)
HGB BLD-MCNC: 9.8 G/DL (ref 12–16)
IMM GRANULOCYTES # BLD AUTO: 0.06 K/UL (ref 0–0.04)
IMM GRANULOCYTES NFR BLD AUTO: 1.8 % (ref 0–0.5)
LYMPHOCYTES # BLD AUTO: 1.2 K/UL (ref 1–4.8)
LYMPHOCYTES NFR BLD: 37.6 % (ref 18–48)
MCH RBC QN AUTO: 28 PG (ref 27–31)
MCHC RBC AUTO-ENTMCNC: 31.4 G/DL (ref 32–36)
MCV RBC AUTO: 89 FL (ref 82–98)
MONOCYTES # BLD AUTO: 0.4 K/UL (ref 0.3–1)
MONOCYTES NFR BLD: 12.5 % (ref 4–15)
NEUTROPHILS # BLD AUTO: 1.4 K/UL (ref 1.8–7.7)
NEUTROPHILS NFR BLD: 43.5 % (ref 38–73)
NRBC BLD-RTO: 0 /100 WBC
PLATELET # BLD AUTO: 274 K/UL (ref 150–450)
PMV BLD AUTO: 10.5 FL (ref 9.2–12.9)
POTASSIUM SERPL-SCNC: 3.8 MMOL/L (ref 3.5–5.1)
PROT SERPL-MCNC: 6.9 G/DL (ref 6–8.4)
RBC # BLD AUTO: 3.5 M/UL (ref 4–5.4)
SODIUM SERPL-SCNC: 139 MMOL/L (ref 136–145)
WBC # BLD AUTO: 3.27 K/UL (ref 3.9–12.7)

## 2024-10-17 PROCEDURE — 99214 OFFICE O/P EST MOD 30 MIN: CPT | Mod: S$PBB,,,

## 2024-10-17 PROCEDURE — 85025 COMPLETE CBC W/AUTO DIFF WBC: CPT | Performed by: NURSE PRACTITIONER

## 2024-10-17 PROCEDURE — 3078F DIAST BP <80 MM HG: CPT | Mod: CPTII,,,

## 2024-10-17 PROCEDURE — 3008F BODY MASS INDEX DOCD: CPT | Mod: CPTII,,,

## 2024-10-17 PROCEDURE — 99999 PR PBB SHADOW E&M-EST. PATIENT-LVL III: CPT | Mod: PBBFAC,,,

## 2024-10-17 PROCEDURE — 25000003 PHARM REV CODE 250: Performed by: INTERNAL MEDICINE

## 2024-10-17 PROCEDURE — 96367 TX/PROPH/DG ADDL SEQ IV INF: CPT

## 2024-10-17 PROCEDURE — 4010F ACE/ARB THERAPY RXD/TAKEN: CPT | Mod: CPTII,,,

## 2024-10-17 PROCEDURE — 96375 TX/PRO/DX INJ NEW DRUG ADDON: CPT

## 2024-10-17 PROCEDURE — 3078F DIAST BP <80 MM HG: CPT | Mod: CPTII,,, | Performed by: INTERNAL MEDICINE

## 2024-10-17 PROCEDURE — 36415 COLL VENOUS BLD VENIPUNCTURE: CPT | Performed by: NURSE PRACTITIONER

## 2024-10-17 PROCEDURE — 99215 OFFICE O/P EST HI 40 MIN: CPT | Mod: 25,S$PBB,, | Performed by: INTERNAL MEDICINE

## 2024-10-17 PROCEDURE — 99213 OFFICE O/P EST LOW 20 MIN: CPT | Mod: PBBFAC,25,27

## 2024-10-17 PROCEDURE — 96413 CHEMO IV INFUSION 1 HR: CPT

## 2024-10-17 PROCEDURE — 3075F SYST BP GE 130 - 139MM HG: CPT | Mod: CPTII,,, | Performed by: INTERNAL MEDICINE

## 2024-10-17 PROCEDURE — 3077F SYST BP >= 140 MM HG: CPT | Mod: CPTII,,,

## 2024-10-17 PROCEDURE — 3008F BODY MASS INDEX DOCD: CPT | Mod: CPTII,,, | Performed by: INTERNAL MEDICINE

## 2024-10-17 PROCEDURE — 63600175 PHARM REV CODE 636 W HCPCS: Performed by: INTERNAL MEDICINE

## 2024-10-17 PROCEDURE — 3044F HG A1C LEVEL LT 7.0%: CPT | Mod: CPTII,,, | Performed by: INTERNAL MEDICINE

## 2024-10-17 PROCEDURE — 99213 OFFICE O/P EST LOW 20 MIN: CPT | Mod: PBBFAC,25 | Performed by: INTERNAL MEDICINE

## 2024-10-17 PROCEDURE — 4010F ACE/ARB THERAPY RXD/TAKEN: CPT | Mod: CPTII,,, | Performed by: INTERNAL MEDICINE

## 2024-10-17 PROCEDURE — 80053 COMPREHEN METABOLIC PANEL: CPT | Performed by: NURSE PRACTITIONER

## 2024-10-17 PROCEDURE — 99999 PR PBB SHADOW E&M-EST. PATIENT-LVL III: CPT | Mod: PBBFAC,,, | Performed by: INTERNAL MEDICINE

## 2024-10-17 PROCEDURE — 3044F HG A1C LEVEL LT 7.0%: CPT | Mod: CPTII,,,

## 2024-10-17 RX ORDER — PROCHLORPERAZINE EDISYLATE 5 MG/ML
5 INJECTION INTRAMUSCULAR; INTRAVENOUS ONCE AS NEEDED
Status: DISCONTINUED | OUTPATIENT
Start: 2024-10-17 | End: 2024-10-17 | Stop reason: HOSPADM

## 2024-10-17 RX ORDER — HEPARIN 100 UNIT/ML
500 SYRINGE INTRAVENOUS
Status: DISCONTINUED | OUTPATIENT
Start: 2024-10-17 | End: 2024-10-17 | Stop reason: HOSPADM

## 2024-10-17 RX ORDER — EPINEPHRINE 0.3 MG/.3ML
0.3 INJECTION SUBCUTANEOUS ONCE AS NEEDED
Status: DISCONTINUED | OUTPATIENT
Start: 2024-10-17 | End: 2024-10-17 | Stop reason: HOSPADM

## 2024-10-17 RX ORDER — DIPHENHYDRAMINE HYDROCHLORIDE 50 MG/ML
50 INJECTION INTRAMUSCULAR; INTRAVENOUS ONCE AS NEEDED
OUTPATIENT
Start: 2024-10-18

## 2024-10-17 RX ORDER — HEPARIN 100 UNIT/ML
500 SYRINGE INTRAVENOUS
OUTPATIENT
Start: 2024-10-18

## 2024-10-17 RX ORDER — FAMOTIDINE 10 MG/ML
20 INJECTION INTRAVENOUS
Status: COMPLETED | OUTPATIENT
Start: 2024-10-17 | End: 2024-10-17

## 2024-10-17 RX ORDER — ACETAMINOPHEN 325 MG/1
650 TABLET ORAL
Status: COMPLETED | OUTPATIENT
Start: 2024-10-17 | End: 2024-10-17

## 2024-10-17 RX ORDER — EPINEPHRINE 0.3 MG/.3ML
0.3 INJECTION SUBCUTANEOUS ONCE AS NEEDED
OUTPATIENT
Start: 2024-10-18

## 2024-10-17 RX ORDER — DIPHENHYDRAMINE HYDROCHLORIDE 50 MG/ML
50 INJECTION INTRAMUSCULAR; INTRAVENOUS ONCE AS NEEDED
Status: DISCONTINUED | OUTPATIENT
Start: 2024-10-17 | End: 2024-10-17 | Stop reason: HOSPADM

## 2024-10-17 RX ORDER — DIPHENHYDRAMINE HYDROCHLORIDE 50 MG/ML
25 INJECTION INTRAMUSCULAR; INTRAVENOUS
Status: COMPLETED | OUTPATIENT
Start: 2024-10-17 | End: 2024-10-17

## 2024-10-17 RX ORDER — SODIUM CHLORIDE 0.9 % (FLUSH) 0.9 %
10 SYRINGE (ML) INJECTION
OUTPATIENT
Start: 2024-10-18

## 2024-10-17 RX ORDER — ATROPINE SULFATE 1 MG/ML
0.4 INJECTION, SOLUTION INTRAMUSCULAR; INTRAVENOUS; SUBCUTANEOUS ONCE AS NEEDED
Status: DISCONTINUED | OUTPATIENT
Start: 2024-10-17 | End: 2024-10-17 | Stop reason: HOSPADM

## 2024-10-17 RX ORDER — HYDROMORPHONE HYDROCHLORIDE 4 MG/1
4 TABLET ORAL
Qty: 40 TABLET | Refills: 0 | Status: SHIPPED | OUTPATIENT
Start: 2024-10-17 | End: 2025-10-17

## 2024-10-17 RX ADMIN — APREPITANT 130 MG: 130 INJECTION, EMULSION INTRAVENOUS at 10:10

## 2024-10-17 RX ADMIN — ACETAMINOPHEN 650 MG: 325 TABLET ORAL at 10:10

## 2024-10-17 RX ADMIN — FAMOTIDINE 20 MG: 10 INJECTION INTRAVENOUS at 10:10

## 2024-10-17 RX ADMIN — SACITUZUMAB GOVITECAN 900 MG: 180 POWDER, FOR SOLUTION INTRAVENOUS at 11:10

## 2024-10-17 RX ADMIN — DIPHENHYDRAMINE HYDROCHLORIDE 25 MG: 50 INJECTION, SOLUTION INTRAMUSCULAR; INTRAVENOUS at 10:10

## 2024-10-17 RX ADMIN — SODIUM CHLORIDE 125 MG: 9 INJECTION, SOLUTION INTRAVENOUS at 09:10

## 2024-10-17 RX ADMIN — HEPARIN 500 UNITS: 100 SYRINGE at 12:10

## 2024-10-17 RX ADMIN — DEXAMETHASONE SODIUM PHOSPHATE 0.25 MG: 4 INJECTION, SOLUTION INTRA-ARTICULAR; INTRALESIONAL; INTRAMUSCULAR; INTRAVENOUS; SOFT TISSUE at 10:10

## 2024-10-17 NOTE — PROGRESS NOTES
Palliative Medicine Clinic Note  Follow-up           Consult Requested By: Dr. Hoffmann      Reason for Consult: Symptom Management/Advance Care Planning/Goals of Care Discussion    Chief Complaint: Increased pain           ASSESSMENT/PLAN:      Plan/Recommendations:    Problem List Items Addressed This Visit          Oncology    Primary malignant neoplasm of upper outer quadrant of breast, left - Primary     Followed by Dr. Hoffmann and Khurram. Previous oncologist at [facility].   Now on Sacituzumab-Govitecan-Palliative intent   Paclitaxel D/C'd due to progressive disease.   S/p Keytruda, left lumpectomy (Aug. 2023) w/ residual disease post resection, radiation (Nov. 2023).    S/P right VATS washout with partial decortication, pleural biopsy, intercostal nerve blocks, Pleurx catheter insertion Aug 2024. Pleurx removed due to low output.   CT A/P: 10/02/2024: Redemonstration of extensive masslike nodular soft tissue density at the right lung base and subdiaphragmatic region, similar to prior examination.  There is an adjacent nodular soft tissue density within the subcutaneous soft tissues with dimensions as above, possibly representing extrapleural/subcutaneous metastatic extension.  This appears significantly increased in size compared to prior study of 08/29/2024.  Correlation with physical examination advised.  Residual contrast material within the collecting systems and bladder from prior CTA examination.  Indeterminate 2.5 cm left lower pole renal hypodensity.  Recommend nonemergent dedicated renal ultrasound follow-up for further characterization.  Mild urinary bladder wall thickening favored to relate to nondistention, although correlation with urinalysis advised.  Nonspecific 3.4 cm soft tissue density within the left adnexal region.  This is not well appreciated on prior examinations.  Unclear whether this represents ovarian tissue versus left pelvic sidewall lymphadenopathy.  Close attention on follow-up imaging  advised.  Redemonstration of right external iliac chain and inguinal lymph node enlargement, similar to prior examination.  Partially visualized soft tissue nodularity within the superficial subcutaneous soft tissues overlying the lateral aspect of the right hip.  Correlation with physical examination advised.  CTA Chest: 10/01/2024: No evidence of pulmonary embolism. Multiple essentially stable masses involving the right hemithorax with a large loculated right pleural effusion and compressive atelectasis of the right lower lobe.   MRI Brain: 08/29/2024: Two small nodular foci of enhancement within the left frontal lobe measuring 3 mm in size and within the left cerebellum measuring 6 mm in size consistent with central nervous system metastatic disease. Minor associated T2 hyperintensity/edema. No mass effect.             Neoplasm related pain       Due to progressive disease/stable  Now stable on MS Contin 30mg BID  Hydromorphone IR 4mg Q3H PRN  Narcan RX given and explained use to pt and family. Pt and family verbalized understanding.    Miralax 1pack/day or Senna 2 tabs at bedtime for opioid-induced constipation    Pain contract- 09/04/2024  UDS- 10/20/2024- Consistent w/ prescribed Hydromorphone and Morphine. Inconsistent with Marijuana. Will discuss using medical marijuana with pt.             Palliative Care    Palliative care encounter       -Code status: Full Code. Pt and daughter wish to revisit at a later time  -HCPOA: Daughter: Luna Saavedra: [phone]. Pt has 1 adult son-lives in [place]. Pt is single  -GOC:  Continue cancer treatment, symptom management, maintain independence and functional status. Pt aware treatment intent is palliative.   -See HPI for further details                     Advance Care Planning   Advance Directives:   Living Will: No    LaPOST: No    Do Not Resuscitate Status: No    Medical Power of : Yes    Agent's Name:  Daughter: Lnua Saavedra   Agent's Contact Number:   [phone]    Decision Making:  Patient answered questions and Family answered questions  Goals of Care: The patient endorses that what is most important right now is to focus on remaining as independent as possible and symptom/pain control    Accordingly, we have decided that the best plan to meet the patient's goals includes continuing with treatment. Pt stated she is aware treatment intent is palliative.   Pt chose to remain Full Code for now. Her and her daughter wish to revisit this later.        Follow up: 1 month     Plan discussed with: Patient        SUBJECTIVE:      History of Present Illness / Interval History:  Sherlyn Saavedra is 64 y.o. female with Recurrent Metastatic Left Breast Cancer to Pluera/Brain  Now on Sacituzumab-Govitecan-Palliative intent   Paclitaxel D/C'd due to progressive disease.   S/p Keytruda, left lumpectomy (Aug. 2023) w/ residual disease post resection, radiation (Nov. 2023).    S/P right VATS washout with partial decortication, pleural biopsy, intercostal nerve blocks, Pleurx catheter insertion Aug 2024. Pleurx removed due to low output.   Followed by Dr. Hoffmann and Khurram. Previous oncologist at [facility].   PMHX: HTN, T2DM    Presents to Palliative Care Clinic for physical symptoms, advance care planning,, clarification of goals of care, and additional support..   Please see oncology notes for more details on pt's care.       10/17/24:    Pt attended seen in infusion bay. She was in no acute distress. Vital signs stable on room air.   Pain stable on Hydromorphone IR 4mg Q3HPRN, Zanaflex PRN, and MSContin 30mg BID   Forgetfulness impacting short-term memory. Now keeping a journal  Anxiety/Depression due to cancer diagnosis. Good family support. She wishes to defer mental health counseling at this time.   Intermittent constipation managed  with Miralax daily/every other day and MOM.   Nausea managed with PRN Zofran. Appetite is gradually improving.     Past Visits:    10/03/2024: The  patient location is: [facility]  The chief complaint leading to consultation is:  Follow-up     Visit type: audiovisual     Face to Face time with patient:  10 minutes  35 minutes of total time spent on the encounter, which includes face to face time and non-face to face time preparing to see the patient (eg, review of tests), Obtaining and/or reviewing separately obtained history, Documenting clinical information in the electronic or other health record, Independently interpreting results (not separately reported) and communicating results to the patient/family/caregiver, or Care coordination (not separately reported).      Each patient to whom he or she provides medical services by telemedicine is:  (1) informed of the relationship between the physician and patient and the respective role of any other health care provider with respect to management of the patient; and (2) notified that he or she may decline to receive medical services by telemedicine and may withdraw from such care at any time.     Notes:   Pt seen via audiovisual feed. A&O x3. No acute distress.   She reported progressive right chest/back/right lateral abd pain over the past week requiring ED visits two days ago. Pain is now better managed with MSContin 30mg BID and Hydromorphone 4mg Q4H PRN.   She is requesting narcotic rx be sent to [facility] on [facility]  Some nausea/vomiting this morning. Constipation slightly managed with MOM/Miralax. She requested Linzess prescription. She has not tried Senna.   She wants to clarify with Dr. Hoffmann if she needs Thoracentesis due to her CT chest showing a pleural effusion.   Called pt back following visit. Informed her Dr. Hoffmann said she does not require thoracentesis at this time, as imaging represents cancer progression, not pleural effusion. Pt verbalized understanding,  Informed pt pain med rx have been sent to [facility] as she requested.   Informed pt to take Zofran 20-30 mins prior to  taking opioid to prevent nausea.     09/17/2024:  Pt attended clinic with her friend, Mr. Torres. She was in no acute distress. Vital signs stable on room air.   She reported lower back pain has improved with Morphine IR 15mg, however, it does not last 6 hours. No itching/rash with Morphine.   She noted a palpable/painful lesion on her right lateral thigh. She noticed it about a month ago, but it was not painful then.       09/04/2024: History obtained from: Patient and medical record.   Pt attended clinic alone. Her daughter, Luna present via speaker phone. was in no acute distress. Vital signs stable on room air.   I explained the role palliative care often plays in supporting patients with serious illness and their families regarding symptom management, advance care planning, and goals of care discussion. Pt and daughter verbalized understanding.   Pt reported persistent progressive right lower back pain over the past month. 8/10.  She is taking Norco 10mg QID PRN but no longer effective and she takes it with Benadryl due to itching. Pain hinders sleep.   Neuropathy to hands and right leg, somewhat stable at this time.   She is taking medical marijuana for appetite stimulation. She supplements meals with protein drinks. Her Bgs have been borderline low- she is taking Metformin and Moujaro. She will revisit this with her PCP.   Pt and daughter inquired about pt receiving Glutathione infusions for immune health. I have encouraged them to discuss this with Dr. Hoffmann.     We discussed advance care planning, goals of care, and code status, Full Code vs. DNR including risks, benefits, and alternatives..   She wishes to continue cancer treatment, symptom management, maintain independence and functional status. Pt stated she is aware treatment intent is palliative.   Pt chose to remain Full Code for now. Her and her daughter wish to revisit this later.   Elected HCPOA is daughter: Luna West: [phone], lives  in [place]. Pt has 1 adult son lives in [place]. She is single.         ROS:  Review of Systems   Constitutional:  Positive for fatigue. Negative for activity change, appetite change (Gradually improving.) and unexpected weight change.   HENT:  Negative for hearing loss, sore throat, trouble swallowing and voice change.    Eyes:  Negative for visual disturbance.   Respiratory:  Negative for cough, chest tightness, shortness of breath (Moderate exertional) and wheezing.    Cardiovascular:  Negative for chest pain, palpitations and leg swelling.   Gastrointestinal:  Positive for abdominal pain. Negative for blood in stool, constipation, nausea, rectal pain, vomiting and reflux.   Genitourinary:  Negative for bladder incontinence, difficulty urinating, flank pain, hematuria, nocturia, pelvic pain and urgency.   Musculoskeletal:  Positive for back pain, leg pain and myalgias. Negative for arthralgias and neck pain.   Integumentary:  Negative for wound.   Allergic/Immunologic: Negative for environmental allergies, food allergies and immunocompromised state.   Neurological:  Positive for numbness. Negative for dizziness, tremors, weakness, headaches, memory loss and coordination difficulties.   Hematological:  Negative for adenopathy. Does not bruise/bleed easily.   Psychiatric/Behavioral:  Negative for agitation, behavioral problems, confusion, decreased concentration, depressed mood (Due to cancer diagnosis), dysphoric mood, self-injury, sleep disturbance and suicidal ideas. The patient is not nervous/anxious (Due to cancer diagnosis).        Review of Symptoms      Symptom Assessment (ESAS 0-10 Scale)  Pain:  3  Dyspnea:  5  Anxiety:  5  Nausea:  7  Depression:  5  Anorexia:  0  Fatigue:  5  Insomnia:  0  Restlessness:  0  Agitation:  0     CAM / Delirium:  Negative  Constipation:  Negative  Diarrhea:  Negative    Anxiety:  Is not nervous/anxious (Due to cancer diagnosis)  Constipation:  No constipation    Bowel  Management Plan (BMP):  Yes      Comments:  Miralax/Senna    Pain Assessment:    Location(s): back    Back       Location: lower and right        Quality: Aching        Quantity: 3/10 in intensity        Chronicity: Onset 1 month(s) ago, gradually improving since Hydromorphone + MSContin        Aggravating Factors: Activity        Alleviating Factors: Opiates       Associated Symptoms: Myalgias    Modified Melo Scale:  0    Performance Status:  90    ECOG Performance Status ndGndrndanddndend:nd nd2nd Living Arrangements:  Lives alone    Psychosocial/Cultural:   See Palliative Psychosocial Note: Yes  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 Daughter, Luna in [place], Son in [place].   **Primary  to Follow**  Palliative Care  Consult: No            Medications:    Current Outpatient Medications:     (Magic mouthwash) 1:1:1 diphenhydrAMINE(Benadryl) 12.5mg/5ml liq, aluminum & magnesium hydroxide-simethicone (Maalox), LIDOcaine viscous 2%, Swish and spit 15 mLs every 4 (four) hours as needed (mouth ulcers). for mouth sores, Disp: 360 mL, Rfl: 1    amLODIPine (NORVASC) 5 MG tablet, Take 5 mg by mouth once daily., Disp: , Rfl:     atorvastatin (LIPITOR) 40 MG tablet, Take 40 mg by mouth., Disp: , Rfl:     blood sugar diagnostic (FREESTYLE TEST) Strp, Test 4 times per day, Disp: , Rfl:     capecitabine (XELODA) 500 MG Tab, Take by mouth 2 (two) times daily., Disp: , Rfl:     dexAMETHasone (DECADRON) 4 MG Tab, Take 2 tablets (8 mg total) by mouth once daily. Take 2 tablets (8 mg total) by mouth once daily on days 2,3,4 and 9,10,11 of each chemotherapy cycle., Disp: 24 tablet, Rfl: 11    famotidine (PEPCID) 20 MG tablet, Take 20 mg by mouth., Disp: , Rfl:     folic acid (FOLVITE) 1 MG tablet, Take 1 mg by mouth once daily., Disp: , Rfl:     folic acid (FOLVITE) 1 MG tablet, take 1 tablet by mouth every morning, Disp: 30 tablet, Rfl: 3    gabapentin (NEURONTIN) 300 MG capsule, Take 300 mg by mouth., Disp: , Rfl:      HYDROmorphone (DILAUDID) 4 MG tablet, Take 1 tablet (4 mg total) by mouth every 3 (three) hours as needed for Pain., Disp: 40 tablet, Rfl: 0    LIDOcaine viscous HCl 2%-diphenhydrAMINE-aluminum-magnesium hydroxide-simethicone, Swish and spit 15 mLs by mouth every 4 (four) hours as needed (mouth ulcers). for mouth sores, Disp: 360 mL, Rfl: 1    LIDOcaine-prilocaine (EMLA) cream, Apply to affected area once, Disp: 30 g, Rfl: 11    lisinopriL-hydrochlorothiazide (PRINZIDE,ZESTORETIC) 20-25 mg Tab, Take 1 tablet by mouth every morning., Disp: , Rfl:     magnesium oxide (MAG-OX) 400 mg (241.3 mg magnesium) tablet, Take 400 mg by mouth once daily., Disp: , Rfl:     metFORMIN (GLUCOPHAGE-XR) 500 MG ER 24hr tablet, Take 1,000 mg by mouth 2 (two) times daily., Disp: , Rfl:     metFORMIN (GLUCOPHAGE-XR) 500 MG ER 24hr tablet, take 2 tablets by mouth in the morning and at bedtime as directed, Disp: 180 tablet, Rfl: 1    morphine (MS CONTIN) 30 MG 12 hr tablet, Take 1 tablet (30 mg total) by mouth 2 (two) times daily., Disp: 60 tablet, Rfl: 0    naloxone (NARCAN) 4 mg/actuation [place], , Disp: , Rfl:     OLANZapine (ZYPREXA) 5 MG tablet, Take 1 tablet (5 mg total) by mouth every evening. days 1-4 and 8-11 of each chemotherapy cycle., Disp: 8 tablet, Rfl: 11    ondansetron (ZOFRAN-ODT) 8 MG TbDL, DISSOLVE ONE TABLET UNDER THE TONGUE EVERY 8 HOURS AS NEEDED FOR NAUSEA, Disp: , Rfl:     ondansetron (ZOFRAN-ODT) 8 MG TbDL, Take 1 tablet (8 mg total) by mouth 2 (two) times daily., Disp: 30 tablet, Rfl: 11    ondansetron (ZOFRAN-ODT) 8 MG TbDL, dissolve 1 tablet on the tongue 3 times daily as needed, Disp: 20 tablet, Rfl: 2    ondansetron (ZOFRAN-ODT) 8 MG TbDL, Take 1 tablet (8 mg total) by mouth every 8 (eight) hours as needed (nausea/vomiting)., Disp: 60 tablet, Rfl: 5    pantoprazole (PROTONIX) 40 MG tablet, Take 40 mg by mouth., Disp: , Rfl:     pantoprazole (PROTONIX) 40 MG tablet, take 1 tablet by mouth daily, Disp: 30 tablet,  Rfl: 1    prochlorperazine (COMPAZINE) 10 MG tablet, Take 10 mg by mouth., Disp: , Rfl:     tirzepatide (MOUNJARO) 2.5 mg/0.5 mL PnIj, Inject 2.5 mg into the skin every 7 days., Disp: , Rfl:     tiZANidine (ZANAFLEX) 4 MG tablet, , Disp: , Rfl:   No current facility-administered medications for this visit.    Facility-Administered Medications Ordered in Other Visits:     acetaminophen tablet 650 mg, 650 mg, Oral, 1 time in Clinic/HOD, Yaya Hoffmann MD    aprepitant (Cinvanti) injection 130 mg, 130 mg, Intravenous, 1 time in Clinic/HOD, Yaya Hoffmann MD    atropine 1 mg/ml injection 0.4 mg, 0.4 mg, Intravenous, Once PRN, Yaya Hoffmann MD    diphenhydrAMINE injection 25 mg, 25 mg, Intravenous, 1 time in Clinic/[facility]QU[facility], Yaya Hoffmann MD    diphenhydrAMINE injection 50 mg, 50 mg, Intravenous, Once PRN, Yaya Hoffmann MD    diphenhydrAMINE injection 50 mg, 50 mg, Intravenous, Once PRN, Yaya Hoffmann MD    EPINEPHrine (EPIPEN) 0.3 mg/0.3 mL pen injection 0.3 mg, 0.3 mg, Intramuscular, Once PRN, Yaya Hoffmann MD    EPINEPHrine (EPIPEN) 0.3 mg/0.3 mL pen injection 0.3 mg, 0.3 mg, Intramuscular, Once PRN, Yaya Hoffmann MD    famotidine (PF) injection 20 mg, 20 mg, Intravenous, 1 time in Clinic/[facility]QU[facility], Yaya Hoffmann MD    ferric gluconate (FERRLECIT) 125 mg in 0.9% NaCl 100 mL IVPB, 125 mg, Intravenous, 1 time in Clinic/[facility]QU[facility], Yaya Hoffmann MD, Last Rate: 100 mL/hr at 10/17/24 0905, 125 mg at 10/17/24 0905    heparin, porcine (PF) 100 unit/mL injection flush 500 Units, 500 Units, Intravenous, PRN, Yaya Hoffmann MD    hydrocortisone sodium succinate injection 100 mg, 100 mg, Intravenous, Once PRN, Yaya Hoffmann MD    hydrocortisone sodium succinate injection 100 mg, 100 mg, Intravenous, Once PRN, Yaya Hoffmann MD    palonosetron 0.25mg/dexAMETHasone 12mg in NS IVPB 0.25 mg 50 mL, 0.25 mg, Intravenous, 1 time in Clinic/HOD, Yaya Hoffmann MD    prochlorperazine injection Soln 5 mg, 5  mg, Intravenous, Once PRN, Yaya Hoffmann MD    sacituzumab govitecan-hziy (TRODELVY) 900 mg in 0.9% NaCl 500 mL chemo infusion, 900 mg, Intravenous, 1 time in Clinic/HOD, Yaya Hoffmann MD      External  database queried on 10/17/2024  by AKUA CAMARGO :          Review of patient's allergies indicates:   Allergen Reactions    Ace inhibitors Swelling    Lisinopril Rash    Hydrocodone Itching     Hives    Hydrocodone-acetaminoph-supp11 Itching    Percocet [oxycodone-acetaminophen] Itching     Pt had to take an antihistamine to improve itching     Cephalexin      Hives    Propoxyphene      Hives    Propoxyphene n-acetaminophen     Propoxyphene-acetaminophen            OBJECTIVE:         Physical Exam:  Vitals:      Physical Exam  Vitals and nursing note reviewed.   Constitutional:       General: She is not in acute distress.     Appearance: Normal appearance. She is normal weight. She is not ill-appearing.   HENT:      Head: Normocephalic and atraumatic.      Nose: Nose normal. No congestion or rhinorrhea.      Mouth/Throat:      Mouth: Mucous membranes are moist.      Pharynx: Oropharynx is clear. No oropharyngeal exudate or posterior oropharyngeal erythema.   Eyes:      General: No scleral icterus.        Right eye: No discharge.         Left eye: No discharge.      Conjunctiva/sclera: Conjunctivae normal.      Pupils: Pupils are equal, round, and reactive to light.   Cardiovascular:      Rate and Rhythm: Normal rate and regular rhythm.      Pulses: Normal pulses.      Heart sounds: Normal heart sounds. No murmur heard.     No gallop.   Pulmonary:      Effort: Pulmonary effort is normal. No respiratory distress.      Breath sounds: Normal breath sounds. No stridor. No wheezing.   Chest:      Chest wall: No tenderness.   Abdominal:      General: Bowel sounds are normal. There is no distension.      Palpations: Abdomen is soft.      Tenderness: There is no abdominal tenderness.    Genitourinary:     Comments: Deferred  Musculoskeletal:         General: No swelling or tenderness. Normal range of motion.      Cervical back: Normal range of motion and neck supple.      Right lower leg: No edema.      Left lower leg: No edema.   Skin:     General: Skin is warm and dry.      Capillary Refill: Capillary refill takes less than 2 seconds.      Coloration: Skin is not jaundiced or pale.      Findings: No rash.   Neurological:      Mental Status: She is alert and oriented to person, place, and time.      Motor: No weakness.   Psychiatric:         Attention and Perception: Attention and perception normal.         Mood and Affect: Mood and affect normal.         Speech: Speech normal.         Behavior: Behavior normal. Behavior is cooperative.         Thought Content: Thought content normal. Thought content does not include suicidal ideation. Thought content does not include suicidal plan.         Cognition and Memory: Cognition normal. She exhibits impaired recent memory.         Judgment: Judgment normal.           Labs: BMP  Lab Results   Component Value Date     10/17/2024    K 3.8 10/17/2024     10/17/2024    CO2 25 10/17/2024    BUN 13 10/17/2024    CREATININE 0.9 10/17/2024    CALCIUM 8.9 10/17/2024    ANIONGAP 11 10/17/2024    EGFRNORACEVR >60 10/17/2024     Lab Results   Component Value Date    WBC 3.27 (L) 10/17/2024    HGB 9.8 (L) 10/17/2024    HCT 31.2 (L) 10/17/2024    MCV 89 10/17/2024     10/17/2024             Imaging: CT A/P: 10/02/2024: Redemonstration of extensive masslike nodular soft tissue density at the right lung base and subdiaphragmatic region, similar to prior examination.  There is an adjacent nodular soft tissue density within the subcutaneous soft tissues with dimensions as above, possibly representing extrapleural/subcutaneous metastatic extension.  This appears significantly increased in size compared to prior study of 08/29/2024.  Correlation with  physical examination advised.  Residual contrast material within the collecting systems and bladder from prior CTA examination.  Indeterminate 2.5 cm left lower pole renal hypodensity.  Recommend nonemergent dedicated renal ultrasound follow-up for further characterization.  Mild urinary bladder wall thickening favored to relate to nondistention, although correlation with urinalysis advised.  Nonspecific 3.4 cm soft tissue density within the left adnexal region.  This is not well appreciated on prior examinations.  Unclear whether this represents ovarian tissue versus left pelvic sidewall lymphadenopathy.  Close attention on follow-up imaging advised.  Redemonstration of right external iliac chain and inguinal lymph node enlargement, similar to prior examination.  Partially visualized soft tissue nodularity within the superficial subcutaneous soft tissues overlying the lateral aspect of the right hip.  Correlation with physical examination advised.    CTA Chest: 10/01/2024: No evidence of pulmonary embolism. Multiple essentially stable masses involving the right hemithorax with a large loculated right pleural effusion and compressive atelectasis of the right lower lobe.     MRI Brain: 08/29/2024: Two small nodular foci of enhancement within the left frontal lobe measuring 3 mm in size and within the left cerebellum measuring 6 mm in size consistent with central nervous system metastatic disease. Minor associated T2 hyperintensity/edema. No mass effect.          I spent a total of 35 minutes on the day of the visit. This includes face to face time in discussion of goals of care, symptom assessment, coordination of care and emotional support.  This also includes non-face to face time preparing to see the patient (eg, review of tests/imaging), obtaining and/or reviewing separately obtained history, documenting clinical information in the electronic or other health record, independently interpreting results and  communicating results to the patient/family/caregiver, or care coordinator.       AKUA HENSLEY NP

## 2024-10-17 NOTE — TELEPHONE ENCOUNTER
SW spoke with and met with pt per her request. Pt stated that she will have Medicare in Jan 2025 and wanted to inquire about the best plans. SW unable to recommend a specific plan but did provide pt with sales/enrollment dept # for [employer] [phone] and [employer] [phone] in the event that pt chooses not to keep traditional Medicare as traditional Medicare does not include dental, vision, transportation, OTC benefits etc. Pt advised to make sure that she discusses current rx's and chemo when shopping for plans. Pt informed that [facility] is not in network with the . No other needs expressed. SW will remain available.

## 2024-10-17 NOTE — PROGRESS NOTES
Subjective:       Patient ID: Sherlyn Saavedra is a 64 y.o. female.    Chief Complaint: Results, Breast Cancer, Chemotherapy, and Pain    HPI:  64-year-old female history of metastatic breast carcinoma.  Patient returns for clinical follow-up patient is cycle 1 day 8 OP SACITUZUMAB GOVITECAN-HZIY Q3W patient is concerned over subcutaneous nodule in posterior aspect of  chest ECOG status 1 marked improvement with long-acting morphine pain medicines daughter on phone          Past Medical History:   Diagnosis Date    Allergy     Anemia     Breast cancer     Diabetes mellitus     HLD (hyperlipidemia) 2015    Hyperlipidemia (Problem)      Hypertension     Primary malignant neoplasm of upper outer quadrant of breast, left 10/10/2023    Secondary malignancy of parietal pleura 2024    Type 2 diabetes mellitus with diabetic polyneuropathy, without long-term current use of insulin 2015    Formatting of this note might be different from the original.   dx in 40's; has some neuropathic symptoms in right LE; (uncertain if from DMII)  Diabetes mellitus type 2 (Problem)       No family history on file.  Social History     Socioeconomic History    Marital status: Single   Tobacco Use    Smoking status: Former     Current packs/day: 0.00     Types: Cigarettes     Quit date:      Years since quittin.8    Smokeless tobacco: Never   Substance and Sexual Activity    Alcohol use: Not Currently    Drug use: Never    Sexual activity: Not Currently     Social Drivers of Health     Financial Resource Strain: Medium Risk (2024)    Overall Financial Resource Strain (CARDIA)     Difficulty of Paying Living Expenses: Somewhat hard   Food Insecurity: Food Insecurity Present (2024)    Hunger Vital Sign     Worried About Running Out of Food in the Last Year: Sometimes true     Ran Out of Food in the Last Year: Sometimes true   Transportation Needs: No Transportation Needs (2024)    Received from [employer]  [facility]    PRAPARE - Transportation     Lack of Transportation (Medical): No     Lack of Transportation (Non-Medical): No   Physical Activity: Inactive (9/12/2024)    Exercise Vital Sign     Days of Exercise per Week: 0 days     Minutes of Exercise per Session: 0 min   Stress: Stress Concern Present (9/12/2024)    [employer] - Occupational Stress Questionnaire     Feeling of Stress : To some extent   Housing Stability: Unknown (9/12/2024)    Housing Stability Vital Sign     Unable to Pay for Housing in the Last Year: No     Past Surgical History:   Procedure Laterality Date    HYSTERECTOMY      tubaligation  1987       Labs:  Lab Results   Component Value Date    WBC 3.87 (L) 10/04/2024    HGB 8.6 (L) 10/04/2024    HCT 28.2 (L) 10/04/2024    MCV 91 10/04/2024     10/04/2024     BMP  Lab Results   Component Value Date     10/04/2024    K 3.8 10/04/2024     10/04/2024    CO2 25 10/04/2024    BUN 9 10/04/2024    CREATININE 1.0 10/04/2024    CALCIUM 9.6 10/04/2024    ANIONGAP 11 10/04/2024     Lab Results   Component Value Date    ALT 67 (H) 10/04/2024    AST 60 (H) 10/04/2024    ALKPHOS 83 10/04/2024    BILITOT 0.4 10/04/2024       Lab Results   Component Value Date    IRON 29 (L) 09/20/2024    TIBC 283 09/20/2024    FERRITIN 406 (H) 09/20/2024     No results found for: "CMVBHOTA23"  Lab Results   Component Value Date    FOLATE 4.1 (L) 07/23/2024     Lab Results   Component Value Date    TSH 2.500 08/14/2023         Review of Systems   Constitutional:  Negative for activity change, appetite change, chills, diaphoresis, fatigue, fever and unexpected weight change.   HENT:  Negative for congestion, dental problem, drooling, ear discharge, ear pain, facial swelling, hearing loss, mouth sores, nosebleeds, postnasal drip, rhinorrhea, sinus pressure, sneezing, sore throat, tinnitus, trouble swallowing and voice " change.    Eyes:  Negative for photophobia, pain, discharge, redness, itching and visual disturbance.   Respiratory:  Negative for cough, choking, chest tightness, shortness of breath, wheezing and stridor.    Cardiovascular:  Positive for chest pain. Negative for palpitations and leg swelling.   Gastrointestinal:  Negative for abdominal distention, abdominal pain, anal bleeding, blood in stool, constipation, diarrhea, nausea, rectal pain and vomiting.   Endocrine: Negative for cold intolerance, heat intolerance, polydipsia, polyphagia and polyuria.   Genitourinary:  Negative for decreased urine volume, difficulty urinating, dyspareunia, dysuria, enuresis, flank pain, frequency, genital sores, hematuria, menstrual problem, pelvic pain, urgency, vaginal bleeding, vaginal discharge and vaginal pain.   Musculoskeletal:  Negative for arthralgias, back pain, gait problem, joint swelling, myalgias, neck pain and neck stiffness.   Skin:  Negative for color change, pallor and rash.   Allergic/Immunologic: Negative for environmental allergies, food allergies and immunocompromised state.   Neurological:  Negative for dizziness, tremors, seizures, syncope, facial asymmetry, speech difficulty, weakness, light-headedness, numbness and headaches.   Hematological:  Negative for adenopathy. Does not bruise/bleed easily.   Psychiatric/Behavioral:  Negative for agitation, behavioral problems, confusion, decreased concentration, dysphoric mood, hallucinations, self-injury, sleep disturbance and suicidal ideas. The patient is not nervous/anxious and is not hyperactive.        Objective:      Physical Exam  Vitals reviewed.   Constitutional:       General: She is not in acute distress.     Appearance: She is well-developed. She is not diaphoretic.   HENT:      Head: Normocephalic and atraumatic.      Right Ear: External ear normal.      Left Ear: External ear normal.      Nose: Nose normal.      Right Sinus: No maxillary sinus  tenderness or frontal sinus tenderness.      Left Sinus: No maxillary sinus tenderness or frontal sinus tenderness.      Mouth/Throat:      Pharynx: No oropharyngeal exudate.   Eyes:      General: Lids are normal. No scleral icterus.        Right eye: No discharge.         Left eye: No discharge.      Conjunctiva/sclera: Conjunctivae normal.      Right eye: Right conjunctiva is not injected. No hemorrhage.     Left eye: Left conjunctiva is not injected. No hemorrhage.     Pupils: Pupils are equal, round, and reactive to light.   Neck:      Thyroid: No thyromegaly.      Vascular: No JVD.      Trachea: No tracheal deviation.   Cardiovascular:      Rate and Rhythm: Normal rate.   Pulmonary:      Effort: Pulmonary effort is normal. No respiratory distress.      Breath sounds: No stridor.   Chest:      Chest wall: No tenderness.   Abdominal:      General: Bowel sounds are normal. There is no distension.      Palpations: Abdomen is soft. There is no hepatomegaly, splenomegaly or mass.      Tenderness: There is no abdominal tenderness. There is no rebound.   Musculoskeletal:         General: No tenderness. Normal range of motion.      Cervical back: Normal range of motion and neck supple.   Lymphadenopathy:      Cervical: No cervical adenopathy.      Upper Body:      Right upper body: No supraclavicular adenopathy.      Left upper body: No supraclavicular adenopathy.   Skin:     General: Skin is dry.      Findings: No erythema or rash.      Comments: See area in media section today for subcutaneous nodule on posterior aspect of chest   Neurological:      Mental Status: She is alert and oriented to person, place, and time.      Cranial Nerves: No cranial nerve deficit.      Coordination: Coordination normal.   Psychiatric:         Behavior: Behavior normal.         Thought Content: Thought content normal.         Judgment: Judgment normal.             Assessment:      1. Primary malignant neoplasm of upper outer quadrant of  breast, left    2. Immunodeficiency due to chemotherapy    3. Secondary adenocarcinoma of brain    4. Secondary malignancy of parietal pleura    5. Secondary cancer of skin           Med Onc Chart Routing      Follow up with physician . Return in 2 weeks CBC CMP   Follow up with [facility]    Infusion scheduling note    Injection scheduling note OP SACITUZUMAB GOVITECAN-HZIY Q3W   Labs    Imaging    Pharmacy appointment    Other referrals                   Plan:     Refill prescription for Dilaudid excellent pain control with long-acting morphine.  Concern over area subcutaneous area in posterior chest will see if response to chemotherapy if not probable palliative radiation would be offered.  Discussed implications answered questions with her return 2 weeks to see me photographs taken with measurement        Yaya Hoffmann Jr, MD FACP

## 2024-10-17 NOTE — PLAN OF CARE
Problem: Adult Inpatient Plan of Care  Goal: Plan of Care Review  Outcome: Progressing  Flowsheets (Taken 10/17/2024 0943)  Plan of Care Reviewed With: patient  Goal: Optimal Comfort and Wellbeing  Outcome: Progressing  Intervention: Provide Person-Centered Care  Flowsheets (Taken 10/17/2024 0943)  Trust Relationship/Rapport:   care explained   choices provided   emotional support provided   empathic listening provided   questions answered   questions encouraged   reassurance provided   thoughts/feelings acknowledged     Problem: Chemotherapy Effects  Goal: Absence of Infection  Outcome: Progressing  Intervention: Prevent Infection and Maximize Resistance  Flowsheets (Taken 10/17/2024 0943)  Infection Prevention:   equipment surfaces disinfected   hand hygiene promoted   personal protective equipment utilized   rest/sleep promoted

## 2024-10-17 NOTE — PATIENT INSTRUCTIONS
Continue your pain medications as prescribed.   Here is the insurance information: [employer] sales/enrollment [phone]. [employer] sales/enrollment [phone].   Make sure the insurance company reviews your medications as well.   Please contact us for your refill 2 to 3 days before you run out.

## 2024-10-21 NOTE — ASSESSMENT & PLAN NOTE
Due to progressive disease/stable  Now stable on MS Contin 30mg BID  Hydromorphone IR 4mg Q3H PRN  Narcan RX given and explained use to pt and family. Pt and family verbalized understanding.    Miralax 1pack/day or Senna 2 tabs at bedtime for opioid-induced constipation    Pain contract- 09/04/2024  UDS- 10/20/2024- Consistent w/ prescribed Hydromorphone and Morphine. Inconsistent with Marijuana. Will discuss using medical marijuana with pt.

## 2024-10-21 NOTE — ASSESSMENT & PLAN NOTE
-Code status: Full Code. Pt and daughter wish to revisit at a later time  -HCPOA: Daughter: Luna [place]: [phone]. Pt has 1 adult son-lives in [place]. Pt is single  -GOC:  Continue cancer treatment, symptom management, maintain independence and functional status. Pt aware treatment intent is palliative.   -See HPI for further details

## 2024-10-21 NOTE — ASSESSMENT & PLAN NOTE
Followed by Dr. Hoffmann and Khurram. Previous oncologist at [facility].   Now on Sacituzumab-Govitecan-Palliative intent   Paclitaxel D/C'd due to progressive disease.   S/p Keytruda, left lumpectomy (Aug. 2023) w/ residual disease post resection, radiation (Nov. 2023).    S/P right VATS washout with partial decortication, pleural biopsy, intercostal nerve blocks, Pleurx catheter insertion Aug 2024. Pleurx removed due to low output.   CT A/P: 10/02/2024: Redemonstration of extensive masslike nodular soft tissue density at the right lung base and subdiaphragmatic region, similar to prior examination.  There is an adjacent nodular soft tissue density within the subcutaneous soft tissues with dimensions as above, possibly representing extrapleural/subcutaneous metastatic extension.  This appears significantly increased in size compared to prior study of 08/29/2024.  Correlation with physical examination advised.  Residual contrast material within the collecting systems and bladder from prior CTA examination.  Indeterminate 2.5 cm left lower pole renal hypodensity.  Recommend nonemergent dedicated renal ultrasound follow-up for further characterization.  Mild urinary bladder wall thickening favored to relate to nondistention, although correlation with urinalysis advised.  Nonspecific 3.4 cm soft tissue density within the left adnexal region.  This is not well appreciated on prior examinations.  Unclear whether this represents ovarian tissue versus left pelvic sidewall lymphadenopathy.  Close attention on follow-up imaging advised.  Redemonstration of right external iliac chain and inguinal lymph node enlargement, similar to prior examination.  Partially visualized soft tissue nodularity within the superficial subcutaneous soft tissues overlying the lateral aspect of the right hip.  Correlation with physical examination advised.  CTA Chest: 10/01/2024: No evidence of pulmonary embolism. Multiple essentially stable masses  involving the right hemithorax with a large loculated right pleural effusion and compressive atelectasis of the right lower lobe.   MRI Brain: 08/29/2024: Two small nodular foci of enhancement within the left frontal lobe measuring 3 mm in size and within the left cerebellum measuring 6 mm in size consistent with central nervous system metastatic disease. Minor associated T2 hyperintensity/edema. No mass effect.

## 2024-10-24 ENCOUNTER — INFUSION (OUTPATIENT)
Dept: INFUSION THERAPY | Facility: HOSPITAL | Age: 64
End: 2024-10-24
Attending: INTERNAL MEDICINE
Payer: MEDICAID

## 2024-10-24 VITALS
SYSTOLIC BLOOD PRESSURE: 137 MMHG | DIASTOLIC BLOOD PRESSURE: 84 MMHG | TEMPERATURE: 98 F | OXYGEN SATURATION: 98 % | HEART RATE: 75 BPM | RESPIRATION RATE: 18 BRPM

## 2024-10-24 DIAGNOSIS — D50.0 IRON DEFICIENCY ANEMIA DUE TO CHRONIC BLOOD LOSS: Primary | ICD-10-CM

## 2024-10-24 DIAGNOSIS — C50.412 PRIMARY MALIGNANT NEOPLASM OF UPPER OUTER QUADRANT OF BREAST, LEFT: ICD-10-CM

## 2024-10-24 PROCEDURE — 63600175 PHARM REV CODE 636 W HCPCS: Performed by: INTERNAL MEDICINE

## 2024-10-24 PROCEDURE — 25000003 PHARM REV CODE 250: Performed by: INTERNAL MEDICINE

## 2024-10-24 PROCEDURE — 96365 THER/PROPH/DIAG IV INF INIT: CPT

## 2024-10-24 RX ORDER — SODIUM CHLORIDE 0.9 % (FLUSH) 0.9 %
10 SYRINGE (ML) INJECTION
Status: DISCONTINUED | OUTPATIENT
Start: 2024-10-24 | End: 2024-10-24 | Stop reason: HOSPADM

## 2024-10-24 RX ORDER — SODIUM CHLORIDE 0.9 % (FLUSH) 0.9 %
10 SYRINGE (ML) INJECTION
OUTPATIENT
Start: 2024-10-25

## 2024-10-24 RX ORDER — MORPHINE SULFATE 30 MG/1
30 TABLET, FILM COATED, EXTENDED RELEASE ORAL 2 TIMES DAILY
Qty: 60 TABLET | Refills: 0 | Status: SHIPPED | OUTPATIENT
Start: 2024-10-24

## 2024-10-24 RX ORDER — HEPARIN 100 UNIT/ML
500 SYRINGE INTRAVENOUS
OUTPATIENT
Start: 2024-10-25

## 2024-10-24 RX ORDER — DIPHENHYDRAMINE HYDROCHLORIDE 50 MG/ML
50 INJECTION INTRAMUSCULAR; INTRAVENOUS ONCE AS NEEDED
OUTPATIENT
Start: 2024-10-25

## 2024-10-24 RX ORDER — HEPARIN 100 UNIT/ML
500 SYRINGE INTRAVENOUS
Status: DISCONTINUED | OUTPATIENT
Start: 2024-10-24 | End: 2024-10-24 | Stop reason: HOSPADM

## 2024-10-24 RX ORDER — MORPHINE SULFATE 30 MG/1
30 TABLET, FILM COATED, EXTENDED RELEASE ORAL 2 TIMES DAILY
Qty: 60 TABLET | Refills: 0 | Status: CANCELLED | OUTPATIENT
Start: 2024-10-24

## 2024-10-24 RX ORDER — EPINEPHRINE 0.3 MG/.3ML
0.3 INJECTION SUBCUTANEOUS ONCE AS NEEDED
OUTPATIENT
Start: 2024-10-25

## 2024-10-24 RX ADMIN — SODIUM CHLORIDE 125 MG: 9 INJECTION, SOLUTION INTRAVENOUS at 08:10

## 2024-10-24 RX ADMIN — HEPARIN 500 UNITS: 100 SYRINGE at 09:10

## 2024-10-24 NOTE — PLAN OF CARE
Problem: Adult Inpatient Plan of Care  Goal: Plan of Care Review  Outcome: Progressing  Flowsheets (Taken 10/24/2024 0813)  Plan of Care Reviewed With: patient  Goal: Patient-Specific Goal (Individualized)  Outcome: Progressing  Flowsheets (Taken 10/24/2024 0813)  Individualized Care Needs: feet elevated in recliner and warm blanket provided for comfort measures  Anxieties, Fears or Concerns: patient concerned about rx refill request  Patient/Family-Specific Goals (Include Timeframe): tolerate treatment without adverse reaction  Goal: Optimal Comfort and Wellbeing  Outcome: Progressing  Intervention: Provide Person-Centered Care  Flowsheets (Taken 10/24/2024 0813)  Trust Relationship/Rapport:   care explained   questions encouraged   choices provided   reassurance provided   emotional support provided   thoughts/feelings acknowledged   empathic listening provided   questions answered

## 2024-10-24 NOTE — DISCHARGE INSTRUCTIONS
[facility]  [address]  [address]  [phone] phone     [phone] fax  Hours of Operation: Monday- Friday 8:00am- 5:00pm  After hours phone  [phone]  Hematology / Oncology Physicians on call      BAYRON Andrew Dr., NP Phaon Dunbar, NP Khelsea Conley, FNP    Please call with any concerns regarding your appointment today.

## 2024-10-31 ENCOUNTER — OFFICE VISIT (OUTPATIENT)
Dept: HEMATOLOGY/ONCOLOGY | Facility: CLINIC | Age: 64
End: 2024-10-31
Payer: MEDICAID

## 2024-10-31 ENCOUNTER — INFUSION (OUTPATIENT)
Dept: INFUSION THERAPY | Facility: HOSPITAL | Age: 64
End: 2024-10-31
Attending: INTERNAL MEDICINE
Payer: MEDICAID

## 2024-10-31 VITALS
SYSTOLIC BLOOD PRESSURE: 124 MMHG | HEIGHT: 60 IN | WEIGHT: 184.44 LBS | HEART RATE: 80 BPM | DIASTOLIC BLOOD PRESSURE: 73 MMHG | TEMPERATURE: 97 F | OXYGEN SATURATION: 99 % | BODY MASS INDEX: 36.21 KG/M2

## 2024-10-31 VITALS
BODY MASS INDEX: 36.21 KG/M2 | OXYGEN SATURATION: 99 % | HEIGHT: 60 IN | DIASTOLIC BLOOD PRESSURE: 57 MMHG | WEIGHT: 184.44 LBS | TEMPERATURE: 98 F | SYSTOLIC BLOOD PRESSURE: 109 MMHG | HEART RATE: 86 BPM | RESPIRATION RATE: 16 BRPM

## 2024-10-31 DIAGNOSIS — C50.412 PRIMARY MALIGNANT NEOPLASM OF UPPER OUTER QUADRANT OF BREAST, LEFT: Primary | ICD-10-CM

## 2024-10-31 DIAGNOSIS — C50.412 PRIMARY MALIGNANT NEOPLASM OF UPPER OUTER QUADRANT OF BREAST, LEFT: ICD-10-CM

## 2024-10-31 DIAGNOSIS — T45.1X5A IMMUNODEFICIENCY DUE TO CHEMOTHERAPY: ICD-10-CM

## 2024-10-31 DIAGNOSIS — C79.2 SECONDARY CANCER OF SKIN: ICD-10-CM

## 2024-10-31 DIAGNOSIS — Z79.899 IMMUNODEFICIENCY DUE TO CHEMOTHERAPY: ICD-10-CM

## 2024-10-31 DIAGNOSIS — C78.2 SECONDARY MALIGNANCY OF PARIETAL PLEURA: ICD-10-CM

## 2024-10-31 DIAGNOSIS — C78.2 SECONDARY MALIGNANCY OF PARIETAL PLEURA: Primary | ICD-10-CM

## 2024-10-31 DIAGNOSIS — C79.31 SECONDARY ADENOCARCINOMA OF BRAIN: ICD-10-CM

## 2024-10-31 DIAGNOSIS — G89.3 NEOPLASM RELATED PAIN: ICD-10-CM

## 2024-10-31 DIAGNOSIS — D50.0 IRON DEFICIENCY ANEMIA DUE TO CHRONIC BLOOD LOSS: ICD-10-CM

## 2024-10-31 DIAGNOSIS — D84.821 IMMUNODEFICIENCY DUE TO CHEMOTHERAPY: ICD-10-CM

## 2024-10-31 DIAGNOSIS — D50.0 IRON DEFICIENCY ANEMIA DUE TO CHRONIC BLOOD LOSS: Primary | ICD-10-CM

## 2024-10-31 PROCEDURE — 99213 OFFICE O/P EST LOW 20 MIN: CPT | Mod: PBBFAC | Performed by: INTERNAL MEDICINE

## 2024-10-31 PROCEDURE — 25000003 PHARM REV CODE 250: Performed by: INTERNAL MEDICINE

## 2024-10-31 PROCEDURE — 3044F HG A1C LEVEL LT 7.0%: CPT | Mod: CPTII,,, | Performed by: INTERNAL MEDICINE

## 2024-10-31 PROCEDURE — 96375 TX/PRO/DX INJ NEW DRUG ADDON: CPT

## 2024-10-31 PROCEDURE — 4010F ACE/ARB THERAPY RXD/TAKEN: CPT | Mod: CPTII,,, | Performed by: INTERNAL MEDICINE

## 2024-10-31 PROCEDURE — 3078F DIAST BP <80 MM HG: CPT | Mod: CPTII,,, | Performed by: INTERNAL MEDICINE

## 2024-10-31 PROCEDURE — 3074F SYST BP LT 130 MM HG: CPT | Mod: CPTII,,, | Performed by: INTERNAL MEDICINE

## 2024-10-31 PROCEDURE — 1159F MED LIST DOCD IN RCRD: CPT | Mod: CPTII,,, | Performed by: INTERNAL MEDICINE

## 2024-10-31 PROCEDURE — 96413 CHEMO IV INFUSION 1 HR: CPT

## 2024-10-31 PROCEDURE — 99215 OFFICE O/P EST HI 40 MIN: CPT | Mod: 25,S$PBB,, | Performed by: INTERNAL MEDICINE

## 2024-10-31 PROCEDURE — 63600175 PHARM REV CODE 636 W HCPCS: Mod: JZ,JG | Performed by: INTERNAL MEDICINE

## 2024-10-31 PROCEDURE — 96367 TX/PROPH/DG ADDL SEQ IV INF: CPT

## 2024-10-31 PROCEDURE — 3008F BODY MASS INDEX DOCD: CPT | Mod: CPTII,,, | Performed by: INTERNAL MEDICINE

## 2024-10-31 PROCEDURE — 99999 PR PBB SHADOW E&M-EST. PATIENT-LVL III: CPT | Mod: PBBFAC,,, | Performed by: INTERNAL MEDICINE

## 2024-10-31 RX ORDER — ACETAMINOPHEN 325 MG/1
650 TABLET ORAL
Status: CANCELLED | OUTPATIENT
Start: 2024-10-31

## 2024-10-31 RX ORDER — DIPHENHYDRAMINE HYDROCHLORIDE 50 MG/ML
25 INJECTION INTRAMUSCULAR; INTRAVENOUS
Status: COMPLETED | OUTPATIENT
Start: 2024-10-31 | End: 2024-10-31

## 2024-10-31 RX ORDER — PROCHLORPERAZINE EDISYLATE 5 MG/ML
5 INJECTION INTRAMUSCULAR; INTRAVENOUS ONCE AS NEEDED
Start: 2024-11-07

## 2024-10-31 RX ORDER — HEPARIN 100 UNIT/ML
500 SYRINGE INTRAVENOUS
OUTPATIENT
Start: 2024-11-07

## 2024-10-31 RX ORDER — FAMOTIDINE 10 MG/ML
20 INJECTION INTRAVENOUS
Status: CANCELLED | OUTPATIENT
Start: 2024-10-31

## 2024-10-31 RX ORDER — SODIUM CHLORIDE 0.9 % (FLUSH) 0.9 %
10 SYRINGE (ML) INJECTION
OUTPATIENT
Start: 2024-10-31

## 2024-10-31 RX ORDER — DIPHENHYDRAMINE HYDROCHLORIDE 50 MG/ML
50 INJECTION INTRAMUSCULAR; INTRAVENOUS ONCE AS NEEDED
OUTPATIENT
Start: 2024-11-07

## 2024-10-31 RX ORDER — ACETAMINOPHEN 325 MG/1
650 TABLET ORAL
Status: COMPLETED | OUTPATIENT
Start: 2024-10-31 | End: 2024-10-31

## 2024-10-31 RX ORDER — FAMOTIDINE 10 MG/ML
20 INJECTION INTRAVENOUS
OUTPATIENT
Start: 2024-11-07

## 2024-10-31 RX ORDER — EPINEPHRINE 0.3 MG/.3ML
0.3 INJECTION SUBCUTANEOUS ONCE AS NEEDED
OUTPATIENT
Start: 2024-11-07

## 2024-10-31 RX ORDER — ATROPINE SULFATE 0.4 MG/ML
0.4 INJECTION, SOLUTION ENDOTRACHEAL; INTRAMEDULLARY; INTRAMUSCULAR; INTRAVENOUS; SUBCUTANEOUS ONCE AS NEEDED
OUTPATIENT
Start: 2024-11-07

## 2024-10-31 RX ORDER — HEPARIN 100 UNIT/ML
500 SYRINGE INTRAVENOUS
Status: CANCELLED | OUTPATIENT
Start: 2024-10-31

## 2024-10-31 RX ORDER — EPINEPHRINE 0.3 MG/.3ML
0.3 INJECTION SUBCUTANEOUS ONCE AS NEEDED
Status: DISCONTINUED | OUTPATIENT
Start: 2024-10-31 | End: 2024-10-31 | Stop reason: HOSPADM

## 2024-10-31 RX ORDER — HYDROMORPHONE HYDROCHLORIDE 4 MG/1
4 TABLET ORAL
Qty: 120 TABLET | Refills: 0 | Status: SHIPPED | OUTPATIENT
Start: 2024-10-31 | End: 2024-11-30

## 2024-10-31 RX ORDER — MORPHINE SULFATE 30 MG/1
30 TABLET, FILM COATED, EXTENDED RELEASE ORAL 2 TIMES DAILY
Qty: 60 TABLET | Refills: 0 | Status: SHIPPED | OUTPATIENT
Start: 2024-10-31

## 2024-10-31 RX ORDER — ATROPINE SULFATE 1 MG/ML
0.4 INJECTION, SOLUTION INTRAMUSCULAR; INTRAVENOUS; SUBCUTANEOUS ONCE AS NEEDED
Status: DISCONTINUED | OUTPATIENT
Start: 2024-10-31 | End: 2024-10-31 | Stop reason: HOSPADM

## 2024-10-31 RX ORDER — DIPHENHYDRAMINE HYDROCHLORIDE 50 MG/ML
50 INJECTION INTRAMUSCULAR; INTRAVENOUS ONCE AS NEEDED
Status: CANCELLED | OUTPATIENT
Start: 2024-10-31

## 2024-10-31 RX ORDER — DIPHENHYDRAMINE HYDROCHLORIDE 50 MG/ML
25 INJECTION INTRAMUSCULAR; INTRAVENOUS
Status: CANCELLED
Start: 2024-10-31

## 2024-10-31 RX ORDER — ACETAMINOPHEN 325 MG/1
650 TABLET ORAL
OUTPATIENT
Start: 2024-11-07

## 2024-10-31 RX ORDER — DIPHENHYDRAMINE HYDROCHLORIDE 50 MG/ML
50 INJECTION INTRAMUSCULAR; INTRAVENOUS ONCE AS NEEDED
Status: DISCONTINUED | OUTPATIENT
Start: 2024-10-31 | End: 2024-10-31 | Stop reason: HOSPADM

## 2024-10-31 RX ORDER — EPINEPHRINE 0.3 MG/.3ML
0.3 INJECTION SUBCUTANEOUS ONCE AS NEEDED
Status: CANCELLED | OUTPATIENT
Start: 2024-10-31

## 2024-10-31 RX ORDER — FAMOTIDINE 10 MG/ML
20 INJECTION INTRAVENOUS
Status: COMPLETED | OUTPATIENT
Start: 2024-10-31 | End: 2024-10-31

## 2024-10-31 RX ORDER — SODIUM CHLORIDE 0.9 % (FLUSH) 0.9 %
10 SYRINGE (ML) INJECTION
OUTPATIENT
Start: 2024-11-07

## 2024-10-31 RX ORDER — HEPARIN 100 UNIT/ML
500 SYRINGE INTRAVENOUS
Status: DISCONTINUED | OUTPATIENT
Start: 2024-10-31 | End: 2024-10-31 | Stop reason: HOSPADM

## 2024-10-31 RX ORDER — ATROPINE SULFATE 0.4 MG/ML
0.4 INJECTION, SOLUTION ENDOTRACHEAL; INTRAMEDULLARY; INTRAMUSCULAR; INTRAVENOUS; SUBCUTANEOUS ONCE AS NEEDED
Status: CANCELLED | OUTPATIENT
Start: 2024-10-31

## 2024-10-31 RX ORDER — DIPHENHYDRAMINE HYDROCHLORIDE 50 MG/ML
25 INJECTION INTRAMUSCULAR; INTRAVENOUS
Start: 2024-11-07

## 2024-10-31 RX ORDER — PROCHLORPERAZINE EDISYLATE 5 MG/ML
5 INJECTION INTRAMUSCULAR; INTRAVENOUS ONCE AS NEEDED
Status: CANCELLED
Start: 2024-10-31

## 2024-10-31 RX ORDER — PROCHLORPERAZINE EDISYLATE 5 MG/ML
5 INJECTION INTRAMUSCULAR; INTRAVENOUS ONCE AS NEEDED
Status: DISCONTINUED | OUTPATIENT
Start: 2024-10-31 | End: 2024-10-31 | Stop reason: HOSPADM

## 2024-10-31 RX ADMIN — SODIUM CHLORIDE 125 MG: 9 INJECTION, SOLUTION INTRAVENOUS at 01:10

## 2024-10-31 RX ADMIN — DIPHENHYDRAMINE HYDROCHLORIDE 25 MG: 50 INJECTION INTRAMUSCULAR; INTRAVENOUS at 02:10

## 2024-10-31 RX ADMIN — ACETAMINOPHEN 650 MG: 325 TABLET ORAL at 02:10

## 2024-10-31 RX ADMIN — FAMOTIDINE 20 MG: 10 INJECTION, SOLUTION INTRAVENOUS at 02:10

## 2024-10-31 RX ADMIN — APREPITANT 130 MG: 130 INJECTION, EMULSION INTRAVENOUS at 02:10

## 2024-10-31 RX ADMIN — DEXAMETHASONE SODIUM PHOSPHATE 0.25 MG: 4 INJECTION, SOLUTION INTRA-ARTICULAR; INTRALESIONAL; INTRAMUSCULAR; INTRAVENOUS; SOFT TISSUE at 02:10

## 2024-10-31 RX ADMIN — SACITUZUMAB GOVITECAN 900 MG: 180 POWDER, FOR SOLUTION INTRAVENOUS at 02:10

## 2024-10-31 RX ADMIN — HEPARIN 500 UNITS: 100 SYRINGE at 04:10

## 2024-11-04 ENCOUNTER — TELEPHONE (OUTPATIENT)
Dept: HEMATOLOGY/ONCOLOGY | Facility: CLINIC | Age: 64
End: 2024-11-04
Payer: MEDICAID

## 2024-11-06 DIAGNOSIS — C50.412 PRIMARY MALIGNANT NEOPLASM OF UPPER OUTER QUADRANT OF BREAST, LEFT: ICD-10-CM

## 2024-11-06 RX ORDER — OLANZAPINE 5 MG/1
5 TABLET ORAL NIGHTLY
Qty: 30 TABLET | Refills: 11 | Status: SHIPPED | OUTPATIENT
Start: 2024-11-06

## 2024-11-07 ENCOUNTER — OFFICE VISIT (OUTPATIENT)
Dept: HEMATOLOGY/ONCOLOGY | Facility: CLINIC | Age: 64
End: 2024-11-07
Payer: MEDICAID

## 2024-11-07 ENCOUNTER — INFUSION (OUTPATIENT)
Dept: INFUSION THERAPY | Facility: HOSPITAL | Age: 64
End: 2024-11-07
Attending: INTERNAL MEDICINE
Payer: MEDICAID

## 2024-11-07 ENCOUNTER — LAB VISIT (OUTPATIENT)
Dept: LAB | Facility: HOSPITAL | Age: 64
End: 2024-11-07
Attending: INTERNAL MEDICINE
Payer: MEDICAID

## 2024-11-07 VITALS
TEMPERATURE: 98 F | HEART RATE: 76 BPM | DIASTOLIC BLOOD PRESSURE: 70 MMHG | RESPIRATION RATE: 16 BRPM | OXYGEN SATURATION: 97 % | SYSTOLIC BLOOD PRESSURE: 126 MMHG

## 2024-11-07 VITALS
HEIGHT: 60 IN | BODY MASS INDEX: 38.26 KG/M2 | TEMPERATURE: 97 F | OXYGEN SATURATION: 99 % | SYSTOLIC BLOOD PRESSURE: 126 MMHG | WEIGHT: 194.88 LBS | HEART RATE: 81 BPM | DIASTOLIC BLOOD PRESSURE: 60 MMHG

## 2024-11-07 DIAGNOSIS — D84.821 IMMUNODEFICIENCY DUE TO CHEMOTHERAPY: ICD-10-CM

## 2024-11-07 DIAGNOSIS — T45.1X5A IMMUNODEFICIENCY DUE TO CHEMOTHERAPY: ICD-10-CM

## 2024-11-07 DIAGNOSIS — Z79.899 IMMUNODEFICIENCY DUE TO CHEMOTHERAPY: ICD-10-CM

## 2024-11-07 DIAGNOSIS — C50.412 PRIMARY MALIGNANT NEOPLASM OF UPPER OUTER QUADRANT OF BREAST, LEFT: ICD-10-CM

## 2024-11-07 DIAGNOSIS — D50.0 IRON DEFICIENCY ANEMIA DUE TO CHRONIC BLOOD LOSS: Primary | ICD-10-CM

## 2024-11-07 DIAGNOSIS — C79.2 SECONDARY CANCER OF SKIN: ICD-10-CM

## 2024-11-07 DIAGNOSIS — C79.31 SECONDARY ADENOCARCINOMA OF BRAIN: ICD-10-CM

## 2024-11-07 DIAGNOSIS — C50.412 PRIMARY MALIGNANT NEOPLASM OF UPPER OUTER QUADRANT OF BREAST, LEFT: Primary | ICD-10-CM

## 2024-11-07 DIAGNOSIS — C78.2 SECONDARY MALIGNANCY OF PARIETAL PLEURA: ICD-10-CM

## 2024-11-07 LAB
ALBUMIN SERPL BCP-MCNC: 3.8 G/DL (ref 3.5–5.2)
ALP SERPL-CCNC: 86 U/L (ref 40–150)
ALT SERPL W/O P-5'-P-CCNC: 24 U/L (ref 10–44)
ANION GAP SERPL CALC-SCNC: 7 MMOL/L (ref 8–16)
AST SERPL-CCNC: 21 U/L (ref 10–40)
BASOPHILS # BLD AUTO: 0.01 K/UL (ref 0–0.2)
BASOPHILS NFR BLD: 0.3 % (ref 0–1.9)
BILIRUB SERPL-MCNC: 0.3 MG/DL (ref 0.1–1)
BUN SERPL-MCNC: 12 MG/DL (ref 8–23)
CALCIUM SERPL-MCNC: 9.2 MG/DL (ref 8.7–10.5)
CHLORIDE SERPL-SCNC: 105 MMOL/L (ref 95–110)
CO2 SERPL-SCNC: 28 MMOL/L (ref 23–29)
CREAT SERPL-MCNC: 0.8 MG/DL (ref 0.5–1.4)
DIFFERENTIAL METHOD BLD: ABNORMAL
EOSINOPHIL # BLD AUTO: 0.1 K/UL (ref 0–0.5)
EOSINOPHIL NFR BLD: 2.7 % (ref 0–8)
ERYTHROCYTE [DISTWIDTH] IN BLOOD BY AUTOMATED COUNT: 18.7 % (ref 11.5–14.5)
EST. GFR  (NO RACE VARIABLE): >60 ML/MIN/1.73 M^2
GLUCOSE SERPL-MCNC: 109 MG/DL (ref 70–110)
HCT VFR BLD AUTO: 30.1 % (ref 37–48.5)
HGB BLD-MCNC: 8.9 G/DL (ref 12–16)
IMM GRANULOCYTES # BLD AUTO: 0.05 K/UL (ref 0–0.04)
IMM GRANULOCYTES NFR BLD AUTO: 1.7 % (ref 0–0.5)
LYMPHOCYTES # BLD AUTO: 0.8 K/UL (ref 1–4.8)
LYMPHOCYTES NFR BLD: 27.5 % (ref 18–48)
MCH RBC QN AUTO: 27.3 PG (ref 27–31)
MCHC RBC AUTO-ENTMCNC: 29.6 G/DL (ref 32–36)
MCV RBC AUTO: 92 FL (ref 82–98)
MONOCYTES # BLD AUTO: 0.4 K/UL (ref 0.3–1)
MONOCYTES NFR BLD: 13.1 % (ref 4–15)
NEUTROPHILS # BLD AUTO: 1.6 K/UL (ref 1.8–7.7)
NEUTROPHILS NFR BLD: 54.7 % (ref 38–73)
NRBC BLD-RTO: 0 /100 WBC
PLATELET # BLD AUTO: 264 K/UL (ref 150–450)
PMV BLD AUTO: 9.6 FL (ref 9.2–12.9)
POTASSIUM SERPL-SCNC: 4.1 MMOL/L (ref 3.5–5.1)
PROT SERPL-MCNC: 6.8 G/DL (ref 6–8.4)
RBC # BLD AUTO: 3.26 M/UL (ref 4–5.4)
SODIUM SERPL-SCNC: 140 MMOL/L (ref 136–145)
WBC # BLD AUTO: 2.91 K/UL (ref 3.9–12.7)

## 2024-11-07 PROCEDURE — 63600175 PHARM REV CODE 636 W HCPCS: Mod: JZ,JG | Performed by: INTERNAL MEDICINE

## 2024-11-07 PROCEDURE — 36415 COLL VENOUS BLD VENIPUNCTURE: CPT | Performed by: INTERNAL MEDICINE

## 2024-11-07 PROCEDURE — 96413 CHEMO IV INFUSION 1 HR: CPT

## 2024-11-07 PROCEDURE — 96367 TX/PROPH/DG ADDL SEQ IV INF: CPT

## 2024-11-07 PROCEDURE — 25000003 PHARM REV CODE 250: Performed by: INTERNAL MEDICINE

## 2024-11-07 PROCEDURE — 80053 COMPREHEN METABOLIC PANEL: CPT | Performed by: INTERNAL MEDICINE

## 2024-11-07 PROCEDURE — 96375 TX/PRO/DX INJ NEW DRUG ADDON: CPT

## 2024-11-07 PROCEDURE — 85025 COMPLETE CBC W/AUTO DIFF WBC: CPT | Performed by: INTERNAL MEDICINE

## 2024-11-07 PROCEDURE — A4216 STERILE WATER/SALINE, 10 ML: HCPCS | Performed by: INTERNAL MEDICINE

## 2024-11-07 RX ORDER — FAMOTIDINE 10 MG/ML
20 INJECTION INTRAVENOUS
Status: COMPLETED | OUTPATIENT
Start: 2024-11-07 | End: 2024-11-07

## 2024-11-07 RX ORDER — DIPHENHYDRAMINE HYDROCHLORIDE 50 MG/ML
50 INJECTION INTRAMUSCULAR; INTRAVENOUS ONCE AS NEEDED
OUTPATIENT
Start: 2024-11-14

## 2024-11-07 RX ORDER — DIPHENHYDRAMINE HYDROCHLORIDE 50 MG/ML
25 INJECTION INTRAMUSCULAR; INTRAVENOUS
Status: COMPLETED | OUTPATIENT
Start: 2024-11-07 | End: 2024-11-07

## 2024-11-07 RX ORDER — ACETAMINOPHEN 325 MG/1
650 TABLET ORAL
Status: COMPLETED | OUTPATIENT
Start: 2024-11-07 | End: 2024-11-07

## 2024-11-07 RX ORDER — EPINEPHRINE 0.3 MG/.3ML
0.3 INJECTION SUBCUTANEOUS ONCE AS NEEDED
OUTPATIENT
Start: 2024-11-14

## 2024-11-07 RX ORDER — SODIUM CHLORIDE 0.9 % (FLUSH) 0.9 %
10 SYRINGE (ML) INJECTION
Status: DISCONTINUED | OUTPATIENT
Start: 2024-11-07 | End: 2024-11-07 | Stop reason: HOSPADM

## 2024-11-07 RX ORDER — SODIUM CHLORIDE 0.9 % (FLUSH) 0.9 %
10 SYRINGE (ML) INJECTION
OUTPATIENT
Start: 2024-11-14

## 2024-11-07 RX ORDER — ATROPINE SULFATE 1 MG/ML
0.4 INJECTION, SOLUTION INTRAMUSCULAR; INTRAVENOUS; SUBCUTANEOUS ONCE AS NEEDED
Status: DISCONTINUED | OUTPATIENT
Start: 2024-11-07 | End: 2024-11-07 | Stop reason: HOSPADM

## 2024-11-07 RX ORDER — HEPARIN 100 UNIT/ML
500 SYRINGE INTRAVENOUS
Status: DISCONTINUED | OUTPATIENT
Start: 2024-11-07 | End: 2024-11-07 | Stop reason: HOSPADM

## 2024-11-07 RX ORDER — DOXYCYCLINE 100 MG/1
CAPSULE ORAL
COMMUNITY
Start: 2024-10-16

## 2024-11-07 RX ORDER — HEPARIN 100 UNIT/ML
500 SYRINGE INTRAVENOUS
OUTPATIENT
Start: 2024-11-14

## 2024-11-07 RX ADMIN — ACETAMINOPHEN 650 MG: 325 TABLET ORAL at 12:11

## 2024-11-07 RX ADMIN — APREPITANT 130 MG: 130 INJECTION, EMULSION INTRAVENOUS at 12:11

## 2024-11-07 RX ADMIN — SODIUM CHLORIDE: 9 INJECTION, SOLUTION INTRAVENOUS at 12:11

## 2024-11-07 RX ADMIN — DIPHENHYDRAMINE HYDROCHLORIDE 25 MG: 50 INJECTION INTRAMUSCULAR; INTRAVENOUS at 12:11

## 2024-11-07 RX ADMIN — HEPARIN 500 UNITS: 100 SYRINGE at 02:11

## 2024-11-07 RX ADMIN — SACITUZUMAB GOVITECAN 900 MG: 180 POWDER, FOR SOLUTION INTRAVENOUS at 01:11

## 2024-11-07 RX ADMIN — DEXAMETHASONE SODIUM PHOSPHATE 0.25 MG: 4 INJECTION, SOLUTION INTRA-ARTICULAR; INTRALESIONAL; INTRAMUSCULAR; INTRAVENOUS; SOFT TISSUE at 12:11

## 2024-11-07 RX ADMIN — FAMOTIDINE 20 MG: 10 INJECTION, SOLUTION INTRAVENOUS at 12:11

## 2024-11-07 RX ADMIN — SODIUM CHLORIDE 125 MG: 9 INJECTION, SOLUTION INTRAVENOUS at 11:11

## 2024-11-07 RX ADMIN — Medication 10 ML: at 02:11

## 2024-11-07 NOTE — PLAN OF CARE
Problem: Adult Inpatient Plan of Care  Goal: Plan of Care Review  Outcome: Progressing  Flowsheets (Taken 11/7/2024 1120)  Plan of Care Reviewed With: patient  Goal: Patient-Specific Goal (Individualized)  Outcome: Progressing  Flowsheets (Taken 11/7/2024 1120)  Individualized Care Needs: Reclined, pillow and blanket provided. BBQ chips given as well.  Anxieties, Fears or Concerns: patient questioned whether or not she was supposed to have chemo today. Reviewed plan with her and she was satisified with the answer.  Patient/Family-Specific Goals (Include Timeframe): To tolerate iron infusion & Chemo today  Goal: Optimal Comfort and Wellbeing  Outcome: Progressing  Intervention: Monitor Pain and Promote Comfort  Flowsheets (Taken 11/7/2024 1120)  Pain Management Interventions:   warm blanket provided   quiet environment facilitated   pillow support provided  Intervention: Provide Person-Centered Care  Flowsheets (Taken 11/7/2024 1120)  Trust Relationship/Rapport:   care explained   reassurance provided   choices provided   thoughts/feelings acknowledged   emotional support provided   empathic listening provided   questions answered   questions encouraged     Problem: Fall Injury Risk  Goal: Absence of Fall and Fall-Related Injury  Outcome: Progressing  Intervention: Identify and Manage Contributors  Flowsheets (Taken 11/7/2024 1120)  Self-Care Promotion: BADL personal routines maintained  Medication Review/Management:   medications reviewed   infusion initiated  Intervention: Promote Injury-Free Environment  Flowsheets (Taken 11/7/2024 1120)  Safety Promotion/Fall Prevention:   in recliner, wheels locked   instructed to call staff for mobility   lighting adjusted   medications reviewed     Problem: Anemia  Goal: Anemia Symptom Improvement  Outcome: Progressing  Intervention: Monitor and Manage Anemia  Flowsheets (Taken 11/7/2024 1120)  Safety Promotion/Fall Prevention:   in recliner, wheels locked   instructed to  call staff for mobility   lighting adjusted   medications reviewed  Fatigue Management: frequent rest breaks encouraged     Problem: Chemotherapy Effects  Goal: Nausea and Vomiting Relief  Outcome: Progressing  Intervention: Prevent or Manage Nausea and Vomiting  Flowsheets (Taken 11/7/2024 1120)  Environmental Support: calm environment promoted

## 2024-11-07 NOTE — PROGRESS NOTES
Subjective:       Patient ID: Sherlyn Saavedra is a 64 y.o. female.    Chief Complaint:   1. Primary malignant neoplasm of upper outer quadrant of breast, left  No Stage Recommended (ypT2, pN0, cM0, G3, ER-, CO-, HER2-)       2. Secondary malignancy of parietal pleura        3. Secondary adenocarcinoma of brain        4. Secondary cancer of skin          Current Treatment:  OP SACITUZUMAB GOVITECAN-HZIY Q3W    FERRLECIT (FERRIC GLUCONATE) QW     Treatment History:  Carbo/Taxol/AC/Cytoxan/Keytruda        Xeloda        XRT    HPI: This is a 64 year old  woman with allergies, HTN, type 2 diabetes, and hyperlipidemia who is seen in Hem/Onc for triple negative breast cancer diagnosed in 2022. She was seen initially in 8/2024 and reported being treated with induction Carbo/Taxol and AC/Keytruda. She had residual disease at the time of resection and was treated with Xeloda and XRT. She had increasing chest pain and shortness of breath; follow up imaging revealed a left pleural effusion. She underwent a right VATS on 8/5/2024; pathology revealed metastatic breast cancer. Germline testing was negative for germ line mutation.     Plan was to treat with single agent Taxol plus or minus Keytruda based off of CPS score. PDL <1%. She started Taxol 3 weeks on 1 week off.     Staging imaging revealed two small nodular foci of enhancement within the left frontal lobe measuring 3 mm in size and within the left cerebellum measuring 6 mm in size consistent with central nervous system metastatic disease. Bone scan was negative for mets. CT C/A/P done in late 8/2024 demonstrated significant interval progression of disease in the right hemithorax, with development of suspicious right inguinal and right external iliac chain lymphadenopathy. In 9/2024, she noted what appeared to be an abscess on her upper right back; she was started on antibiotics with no resolution. This area is felt to be an additional site of mets.     In  light of interval progression during Taxol therapy, her treatment changed to Trodelvy days 1 & 8 every 3 weeks.     Her primary Hematologist/Oncologist is Dr. Hoffmann.    Interval History: Patient presents for follow up on Trodelvy; She is scheduled to receive C2D8 today. She presents alone and reports having significant constipation unrelieved by Miralax and MOM which she states normally works. She performed an enema on herself this morning and was able to pass hard stool. Discussed that this is a known and common side effect of narcotic pain medications. She also reports more throbbing at the area of skin mets. WBC 2.91; ANC 1600, adequate for treatment. Anemia stable. Plts WNL. CMP unremarkable. Will proceed with treatment today.      Reviewed labs with patient:   CBC:   Recent Labs   Lab 11/07/24  0838   WBC 2.91 L   RBC 3.26 L   Hemoglobin 8.9 L   Hematocrit 30.1 L   Platelets 264   MCV 92   MCH 27.3   MCHC 29.6 L     CMP:  Recent Labs   Lab 11/07/24  0838   Glucose 109   Calcium 9.2   Albumin 3.8   Total Protein 6.8   Sodium 140   Potassium 4.1   CO2 28   Chloride 105   BUN 12   Creatinine 0.8   Alkaline Phosphatase 86   ALT 24   AST 21   Total Bilirubin 0.3     The patient location is: [facility]  The chief complaint leading to consultation is: breast cancer    Visit type: audiovisual    Face to Face time with patient: 12 minutes  26 minutes of total time spent on the encounter, which includes face to face time and non-face to face time preparing to see the patient (eg, review of tests), Obtaining and/or reviewing separately obtained history, Documenting clinical information in the electronic or other health record, Independently interpreting results (not separately reported) and communicating results to the patient/family/caregiver, or Care coordination (not separately reported).     Each patient to whom he or she provides medical services by telemedicine is:  (1) informed of the relationship between the physician  and patient and the respective role of any other health care provider with respect to management of the patient; and (2) notified that he or she may decline to receive medical services by telemedicine and may withdraw from such care at any time.    Social History     Socioeconomic History    Marital status: Single   Tobacco Use    Smoking status: Former     Current packs/day: 0.00     Types: Cigarettes     Quit date:      Years since quittin.8    Smokeless tobacco: Never   Substance and Sexual Activity    Alcohol use: Not Currently    Drug use: Never    Sexual activity: Not Currently     Social Drivers of Health     Financial Resource Strain: Medium Risk (2024)    Overall Financial Resource Strain (CARDIA)     Difficulty of Paying Living Expenses: Somewhat hard   Food Insecurity: Food Insecurity Present (2024)    Hunger Vital Sign     Worried About Running Out of Food in the Last Year: Sometimes true     Ran Out of Food in the Last Year: Sometimes true   Transportation Needs: No Transportation Needs (2024)    Received from [facility]    PRAPARE - Transportation     Lack of Transportation (Medical): No     Lack of Transportation (Non-Medical): No   Physical Activity: Inactive (2024)    Exercise Vital Sign     Days of Exercise per Week: 0 days     Minutes of Exercise per Session: 0 min   Stress: Stress Concern Present (2024)    [employer] - Occupational Stress Questionnaire     Feeling of Stress : To some extent   Housing Stability: Unknown (2024)    Housing Stability Vital Sign     Unable to Pay for Housing in the Last Year: No     Past Medical History:   Diagnosis Date    Allergy     Anemia     Breast cancer     Diabetes mellitus     HLD (hyperlipidemia) 2015    Hyperlipidemia (Problem)      Hypertension     Primary malignant neoplasm of upper outer quadrant of breast,  "left 10/10/2023    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   dx in 40's; has some neuropathic symptoms in right LE; (uncertain if from DMII)  Diabetes mellitus type 2 (Problem)       No family history on file.  Past Surgical History:   Procedure Laterality Date    HYSTERECTOMY      tubaligation  1987     Review of Systems   Constitutional:  Positive for appetite change (improved; ageusia "sometimes") and fatigue (a little more tired than normal).   HENT:  Negative for mouth sores, rhinorrhea and sore throat.    Eyes: Negative.    Respiratory: Negative.     Cardiovascular: Negative.    Gastrointestinal:  Positive for constipation (relieved by enema, MOM). Negative for diarrhea, nausea and vomiting.   Endocrine: Negative.    Genitourinary: Negative.    Musculoskeletal:  Positive for back pain (lower right side around to stomach; 3/10, relieved by new pain meds).   Allergic/Immunologic: Negative.    Neurological:  Negative for weakness and numbness.   Hematological: Negative.    Psychiatric/Behavioral:  Positive for sleep disturbance (due to pain).        Medication List with Changes/Refills   Current Medications    (MAGIC MOUTHWASH) 1:1:1 DIPHENHYDRAMINE(BENADRYL) 12.5MG/5ML LIQ, ALUMINUM & MAGNESIUM HYDROXIDE-SIMETHICONE (MAALOX), LIDOCAINE VISCOUS 2%    Swish and spit 15 mLs every 4 (four) hours as needed (mouth ulcers). for mouth sores    AMLODIPINE (NORVASC) 5 MG TABLET    Take 5 mg by mouth once daily.    ATORVASTATIN (LIPITOR) 40 MG TABLET    Take 40 mg by mouth.    BLOOD SUGAR DIAGNOSTIC (FREESTYLE TEST) STRP    Test 4 times per day    BUSPIRONE (BUSPAR) 15 MG TABLET    Take 1 tablet by mouth at bedtime.    CAPECITABINE (XELODA) 500 MG TAB    Take by mouth 2 (two) times daily.    DEXAMETHASONE (DECADRON) 4 MG TAB    Take 2 " tablets (8 mg total) by mouth once daily. Take 2 tablets (8 mg total) by mouth once daily on days 2,3,4 and 9,10,11 of each chemotherapy cycle.    DOXYCYCLINE (MONODOX) 100 MG CAPSULE    EMPTY CONTENTS OF 1 CAPSULE INTO NASAL IRRIGATION SYSTEM, ADD DISTILLED WATER, SALT PACK, MIX & IRRIGATE. PERFORM 2 TIMES DAILY    FAMOTIDINE (PEPCID) 20 MG TABLET    Take 20 mg by mouth.    FOLIC ACID (FOLVITE) 1 MG TABLET    Take 1 mg by mouth once daily.    FOLIC ACID (FOLVITE) 1 MG TABLET    take 1 tablet by mouth every morning    GABAPENTIN (NEURONTIN) 300 MG CAPSULE    Take 300 mg by mouth.    HYDROMORPHONE (DILAUDID) 4 MG TABLET    Take 1 tablet (4 mg total) by mouth every 3 (three) hours as needed for Pain (4 doses/day).    LIDOCAINE VISCOUS HCL 2%-DIPHENHYDRAMINE-ALUMINUM-MAGNESIUM HYDROXIDE-SIMETHICONE    Swish and spit 15 mLs by mouth every 4 (four) hours as needed (mouth ulcers). for mouth sores    LIDOCAINE-PRILOCAINE (EMLA) CREAM    Apply to affected area once    LISINOPRIL-HYDROCHLOROTHIAZIDE (PRINZIDE,ZESTORETIC) 20-25 MG TAB    Take 1 tablet by mouth every morning.    MAGNESIUM OXIDE (MAG-OX) 400 MG (241.3 MG MAGNESIUM) TABLET    Take 400 mg by mouth once daily.    METFORMIN (GLUCOPHAGE-XR) 500 MG ER 24HR TABLET    Take 1,000 mg by mouth 2 (two) times daily.    METFORMIN (GLUCOPHAGE-XR) 500 MG ER 24HR TABLET    take 2 tablets by mouth in the morning and at bedtime as directed    MORPHINE (MS CONTIN) 30 MG 12 HR TABLET    Take 1 tablet (30 mg total) by mouth 2 (two) times daily.    MORPHINE (MS CONTIN) 30 MG 12 HR TABLET    Take 1 tablet (30 mg total) by mouth 2 (two) times daily.    NALOXONE (NARCAN) 4 MG/ACTUATION SPRY        OLANZAPINE (ZYPREXA) 5 MG TABLET    TAKE ONE TABLET BY MOUTH EVERY EVENING    ONDANSETRON (ZOFRAN-ODT) 8 MG TBDL    DISSOLVE ONE TABLET UNDER THE TONGUE EVERY 8 HOURS AS NEEDED FOR NAUSEA    ONDANSETRON (ZOFRAN-ODT) 8 MG TBDL    Take 1 tablet (8 mg total) by mouth 2 (two) times daily.     ONDANSETRON (ZOFRAN-ODT) 8 MG TBDL    dissolve 1 tablet on the tongue 3 times daily as needed    ONDANSETRON (ZOFRAN-ODT) 8 MG TBDL    Take 1 tablet (8 mg total) by mouth every 8 (eight) hours as needed (nausea/vomiting).    PANTOPRAZOLE (PROTONIX) 40 MG TABLET    Take 40 mg by mouth.    PANTOPRAZOLE (PROTONIX) 40 MG TABLET    take 1 tablet by mouth daily    PROCHLORPERAZINE (COMPAZINE) 10 MG TABLET    Take 10 mg by mouth.    TIRZEPATIDE (MOUNJARO) 2.5 MG/0.5 ML PNIJ    Inject 2.5 mg into the skin every 7 days.    TIZANIDINE (ZANAFLEX) 4 MG TABLET         Objective:     Vitals:    11/07/24 1033   BP: 126/60   Pulse: 81   Temp: 97.4 °F (36.3 °C)     Physical Exam     Unable to assess due to virtual visit.    (1) Restricted in physically strenuous activity, ambulatory and able to do work of light nature  Assessment:     Problem List Items Addressed This Visit          Immunology/Multi System    Immunodeficiency due to chemotherapy       Oncology    Primary malignant neoplasm of upper outer quadrant of breast, left - Primary     Diagnosed in 2022; initially treated with Carbo/Taxol then AC/Keytruda. Residual disease on resection; treated with Xeloda and XRT. 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 Initially treated with Taxol x 4 cycles. Changed to Trodelvy every 3 weeks due to interval progression.          Secondary malignancy of parietal pleura    Secondary adenocarcinoma of brain    Secondary cancer of skin     Plan:     Primary malignant neoplasm of upper outer quadrant of breast, left    Secondary malignancy of parietal pleura    Secondary adenocarcinoma of brain    Secondary cancer of skin    Immunodeficiency due to chemotherapy    Labs reviewed; stable.   Ok to proceed with C2D8 of Trodelvy today.   Ok to proceed with C5D1 of Ferrlecit today.    Restaging scans to be done in 1-2 weeks; ordered, will have scheduled.  Follow up in 2 weeks with Dr. Hoffmann with  CT scans, CBC, and Comprehensive Metabolic Panel  prior to C3D1.    Route Chart for Scheduling    Med Onc Chart Routing      Follow up with physician 2 weeks. Dr. Hoffmann with scan results   Follow up with [facility]    Infusion scheduling note   in 2 weeks for C3D1 Trodelvy and C6D1 Ferrlecit   Injection scheduling note    Labs CBC and CMP   Scheduling:  Preferred lab:  Lab interval:  in 2 weeks   Imaging CT chest abdomen pelvis   ordered, please schedule in 1-2 weeks   Pharmacy appointment No pharmacy appointment needed      Other referrals       No additional referrals needed             I will review assessment/plan with collaborating physician.      ELPIDIO Granados

## 2024-11-07 NOTE — DISCHARGE INSTRUCTIONS
.[facility]  [address] [facility]  [address]  [phone] phone     [phone] fax  Hours of Operation: Monday- Friday 8:00am- 5:00pm  After hours phone  [phone]  Hematology / Oncology Physicians on call    Dr. Shun García           Nurse Practitioners:     Georgette Loredo, CARLOS Morfin, LISA Rojas, CARLOS Guajardo, CARLOS Morataya, NP    Please don't hesitate to call if you have any concerns.      FALL PREVENTION   Falls often occur due to slipping, tripping or losing your balance. Here are ways to reduce your risk of falling again.   Was there anything that caused your fall that can be fixed, removed or replaced?   Make your home safe by keeping walkways clear of objects you may trip over.   Use non-slip pads under rugs.   Do not walk in poorly lit areas.   Do not stand on chairs or wobbly ladders.   Use caution when reaching overhead or looking upward. This position can cause a loss of balance.   Be sure your shoes fit properly, have non-slip bottoms and are in good condition.   Be cautious when going up and down stairs, curbs, and when walking on uneven sidewalks.   If your balance is poor, consider using a cane or walker.   If your fall was related to alcohol use, stop or limit alcohol intake.   If your fall was related to use of sleeping medicines, talk to your doctor about this. You may need to reduce your dosage at bedtime if you awaken during the night to go to the bathroom.   To reduce the need for nighttime bathroom trips:   Avoid drinking fluids for several hours before going to bed   Empty your bladder before going to bed   Men can keep a urinal at the bedside   © 8346-2741 [employer] Ruiz, [address] [place] [address], [place] [address]. All rights reserved. This information is not intended as a substitute for professional medical care. Always follow your healthcare  professional's instructions.    WAYS TO HELP PREVENT INFECTION        WASH YOUR HANDS OFTEN DURING THE DAY, ESPECIALLY BEFORE YOU EAT, AFTER USING THE BATHROOM, AND AFTER TOUCHING ANIMALS    STAY AWAY FROM PEOPLE WHO HAVE ILLNESSES YOU CAN CATCH; SUCH AS COLDS, FLU, CHICKEN POX    TRY TO AVOID CROWDS    STAY AWAY FROM CHILDREN WHO RECENTLY HAVE RECEIVED LIVE VIRUS VACCINES    MAINTAIN GOOD MOUTH CARE    DO NOT SQUEEZE OR SCRATCH PIMPLES    CLEAN CUTS & SCRAPES RIGHT AWAY AND DAILY UNTIL HEALED WITH WARM WATER, SOAP & AN ANTISEPTIC    AVOID CONTACT WITH LITTER BOXES, BIRD CAGES, & FISH TANKS    AVOID STANDING WATER, IE., BIRD BATHS, FLOWER POTS/VASES, OR HUMIDIFIERS    WEAR GLOVES WHEN GARDENING OR CLEANING UP AFTER OTHERS, ESPECIALLY BABIES & SMALL CHILDREN    DO NOT EAT RAW FISH, SEAFOOD, MEAT, OR EGGS

## 2024-11-11 ENCOUNTER — TELEPHONE (OUTPATIENT)
Dept: HEMATOLOGY/ONCOLOGY | Facility: CLINIC | Age: 64
End: 2024-11-11
Payer: MEDICAID

## 2024-11-11 NOTE — TELEPHONE ENCOUNTER
SWER received call from patient requesting home health services. SWER asked patient to explain and patient is looking for help with cooking and cleaning around the home. SWER explained longterm personal care services to patient. SWER provided patient the number to call to obtain services. SWER also emailed patient the link to read up on the services. SWER will also place home health order for patient to [facility] for provider to sign. Pt states she was current with [facility] in the past.

## 2024-11-11 NOTE — TELEPHONE ENCOUNTER
Pt's daughter called to advise that pt is now living alone. Would like to explore the possibility of her mom getting infusions near her home in [place] on like an every other month schedule. I told her that was possible but that she would need to contact her mother's insurance to see if anyone in the [place] area accepts that insurance. I also supplied daughter my email address to send in her [facility] paperwork to us. Let her know I will get it to Ms Hayden and that it takes 10-14 business days to get that paperwork returned. Ms Quentin v/u.

## 2024-11-11 NOTE — TELEPHONE ENCOUNTER
Pt called requesting [employer] rides for next 2 appts. SW scheduled pt's [employer] for 11/13 with pickup at 9:50am and 11/14 with pickup at 7:30am. Pt was informed and SW will remain available.

## 2024-11-13 ENCOUNTER — OFFICE VISIT (OUTPATIENT)
Dept: PALLIATIVE MEDICINE | Facility: CLINIC | Age: 64
End: 2024-11-13
Payer: MEDICAID

## 2024-11-13 VITALS
DIASTOLIC BLOOD PRESSURE: 67 MMHG | WEIGHT: 190.25 LBS | HEART RATE: 93 BPM | OXYGEN SATURATION: 100 % | HEIGHT: 60 IN | BODY MASS INDEX: 37.35 KG/M2 | TEMPERATURE: 98 F | RESPIRATION RATE: 18 BRPM | SYSTOLIC BLOOD PRESSURE: 139 MMHG

## 2024-11-13 DIAGNOSIS — C79.2 SECONDARY CANCER OF SKIN: Primary | ICD-10-CM

## 2024-11-13 DIAGNOSIS — G89.3 NEOPLASM RELATED PAIN: ICD-10-CM

## 2024-11-13 DIAGNOSIS — C50.412 PRIMARY MALIGNANT NEOPLASM OF UPPER OUTER QUADRANT OF BREAST, LEFT: ICD-10-CM

## 2024-11-13 DIAGNOSIS — Z51.5 PALLIATIVE CARE ENCOUNTER: ICD-10-CM

## 2024-11-13 PROCEDURE — 3008F BODY MASS INDEX DOCD: CPT | Mod: CPTII,,,

## 2024-11-13 PROCEDURE — 3044F HG A1C LEVEL LT 7.0%: CPT | Mod: CPTII,,,

## 2024-11-13 PROCEDURE — 1159F MED LIST DOCD IN RCRD: CPT | Mod: CPTII,,,

## 2024-11-13 PROCEDURE — 3075F SYST BP GE 130 - 139MM HG: CPT | Mod: CPTII,,,

## 2024-11-13 PROCEDURE — 99999 PR PBB SHADOW E&M-EST. PATIENT-LVL V: CPT | Mod: PBBFAC,,,

## 2024-11-13 PROCEDURE — 4010F ACE/ARB THERAPY RXD/TAKEN: CPT | Mod: CPTII,,,

## 2024-11-13 PROCEDURE — 99215 OFFICE O/P EST HI 40 MIN: CPT | Mod: PBBFAC

## 2024-11-13 PROCEDURE — 99214 OFFICE O/P EST MOD 30 MIN: CPT | Mod: S$PBB,,,

## 2024-11-13 PROCEDURE — 3078F DIAST BP <80 MM HG: CPT | Mod: CPTII,,,

## 2024-11-13 NOTE — PROGRESS NOTES
Palliative Medicine Clinic Note  Follow-up           Consult Requested By: Dr. Hoffmann      Reason for Consult: Symptom Management/Advance Care Planning/Goals of Care Discussion    Chief Complaint: Pain/Right upper back wound          ASSESSMENT/PLAN:      Plan/Recommendations      1. Secondary cancer of skin  Assessment & Plan:  Right upper back wound  CDI, healing  Cleaned with saline and covered with gauze and transparent dressing per pt's request.       2. Primary malignant neoplasm of upper outer quadrant of breast, left  Assessment & Plan:  Followed by Dr. Hoffmann and Cameron. Previous oncologist at [facility].   Now on Sacituzumab-Govitecan-Palliative intent   Paclitaxel D/C'd due to progressive disease.   S/p Keytruda, left lumpectomy (Aug. 2023) w/ residual disease post resection, radiation (Nov. 2023).    S/P right VATS washout with partial decortication, pleural biopsy, intercostal nerve blocks, Pleurx catheter insertion Aug 2024. Pleurx removed due to low output.   CT A/P: 10/02/2024: Redemonstration of extensive masslike nodular soft tissue density at the right lung base and subdiaphragmatic region, similar to prior examination.  There is an adjacent nodular soft tissue density within the subcutaneous soft tissues with dimensions as above, possibly representing extrapleural/subcutaneous metastatic extension.  This appears significantly increased in size compared to prior study of 08/29/2024.  Correlation with physical examination advised.  Residual contrast material within the collecting systems and bladder from prior CTA examination.  Indeterminate 2.5 cm left lower pole renal hypodensity.  Recommend nonemergent dedicated renal ultrasound follow-up for further characterization.  Mild urinary bladder wall thickening favored to relate to nondistention, although correlation with urinalysis advised.  Nonspecific 3.4 cm soft tissue density within the left adnexal region.  This is not well appreciated on prior  examinations.  Unclear whether this represents ovarian tissue versus left pelvic sidewall lymphadenopathy.  Close attention on follow-up imaging advised.  Redemonstration of right external iliac chain and inguinal lymph node enlargement, similar to prior examination.  Partially visualized soft tissue nodularity within the superficial subcutaneous soft tissues overlying the lateral aspect of the right hip.  Correlation with physical examination advised.  CTA Chest: 10/01/2024: No evidence of pulmonary embolism. Multiple essentially stable masses involving the right hemithorax with a large loculated right pleural effusion and compressive atelectasis of the right lower lobe.   MRI Brain: 08/29/2024: Two small nodular foci of enhancement within the left frontal lobe measuring 3 mm in size and within the left cerebellum measuring 6 mm in size consistent with central nervous system metastatic disease. Minor associated T2 hyperintensity/edema. No mass effect.          3. Neoplasm related pain  Assessment & Plan:    Due to progressive disease/stable  Now stable on MS Contin 30mg BID  Hydromorphone IR 4mg Q3H PRN  Narcan RX given and explained use to pt and family. Pt and family verbalized understanding.    Miralax 1pack/day or Senna 2 tabs at bedtime for opioid-induced constipation    Pain contract- 09/04/2024  UDS- 10/17/2024- Consistent w/ prescribed Hydromorphone and Morphine. Inconsistent with Marijuana. Pt aware she can obtain medical marijuana card.       4. Palliative care encounter  Assessment & Plan:    -Code status: Full Code. Pt and daughter wish to revisit at a later time  -HCPOA: Daughter: Luna West: [phone]. Pt has 1 adult son-lives in [place]. Pt is single  -GOC:  Continue cancer treatment, symptom management, maintain independence and functional status. Pt aware treatment intent is palliative.   -See HPI for further details              Advance Care Planning   Advance Directives:   Living Will: No     LaPOST: No    Do Not Resuscitate Status: No    Medical Power of : Yes    Agent's Name:  Daughter: Luna Saavedra   Agent's Contact Number:  [phone]    Decision Making:  Patient answered questions and Family answered questions  Goals of Care: The patient endorses that what is most important right now is to focus on remaining as independent as possible and symptom/pain control    Accordingly, we have decided that the best plan to meet the patient's goals includes continuing with treatment. Pt stated she is aware treatment intent is palliative.   Pt chose to remain Full Code for now. Her and her daughter wish to revisit this later.          Follow up: 2 months     Plan discussed with: Patient        SUBJECTIVE:      History of Present Illness / Interval History:  Sherlyn Saavedra is 64 y.o. female with Recurrent Metastatic Left Breast Cancer to Pluera/Brain  Now on Sacituzumab-Govitecan-Palliative intent   Paclitaxel D/C'd due to progressive disease.   S/p Keytruda, left lumpectomy (Aug. 2023) w/ residual disease post resection, radiation (Nov. 2023).    S/P right VATS washout with partial decortication, pleural biopsy, intercostal nerve blocks, Pleurx catheter insertion Aug 2024. Pleurx removed due to low output.   Followed by Dr. Hoffmann and Khurram. Previous oncologist at [facility].   PMHX: HTN, T2DM    Presents to Palliative Care Clinic for physical symptoms, advance care planning,, clarification of goals of care, and additional support..   Please see oncology notes for more details on pt's care.         11/13/24:  Pt attended clinic alone. She was in no acute distress. Vital signs stable on room air.   She reported back pain is stable 3/10 on Hydromorphone IR 4mg Q3HPRN, Zanaflex PRN, MSContin 30mg BID and marijuana  Constipation managed with Miralax and Senna.   Anxiety/Depression has improved. Her  is providing spiritual counseling.   She is requesting help at home with cooking and cleaning. Information  on [employer] [place] on Aging given.   Right upper back wound stable. Blister has healed.     Past Visits:    10/17/2024: Pt seen in [facility]. She was in no acute distress. Vital signs stable on room air.   Pain stable on Hydromorphone IR 4mg Q3HPRN, Zanaflex PRN, and MSContin 30mg BID   Forgetfulness impacting short-term memory. Now keeping a journal  Anxiety/Depression due to cancer diagnosis. Good family support. She wishes to defer mental health counseling at this time.   Intermittent constipation managed  with Miralax daily/every other day and MOM.   Nausea managed with PRN Zofran. Appetite is gradually improving.     10/03/2024: The patient location is: [facility]  The chief complaint leading to consultation is:  Follow-up     Visit type: audiovisual     Face to Face time with patient:  10 minutes  35 minutes of total time spent on the encounter, which includes face to face time and non-face to face time preparing to see the patient (eg, review of tests), Obtaining and/or reviewing separately obtained history, Documenting clinical information in the electronic or other health record, Independently interpreting results (not separately reported) and communicating results to the patient/family/caregiver, or Care coordination (not separately reported).      Each patient to whom he or she provides medical services by telemedicine is:  (1) informed of the relationship between the physician and patient and the respective role of any other health care provider with respect to management of the patient; and (2) notified that he or she may decline to receive medical services by telemedicine and may withdraw from such care at any time.     Notes:   Pt seen via audiovisual feed. A&O x3. No acute distress.   She reported progressive right chest/back/right lateral abd pain over the past week requiring ED visits two days ago. Pain is now better managed with MSContin 30mg BID and Hydromorphone 4mg Q4H PRN.   She is  requesting narcotic rx be sent to [facility]  Some nausea/vomiting this morning. Constipation slightly managed with MOM/Miralax. She requested Linzess prescription. She has not tried Senna.   She wants to clarify with Dr. Hoffmann if she needs Thoracentesis due to her CT chest showing a pleural effusion.   Called pt back following visit. Informed her Dr. Hoffmann said she does not require thoracentesis at this time, as imaging represents cancer progression, not pleural effusion. Pt verbalized understanding,  Informed pt pain med rx have been sent to [facility] as she requested.   Informed pt to take Zofran 20-30 mins prior to taking opioid to prevent nausea.     09/17/2024:  Pt attended clinic with her friend, Mr. Torres. She was in no acute distress. Vital signs stable on room air.   She reported lower back pain has improved with Morphine IR 15mg, however, it does not last 6 hours. No itching/rash with Morphine.   She noted a palpable/painful lesion on her right lateral thigh. She noticed it about a month ago, but it was not painful then.       09/04/2024: History obtained from: Patient and medical record.   Pt attended clinic alone. Her daughter, Luna present via speaker phone. was in no acute distress. Vital signs stable on room air.   I explained the role palliative care often plays in supporting patients with serious illness and their families regarding symptom management, advance care planning, and goals of care discussion. Pt and daughter verbalized understanding.   Pt reported persistent progressive right lower back pain over the past month. 8/10.  She is taking Norco 10mg QID PRN but no longer effective and she takes it with Benadryl due to itching. Pain hinders sleep.   Neuropathy to hands and right leg, somewhat stable at this time.   She is taking medical marijuana for appetite stimulation. She supplements meals with protein drinks. Her Bgs have been borderline low- she is taking  Metformin and Moujaro. She will revisit this with her PCP.   Pt and daughter inquired about pt receiving Glutathione infusions for immune health. I have encouraged them to discuss this with Dr. Hoffmann.     We discussed advance care planning, goals of care, and code status, Full Code vs. DNR including risks, benefits, and alternatives..   She wishes to continue cancer treatment, symptom management, maintain independence and functional status. Pt stated she is aware treatment intent is palliative.   Pt chose to remain Full Code for now. Her and her daughter wish to revisit this later.   Elected HCPOA is daughter: Luna West: [phone], lives in [place]. Pt has 1 adult son lives in [place]. She is single.         ROS:  Review of Systems   Constitutional:  Positive for fatigue. Negative for activity change, appetite change (Gradually improving.) and unexpected weight change.   HENT:  Negative for hearing loss, sore throat, trouble swallowing and voice change.    Eyes:  Negative for visual disturbance.   Respiratory:  Negative for cough, chest tightness, shortness of breath (Moderate exertional) and wheezing.    Cardiovascular:  Negative for chest pain, palpitations and leg swelling.   Gastrointestinal:  Positive for abdominal pain and constipation. Negative for blood in stool, nausea, rectal pain, vomiting and reflux.   Genitourinary:  Negative for bladder incontinence, difficulty urinating, flank pain, hematuria, nocturia, pelvic pain and urgency.   Musculoskeletal:  Positive for back pain, leg pain and myalgias. Negative for arthralgias and neck pain.   Integumentary:  Positive for wound (Right upper back).   Allergic/Immunologic: Negative for environmental allergies, food allergies and immunocompromised state.   Neurological:  Positive for numbness. Negative for dizziness, tremors, weakness, headaches, memory loss and coordination difficulties.   Hematological:  Negative for adenopathy. Does not bruise/bleed  easily.   Psychiatric/Behavioral:  Negative for agitation, behavioral problems, confusion, decreased concentration, depressed mood (Due to cancer diagnosis), dysphoric mood, self-injury, sleep disturbance and suicidal ideas. The patient is not nervous/anxious (Due to cancer diagnosis).        Review of Symptoms      Symptom Assessment (ESAS 0-10 Scale)  Pain:  3  Dyspnea:  2  Anxiety:  5  Nausea:  0  Depression:  5  Anorexia:  0  Fatigue:  5  Insomnia:  0  Restlessness:  0  Agitation:  0     CAM / Delirium:  Negative  Constipation:  Negative  Diarrhea:  Negative    Anxiety:  Is not nervous/anxious (Due to cancer diagnosis)  Constipation:  Constipation    Bowel Management Plan (BMP):  Yes      Comments:  Miralax/Senna    Pain Assessment:    Location(s): back    Back       Location: lower and right        Quality: Aching        Quantity: 3/10 in intensity        Chronicity: Onset 1 month(s) ago, gradually improving since Hydromorphone + MSContin + Marijuana        Aggravating Factors: Activity        Alleviating Factors: Opiates       Associated Symptoms: Myalgias    Modified Melo Scale:  0    Performance Status:  90    ECOG Performance Status ndGndrndanddndend:nd nd2nd Living Arrangements:  Lives alone    Psychosocial/Cultural:   See Palliative Psychosocial Note: Yes  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 Daughter, Luna in [place], Son in [place].   **Primary  to Follow**  Palliative Care  Consult: Yes            Medications:    Current Outpatient Medications:     (Magic mouthwash) 1:1:1 diphenhydrAMINE(Benadryl) 12.5mg/5ml liq, aluminum & magnesium hydroxide-simethicone (Maalox), LIDOcaine viscous 2%, Swish and spit 15 mLs every 4 (four) hours as needed (mouth ulcers). for mouth sores, Disp: 360 mL, Rfl: 1    amLODIPine (NORVASC) 5 MG tablet, Take 5 mg by mouth once daily., Disp: , Rfl:     atorvastatin (LIPITOR) 40 MG tablet, Take 40 mg by mouth., Disp: , Rfl:     blood sugar diagnostic (FREESTYLE TEST)  Strp, Test 4 times per day, Disp: , Rfl:     busPIRone (BUSPAR) 15 MG tablet, Take 1 tablet by mouth at bedtime., Disp: 30 tablet, Rfl: 5    capecitabine (XELODA) 500 MG Tab, Take by mouth 2 (two) times daily., Disp: , Rfl:     dexAMETHasone (DECADRON) 4 MG Tab, Take 2 tablets (8 mg total) by mouth once daily. Take 2 tablets (8 mg total) by mouth once daily on days 2,3,4 and 9,10,11 of each chemotherapy cycle., Disp: 24 tablet, Rfl: 11    doxycycline (MONODOX) 100 MG capsule, EMPTY CONTENTS OF 1 CAPSULE INTO NASAL IRRIGATION SYSTEM, ADD DISTILLED WATER, SALT PACK, MIX & IRRIGATE. PERFORM 2 TIMES DAILY, Disp: , Rfl:     famotidine (PEPCID) 20 MG tablet, Take 20 mg by mouth., Disp: , Rfl:     folic acid (FOLVITE) 1 MG tablet, Take 1 mg by mouth once daily., Disp: , Rfl:     folic acid (FOLVITE) 1 MG tablet, take 1 tablet by mouth every morning, Disp: 30 tablet, Rfl: 3    gabapentin (NEURONTIN) 300 MG capsule, Take 300 mg by mouth., Disp: , Rfl:     HYDROmorphone (DILAUDID) 4 MG tablet, Take 1 tablet (4 mg total) by mouth every 3 (three) hours as needed for Pain (4 doses/day)., Disp: 120 tablet, Rfl: 0    LIDOcaine viscous HCl 2%-diphenhydrAMINE-aluminum-magnesium hydroxide-simethicone, Swish and spit 15 mLs by mouth every 4 (four) hours as needed (mouth ulcers). for mouth sores, Disp: 360 mL, Rfl: 1    LIDOcaine-prilocaine (EMLA) cream, Apply to affected area once, Disp: 30 g, Rfl: 11    lisinopriL-hydrochlorothiazide (PRINZIDE,ZESTORETIC) 20-25 mg Tab, Take 1 tablet by mouth every morning., Disp: , Rfl:     magnesium oxide (MAG-OX) 400 mg (241.3 mg magnesium) tablet, Take 400 mg by mouth once daily., Disp: , Rfl:     metFORMIN (GLUCOPHAGE-XR) 500 MG ER 24hr tablet, Take 1,000 mg by mouth 2 (two) times daily., Disp: , Rfl:     metFORMIN (GLUCOPHAGE-XR) 500 MG ER 24hr tablet, take 2 tablets by mouth in the morning and at bedtime as directed, Disp: 180 tablet, Rfl: 1    morphine (MS CONTIN) 30 MG 12 hr tablet, Take 1  tablet (30 mg total) by mouth 2 (two) times daily., Disp: 60 tablet, Rfl: 0    morphine (MS CONTIN) 30 MG 12 hr tablet, Take 1 tablet (30 mg total) by mouth 2 (two) times daily., Disp: 60 tablet, Rfl: 0    naloxone (NARCAN) 4 mg/actuation [place], , Disp: , Rfl:     OLANZapine (ZYPREXA) 5 MG tablet, TAKE ONE TABLET BY MOUTH EVERY EVENING, Disp: 30 tablet, Rfl: 11    ondansetron (ZOFRAN-ODT) 8 MG TbDL, DISSOLVE ONE TABLET UNDER THE TONGUE EVERY 8 HOURS AS NEEDED FOR NAUSEA, Disp: , Rfl:     ondansetron (ZOFRAN-ODT) 8 MG TbDL, Take 1 tablet (8 mg total) by mouth 2 (two) times daily., Disp: 30 tablet, Rfl: 11    ondansetron (ZOFRAN-ODT) 8 MG TbDL, dissolve 1 tablet on the tongue 3 times daily as needed, Disp: 20 tablet, Rfl: 2    ondansetron (ZOFRAN-ODT) 8 MG TbDL, Take 1 tablet (8 mg total) by mouth every 8 (eight) hours as needed (nausea/vomiting)., Disp: 60 tablet, Rfl: 5    pantoprazole (PROTONIX) 40 MG tablet, Take 40 mg by mouth., Disp: , Rfl:     pantoprazole (PROTONIX) 40 MG tablet, take 1 tablet by mouth daily, Disp: 30 tablet, Rfl: 1    prochlorperazine (COMPAZINE) 10 MG tablet, Take 10 mg by mouth., Disp: , Rfl:     tirzepatide (MOUNJARO) 2.5 mg/0.5 mL PnIj, Inject 2.5 mg into the skin every 7 days., Disp: , Rfl:     tiZANidine (ZANAFLEX) 4 MG tablet, , Disp: , Rfl:       External  database queried on 11/13/2024  by AKUA CAMARGO :        Review of patient's allergies indicates:   Allergen Reactions    Ace inhibitors Swelling    Lisinopril Rash    Hydrocodone Itching     Hives    Hydrocodone-acetaminoph-supp11 Itching    Percocet [oxycodone-acetaminophen] Itching     Pt had to take an antihistamine to improve itching     Cephalexin      Hives    Pantoprazole Hives    Propoxyphene      Hives    Propoxyphene n-acetaminophen     Propoxyphene-acetaminophen            OBJECTIVE:         Physical Exam:  Vitals:      Physical Exam  Vitals and nursing note reviewed.   Constitutional:       General:  She is not in acute distress.     Appearance: Normal appearance. She is normal weight. She is not ill-appearing.   HENT:      Head: Normocephalic and atraumatic.      Nose: Nose normal. No congestion or rhinorrhea.      Mouth/Throat:      Mouth: Mucous membranes are moist.      Pharynx: Oropharynx is clear. No oropharyngeal exudate or posterior oropharyngeal erythema.   Eyes:      General: No scleral icterus.        Right eye: No discharge.         Left eye: No discharge.      Conjunctiva/sclera: Conjunctivae normal.      Pupils: Pupils are equal, round, and reactive to light.   Cardiovascular:      Rate and Rhythm: Normal rate and regular rhythm.      Pulses: Normal pulses.      Heart sounds: Normal heart sounds. No murmur heard.     No gallop.      Comments: Trace bilateral lower extremities edema.   Pulmonary:      Effort: Pulmonary effort is normal. No respiratory distress.      Breath sounds: Normal breath sounds. No stridor. No wheezing.   Chest:      Chest wall: No tenderness.   Abdominal:      General: Bowel sounds are normal. There is no distension.      Palpations: Abdomen is soft.      Tenderness: There is no abdominal tenderness.   Genitourinary:     Comments: Deferred  Musculoskeletal:         General: No swelling or tenderness. Normal range of motion.      Cervical back: Normal range of motion and neck supple.      Right lower leg: Edema present.      Left lower leg: Edema present.   Skin:     General: Skin is warm and dry.      Capillary Refill: Capillary refill takes less than 2 seconds.      Coloration: Skin is not jaundiced or pale.      Findings: Wound present. No rash.             Comments: Right upper back wound- Healing. No drainage. Cleaned with sterile saline and covered with gauze and transparent dressing per pt's request.    Neurological:      Mental Status: She is alert and oriented to person, place, and time.      Motor: No weakness.   Psychiatric:         Attention and Perception:  Attention and perception normal.         Mood and Affect: Mood and affect normal.         Speech: Speech normal.         Behavior: Behavior normal. Behavior is cooperative.         Thought Content: Thought content normal. Thought content does not include suicidal ideation. Thought content does not include suicidal plan.         Cognition and Memory: Cognition and memory normal.         Judgment: Judgment normal.           Labs: BMP  Lab Results   Component Value Date     11/07/2024    K 4.1 11/07/2024     11/07/2024    CO2 28 11/07/2024    BUN 12 11/07/2024    CREATININE 0.8 11/07/2024    CALCIUM 9.2 11/07/2024    ANIONGAP 7 (L) 11/07/2024    EGFRNORACEVR >60 11/07/2024     Lab Results   Component Value Date    WBC 2.91 (L) 11/07/2024    HGB 8.9 (L) 11/07/2024    HCT 30.1 (L) 11/07/2024    MCV 92 11/07/2024     11/07/2024             Imaging: CT A/P: 10/02/2024: Redemonstration of extensive masslike nodular soft tissue density at the right lung base and subdiaphragmatic region, similar to prior examination.  There is an adjacent nodular soft tissue density within the subcutaneous soft tissues with dimensions as above, possibly representing extrapleural/subcutaneous metastatic extension.  This appears significantly increased in size compared to prior study of 08/29/2024.  Correlation with physical examination advised.  Residual contrast material within the collecting systems and bladder from prior CTA examination.  Indeterminate 2.5 cm left lower pole renal hypodensity.  Recommend nonemergent dedicated renal ultrasound follow-up for further characterization.  Mild urinary bladder wall thickening favored to relate to nondistention, although correlation with urinalysis advised.  Nonspecific 3.4 cm soft tissue density within the left adnexal region.  This is not well appreciated on prior examinations.  Unclear whether this represents ovarian tissue versus left pelvic sidewall lymphadenopathy.  Close  attention on follow-up imaging advised.  Redemonstration of right external iliac chain and inguinal lymph node enlargement, similar to prior examination.  Partially visualized soft tissue nodularity within the superficial subcutaneous soft tissues overlying the lateral aspect of the right hip.  Correlation with physical examination advised.    CTA Chest: 10/01/2024: No evidence of pulmonary embolism. Multiple essentially stable masses involving the right hemithorax with a large loculated right pleural effusion and compressive atelectasis of the right lower lobe.     MRI Brain: 08/29/2024: Two small nodular foci of enhancement within the left frontal lobe measuring 3 mm in size and within the left cerebellum measuring 6 mm in size consistent with central nervous system metastatic disease. Minor associated T2 hyperintensity/edema. No mass effect.          I spent a total of 35 minutes on the day of the visit. This includes face to face time in discussion of goals of care, symptom assessment, coordination of care and emotional support.  This also includes non-face to face time preparing to see the patient (eg, review of tests/imaging), obtaining and/or reviewing separately obtained history, documenting clinical information in the electronic or other health record, independently interpreting results and communicating results to the patient/family/caregiver, or care coordinator.       AKUA HENSLEY NP

## 2024-11-13 NOTE — PATIENT INSTRUCTIONS
Contact the [place] on aging at [phone] for resources on assistance with cooking and cleaning.

## 2024-11-14 ENCOUNTER — INFUSION (OUTPATIENT)
Dept: INFUSION THERAPY | Facility: HOSPITAL | Age: 64
End: 2024-11-14
Attending: INTERNAL MEDICINE
Payer: MEDICAID

## 2024-11-14 VITALS
DIASTOLIC BLOOD PRESSURE: 57 MMHG | TEMPERATURE: 97 F | HEART RATE: 85 BPM | SYSTOLIC BLOOD PRESSURE: 121 MMHG | OXYGEN SATURATION: 98 % | RESPIRATION RATE: 18 BRPM

## 2024-11-14 DIAGNOSIS — D50.0 IRON DEFICIENCY ANEMIA DUE TO CHRONIC BLOOD LOSS: Primary | ICD-10-CM

## 2024-11-14 PROCEDURE — 25000003 PHARM REV CODE 250: Performed by: INTERNAL MEDICINE

## 2024-11-14 PROCEDURE — 96365 THER/PROPH/DIAG IV INF INIT: CPT

## 2024-11-14 PROCEDURE — 36593 DECLOT VASCULAR DEVICE: CPT

## 2024-11-14 PROCEDURE — 63600175 PHARM REV CODE 636 W HCPCS: Performed by: INTERNAL MEDICINE

## 2024-11-14 RX ORDER — EPINEPHRINE 0.3 MG/.3ML
0.3 INJECTION SUBCUTANEOUS ONCE AS NEEDED
OUTPATIENT
Start: 2024-11-21

## 2024-11-14 RX ORDER — SODIUM CHLORIDE 0.9 % (FLUSH) 0.9 %
10 SYRINGE (ML) INJECTION
OUTPATIENT
Start: 2024-11-21

## 2024-11-14 RX ORDER — HEPARIN 100 UNIT/ML
500 SYRINGE INTRAVENOUS
OUTPATIENT
Start: 2024-11-21

## 2024-11-14 RX ORDER — HEPARIN 100 UNIT/ML
500 SYRINGE INTRAVENOUS
Status: DISCONTINUED | OUTPATIENT
Start: 2024-11-14 | End: 2024-11-14 | Stop reason: HOSPADM

## 2024-11-14 RX ORDER — DIPHENHYDRAMINE HYDROCHLORIDE 50 MG/ML
50 INJECTION INTRAMUSCULAR; INTRAVENOUS ONCE AS NEEDED
OUTPATIENT
Start: 2024-11-21

## 2024-11-14 RX ORDER — SODIUM CHLORIDE 0.9 % (FLUSH) 0.9 %
10 SYRINGE (ML) INJECTION
Status: DISCONTINUED | OUTPATIENT
Start: 2024-11-14 | End: 2024-11-14 | Stop reason: HOSPADM

## 2024-11-14 RX ADMIN — ALTEPLASE 2 MG: 2.2 INJECTION, POWDER, LYOPHILIZED, FOR SOLUTION INTRAVENOUS at 08:11

## 2024-11-14 RX ADMIN — SODIUM CHLORIDE 125 MG: 9 INJECTION, SOLUTION INTRAVENOUS at 09:11

## 2024-11-14 RX ADMIN — HEPARIN 500 UNITS: 100 SYRINGE at 10:11

## 2024-11-14 NOTE — DISCHARGE INSTRUCTIONS
[facility]  [address] [facility]  [address]  [phone] phone     [phone] fax  Hours of Operation: Monday- Friday 8:00am- 5:00pm  After hours phone  [phone]  Hematology / Oncology Physicians on call      BAYRON Andrew Dr., NP Phaon Dunbar, NP Khelsea Conley, FNP    Please call with any concerns regarding your appointment today.

## 2024-11-14 NOTE — NURSING
Port accessed but no blood return. Had Erica ((charge nurse) check and she re=accessed. And still no blood return. Have flush bag in use to see if that will help. Will recheck.

## 2024-11-14 NOTE — NURSING
Still no blood return. [place] instilled. Will check for blood return in 15 minutes and if no blood return will recheck again in another 30 minutes

## 2024-11-15 ENCOUNTER — TELEPHONE (OUTPATIENT)
Dept: HEMATOLOGY/ONCOLOGY | Facility: CLINIC | Age: 64
End: 2024-11-15
Payer: MEDICAID

## 2024-11-15 NOTE — TELEPHONE ENCOUNTER
Pt contact SW to schedule transportation for 11/19 and 11/21 appts. Pt was scheduled for  at 8am on 11/19 and 9:30am on 11/21 with [facility]. SW will remain available.

## 2024-11-19 ENCOUNTER — HOSPITAL ENCOUNTER (OUTPATIENT)
Dept: RADIOLOGY | Facility: HOSPITAL | Age: 64
Discharge: HOME | End: 2024-11-19
Attending: INTERNAL MEDICINE
Payer: MEDICAID

## 2024-11-19 ENCOUNTER — INFUSION (OUTPATIENT)
Dept: INFUSION THERAPY | Facility: HOSPITAL | Age: 64
End: 2024-11-19
Attending: INTERNAL MEDICINE
Payer: MEDICAID

## 2024-11-19 DIAGNOSIS — C50.412 PRIMARY MALIGNANT NEOPLASM OF UPPER OUTER QUADRANT OF BREAST, LEFT: Primary | ICD-10-CM

## 2024-11-19 DIAGNOSIS — C50.412 PRIMARY MALIGNANT NEOPLASM OF UPPER OUTER QUADRANT OF BREAST, LEFT: ICD-10-CM

## 2024-11-19 DIAGNOSIS — C78.2 SECONDARY MALIGNANCY OF PARIETAL PLEURA: ICD-10-CM

## 2024-11-19 DIAGNOSIS — C79.31 SECONDARY ADENOCARCINOMA OF BRAIN: ICD-10-CM

## 2024-11-19 PROCEDURE — 96523 IRRIG DRUG DELIVERY DEVICE: CPT

## 2024-11-19 PROCEDURE — A4216 STERILE WATER/SALINE, 10 ML: HCPCS | Performed by: INTERNAL MEDICINE

## 2024-11-19 PROCEDURE — 71260 CT THORAX DX C+: CPT | Mod: 26,,, | Performed by: RADIOLOGY

## 2024-11-19 PROCEDURE — 25500020 PHARM REV CODE 255: Performed by: INTERNAL MEDICINE

## 2024-11-19 PROCEDURE — 74177 CT ABD & PELVIS W/CONTRAST: CPT | Mod: TC

## 2024-11-19 PROCEDURE — 25000003 PHARM REV CODE 250: Performed by: INTERNAL MEDICINE

## 2024-11-19 PROCEDURE — A9698 NON-RAD CONTRAST MATERIALNOC: HCPCS | Performed by: INTERNAL MEDICINE

## 2024-11-19 PROCEDURE — 63600175 PHARM REV CODE 636 W HCPCS: Performed by: INTERNAL MEDICINE

## 2024-11-19 PROCEDURE — 74177 CT ABD & PELVIS W/CONTRAST: CPT | Mod: 26,,, | Performed by: RADIOLOGY

## 2024-11-19 RX ORDER — HEPARIN 100 UNIT/ML
500 SYRINGE INTRAVENOUS
Status: DISCONTINUED | OUTPATIENT
Start: 2024-11-19 | End: 2024-11-19 | Stop reason: HOSPADM

## 2024-11-19 RX ORDER — SODIUM CHLORIDE 0.9 % (FLUSH) 0.9 %
10 SYRINGE (ML) INJECTION
Status: DISCONTINUED | OUTPATIENT
Start: 2024-11-19 | End: 2024-11-19 | Stop reason: HOSPADM

## 2024-11-19 RX ORDER — SODIUM CHLORIDE 0.9 % (FLUSH) 0.9 %
10 SYRINGE (ML) INJECTION
OUTPATIENT
Start: 2024-11-19

## 2024-11-19 RX ORDER — HEPARIN 100 UNIT/ML
500 SYRINGE INTRAVENOUS
OUTPATIENT
Start: 2024-11-19

## 2024-11-19 RX ADMIN — IOHEXOL 100 ML: 350 INJECTION, SOLUTION INTRAVENOUS at 11:11

## 2024-11-19 RX ADMIN — HEPARIN 500 UNITS: 100 SYRINGE at 11:11

## 2024-11-19 RX ADMIN — IOHEXOL 1000 ML: 12 SOLUTION ORAL at 11:11

## 2024-11-19 RX ADMIN — Medication 10 ML: at 11:11

## 2024-11-20 NOTE — ASSESSMENT & PLAN NOTE
Followed by Dr. Hoffmann and Khurram. Previous oncologist at [facility].   Now on Sacituzumab-Govitecan-Palliative intent   Paclitaxel D/C'd due to progressive disease.   S/p Keytruda, left lumpectomy (Aug. 2023) w/ residual disease post resection, radiation (Nov. 2023).    S/P right VATS washout with partial decortication, pleural biopsy, intercostal nerve blocks, Pleurx catheter insertion Aug 2024. Pleurx removed due to low output.   CT A/P: 10/02/2024: Redemonstration of extensive masslike nodular soft tissue density at the right lung base and subdiaphragmatic region, similar to prior examination.  There is an adjacent nodular soft tissue density within the subcutaneous soft tissues with dimensions as above, possibly representing extrapleural/subcutaneous metastatic extension.  This appears significantly increased in size compared to prior study of 08/29/2024.  Correlation with physical examination advised.  Residual contrast material within the collecting systems and bladder from prior CTA examination.  Indeterminate 2.5 cm left lower pole renal hypodensity.  Recommend nonemergent dedicated renal ultrasound follow-up for further characterization.  Mild urinary bladder wall thickening favored to relate to nondistention, although correlation with urinalysis advised.  Nonspecific 3.4 cm soft tissue density within the left adnexal region.  This is not well appreciated on prior examinations.  Unclear whether this represents ovarian tissue versus left pelvic sidewall lymphadenopathy.  Close attention on follow-up imaging advised.  Redemonstration of right external iliac chain and inguinal lymph node enlargement, similar to prior examination.  Partially visualized soft tissue nodularity within the superficial subcutaneous soft tissues overlying the lateral aspect of the right hip.  Correlation with physical examination advised.  CTA Chest: 10/01/2024: No evidence of pulmonary embolism. Multiple essentially stable masses  involving the right hemithorax with a large loculated right pleural effusion and compressive atelectasis of the right lower lobe.   MRI Brain: 08/29/2024: Two small nodular foci of enhancement within the left frontal lobe measuring 3 mm in size and within the left cerebellum measuring 6 mm in size consistent with central nervous system metastatic disease. Minor associated T2 hyperintensity/edema. No mass effect.

## 2024-11-20 NOTE — ASSESSMENT & PLAN NOTE
Right upper back wound  CDI, healing  Cleaned with saline and covered with gauze and transparent dressing per pt's request.

## 2024-11-20 NOTE — ASSESSMENT & PLAN NOTE
-Code status: Full Code. Pt and daughter wish to revisit at a later time  -HCPOA: Daughter: Luna West: [phone]. Pt has 1 adult son-lives in [place]. Pt is single  -GOC:  Continue cancer treatment, symptom management, maintain independence and functional status. Pt aware treatment intent is palliative.   -See HPI for further details

## 2024-11-21 ENCOUNTER — INFUSION (OUTPATIENT)
Dept: INFUSION THERAPY | Facility: HOSPITAL | Age: 64
End: 2024-11-21
Attending: INTERNAL MEDICINE
Payer: MEDICAID

## 2024-11-21 ENCOUNTER — LAB VISIT (OUTPATIENT)
Dept: LAB | Facility: HOSPITAL | Age: 64
End: 2024-11-21
Attending: INTERNAL MEDICINE
Payer: MEDICAID

## 2024-11-21 ENCOUNTER — OFFICE VISIT (OUTPATIENT)
Dept: HEMATOLOGY/ONCOLOGY | Facility: CLINIC | Age: 64
End: 2024-11-21
Payer: MEDICAID

## 2024-11-21 VITALS
OXYGEN SATURATION: 98 % | SYSTOLIC BLOOD PRESSURE: 135 MMHG | HEIGHT: 60 IN | WEIGHT: 183.63 LBS | OXYGEN SATURATION: 96 % | RESPIRATION RATE: 18 BRPM | HEIGHT: 60 IN | DIASTOLIC BLOOD PRESSURE: 54 MMHG | TEMPERATURE: 98 F | WEIGHT: 183.63 LBS | DIASTOLIC BLOOD PRESSURE: 61 MMHG | TEMPERATURE: 98 F | BODY MASS INDEX: 36.05 KG/M2 | BODY MASS INDEX: 36.05 KG/M2 | SYSTOLIC BLOOD PRESSURE: 126 MMHG | HEART RATE: 91 BPM | HEART RATE: 89 BPM

## 2024-11-21 DIAGNOSIS — C79.2 SECONDARY CANCER OF SKIN: ICD-10-CM

## 2024-11-21 DIAGNOSIS — D84.821 IMMUNODEFICIENCY DUE TO CHEMOTHERAPY: ICD-10-CM

## 2024-11-21 DIAGNOSIS — C78.2 SECONDARY MALIGNANCY OF PARIETAL PLEURA: ICD-10-CM

## 2024-11-21 DIAGNOSIS — Z79.899 IMMUNODEFICIENCY DUE TO CHEMOTHERAPY: ICD-10-CM

## 2024-11-21 DIAGNOSIS — G89.3 NEOPLASM RELATED PAIN: ICD-10-CM

## 2024-11-21 DIAGNOSIS — I10 HYPERTENSION, UNSPECIFIED TYPE: ICD-10-CM

## 2024-11-21 DIAGNOSIS — C50.412 PRIMARY MALIGNANT NEOPLASM OF UPPER OUTER QUADRANT OF BREAST, LEFT: ICD-10-CM

## 2024-11-21 DIAGNOSIS — T45.1X5A IMMUNODEFICIENCY DUE TO CHEMOTHERAPY: ICD-10-CM

## 2024-11-21 DIAGNOSIS — C50.412 PRIMARY MALIGNANT NEOPLASM OF UPPER OUTER QUADRANT OF BREAST, LEFT: Primary | ICD-10-CM

## 2024-11-21 DIAGNOSIS — C79.31 SECONDARY ADENOCARCINOMA OF BRAIN: ICD-10-CM

## 2024-11-21 LAB
ALBUMIN SERPL BCP-MCNC: 3.9 G/DL (ref 3.5–5.2)
ALP SERPL-CCNC: 114 U/L (ref 40–150)
ALT SERPL W/O P-5'-P-CCNC: 26 U/L (ref 10–44)
ANION GAP SERPL CALC-SCNC: 12 MMOL/L (ref 8–16)
AST SERPL-CCNC: 37 U/L (ref 10–40)
BASOPHILS # BLD AUTO: 0.01 K/UL (ref 0–0.2)
BASOPHILS NFR BLD: 0.3 % (ref 0–1.9)
BILIRUB SERPL-MCNC: 0.4 MG/DL (ref 0.1–1)
BUN SERPL-MCNC: 10 MG/DL (ref 8–23)
CALCIUM SERPL-MCNC: 9.6 MG/DL (ref 8.7–10.5)
CHLORIDE SERPL-SCNC: 104 MMOL/L (ref 95–110)
CO2 SERPL-SCNC: 24 MMOL/L (ref 23–29)
CREAT SERPL-MCNC: 0.9 MG/DL (ref 0.5–1.4)
DIFFERENTIAL METHOD BLD: ABNORMAL
EOSINOPHIL # BLD AUTO: 0.1 K/UL (ref 0–0.5)
EOSINOPHIL NFR BLD: 2 % (ref 0–8)
ERYTHROCYTE [DISTWIDTH] IN BLOOD BY AUTOMATED COUNT: 18.7 % (ref 11.5–14.5)
EST. GFR  (NO RACE VARIABLE): >60 ML/MIN/1.73 M^2
GLUCOSE SERPL-MCNC: 129 MG/DL (ref 70–110)
HCT VFR BLD AUTO: 29.2 % (ref 37–48.5)
HGB BLD-MCNC: 9.1 G/DL (ref 12–16)
IMM GRANULOCYTES # BLD AUTO: 0.01 K/UL (ref 0–0.04)
IMM GRANULOCYTES NFR BLD AUTO: 0.3 % (ref 0–0.5)
IRON SERPL-MCNC: 37 UG/DL (ref 30–160)
LDH SERPL L TO P-CCNC: 1191 U/L (ref 110–260)
LYMPHOCYTES # BLD AUTO: 0.7 K/UL (ref 1–4.8)
LYMPHOCYTES NFR BLD: 18.6 % (ref 18–48)
MCH RBC QN AUTO: 28.3 PG (ref 27–31)
MCHC RBC AUTO-ENTMCNC: 31.2 G/DL (ref 32–36)
MCV RBC AUTO: 91 FL (ref 82–98)
MONOCYTES # BLD AUTO: 0.4 K/UL (ref 0.3–1)
MONOCYTES NFR BLD: 12.3 % (ref 4–15)
NEUTROPHILS # BLD AUTO: 2.3 K/UL (ref 1.8–7.7)
NEUTROPHILS NFR BLD: 66.5 % (ref 38–73)
NRBC BLD-RTO: 0 /100 WBC
PLATELET # BLD AUTO: 260 K/UL (ref 150–450)
PMV BLD AUTO: 9 FL (ref 9.2–12.9)
POTASSIUM SERPL-SCNC: 3.9 MMOL/L (ref 3.5–5.1)
PROT SERPL-MCNC: 7.6 G/DL (ref 6–8.4)
RBC # BLD AUTO: 3.22 M/UL (ref 4–5.4)
SATURATED IRON: 14 % (ref 20–50)
SODIUM SERPL-SCNC: 140 MMOL/L (ref 136–145)
TOTAL IRON BINDING CAPACITY: 265 UG/DL (ref 250–450)
TRANSFERRIN SERPL-MCNC: 179 MG/DL (ref 200–375)
WBC # BLD AUTO: 3.5 K/UL (ref 3.9–12.7)

## 2024-11-21 PROCEDURE — 83615 LACTATE (LD) (LDH) ENZYME: CPT | Performed by: INTERNAL MEDICINE

## 2024-11-21 PROCEDURE — 84466 ASSAY OF TRANSFERRIN: CPT | Performed by: INTERNAL MEDICINE

## 2024-11-21 PROCEDURE — 80053 COMPREHEN METABOLIC PANEL: CPT | Performed by: INTERNAL MEDICINE

## 2024-11-21 PROCEDURE — 82728 ASSAY OF FERRITIN: CPT | Performed by: INTERNAL MEDICINE

## 2024-11-21 PROCEDURE — 83010 ASSAY OF HAPTOGLOBIN QUANT: CPT | Performed by: INTERNAL MEDICINE

## 2024-11-21 PROCEDURE — 85025 COMPLETE CBC W/AUTO DIFF WBC: CPT | Performed by: INTERNAL MEDICINE

## 2024-11-21 RX ORDER — EPINEPHRINE 0.3 MG/.3ML
0.3 INJECTION SUBCUTANEOUS ONCE AS NEEDED
OUTPATIENT
Start: 2024-11-28

## 2024-11-21 RX ORDER — ONDANSETRON HYDROCHLORIDE 2 MG/ML
8 INJECTION, SOLUTION INTRAVENOUS
OUTPATIENT
Start: 2024-11-28

## 2024-11-21 RX ORDER — SODIUM CHLORIDE 0.9 % (FLUSH) 0.9 %
10 SYRINGE (ML) INJECTION
OUTPATIENT
Start: 2024-11-28

## 2024-11-21 RX ORDER — HEPARIN 100 UNIT/ML
500 SYRINGE INTRAVENOUS
OUTPATIENT
Start: 2024-11-28

## 2024-11-21 RX ORDER — ONDANSETRON HYDROCHLORIDE 2 MG/ML
8 INJECTION, SOLUTION INTRAVENOUS
OUTPATIENT
Start: 2024-11-21

## 2024-11-21 RX ORDER — DIPHENHYDRAMINE HYDROCHLORIDE 50 MG/ML
50 INJECTION INTRAMUSCULAR; INTRAVENOUS ONCE AS NEEDED
OUTPATIENT
Start: 2024-11-28

## 2024-11-21 RX ORDER — HEPARIN 100 UNIT/ML
500 SYRINGE INTRAVENOUS
OUTPATIENT
Start: 2024-11-21

## 2024-11-21 RX ORDER — SODIUM CHLORIDE 0.9 % (FLUSH) 0.9 %
10 SYRINGE (ML) INJECTION
OUTPATIENT
Start: 2024-11-21

## 2024-11-21 NOTE — PROGRESS NOTES
Subjective:       Patient ID: Sherlyn Saavedra is a 64 y.o. female.    Chief Complaint: Results, Chemotherapy, and Breast Cancer    HPI:  64-year-old female with progressive metastatic triple negative breast cancer.  Patient has progressive 2 lines of therapy.  Recent CT chest abdomen pelvis demonstrates progressive disease.  At this time is here for review with virtual visit daughter present ECOG status 1    Past Medical History:   Diagnosis Date    Allergy     Anemia     Breast cancer     Diabetes mellitus     HLD (hyperlipidemia) 2015    Hyperlipidemia (Problem)      Hypertension     Primary malignant neoplasm of upper outer quadrant of breast, left 10/10/2023    Secondary malignancy of parietal pleura 2024    Type 2 diabetes mellitus with diabetic polyneuropathy, without long-term current use of insulin 2015    Formatting of this note might be different from the original.   dx in 40's; has some neuropathic symptoms in right LE; (uncertain if from DMII)  Diabetes mellitus type 2 (Problem)       No family history on file.  Social History     Socioeconomic History    Marital status: Single   Tobacco Use    Smoking status: Former     Current packs/day: 0.00     Types: Cigarettes     Quit date:      Years since quittin.9    Smokeless tobacco: Never   Substance and Sexual Activity    Alcohol use: Not Currently    Drug use: Never    Sexual activity: Not Currently     Social Drivers of Health     Financial Resource Strain: Medium Risk (2024)    Overall Financial Resource Strain (CARDIA)     Difficulty of Paying Living Expenses: Somewhat hard   Food Insecurity: Food Insecurity Present (2024)    Hunger Vital Sign     Worried About Running Out of Food in the Last Year: Sometimes true     Ran Out of Food in the Last Year: Sometimes true   Transportation Needs: No Transportation Needs (2024)    Received from [facility] Subsidiaries and  "Affiliates    PRAPARE - Transportation     Lack of Transportation (Medical): No     Lack of Transportation (Non-Medical): No   Physical Activity: Inactive (9/12/2024)    Exercise Vital Sign     Days of Exercise per Week: 0 days     Minutes of Exercise per Session: 0 min   Stress: Stress Concern Present (9/12/2024)    [employer] [place] [employer] - Occupational Stress Questionnaire     Feeling of Stress : To some extent   Housing Stability: Unknown (9/12/2024)    Housing Stability Vital Sign     Unable to Pay for Housing in the Last Year: No     Past Surgical History:   Procedure Laterality Date    HYSTERECTOMY      tubaligation  1987       Labs:  Lab Results   Component Value Date    WBC 3.50 (L) 11/21/2024    HGB 9.1 (L) 11/21/2024    HCT 29.2 (L) 11/21/2024    MCV 91 11/21/2024     11/21/2024     BMP  Lab Results   Component Value Date     11/21/2024    K 3.9 11/21/2024     11/21/2024    CO2 24 11/21/2024    BUN 10 11/21/2024    CREATININE 0.9 11/21/2024    CALCIUM 9.6 11/21/2024    ANIONGAP 12 11/21/2024     Lab Results   Component Value Date    ALT 26 11/21/2024    AST 37 11/21/2024    ALKPHOS 114 11/21/2024    BILITOT 0.4 11/21/2024       Lab Results   Component Value Date    IRON 29 (L) 09/20/2024    TIBC 283 09/20/2024    FERRITIN 1,269 (H) 10/31/2024     No results found for: "[id number]"  Lab Results   Component Value Date    FOLATE 4.1 (L) 07/23/2024     Lab Results   Component Value Date    TSH 2.500 08/14/2023         Review of Systems   Constitutional:  Positive for fatigue. Negative for activity change, appetite change, chills, diaphoresis, fever and unexpected weight change.   HENT:  Negative for congestion, dental problem, drooling, ear discharge, ear pain, facial swelling, hearing loss, mouth sores, nosebleeds, postnasal drip, rhinorrhea, sinus pressure, sneezing, sore throat, tinnitus, trouble swallowing and voice change.    Eyes:  Negative for photophobia, pain, " discharge, redness, itching and visual disturbance.   Respiratory:  Negative for cough, choking, chest tightness, shortness of breath, wheezing and stridor.    Cardiovascular:  Positive for chest pain. Negative for palpitations and leg swelling.   Gastrointestinal:  Negative for abdominal distention, abdominal pain, anal bleeding, blood in stool, constipation, diarrhea, nausea, rectal pain and vomiting.   Endocrine: Negative for cold intolerance, heat intolerance, polydipsia, polyphagia and polyuria.   Genitourinary:  Negative for decreased urine volume, difficulty urinating, dyspareunia, dysuria, enuresis, flank pain, frequency, genital sores, hematuria, menstrual problem, pelvic pain, urgency, vaginal bleeding, vaginal discharge and vaginal pain.   Musculoskeletal:  Negative for arthralgias, back pain, gait problem, joint swelling, myalgias, neck pain and neck stiffness.   Skin:  Negative for color change, pallor and rash.   Allergic/Immunologic: Negative for environmental allergies, food allergies and immunocompromised state.   Neurological:  Positive for weakness and headaches. Negative for dizziness, tremors, seizures, syncope, facial asymmetry, speech difficulty, light-headedness and numbness.   Hematological:  Negative for adenopathy. Does not bruise/bleed easily.   Psychiatric/Behavioral:  Positive for dysphoric mood. Negative for agitation, behavioral problems, confusion, decreased concentration, hallucinations, self-injury, sleep disturbance and suicidal ideas. The patient is nervous/anxious. The patient is not hyperactive.        Objective:      Physical Exam  Constitutional:       Appearance: Normal appearance. She is ill-appearing.   Neurological:      Mental Status: She is alert.             Assessment:      1. Primary malignant neoplasm of upper outer quadrant of breast, left    2. Immunodeficiency due to chemotherapy    3. Neoplasm related pain    4. Hypertension, unspecified type    5. Secondary  adenocarcinoma of brain    6. Secondary cancer of skin    7. Secondary malignancy of parietal pleura           Med Onc Chart Routing      Follow up with physician    Follow up with [facility]    Infusion scheduling note    Injection scheduling note OP ERIBULIN Q3W okay to start cycle 1 day 1 without seen MD orders has been signed see provider cycle 1 day 8   Labs    Imaging MRI   MRI brain ordered stat see if intracranial progression   Pharmacy appointment    Other referrals                   Plan:     The patient location is:  [facility]  The chief complaint leading to consultation is:  Breast cancer    Visit type: audiovisual    Face to Face time with patient: 25 minutes of total time spent on the encounter, which includes face to face time and non-face to face time preparing to see the patient (eg, review of tests), Obtaining and/or reviewing separately obtained history, Documenting clinical information in the electronic or other health record, Independently interpreting results (not separately reported) and communicating results to the patient/family/caregiver, or Care coordination (not separately reported).         Each patient to whom he or she provides medical services by telemedicine is:  (1) informed of the relationship between the physician and patient and the respective role of any other health care provider with respect to management of the patient; and (2) notified that he or she may decline to receive medical services by telemedicine and may withdraw from such care at any time.    Notes:  Unfortunately results of CT chest abdomen pelvis from [facility] progressive disease.  Patient is also complaining of increased headache very small nodules noted previously in brain.  At this time will have MRI done to see if intracranial progression discussed with patient as well as daughter prognosis she is now on 3rd line triple negative breast carcinoma.  Will place before clinical trials group.  But will begin on third  line therapyOP ERIBULIN Q3W Consented the patient to the treatment plan and the patient was educated on the planned duration of the treatment and schedule of the treatment administration.Advance Care Planning as has been done has been documented 20% response rate family is aware noncurative setting             Yaya Hoffmann Jr, MD FACP

## 2024-11-21 NOTE — PLAN OF CARE
DISCONTINUE ON PATHWAY REGIMEN - Breast    [id number]        Sacituzumab govitecan-hziy (Trodelvy)     **Always confirm dose/schedule in your pharmacy ordering system**    REASON: Disease Progression  PRIOR TREATMENT: [id number]  TREATMENT RESPONSE: Progressive Disease (PD)    START ON PATHWAY REGIMEN - Breast    [id number]        Eribulin mesylate ([place])     **Always confirm dose/schedule in your pharmacy ordering system**    Patient Characteristics:  Distant Metastases or Locoregional Recurrent Disease - Unresected, M0 or Locally   Advanced Unresectable Disease Progressing after Neoadjuvant and Local   Therapies, M0, HER2 Low/Negative, ER Negative, Chemotherapy, HER2 Negative,   Third Line and Beyond, gBRCA Wildtype or Not a Candidate for Molecular Targeted   Therapy  Therapeutic Status: Distant Metastases  HER2 Status: Negative (-)  ER Status: Negative (-)  IL Status: Negative (-)  Therapy Approach Indicated: Standard Chemotherapy/Endocrine Therapy  Line of Therapy: Third Line and Beyond  Intent of Therapy:  Non-Curative / Palliative Intent, Discussed with Patient

## 2024-11-21 NOTE — NURSING
Pt treatment cancelled , changed to new regimen waiting on authorization for Eribulin.  All Ferrlecit doses complete , pt aware to expect a call from scheduling department once new drug authorization received.  Consent form signed .

## 2024-11-22 ENCOUNTER — PATIENT MESSAGE (OUTPATIENT)
Dept: HEMATOLOGY/ONCOLOGY | Facility: CLINIC | Age: 64
End: 2024-11-22
Payer: MEDICAID

## 2024-11-22 DIAGNOSIS — C50.412 PRIMARY MALIGNANT NEOPLASM OF UPPER OUTER QUADRANT OF BREAST, LEFT: Primary | ICD-10-CM

## 2024-11-22 LAB
FERRITIN SERPL-MCNC: 1533 NG/ML (ref 20–300)
HAPTOGLOB SERPL-MCNC: 278 MG/DL (ref 30–250)

## 2024-11-25 DIAGNOSIS — C78.2 SECONDARY MALIGNANCY OF PARIETAL PLEURA: Primary | ICD-10-CM

## 2024-11-26 ENCOUNTER — TELEPHONE (OUTPATIENT)
Dept: HEMATOLOGY/ONCOLOGY | Facility: CLINIC | Age: 64
End: 2024-11-26
Payer: MEDICAID

## 2024-11-26 NOTE — TELEPHONE ENCOUNTER
Contacted pt to advise that the auth for her new tx has gone through and we have her scheduled for infusion 12/3 @1pm at [facility] with labs before. Pt v/u. Also confirmed that she has education scheduled 11/29 at 0830. Pt v/u.

## 2024-11-27 ENCOUNTER — HOSPITAL ENCOUNTER (OUTPATIENT)
Dept: RADIOLOGY | Facility: HOSPITAL | Age: 64
Discharge: HOME | End: 2024-11-27
Attending: INTERNAL MEDICINE
Payer: MEDICAID

## 2024-11-27 ENCOUNTER — HOSPITAL ENCOUNTER (INPATIENT)
Facility: HOSPITAL | Age: 64
LOS: 3 days | Discharge: HOME | DRG: 597 | End: 2024-11-30
Attending: STUDENT IN AN ORGANIZED HEALTH CARE EDUCATION/TRAINING PROGRAM | Admitting: INTERNAL MEDICINE
Payer: MEDICAID

## 2024-11-27 ENCOUNTER — TELEPHONE (OUTPATIENT)
Dept: HEMATOLOGY/ONCOLOGY | Facility: CLINIC | Age: 64
End: 2024-11-27
Payer: MEDICAID

## 2024-11-27 DIAGNOSIS — R07.9 CHEST PAIN: ICD-10-CM

## 2024-11-27 DIAGNOSIS — G89.29 CHRONIC NONINTRACTABLE HEADACHE, UNSPECIFIED HEADACHE TYPE: ICD-10-CM

## 2024-11-27 DIAGNOSIS — R51.9 CHRONIC NONINTRACTABLE HEADACHE, UNSPECIFIED HEADACHE TYPE: ICD-10-CM

## 2024-11-27 DIAGNOSIS — C50.412 PRIMARY MALIGNANT NEOPLASM OF UPPER OUTER QUADRANT OF BREAST, LEFT: ICD-10-CM

## 2024-11-27 DIAGNOSIS — G89.3 NEOPLASM RELATED PAIN: ICD-10-CM

## 2024-11-27 DIAGNOSIS — C78.2 SECONDARY MALIGNANCY OF PARIETAL PLEURA: ICD-10-CM

## 2024-11-27 DIAGNOSIS — G93.89 BRAIN MASS: Primary | ICD-10-CM

## 2024-11-27 PROBLEM — C50.919 BREAST CANCER METASTASIZED TO BRAIN: Status: ACTIVE | Noted: 2024-11-27

## 2024-11-27 PROBLEM — C79.31 BREAST CANCER METASTASIZED TO BRAIN: Status: ACTIVE | Noted: 2024-11-27

## 2024-11-27 LAB
ALBUMIN SERPL BCP-MCNC: 3.8 G/DL (ref 3.5–5.2)
ALP SERPL-CCNC: 108 U/L (ref 40–150)
ALT SERPL W/O P-5'-P-CCNC: 28 U/L (ref 10–44)
ANION GAP SERPL CALC-SCNC: 15 MMOL/L (ref 8–16)
APTT PPP: 22.2 SEC (ref 21–32)
AST SERPL-CCNC: 46 U/L (ref 10–40)
BASOPHILS # BLD AUTO: 0.03 K/UL (ref 0–0.2)
BASOPHILS NFR BLD: 0.7 % (ref 0–1.9)
BILIRUB SERPL-MCNC: 0.4 MG/DL (ref 0.1–1)
BUN SERPL-MCNC: 10 MG/DL (ref 8–23)
CALCIUM SERPL-MCNC: 9.6 MG/DL (ref 8.7–10.5)
CHLORIDE SERPL-SCNC: 106 MMOL/L (ref 95–110)
CO2 SERPL-SCNC: 18 MMOL/L (ref 23–29)
CREAT SERPL-MCNC: 0.8 MG/DL (ref 0.5–1.4)
DIFFERENTIAL METHOD BLD: ABNORMAL
EOSINOPHIL # BLD AUTO: 0.1 K/UL (ref 0–0.5)
EOSINOPHIL NFR BLD: 1.2 % (ref 0–8)
ERYTHROCYTE [DISTWIDTH] IN BLOOD BY AUTOMATED COUNT: 17.9 % (ref 11.5–14.5)
EST. GFR  (NO RACE VARIABLE): >60 ML/MIN/1.73 M^2
GLUCOSE SERPL-MCNC: 80 MG/DL (ref 70–110)
HCT VFR BLD AUTO: 31.9 % (ref 37–48.5)
HGB BLD-MCNC: 9.9 G/DL (ref 12–16)
IMM GRANULOCYTES # BLD AUTO: 0.03 K/UL (ref 0–0.04)
IMM GRANULOCYTES NFR BLD AUTO: 0.7 % (ref 0–0.5)
INR PPP: 1.1 (ref 0.8–1.2)
LYMPHOCYTES # BLD AUTO: 0.9 K/UL (ref 1–4.8)
LYMPHOCYTES NFR BLD: 19.8 % (ref 18–48)
MCH RBC QN AUTO: 28.5 PG (ref 27–31)
MCHC RBC AUTO-ENTMCNC: 31 G/DL (ref 32–36)
MCV RBC AUTO: 92 FL (ref 82–98)
MONOCYTES # BLD AUTO: 0.6 K/UL (ref 0.3–1)
MONOCYTES NFR BLD: 14.2 % (ref 4–15)
NEUTROPHILS # BLD AUTO: 2.7 K/UL (ref 1.8–7.7)
NEUTROPHILS NFR BLD: 63.4 % (ref 38–73)
NRBC BLD-RTO: 0 /100 WBC
PLATELET # BLD AUTO: 274 K/UL (ref 150–450)
PMV BLD AUTO: 9.3 FL (ref 9.2–12.9)
POTASSIUM SERPL-SCNC: 4.4 MMOL/L (ref 3.5–5.1)
PROT SERPL-MCNC: 7.8 G/DL (ref 6–8.4)
PROTHROMBIN TIME: 11.8 SEC (ref 9–12.5)
RBC # BLD AUTO: 3.47 M/UL (ref 4–5.4)
SODIUM SERPL-SCNC: 139 MMOL/L (ref 136–145)
WBC # BLD AUTO: 4.29 K/UL (ref 3.9–12.7)

## 2024-11-27 PROCEDURE — 25500020 PHARM REV CODE 255: Mod: PN | Performed by: INTERNAL MEDICINE

## 2024-11-27 PROCEDURE — 85025 COMPLETE CBC W/AUTO DIFF WBC: CPT | Performed by: STUDENT IN AN ORGANIZED HEALTH CARE EDUCATION/TRAINING PROGRAM

## 2024-11-27 PROCEDURE — 96376 TX/PRO/DX INJ SAME DRUG ADON: CPT

## 2024-11-27 PROCEDURE — 70553 MRI BRAIN STEM W/O & W/DYE: CPT | Mod: 26,,, | Performed by: RADIOLOGY

## 2024-11-27 PROCEDURE — 96374 THER/PROPH/DIAG INJ IV PUSH: CPT

## 2024-11-27 PROCEDURE — 70553 MRI BRAIN STEM W/O & W/DYE: CPT | Mod: TC,PN

## 2024-11-27 PROCEDURE — 12000002 HC ACUTE/MED SURGE SEMI-PRIVATE ROOM

## 2024-11-27 PROCEDURE — 85610 PROTHROMBIN TIME: CPT | Performed by: STUDENT IN AN ORGANIZED HEALTH CARE EDUCATION/TRAINING PROGRAM

## 2024-11-27 PROCEDURE — 85730 THROMBOPLASTIN TIME PARTIAL: CPT | Performed by: STUDENT IN AN ORGANIZED HEALTH CARE EDUCATION/TRAINING PROGRAM

## 2024-11-27 PROCEDURE — 63600175 PHARM REV CODE 636 W HCPCS: Performed by: STUDENT IN AN ORGANIZED HEALTH CARE EDUCATION/TRAINING PROGRAM

## 2024-11-27 PROCEDURE — A9585 GADOBUTROL INJECTION: HCPCS | Mod: PN | Performed by: INTERNAL MEDICINE

## 2024-11-27 PROCEDURE — 96375 TX/PRO/DX INJ NEW DRUG ADDON: CPT

## 2024-11-27 PROCEDURE — 87635 SARS-COV-2 COVID-19 AMP PRB: CPT | Performed by: INTERNAL MEDICINE

## 2024-11-27 PROCEDURE — 25000003 PHARM REV CODE 250: Performed by: STUDENT IN AN ORGANIZED HEALTH CARE EDUCATION/TRAINING PROGRAM

## 2024-11-27 PROCEDURE — 99285 EMERGENCY DEPT VISIT HI MDM: CPT | Mod: 25

## 2024-11-27 PROCEDURE — 80053 COMPREHEN METABOLIC PANEL: CPT | Performed by: STUDENT IN AN ORGANIZED HEALTH CARE EDUCATION/TRAINING PROGRAM

## 2024-11-27 RX ORDER — LISINOPRIL AND HYDROCHLOROTHIAZIDE 10; 12.5 MG/1; MG/1
2 TABLET ORAL DAILY
Status: DISCONTINUED | OUTPATIENT
Start: 2024-11-28 | End: 2024-11-29

## 2024-11-27 RX ORDER — INSULIN ASPART 100 [IU]/ML
0-5 INJECTION, SOLUTION INTRAVENOUS; SUBCUTANEOUS
Status: DISCONTINUED | OUTPATIENT
Start: 2024-11-28 | End: 2024-11-30 | Stop reason: HOSPADM

## 2024-11-27 RX ORDER — SIMETHICONE 80 MG
1 TABLET,CHEWABLE ORAL 4 TIMES DAILY PRN
Status: DISCONTINUED | OUTPATIENT
Start: 2024-11-28 | End: 2024-11-30 | Stop reason: HOSPADM

## 2024-11-27 RX ORDER — IBUPROFEN 200 MG
24 TABLET ORAL
Status: DISCONTINUED | OUTPATIENT
Start: 2024-11-28 | End: 2024-11-30 | Stop reason: HOSPADM

## 2024-11-27 RX ORDER — CAPECITABINE 500 MG/1
500 TABLET, FILM COATED ORAL 2 TIMES DAILY
Status: DISCONTINUED | OUTPATIENT
Start: 2024-11-28 | End: 2024-11-28

## 2024-11-27 RX ORDER — BISACODYL 10 MG/1
10 SUPPOSITORY RECTAL DAILY PRN
Status: DISCONTINUED | OUTPATIENT
Start: 2024-11-28 | End: 2024-11-30 | Stop reason: HOSPADM

## 2024-11-27 RX ORDER — ALUMINUM HYDROXIDE, MAGNESIUM HYDROXIDE, AND SIMETHICONE 1200; 120; 1200 MG/30ML; MG/30ML; MG/30ML
30 SUSPENSION ORAL 4 TIMES DAILY PRN
Status: DISCONTINUED | OUTPATIENT
Start: 2024-11-28 | End: 2024-11-30 | Stop reason: HOSPADM

## 2024-11-27 RX ORDER — LABETALOL HYDROCHLORIDE 5 MG/ML
10 INJECTION, SOLUTION INTRAVENOUS EVERY 4 HOURS PRN
Status: DISCONTINUED | OUTPATIENT
Start: 2024-11-28 | End: 2024-11-30 | Stop reason: HOSPADM

## 2024-11-27 RX ORDER — ATORVASTATIN CALCIUM 40 MG/1
40 TABLET, FILM COATED ORAL NIGHTLY
Status: DISCONTINUED | OUTPATIENT
Start: 2024-11-28 | End: 2024-11-30 | Stop reason: HOSPADM

## 2024-11-27 RX ORDER — GADOBUTROL 604.72 MG/ML
8 INJECTION INTRAVENOUS
Status: COMPLETED | OUTPATIENT
Start: 2024-11-27 | End: 2024-11-27

## 2024-11-27 RX ORDER — NALOXONE HCL 0.4 MG/ML
0.02 VIAL (ML) INJECTION
Status: DISCONTINUED | OUTPATIENT
Start: 2024-11-28 | End: 2024-11-30 | Stop reason: HOSPADM

## 2024-11-27 RX ORDER — TALC
6 POWDER (GRAM) TOPICAL NIGHTLY PRN
Status: DISCONTINUED | OUTPATIENT
Start: 2024-11-28 | End: 2024-11-30 | Stop reason: HOSPADM

## 2024-11-27 RX ORDER — PROCHLORPERAZINE EDISYLATE 5 MG/ML
5 INJECTION INTRAMUSCULAR; INTRAVENOUS EVERY 6 HOURS PRN
Status: DISCONTINUED | OUTPATIENT
Start: 2024-11-28 | End: 2024-11-30 | Stop reason: HOSPADM

## 2024-11-27 RX ORDER — ONDANSETRON 4 MG/1
4 TABLET, ORALLY DISINTEGRATING ORAL
Status: COMPLETED | OUTPATIENT
Start: 2024-11-27 | End: 2024-11-27

## 2024-11-27 RX ORDER — MORPHINE SULFATE 30 MG/1
30 TABLET, FILM COATED, EXTENDED RELEASE ORAL 2 TIMES DAILY
Status: DISCONTINUED | OUTPATIENT
Start: 2024-11-28 | End: 2024-11-29

## 2024-11-27 RX ORDER — ONDANSETRON HYDROCHLORIDE 2 MG/ML
4 INJECTION, SOLUTION INTRAVENOUS EVERY 6 HOURS PRN
Status: DISCONTINUED | OUTPATIENT
Start: 2024-11-28 | End: 2024-11-30 | Stop reason: HOSPADM

## 2024-11-27 RX ORDER — HYDROMORPHONE HYDROCHLORIDE 4 MG/1
4 TABLET ORAL EVERY 4 HOURS PRN
Status: DISCONTINUED | OUTPATIENT
Start: 2024-11-28 | End: 2024-11-30 | Stop reason: HOSPADM

## 2024-11-27 RX ORDER — FAMOTIDINE 20 MG/1
20 TABLET, FILM COATED ORAL 2 TIMES DAILY
Status: DISCONTINUED | OUTPATIENT
Start: 2024-11-27 | End: 2024-11-30 | Stop reason: HOSPADM

## 2024-11-27 RX ORDER — IBUPROFEN 200 MG
16 TABLET ORAL
Status: DISCONTINUED | OUTPATIENT
Start: 2024-11-28 | End: 2024-11-30 | Stop reason: HOSPADM

## 2024-11-27 RX ORDER — LANOLIN ALCOHOL/MO/W.PET/CERES
400 CREAM (GRAM) TOPICAL DAILY
Status: DISCONTINUED | OUTPATIENT
Start: 2024-11-28 | End: 2024-11-30 | Stop reason: HOSPADM

## 2024-11-27 RX ORDER — IPRATROPIUM BROMIDE AND ALBUTEROL SULFATE 2.5; .5 MG/3ML; MG/3ML
3 SOLUTION RESPIRATORY (INHALATION) EVERY 4 HOURS PRN
Status: DISCONTINUED | OUTPATIENT
Start: 2024-11-28 | End: 2024-11-30 | Stop reason: HOSPADM

## 2024-11-27 RX ORDER — ONDANSETRON 8 MG/1
8 TABLET, ORALLY DISINTEGRATING ORAL 2 TIMES DAILY
Qty: 30 TABLET | Refills: 2 | Status: SHIPPED | OUTPATIENT
Start: 2024-11-27

## 2024-11-27 RX ORDER — DEXAMETHASONE SODIUM PHOSPHATE 4 MG/ML
4 INJECTION, SOLUTION INTRA-ARTICULAR; INTRALESIONAL; INTRAMUSCULAR; INTRAVENOUS; SOFT TISSUE EVERY 6 HOURS
Status: DISCONTINUED | OUTPATIENT
Start: 2024-11-28 | End: 2024-11-30 | Stop reason: HOSPADM

## 2024-11-27 RX ORDER — FOLIC ACID 1 MG/1
1 TABLET ORAL DAILY
Status: DISCONTINUED | OUTPATIENT
Start: 2024-11-28 | End: 2024-11-30 | Stop reason: HOSPADM

## 2024-11-27 RX ORDER — BUSPIRONE HYDROCHLORIDE 5 MG/1
15 TABLET ORAL NIGHTLY
Status: DISCONTINUED | OUTPATIENT
Start: 2024-11-28 | End: 2024-11-30 | Stop reason: HOSPADM

## 2024-11-27 RX ORDER — DEXAMETHASONE SODIUM PHOSPHATE 4 MG/ML
10 INJECTION, SOLUTION INTRA-ARTICULAR; INTRALESIONAL; INTRAMUSCULAR; INTRAVENOUS; SOFT TISSUE ONCE
Status: COMPLETED | OUTPATIENT
Start: 2024-11-27 | End: 2024-11-27

## 2024-11-27 RX ORDER — GLUCAGON 1 MG
1 KIT INJECTION
Status: DISCONTINUED | OUTPATIENT
Start: 2024-11-28 | End: 2024-11-30 | Stop reason: HOSPADM

## 2024-11-27 RX ORDER — ACETAMINOPHEN 325 MG/1
650 TABLET ORAL EVERY 4 HOURS PRN
Status: DISCONTINUED | OUTPATIENT
Start: 2024-11-28 | End: 2024-11-30 | Stop reason: HOSPADM

## 2024-11-27 RX ORDER — MORPHINE SULFATE 4 MG/ML
4 INJECTION, SOLUTION INTRAMUSCULAR; INTRAVENOUS
Status: COMPLETED | OUTPATIENT
Start: 2024-11-27 | End: 2024-11-27

## 2024-11-27 RX ORDER — SODIUM CHLORIDE 0.9 % (FLUSH) 0.9 %
10 SYRINGE (ML) INJECTION EVERY 12 HOURS PRN
Status: DISCONTINUED | OUTPATIENT
Start: 2024-11-28 | End: 2024-11-30 | Stop reason: HOSPADM

## 2024-11-27 RX ORDER — POLYETHYLENE GLYCOL 3350 17 G/17G
17 POWDER, FOR SOLUTION ORAL DAILY
Status: DISCONTINUED | OUTPATIENT
Start: 2024-11-28 | End: 2024-11-30 | Stop reason: HOSPADM

## 2024-11-27 RX ORDER — GABAPENTIN 300 MG/1
300 CAPSULE ORAL 2 TIMES DAILY
Status: DISCONTINUED | OUTPATIENT
Start: 2024-11-28 | End: 2024-11-30 | Stop reason: HOSPADM

## 2024-11-27 RX ORDER — AMOXICILLIN 250 MG
2 CAPSULE ORAL 2 TIMES DAILY
Status: DISCONTINUED | OUTPATIENT
Start: 2024-11-27 | End: 2024-11-30 | Stop reason: HOSPADM

## 2024-11-27 RX ORDER — AMLODIPINE BESYLATE 5 MG/1
5 TABLET ORAL DAILY
Status: DISCONTINUED | OUTPATIENT
Start: 2024-11-28 | End: 2024-11-30 | Stop reason: HOSPADM

## 2024-11-27 RX ADMIN — MORPHINE SULFATE 4 MG: 4 INJECTION INTRAVENOUS at 10:11

## 2024-11-27 RX ADMIN — DEXAMETHASONE SODIUM PHOSPHATE 10 MG: 4 INJECTION INTRA-ARTICULAR; INTRALESIONAL; INTRAMUSCULAR; INTRAVENOUS; SOFT TISSUE at 10:11

## 2024-11-27 RX ADMIN — ONDANSETRON 4 MG: 4 TABLET, ORALLY DISINTEGRATING ORAL at 09:11

## 2024-11-27 RX ADMIN — GADOBUTROL 8 ML: 604.72 INJECTION INTRAVENOUS at 03:11

## 2024-11-27 RX ADMIN — MORPHINE SULFATE 4 MG: 4 INJECTION INTRAVENOUS at 09:11

## 2024-11-27 NOTE — TELEPHONE ENCOUNTER
Attempted to return call  to Pippa at [facility]. No Answer. No available Vm.  Called patient  who spoke to Geneva Morfin NP  to instruct and explain next steps for patient to go to the [facility] in [facility]  for continued evaluation of current condition. Family with Mrs. Saavedra on phone call  also updated.

## 2024-11-28 PROBLEM — G93.89 BRAIN MASS: Status: ACTIVE | Noted: 2024-11-28

## 2024-11-28 LAB
ALBUMIN SERPL BCP-MCNC: 3.6 G/DL (ref 3.5–5.2)
ALP SERPL-CCNC: 98 U/L (ref 40–150)
ALT SERPL W/O P-5'-P-CCNC: 24 U/L (ref 10–44)
ANION GAP SERPL CALC-SCNC: 14 MMOL/L (ref 8–16)
AST SERPL-CCNC: 36 U/L (ref 10–40)
BASOPHILS # BLD AUTO: 0.01 K/UL (ref 0–0.2)
BASOPHILS NFR BLD: 0.3 % (ref 0–1.9)
BILIRUB SERPL-MCNC: 0.4 MG/DL (ref 0.1–1)
BUN SERPL-MCNC: 11 MG/DL (ref 8–23)
CALCIUM SERPL-MCNC: 9.7 MG/DL (ref 8.7–10.5)
CHLORIDE SERPL-SCNC: 106 MMOL/L (ref 95–110)
CO2 SERPL-SCNC: 21 MMOL/L (ref 23–29)
CREAT SERPL-MCNC: 0.8 MG/DL (ref 0.5–1.4)
DIFFERENTIAL METHOD BLD: ABNORMAL
EOSINOPHIL # BLD AUTO: 0 K/UL (ref 0–0.5)
EOSINOPHIL NFR BLD: 0.3 % (ref 0–8)
ERYTHROCYTE [DISTWIDTH] IN BLOOD BY AUTOMATED COUNT: 17.5 % (ref 11.5–14.5)
EST. GFR  (NO RACE VARIABLE): >60 ML/MIN/1.73 M^2
ESTIMATED AVG GLUCOSE: 111 MG/DL (ref 68–131)
GLUCOSE SERPL-MCNC: 154 MG/DL (ref 70–110)
HBA1C MFR BLD: 5.5 % (ref 4–5.6)
HCT VFR BLD AUTO: 28.4 % (ref 37–48.5)
HGB BLD-MCNC: 9 G/DL (ref 12–16)
IMM GRANULOCYTES # BLD AUTO: 0.02 K/UL (ref 0–0.04)
IMM GRANULOCYTES NFR BLD AUTO: 0.5 % (ref 0–0.5)
LYMPHOCYTES # BLD AUTO: 0.4 K/UL (ref 1–4.8)
LYMPHOCYTES NFR BLD: 10.3 % (ref 18–48)
MAGNESIUM SERPL-MCNC: 1.6 MG/DL (ref 1.6–2.6)
MCH RBC QN AUTO: 27.9 PG (ref 27–31)
MCHC RBC AUTO-ENTMCNC: 31.7 G/DL (ref 32–36)
MCV RBC AUTO: 88 FL (ref 82–98)
MONOCYTES # BLD AUTO: 0.1 K/UL (ref 0.3–1)
MONOCYTES NFR BLD: 1.8 % (ref 4–15)
NEUTROPHILS # BLD AUTO: 3.5 K/UL (ref 1.8–7.7)
NEUTROPHILS NFR BLD: 86.8 % (ref 38–73)
NRBC BLD-RTO: 0 /100 WBC
PHOSPHATE SERPL-MCNC: 3.5 MG/DL (ref 2.7–4.5)
PLATELET # BLD AUTO: 256 K/UL (ref 150–450)
PMV BLD AUTO: 9.7 FL (ref 9.2–12.9)
POCT GLUCOSE: 130 MG/DL (ref 70–110)
POCT GLUCOSE: 134 MG/DL (ref 70–110)
POCT GLUCOSE: 145 MG/DL (ref 70–110)
POCT GLUCOSE: 150 MG/DL (ref 70–110)
POTASSIUM SERPL-SCNC: 4.2 MMOL/L (ref 3.5–5.1)
PROT SERPL-MCNC: 7.1 G/DL (ref 6–8.4)
RBC # BLD AUTO: 3.23 M/UL (ref 4–5.4)
SARS-COV-2 RDRP RESP QL NAA+PROBE: NEGATIVE
SODIUM SERPL-SCNC: 141 MMOL/L (ref 136–145)
WBC # BLD AUTO: 3.99 K/UL (ref 3.9–12.7)

## 2024-11-28 PROCEDURE — 20600001 HC STEP DOWN PRIVATE ROOM

## 2024-11-28 PROCEDURE — 85025 COMPLETE CBC W/AUTO DIFF WBC: CPT | Performed by: INTERNAL MEDICINE

## 2024-11-28 PROCEDURE — 63600175 PHARM REV CODE 636 W HCPCS: Performed by: INTERNAL MEDICINE

## 2024-11-28 PROCEDURE — 83735 ASSAY OF MAGNESIUM: CPT | Performed by: INTERNAL MEDICINE

## 2024-11-28 PROCEDURE — 36415 COLL VENOUS BLD VENIPUNCTURE: CPT | Performed by: INTERNAL MEDICINE

## 2024-11-28 PROCEDURE — 25000003 PHARM REV CODE 250: Performed by: INTERNAL MEDICINE

## 2024-11-28 PROCEDURE — 84100 ASSAY OF PHOSPHORUS: CPT | Performed by: INTERNAL MEDICINE

## 2024-11-28 PROCEDURE — 80053 COMPREHEN METABOLIC PANEL: CPT | Performed by: INTERNAL MEDICINE

## 2024-11-28 PROCEDURE — 99223 1ST HOSP IP/OBS HIGH 75: CPT | Mod: ,,, | Performed by: STUDENT IN AN ORGANIZED HEALTH CARE EDUCATION/TRAINING PROGRAM

## 2024-11-28 PROCEDURE — 83036 HEMOGLOBIN GLYCOSYLATED A1C: CPT | Performed by: INTERNAL MEDICINE

## 2024-11-28 RX ORDER — HYDROMORPHONE HYDROCHLORIDE 1 MG/ML
1 INJECTION, SOLUTION INTRAMUSCULAR; INTRAVENOUS; SUBCUTANEOUS ONCE
Status: COMPLETED | OUTPATIENT
Start: 2024-11-28 | End: 2024-11-28

## 2024-11-28 RX ADMIN — BUSPIRONE HYDROCHLORIDE 15 MG: 5 TABLET ORAL at 09:11

## 2024-11-28 RX ADMIN — AMLODIPINE BESYLATE 5 MG: 5 TABLET ORAL at 08:11

## 2024-11-28 RX ADMIN — SENNOSIDES AND DOCUSATE SODIUM 2 TABLET: 50; 8.6 TABLET ORAL at 09:11

## 2024-11-28 RX ADMIN — DEXAMETHASONE SODIUM PHOSPHATE 4 MG: 4 INJECTION INTRA-ARTICULAR; INTRALESIONAL; INTRAMUSCULAR; INTRAVENOUS; SOFT TISSUE at 05:11

## 2024-11-28 RX ADMIN — POLYETHYLENE GLYCOL 3350 17 G: 17 POWDER, FOR SOLUTION ORAL at 08:11

## 2024-11-28 RX ADMIN — FAMOTIDINE 20 MG: 20 TABLET ORAL at 08:11

## 2024-11-28 RX ADMIN — ATORVASTATIN CALCIUM 40 MG: 40 TABLET, FILM COATED ORAL at 09:11

## 2024-11-28 RX ADMIN — SENNOSIDES AND DOCUSATE SODIUM 2 TABLET: 50; 8.6 TABLET ORAL at 08:11

## 2024-11-28 RX ADMIN — FOLIC ACID 1 MG: 1 TABLET ORAL at 08:11

## 2024-11-28 RX ADMIN — MORPHINE SULFATE 30 MG: 30 TABLET, FILM COATED, EXTENDED RELEASE ORAL at 09:11

## 2024-11-28 RX ADMIN — Medication 400 MG: at 08:11

## 2024-11-28 RX ADMIN — FAMOTIDINE 20 MG: 20 TABLET ORAL at 12:11

## 2024-11-28 RX ADMIN — GABAPENTIN 300 MG: 300 CAPSULE ORAL at 09:11

## 2024-11-28 RX ADMIN — FAMOTIDINE 20 MG: 20 TABLET ORAL at 09:11

## 2024-11-28 RX ADMIN — SENNOSIDES AND DOCUSATE SODIUM 2 TABLET: 50; 8.6 TABLET ORAL at 12:11

## 2024-11-28 RX ADMIN — HYDROMORPHONE HYDROCHLORIDE 1 MG: 1 INJECTION, SOLUTION INTRAMUSCULAR; INTRAVENOUS; SUBCUTANEOUS at 05:11

## 2024-11-28 RX ADMIN — DEXAMETHASONE SODIUM PHOSPHATE 4 MG: 4 INJECTION INTRA-ARTICULAR; INTRALESIONAL; INTRAMUSCULAR; INTRAVENOUS; SOFT TISSUE at 11:11

## 2024-11-28 RX ADMIN — HYDROMORPHONE HYDROCHLORIDE 4 MG: 4 TABLET ORAL at 03:11

## 2024-11-28 RX ADMIN — MORPHINE SULFATE 30 MG: 30 TABLET, FILM COATED, EXTENDED RELEASE ORAL at 08:11

## 2024-11-28 RX ADMIN — HYDROMORPHONE HYDROCHLORIDE 4 MG: 4 TABLET ORAL at 09:11

## 2024-11-28 RX ADMIN — GABAPENTIN 300 MG: 300 CAPSULE ORAL at 08:11

## 2024-11-28 NOTE — HPI
"Ms. Saavedra is a 63yo lady with a past medical history of primary malignant neoplasm of upper outer quadrant of breast, left with brain/lung mets, DM2, HLD, HTN, and anemia    She is followed in Oncology Clinic by Dr. Hoffmann, who last saw her on 11/21/24.  He noted, progressive metastatic triple negative breast cancer. Patient has progressive 2 lines of therapy. Recent CT chest abdomen pelvis demonstrates progressive disease.  As she was having new headaches, he ordered a follow up MRI brain.  She was last seen in Chemo Infusion on 11/21/24 at which point the nurse noted, Pt treatment cancelled , changed to new regimen waiting on authorization for Eribulin. All Ferrlecit doses complete , pt aware to expect a call from scheduling department once new drug authorization received.    MRI brain done earlier today showed interval progression of metastatic disease with significant interval increase in size of several ring-enhancing lesions in both cerebral hemispheres and the left cerebellum as compared to the prior with significant surrounding edema.  New punctate focus of enhancement in the left parietal lobe as compared to the prior.  Interval increase in size of the ventricular system with some effacement of the sulci bilaterally is concerning for developing hydrocephalus.      She was immediately referred to the ED after the above results.  Ms. Saavedra is in the ED now with her daughter at the bedside, who assists with the history.  Ms. Saavedra tells me her main concerns have been constipation and headache.  Her last BM was this past Saturday, 3 days ago.  She has an, "intense" headache that is located on the vertex and frontal area, and is sharp in quality.  She has been taking Ibuprofen for it with some occasional relief.  She is also getting "hot flashes" on her head.  She also notes vague visual changes.    Her daughter notes imbalance when walking at times, mild confusion, and clumsiness.  For example, she picks up " the phone then she can't figure out how to dial or make the call.  She also feels her appetite has been declining over the past few days, only eating some fresh fruits at times.    In the ED her VS were BP (!) 147/63 -> 160/103   Pulse 75   Temp 98.2 °F (36.8 °C) (Oral)   Resp 20   Wt 83 kg (183 lb)   SpO2 100% RA  BMI 35.74 kg/m².  Labs showed Hg 9.9, Cr 0.8, AST 46, otherwise NML.  No radiographs done in the ED.    In the ED she was treated with:  Medications   dexAMETHasone injection 10 mg (has no administration in time range)   morphine injection 4 mg (has no administration in time range)   morphine injection 4 mg (4 mg Intravenous Given 11/27/24 2116)   ondansetron disintegrating tablet 4 mg (4 mg Oral Given 11/27/24 2116)

## 2024-11-28 NOTE — ASSESSMENT & PLAN NOTE
Continued her home medications for pain for now  If confusion worsens, may need to stop long acting MS Contin         [START ON 11/28/2024] HYDROmorphone tablet 4 mg, 4 mg, Oral, Q4H PRN, mod-severe pain    [START ON 11/28/2024] morphine 12 hr tablet 30 mg, 30 mg, Oral, BID

## 2024-11-28 NOTE — ASSESSMENT & PLAN NOTE
"Ms. Saavedra is a 63 yo female with a PMHx of primary malignant neoplasm of upper outer quadrant of the left breast with brain/lung mets, DM2, HLD, HTN, and anemia. She is followed in Oncology Clinic by Dr. Hoffmann, who last saw her on 11/21/24. He noted, "progressive metastatic triple negative breast cancer. Patient has progressive 2 lines of therapy. Recent CT chest abdomen pelvis demonstrates progressive disease." As she was having new headaches, he ordered a follow up MRI brain.MRI brain done 11/27 shows interval progression of metastatic disease with significant interval increase in size of several ring-enhancing lesions in both cerebral hemispheres and the left cerebellum as compared to the prior with significant surrounding edema.     Plan:  -- q4h neurochecks on floor   --SBP <160 (cardene ggt; hydralazine & labetalol PRN; transition to home meds when appropriate)   --Na >135   --Loading dose of 10mg Dex once followed by 4mg q6h x7d  --Craniotomy for tumor resection will be discussed with patient and family tomorrow to align with goals of care  --PUD PPx while steroid treatment ongoing  --Continue to monitor clinically, notify NSGY immediately with any changes in neuro status  "

## 2024-11-28 NOTE — PROGRESS NOTES
"[facility] - Oncology ([facility])  Hematology/Oncology  Progress Note    Patient Name: Sherlyn Saavedra  Admission Date: 11/27/2024  Hospital Length of Stay: 1 days  Code Status: Full Code     Subjective:     HPI:  Ms. Saavedra is a 65 yo female with a PMHx of primary malignant neoplasm of upper outer quadrant of the left breast with brain/lung mets, DM2, HLD, HTN, and anemia. She is followed in Oncology Clinic by Dr. Hoffmann, who last saw her on 11/21/24.  He noted, "progressive metastatic triple negative breast cancer. Patient has progressive 2 lines of therapy. Recent CT chest abdomen pelvis demonstrates progressive disease."  As she was having new headaches, he ordered a follow up MRI brain.MRI brain done 11/27 shows interval progression of metastatic disease with significant interval increase in size of several ring-enhancing lesions in both cerebral hemispheres and the left cerebellum as compared to the prior with significant surrounding edema.    Admitted to [facility] for further investigation and management.    Interval History: Vitals were stable. F/u neurosurgery recs and continue current management.    Oncology Treatment Plan:   OP ERIBULIN Q3W    Medications:  Continuous Infusions:  Scheduled Meds:   amLODIPine  5 mg Oral Daily    atorvastatin  40 mg Oral QHS    busPIRone  15 mg Oral QHS    dexAMETHasone  4 mg Intravenous Q6H    famotidine  20 mg Oral BID    folic acid  1 mg Oral Daily    gabapentin  300 mg Oral BID    lisinopriL-hydrochlorothiazide  2 tablet Oral Daily    magnesium oxide  400 mg Oral Daily    morphine  30 mg Oral BID    polyethylene glycol  17 g Oral Daily    senna-docusate 8.6-50 mg  2 tablet Oral BID     PRN Meds:  Current Facility-Administered Medications:     (Magic mouthwash) 1:1:1 diphenhydrAMINE(Benadryl) 12.5mg/5ml liq, aluminum & magnesium hydroxide-simethicone (Maalox), LIDOcaine viscous 2%, 15 mL, Swish & Spit, Q4H PRN    acetaminophen, 650 mg, Oral, Q4H PRN    albuterol-ipratropium, 3 " mL, Nebulization, Q4H PRN    aluminum-magnesium hydroxide-simethicone, 30 mL, Oral, QID PRN    bisacodyL, 10 mg, Rectal, Daily PRN    dextrose 10%, 12.5 g, Intravenous, PRN    dextrose 10%, 25 g, Intravenous, PRN    glucagon (human recombinant), 1 mg, Intramuscular, PRN    glucose, 16 g, Oral, PRN    glucose, 24 g, Oral, PRN    HYDROmorphone, 4 mg, Oral, Q4H PRN    insulin aspart U-100, 0-5 Units, Subcutaneous, QID (AC + HS) PRN    labetaloL, 10 mg, Intravenous, Q4H PRN    melatonin, 6 mg, Oral, Nightly PRN    naloxone, 0.02 mg, Intravenous, PRN    ondansetron, 4 mg, Intravenous, Q6H PRN    prochlorperazine, 5 mg, Intravenous, Q6H PRN    simethicone, 1 tablet, Oral, QID PRN    sodium chloride 0.9%, 10 mL, Intravenous, Q12H PRN     Review of Systems   Constitutional:  Positive for unexpected weight change. Negative for fever.   HENT:  Negative for congestion.    Musculoskeletal:  Negative for gait problem.   Neurological:  Positive for headaches. Negative for weakness and numbness.   Psychiatric/Behavioral:  Negative for behavioral problems.      Objective:     Vital Signs (Most Recent):  Temp: 97.9 °F (36.6 °C) (11/28/24 1108)  Pulse: 79 (11/28/24 1453)  Resp: 18 (11/28/24 1108)  BP: 133/75 (11/28/24 1108)  SpO2: 98 % (11/28/24 1453) Vital Signs (24h Range):  Temp:  [97.7 °F (36.5 °C)-98.5 °F (36.9 °C)] 97.9 °F (36.6 °C)  Pulse:  [70-83] 79  Resp:  [16-20] 18  SpO2:  [97 %-100 %] 98 %  BP: (133-160)/() 133/75     Weight: 81.8 kg (180 lb 5.4 oz)  Body mass index is 35.22 kg/m².  Body surface area is 1.86 meters squared.      Intake/Output Summary (Last 24 hours) at 11/28/2024 1516  Last data filed at 11/28/2024 0614  Gross per 24 hour   Intake 240 ml   Output 350 ml   Net -110 ml        Physical Exam  Vitals and nursing note reviewed.   Constitutional:       General: She is not in acute distress.  Eyes:      Pupils: Pupils are equal, round, and reactive to light.   Cardiovascular:      Rate and Rhythm: Normal  "rate.      Pulses: Normal pulses.      Heart sounds: Normal heart sounds.   Pulmonary:      Effort: Pulmonary effort is normal.      Breath sounds: Normal breath sounds.   Musculoskeletal:      Right lower leg: No edema.      Left lower leg: No edema.   Skin:     General: Skin is warm.   Neurological:      Mental Status: She is oriented to person, place, and time.      Motor: No weakness.   Psychiatric:         Mood and Affect: Mood normal.         Behavior: Behavior normal.          Significant Labs:   All pertinent labs from the last 24 hours have been reviewed.    Diagnostic Results:  I have reviewed all pertinent imaging results/findings within the past 24 hours.  I have reviewed and interpreted all pertinent imaging results/findings within the past 24 hours.  Assessment/Plan:     * Breast cancer metastasized to brain  Ms. Saavedra is a 65 yo female with a PMHx of primary malignant neoplasm of upper outer quadrant of the left breast with brain/lung mets, DM2, HLD, HTN, and anemia. She is followed in Oncology Clinic by Dr. Hoffmann, who last saw her on 11/21/24. He noted, "progressive metastatic triple negative breast cancer. Patient has progressive 2 lines of therapy. Recent CT chest abdomen pelvis demonstrates progressive disease." As she was having new headaches, he ordered a follow up MRI brain.MRI brain done 11/27 shows interval progression of metastatic disease with significant interval increase in size of several ring-enhancing lesions in both cerebral hemispheres and the left cerebellum as compared to the prior with significant surrounding edema.     Plan:  -- q4h neurochecks on floor   --SBP <160 (cardene ggt; hydralazine & labetalol PRN; transition to home meds when appropriate)   --Na >135   --Loading dose of 10mg Dex once followed by 4mg q6h x7d  --Craniotomy for tumor resection will be discussed with patient and family tomorrow to align with goals of care  --PUD PPx while steroid treatment " ongoing  --Continue to monitor clinically, notify NSGY immediately with any changes in neuro status    Type 2 diabetes mellitus with diabetic polyneuropathy, without long-term current use of insulin  Sliding scale    Hypertension  Known HTN    Plan:  -- SBP <160 (cardene ggt; hydralazine & labetalol PRN; transition to home meds when appropriate)     Primary malignant neoplasm of upper outer quadrant of breast, left  Ms. Saavedra is a 63 yo female with a PMHx of primary malignant neoplasm of upper outer quadrant of the left breast with brain/lung mets, recent MRI shows brain mets.    Plan:  -- On chemotherapy, will be held for now.             Lokesh Crowder MD  Hematology/Oncology  [facility] - Oncology ([facility])

## 2024-11-28 NOTE — ASSESSMENT & PLAN NOTE
primary malignant neoplasm of upper outer quadrant of breast, left with brain/lung mets.    She is followed in Oncology Clinic by Dr. Hoffmann, who last saw her on 11/21/24.  He noted, progressive metastatic triple negative breast cancer. Patient has progressive 2 lines of therapy. Recent CT chest abdomen pelvis demonstrates progressive disease.  As she was having new headaches, he ordered a follow up MRI brain.  She was last seen in Chemo Infusion on 11/21/24 at which point the nurse noted, Pt treatment cancelled , changed to new regimen waiting on authorization for Eribulin. All Ferrlecit doses complete , pt aware to expect a call from scheduling department once new drug authorization received.    She was apparently about to start 3rd line eribulin therapy.

## 2024-11-28 NOTE — ASSESSMENT & PLAN NOTE
"Patient's FSGs are controlled on current medication regimen.  Last A1c reviewed-   Lab Results   Component Value Date    HGBA1C 5.3 03/19/2024     Most recent fingerstick glucose reviewed- No results for input(s): "POCTGLUCOSE" in the last 24 hours.  Current correctional scale  Low  Maintain anti-hyperglycemic dose as follows-   Antihyperglycemics (From admission, onward)      Start     Stop Route Frequency Ordered    11/28/24 0032  insulin aspart U-100 pen 0-5 Units         -- SubQ Before meals & nightly PRN 11/27/24 2332          Hold Oral hypoglycemics while patient is in the hospital.  "

## 2024-11-28 NOTE — PLAN OF CARE
No acute events this shift. Patient AAOx4. Afebrile and VSS. x1 assist. Patient complaint of headache. PRN PO Dilaudid administered x1. Moderate relief obtained. Patient complaint of pain. On-call MD notified. x1 dose of IV Dilaudid ordered and administered to patient. Daughter at bedside. Bed alarm on. Bed in lowest position and locked. Side rails up x2. All possessions and call light within reach. Non-skid socks worn. Instructed to call for assistance and voiced understanding. All needs of patient currently met. Will continue to monitor with frequent rounding.

## 2024-11-28 NOTE — SUBJECTIVE & OBJECTIVE
Interval History: 11/28/2024: Stable on floor. Pending discussion for goals of care and goals of surgery, will complete today.    Medications:  Continuous Infusions:  Scheduled Meds:   amLODIPine  5 mg Oral Daily    atorvastatin  40 mg Oral QHS    busPIRone  15 mg Oral QHS    capecitabine  500 mg Oral BID    dexAMETHasone  4 mg Intravenous Q6H    famotidine  20 mg Oral BID    folic acid  1 mg Oral Daily    gabapentin  300 mg Oral BID    lisinopriL-hydrochlorothiazide  2 tablet Oral Daily    magnesium oxide  400 mg Oral Daily    morphine  30 mg Oral BID    polyethylene glycol  17 g Oral Daily    senna-docusate 8.6-50 mg  2 tablet Oral BID     PRN Meds:  Current Facility-Administered Medications:     (Magic mouthwash) 1:1:1 diphenhydrAMINE(Benadryl) 12.5mg/5ml liq, aluminum & magnesium hydroxide-simethicone (Maalox), LIDOcaine viscous 2%, 15 mL, Swish & Spit, Q4H PRN    acetaminophen, 650 mg, Oral, Q4H PRN    albuterol-ipratropium, 3 mL, Nebulization, Q4H PRN    aluminum-magnesium hydroxide-simethicone, 30 mL, Oral, QID PRN    bisacodyL, 10 mg, Rectal, Daily PRN    dextrose 10%, 12.5 g, Intravenous, PRN    dextrose 10%, 25 g, Intravenous, PRN    glucagon (human recombinant), 1 mg, Intramuscular, PRN    glucose, 16 g, Oral, PRN    glucose, 24 g, Oral, PRN    HYDROmorphone, 4 mg, Oral, Q4H PRN    insulin aspart U-100, 0-5 Units, Subcutaneous, QID (AC + HS) PRN    labetaloL, 10 mg, Intravenous, Q4H PRN    melatonin, 6 mg, Oral, Nightly PRN    naloxone, 0.02 mg, Intravenous, PRN    ondansetron, 4 mg, Intravenous, Q6H PRN    prochlorperazine, 5 mg, Intravenous, Q6H PRN    simethicone, 1 tablet, Oral, QID PRN    sodium chloride 0.9%, 10 mL, Intravenous, Q12H PRN     Review of Systems  Objective:     Weight: 81.8 kg (180 lb 5.4 oz)  Body mass index is 35.22 kg/m².  Vital Signs (Most Recent):  Temp: 97.7 °F (36.5 °C) (11/28/24 0800)  Pulse: 70 (11/28/24 0800)  Resp: 17 (11/28/24 0810)  BP: (!) 151/68 (11/28/24 0800)  SpO2: 98  % (11/28/24 0800) Vital Signs (24h Range):  Temp:  [97.7 °F (36.5 °C)-98.5 °F (36.9 °C)] 97.7 °F (36.5 °C)  Pulse:  [70-83] 70  Resp:  [16-20] 17  SpO2:  [97 %-100 %] 98 %  BP: (147-160)/() 151/68         Neurosurgery Physical Exam 11/28/2024        General:   Aox3  GCS [id number]    CNII-XII:   Intact on detailed exam  PERRL  Visual fields grossly intact  EOMi  Facial sensation preserved  No facial assymetry  Tongue/uvula/palate midline  Shoulder shrug equal  No pronator drift    Extremities:   5/5 motor throughout  Sensorium intact throughout  Coordination intact throughout  DTRs 2+  No pathological reflexes  No sensory level present     +Dysmetria present on finger to nose testing    Significant Labs:  Recent Labs   Lab 11/27/24 2043 11/28/24  0405   GLU 80 154*    141   K 4.4 4.2    106   CO2 18* 21*   BUN 10 11   CREATININE 0.8 0.8   CALCIUM 9.6 9.7   MG  --  1.6     Recent Labs   Lab 11/27/24 2043 11/28/24  0405   WBC 4.29 3.99   HGB 9.9* 9.0*   HCT 31.9* 28.4*    256     Recent Labs   Lab 11/27/24 2043   INR 1.1   APTT 22.2     Microbiology Results (last 7 days)       ** No results found for the last 168 hours. **          All pertinent labs from the last 24 hours have been reviewed.    Significant Diagnostics:  I have reviewed all pertinent imaging results/findings within the past 24 hours.

## 2024-11-28 NOTE — PROGRESS NOTES
[facility] - Oncology ([facility])  Neurosurgery  Progress Note    Subjective:     History of Present Illness: Sherlyn Saavedra is a 65yo F with PMHx T2DM, HTN, HLD, and known breast cancer with mets to brain who presents to ED after MRI brain w/wo outpatient showing increase in number and size of ring-enhancing lesions in bilateral hemispheres and L cerebellum with increase in ventricular size. Patient has been having severe headaches over  past several weeks. Also notes some gait instability. No new numbness/weakness/tingling in arms or legs. Follows with heme/onc in outpatient setting. NSGY consulted given above findings.    Post-Op Info:  * No surgery found *       Interval History: 11/28/2024: Stable on floor. Pending discussion for goals of care and goals of surgery, will complete today.    Medications:  Continuous Infusions:  Scheduled Meds:   amLODIPine  5 mg Oral Daily    atorvastatin  40 mg Oral QHS    busPIRone  15 mg Oral QHS    capecitabine  500 mg Oral BID    dexAMETHasone  4 mg Intravenous Q6H    famotidine  20 mg Oral BID    folic acid  1 mg Oral Daily    gabapentin  300 mg Oral BID    lisinopriL-hydrochlorothiazide  2 tablet Oral Daily    magnesium oxide  400 mg Oral Daily    morphine  30 mg Oral BID    polyethylene glycol  17 g Oral Daily    senna-docusate 8.6-50 mg  2 tablet Oral BID     PRN Meds:  Current Facility-Administered Medications:     (Magic mouthwash) 1:1:1 diphenhydrAMINE(Benadryl) 12.5mg/5ml liq, aluminum & magnesium hydroxide-simethicone (Maalox), LIDOcaine viscous 2%, 15 mL, Swish & Spit, Q4H PRN    acetaminophen, 650 mg, Oral, Q4H PRN    albuterol-ipratropium, 3 mL, Nebulization, Q4H PRN    aluminum-magnesium hydroxide-simethicone, 30 mL, Oral, QID PRN    bisacodyL, 10 mg, Rectal, Daily PRN    dextrose 10%, 12.5 g, Intravenous, PRN    dextrose 10%, 25 g, Intravenous, PRN    glucagon (human recombinant), 1 mg, Intramuscular, PRN    glucose, 16 g, Oral, PRN    glucose, 24 g, Oral, PRN     HYDROmorphone, 4 mg, Oral, Q4H PRN    insulin aspart U-100, 0-5 Units, Subcutaneous, QID (AC + HS) PRN    labetaloL, 10 mg, Intravenous, Q4H PRN    melatonin, 6 mg, Oral, Nightly PRN    naloxone, 0.02 mg, Intravenous, PRN    ondansetron, 4 mg, Intravenous, Q6H PRN    prochlorperazine, 5 mg, Intravenous, Q6H PRN    simethicone, 1 tablet, Oral, QID PRN    sodium chloride 0.9%, 10 mL, Intravenous, Q12H PRN     Review of Systems  Objective:     Weight: 81.8 kg (180 lb 5.4 oz)  Body mass index is 35.22 kg/m².  Vital Signs (Most Recent):  Temp: 97.7 °F (36.5 °C) (11/28/24 0800)  Pulse: 70 (11/28/24 0800)  Resp: 17 (11/28/24 0810)  BP: (!) 151/68 (11/28/24 0800)  SpO2: 98 % (11/28/24 0800) Vital Signs (24h Range):  Temp:  [97.7 °F (36.5 °C)-98.5 °F (36.9 °C)] 97.7 °F (36.5 °C)  Pulse:  [70-83] 70  Resp:  [16-20] 17  SpO2:  [97 %-100 %] 98 %  BP: (147-160)/() 151/68         Neurosurgery Physical Exam 11/28/2024        General:   Aox3  GCS E4V5M6    CNII-XII:   Intact on detailed exam  PERRL  Visual fields grossly intact  EOMi  Facial sensation preserved  No facial assymetry  Tongue/uvula/palate midline  Shoulder shrug equal  No pronator drift    Extremities:   5/5 motor throughout  Sensorium intact throughout  Coordination intact throughout  DTRs 2+  No pathological reflexes  No sensory level present     +Dysmetria present on finger to nose testing    Significant Labs:  Recent Labs   Lab 11/27/24 2043 11/28/24 0405   GLU 80 154*    141   K 4.4 4.2    106   CO2 18* 21*   BUN 10 11   CREATININE 0.8 0.8   CALCIUM 9.6 9.7   MG  --  1.6     Recent Labs   Lab 11/27/24 2043 11/28/24  0405   WBC 4.29 3.99   HGB 9.9* 9.0*   HCT 31.9* 28.4*    256     Recent Labs   Lab 11/27/24 2043   INR 1.1   APTT 22.2     Microbiology Results (last 7 days)       ** No results found for the last 168 hours. **          All pertinent labs from the last 24 hours have been reviewed.    Significant Diagnostics:  I have  reviewed all pertinent imaging results/findings within the past 24 hours.  Assessment/Plan:     Primary malignant neoplasm of upper outer quadrant of breast, left  Sherlyn Saavedra is a 63yo F with PMHx T2DM, HTN, HLD, and known breast cancer with mets to brain who presents to ED after MRI brain w/wo outpatient showing increase in number and size of ring-enhancing lesions in bilateral hemispheres and L cerebellum with increase in ventricular size. Patient has been having severe headaches over  past several weeks. Also notes some gait instability. No new numbness/weakness/tingling in arms or legs. Follows with heme/onc in outpatient setting. NSGY consulted given above findings.     Plan:  --admit patient to  for further workup and monitoring  - q4h neurochecks on floor   --All labs and diagnostics reviewed   --SBP <160 (cardene ggt; hydralazine & labetalol PRN; transition to home meds when appropriate)   --Na >135   --recommend loading dose of 10mg Dex once followed by 4mg q6h x7d  --Recommend Heme/onc work-up for further staging and prognostication    - appears patient is on third line chemo with low response rate per most recent onc note; craniotomy for tumor resection will be discussed with patient and family tomorrow to align with goals of care  --okay for diet at this time pending surgical decision making  --will have goals of care discussion with family today  --PUD PPx while steroid treatment ongoing  --Continue to monitor clinically, notify NSGY immediately with any changes in neuro status     Dispo: admit to   Discussed with staff Dr. Janice Juan MD  Neurosurgery  [facility] - Oncology ([facility])

## 2024-11-28 NOTE — ASSESSMENT & PLAN NOTE
Sherlyn Saavedra is a 65yo F with PMHx T2DM, HTN, HLD, and known breast cancer with mets to brain who presents to ED after MRI brain w/wo outpatient showing increase in number and size of ring-enhancing lesions in bilateral hemispheres and L cerebellum with increase in ventricular size. Patient has been having severe headaches over  past several weeks. Also notes some gait instability. No new numbness/weakness/tingling in arms or legs. Follows with heme/onc in outpatient setting. NSGY consulted given above findings.     Plan:  --admit patient to  for further workup and monitoring  - q4h neurochecks on floor   --All labs and diagnostics reviewed   --SBP <160 (cardene ggt; hydralazine & labetalol PRN; transition to home meds when appropriate)   --Na >135   --recommend loading dose of 10mg Dex once followed by 4mg q6h x7d  --Recommend Heme/onc work-up for further staging and prognostication    - appears patient is on third line chemo with low response rate per most recent onc note; craniotomy for tumor resection will be discussed with patient and family tomorrow to align with goals of care  --okay for diet at this time pending surgical decision making  --PUD PPx while steroid treatment ongoing  --Continue to monitor clinically, notify NSGY immediately with any changes in neuro status     Dispo: admit to   Discussed with staff Dr. Camacho

## 2024-11-28 NOTE — ASSESSMENT & PLAN NOTE
Known HTN    Plan:  -- SBP <160 (cardene ggt; hydralazine & labetalol PRN; transition to home meds when appropriate)

## 2024-11-28 NOTE — ASSESSMENT & PLAN NOTE
[START ON 11/28/2024] amLODIPine tablet 5 mg, 5 mg, Oral, Daily     [START ON 11/28/2024] labetaloL injection 10 mg, 10 mg, Intravenous, Q4H PRN, SBP > 160    [START ON 11/28/2024] lisinopriL-hydrochlorothiazide 10-12.5 mg per tablet 2 tablet, 2 tablet, Oral, Daily    -Care with this med, as can lower Na    -Goal SBP < 160

## 2024-11-28 NOTE — H&P
[facility] - Hematology-Oncology  History & Physical    Patient Name: Sherlyn Saavedra  MRN: [id number]  Admission Date: 11/27/2024  Attending Physician: Mele Wing MD   Primary Care Provider: Nan, Primary Doctor         Patient information was obtained from patient, relative(s), and ER records.       Subjective:     Principal Problem:Breast cancer metastasized to brain    Chief Complaint:   Chief Complaint   Patient presents with    Headache     Got MRI done today, saw fluid in the brain, referred to ED for further evaluation. Pt complaining of headache at this time. Hx of breast cancer.         HPI: Ms. Saavedra is a 63yo lady with a past medical history of primary malignant neoplasm of upper outer quadrant of breast, left with brain/lung mets, DM2, HLD, HTN, and anemia    She is followed in Oncology Clinic by Dr. Hoffmann, who last saw her on 11/21/24.  He noted, progressive metastatic triple negative breast cancer. Patient has progressive 2 lines of therapy. Recent CT chest abdomen pelvis demonstrates progressive disease.  As she was having new headaches, he ordered a follow up MRI brain.  She was last seen in Chemo Infusion on 11/21/24 at which point the nurse noted, Pt treatment cancelled , changed to new regimen waiting on authorization for Eribulin. All Ferrlecit doses complete , pt aware to expect a call from scheduling department once new drug authorization received.    MRI brain done earlier today showed interval progression of metastatic disease with significant interval increase in size of several ring-enhancing lesions in both cerebral hemispheres and the left cerebellum as compared to the prior with significant surrounding edema.  New punctate focus of enhancement in the left parietal lobe as compared to the prior.  Interval increase in size of the ventricular system with some effacement of the sulci bilaterally is concerning for developing hydrocephalus.      She was immediately referred to the ED  "after the above results.  Ms. Saavedra is in the ED now with her daughter at the bedside, who assists with the history.  Ms. Saavedra tells me her main concerns have been constipation and headache.  Her last BM was this past Saturday, 3 days ago.  She has an, "intense" headache that is located on the vertex and frontal area, and is sharp in quality.  She has been taking Ibuprofen for it with some occasional relief.  She is also getting "hot flashes" on her head.  She also notes vague visual changes.    Her daughter notes imbalance when walking at times, mild confusion, and clumsiness.  For example, she picks up the phone then she can't figure out how to dial or make the call.  She also feels her appetite has been declining over the past few days, only eating some fresh fruits at times.    In the ED her VS were BP (!) 147/63 -> 160/103   Pulse 75   Temp 98.2 °F (36.8 °C) (Oral)   Resp 20   Wt 83 kg (183 lb)   SpO2 100% RA  BMI 35.74 kg/m².  Labs showed Hg 9.9, Cr 0.8, AST 46, otherwise NML.  No radiographs done in the ED.    In the ED she was treated with:  Medications   dexAMETHasone injection 10 mg (has no administration in time range)   morphine injection 4 mg (has no administration in time range)   morphine injection 4 mg (4 mg Intravenous Given 11/27/24 2116)   ondansetron disintegrating tablet 4 mg (4 mg Oral Given 11/27/24 2116)             Past Medical History:   Diagnosis Date    Allergy     Anemia     Breast cancer     Diabetes mellitus     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   dx in 40's; has some neuropathic symptoms in right LE; (uncertain if from " DMII)  Diabetes mellitus type 2 (Problem)         Past Surgical History:   Procedure Laterality Date    HYSTERECTOMY      tubaligation  1987       Review of patient's allergies indicates:   Allergen Reactions    Ace inhibitors Swelling    Lisinopril Rash    Hydrocodone Itching     Hives    Hydrocodone-acetaminoph-supp11 Itching    Percocet [oxycodone-acetaminophen] Itching     Pt had to take an antihistamine to improve itching     Cephalexin      Hives    Pantoprazole Hives    Propoxyphene      Hives    Propoxyphene n-acetaminophen     Propoxyphene-acetaminophen        Current Facility-Administered Medications on File Prior to Encounter   Medication    [COMPLETED] gadobutroL (GADAVIST) injection 8 mL     Current Outpatient Medications on File Prior to Encounter   Medication Sig    (Magic mouthwash) 1:1:1 diphenhydrAMINE(Benadryl) 12.5mg/5ml liq, aluminum & magnesium hydroxide-simethicone (Maalox), LIDOcaine viscous 2% Swish and spit 15 mLs every 4 (four) hours as needed (mouth ulcers). for mouth sores    amLODIPine (NORVASC) 5 MG tablet Take 5 mg by mouth once daily.    amLODIPine (NORVASC) 5 MG tablet TAKE 1 TABLET BY MOUTH EVERY MORNING    atorvastatin (LIPITOR) 40 MG tablet Take 40 mg by mouth.    blood sugar diagnostic (FREESTYLE TEST) Strp Test 4 times per day    busPIRone (BUSPAR) 15 MG tablet Take 1 tablet by mouth at bedtime.    capecitabine (XELODA) 500 MG Tab Take by mouth 2 (two) times daily.    dexAMETHasone (DECADRON) 4 MG Tab Take 2 tablets (8 mg total) by mouth once daily. Take 2 tablets (8 mg total) by mouth once daily on days 2,3,4 and 9,10,11 of each chemotherapy cycle.    doxycycline (MONODOX) 100 MG capsule EMPTY CONTENTS OF 1 CAPSULE INTO NASAL IRRIGATION SYSTEM, ADD DISTILLED WATER, SALT PACK, MIX & IRRIGATE. PERFORM 2 TIMES DAILY    famotidine (PEPCID) 20 MG tablet Take 20 mg by mouth.    folic acid (FOLVITE) 1 MG tablet Take 1 mg by mouth once daily.    folic acid (FOLVITE) 1 MG tablet take 1  tablet by mouth every morning    gabapentin (NEURONTIN) 300 MG capsule Take 300 mg by mouth.    HYDROmorphone (DILAUDID) 4 MG tablet Take 1 tablet (4 mg total) by mouth every 3 (three) hours as needed for Pain (4 doses/day).    LIDOcaine viscous HCl 2%-diphenhydrAMINE-aluminum-magnesium hydroxide-simethicone Swish and spit 15 mLs by mouth every 4 (four) hours as needed (mouth ulcers). for mouth sores    LIDOcaine-prilocaine (EMLA) cream Apply to affected area once    lisinopriL-hydrochlorothiazide (PRINZIDE,ZESTORETIC) 20-25 mg Tab Take 1 tablet by mouth every morning.    magnesium oxide (MAG-OX) 400 mg (241.3 mg magnesium) tablet Take 400 mg by mouth once daily.    metFORMIN (GLUCOPHAGE-XR) 500 MG ER 24hr tablet Take 1,000 mg by mouth 2 (two) times daily.    metFORMIN (GLUCOPHAGE-XR) 500 MG ER 24hr tablet take 2 tablets by mouth in the morning and at bedtime as directed    morphine (MS CONTIN) 30 MG 12 hr tablet Take 1 tablet (30 mg total) by mouth 2 (two) times daily.    morphine (MS CONTIN) 30 MG 12 hr tablet Take 1 tablet (30 mg total) by mouth 2 (two) times daily.    naloxone (NARCAN) 4 mg/actuation Spry     ondansetron (ZOFRAN-ODT) 8 MG TbDL Dissolve 1 tablet (8 mg total) by mouth 2 (two) times daily.    pantoprazole (PROTONIX) 40 MG tablet Take 40 mg by mouth.    pantoprazole (PROTONIX) 40 MG tablet take 1 tablet by mouth daily    tirzepatide (MOUNJARO) 2.5 mg/0.5 mL PnIj Inject 2.5 mg into the skin every 7 days.    tirzepatide (MOUNJARO) 5 mg/0.5 mL PnIj Inject 5 mg into the skin once weekly    tiZANidine (ZANAFLEX) 4 MG tablet     [DISCONTINUED] OLANZapine (ZYPREXA) 5 MG tablet TAKE ONE TABLET BY MOUTH EVERY EVENING    [DISCONTINUED] ondansetron (ZOFRAN-ODT) 8 MG TbDL DISSOLVE ONE TABLET UNDER THE TONGUE EVERY 8 HOURS AS NEEDED FOR NAUSEA    [DISCONTINUED] ondansetron (ZOFRAN-ODT) 8 MG TbDL Take 1 tablet (8 mg total) by mouth 2 (two) times daily.    [DISCONTINUED] ondansetron (ZOFRAN-ODT) 8 MG TbDL dissolve  1 tablet on the tongue 3 times daily as needed    [DISCONTINUED] ondansetron (ZOFRAN-ODT) 8 MG TbDL Take 1 tablet (8 mg total) by mouth every 8 (eight) hours as needed (nausea/vomiting).    [DISCONTINUED] prochlorperazine (COMPAZINE) 10 MG tablet Take 10 mg by mouth.     Family History    None       Tobacco Use    Smoking status: Former     Current packs/day: 0.00     Types: Cigarettes     Quit date:      Years since quittin.9    Smokeless tobacco: Never   Substance and Sexual Activity    Alcohol use: Not Currently    Drug use: Never    Sexual activity: Not Currently     Review of Systems   Constitutional:  Positive for activity change, appetite change, diaphoresis and fatigue. Negative for fever.   HENT:  Negative for congestion.    Eyes:  Positive for visual disturbance.   Respiratory:  Negative for cough and shortness of breath.    Cardiovascular:  Negative for chest pain.   Gastrointestinal:  Positive for constipation and nausea. Negative for vomiting.   Endocrine: Positive for cold intolerance.   Genitourinary:  Negative for dysuria.   Musculoskeletal:  Negative for myalgias.   Skin:  Negative for rash.   Allergic/Immunologic: Positive for immunocompromised state.   Neurological:  Positive for speech difficulty and headaches. Negative for seizures.   Hematological:  Does not bruise/bleed easily.   Psychiatric/Behavioral:  Positive for confusion and decreased concentration.      Objective:     Vital Signs (Most Recent):  Temp: 98.2 °F (36.8 °C) (245)  Pulse: 72 (24)  Resp: 20 (24)  BP: (!) 150/65 (24)  SpO2: 99 % (24) Vital Signs (24h Range):  Temp:  [98.2 °F (36.8 °C)] 98.2 °F (36.8 °C)  Pulse:  [72-78] 72  Resp:  [16-20] 20  SpO2:  [98 %-100 %] 99 %  BP: (147-160)/() 150/65     Weight: 83 kg (183 lb)  Body mass index is 35.74 kg/m².     Physical Exam  Constitutional:       General: She is awake. She is not in acute distress.     Appearance:  She is overweight. She is not ill-appearing, toxic-appearing or diaphoretic.   HENT:      Head: Normocephalic and atraumatic.   Pulmonary:      Effort: No tachypnea or bradypnea.   Abdominal:      General: Abdomen is protuberant.   Genitourinary:     Comments: No palomares in place  Skin:     Coloration: Skin is sallow.   Neurological:      Mental Status: She is oriented to person, place, and time. She is lethargic.      GCS: GCS eye subscore is 4. GCS verbal subscore is 5. GCS motor subscore is 6.   Psychiatric:         Attention and Perception: Attention and perception normal.         Mood and Affect: Affect is flat.         Speech: Speech is slurred.         Behavior: Behavior is slowed and withdrawn. Behavior is cooperative.         Cognition and Memory: Cognition is impaired. Memory is impaired. She exhibits impaired recent memory. She does not exhibit impaired remote memory.                Significant Labs: All pertinent labs within the past 24 hours have been reviewed.  Recent Results (from the past 24 hours)   CBC auto differential    Collection Time: 11/27/24  8:43 PM   Result Value Ref Range    WBC 4.29 3.90 - 12.70 K/uL    RBC 3.47 (L) 4.00 - 5.40 M/uL    Hemoglobin 9.9 (L) 12.0 - 16.0 g/dL    Hematocrit 31.9 (L) 37.0 - 48.5 %    MCV 92 82 - 98 fL    MCH 28.5 27.0 - 31.0 pg    MCHC 31.0 (L) 32.0 - 36.0 g/dL    RDW 17.9 (H) 11.5 - 14.5 %    Platelets 274 150 - 450 K/uL    MPV 9.3 9.2 - 12.9 fL    Immature Granulocytes 0.7 (H) 0.0 - 0.5 %    Gran # (ANC) 2.7 1.8 - 7.7 K/uL    Immature Grans (Abs) 0.03 0.00 - 0.04 K/uL    Lymph # 0.9 (L) 1.0 - 4.8 K/uL    Mono # 0.6 0.3 - 1.0 K/uL    Eos # 0.1 0.0 - 0.5 K/uL    Baso # 0.03 0.00 - 0.20 K/uL    nRBC 0 0 /100 WBC    Gran % 63.4 38.0 - 73.0 %    Lymph % 19.8 18.0 - 48.0 %    Mono % 14.2 4.0 - 15.0 %    Eosinophil % 1.2 0.0 - 8.0 %    Basophil % 0.7 0.0 - 1.9 %    Differential Method Automated    Comprehensive metabolic panel    Collection Time: 11/27/24  8:43 PM    Result Value Ref Range    Sodium 139 136 - 145 mmol/L    Potassium 4.4 3.5 - 5.1 mmol/L    Chloride 106 95 - 110 mmol/L    CO2 18 (L) 23 - 29 mmol/L    Glucose 80 70 - 110 mg/dL    BUN 10 8 - 23 mg/dL    Creatinine 0.8 0.5 - 1.4 mg/dL    Calcium 9.6 8.7 - 10.5 mg/dL    Total Protein 7.8 6.0 - 8.4 g/dL    Albumin 3.8 3.5 - 5.2 g/dL    Total Bilirubin 0.4 0.1 - 1.0 mg/dL    Alkaline Phosphatase 108 40 - 150 U/L    AST 46 (H) 10 - 40 U/L    ALT 28 10 - 44 U/L    eGFR >60.0 >60 mL/min/1.73 m^2    Anion Gap 15 8 - 16 mmol/L   APTT    Collection Time: 11/27/24  8:43 PM   Result Value Ref Range    aPTT 22.2 21.0 - 32.0 sec   Protime-INR    Collection Time: 11/27/24  8:43 PM   Result Value Ref Range    Prothrombin Time 11.8 9.0 - 12.5 sec    INR 1.1 0.8 - 1.2         Significant Imaging: I have reviewed all pertinent imaging results/findings within the past 24 hours.  Assessment/Plan:     * Breast cancer metastasized to brain  MRI brain done earlier today showed interval progression of metastatic disease with significant interval increase in size of several ring-enhancing lesions in both cerebral hemispheres and the left cerebellum as compared to the prior with significant surrounding edema.  New punctate focus of enhancement in the left parietal lobe as compared to the prior.  Interval increase in size of the ventricular system with some effacement of the sulci bilaterally is concerning for developing hydrocephalus.    NSG consult in ED by Dr. Juan noted and appreciated   -recommend loading dose of 10mg Dex once followed by 4mg q6h x7d   -Okay for Q4 hour neuro checks on the floor with tele   -Keep Na > 135 and SBP < 160    Will defer aggressive Tx to primary team, as Hospice and Palliative Care transition could be option as well   -?XRT ?Tumor resection  Seizure precautions and Q4 checks      Primary malignant neoplasm of upper outer quadrant of breast, left  primary malignant neoplasm of upper outer quadrant of breast,  "left with brain/lung mets.    She is followed in Oncology Clinic by Dr. Hoffmann, who last saw her on 11/21/24.  He noted, progressive metastatic triple negative breast cancer. Patient has progressive 2 lines of therapy. Recent CT chest abdomen pelvis demonstrates progressive disease.  As she was having new headaches, he ordered a follow up MRI brain.  She was last seen in Chemo Infusion on 11/21/24 at which point the nurse noted, Pt treatment cancelled , changed to new regimen waiting on authorization for Eribulin. All Ferrlecit doses complete , pt aware to expect a call from scheduling department once new drug authorization received.    She was apparently about to start 3rd line eribulin therapy.       Type 2 diabetes mellitus with diabetic polyneuropathy, without long-term current use of insulin  Patient's FSGs are controlled on current medication regimen.  Last A1c reviewed-   Lab Results   Component Value Date    HGBA1C 5.3 03/19/2024     Most recent fingerstick glucose reviewed- No results for input(s): "POCTGLUCOSE" in the last 24 hours.  Current correctional scale  Low  Maintain anti-hyperglycemic dose as follows-   Antihyperglycemics (From admission, onward)      Start     Stop Route Frequency Ordered    11/28/24 0032  insulin aspart U-100 pen 0-5 Units         -- SubQ Before meals & nightly PRN 11/27/24 2332          Hold Oral hypoglycemics while patient is in the hospital.    Hypertension    [START ON 11/28/2024] amLODIPine tablet 5 mg, 5 mg, Oral, Daily     [START ON 11/28/2024] labetaloL injection 10 mg, 10 mg, Intravenous, Q4H PRN, SBP > 160    [START ON 11/28/2024] lisinopriL-hydrochlorothiazide 10-12.5 mg per tablet 2 tablet, 2 tablet, Oral, Daily    -Care with this med, as can lower Na    -Goal SBP < 160    Obesity, Class II, BMI 35-39.9  Noted  Currently poor appetite      Secondary malignancy of parietal pleura  Ordered CXR      Neoplasm related pain  Continued her home medications for pain for " now  If confusion worsens, may need to stop long acting MS Contin         [START ON 11/28/2024] HYDROmorphone tablet 4 mg, 4 mg, Oral, Q4H PRN, mod-severe pain    [START ON 11/28/2024] morphine 12 hr tablet 30 mg, 30 mg, Oral, BID     Opioid-induced constipation    [START ON 11/28/2024] bisacodyL suppository 10 mg, 10 mg, Rectal, Daily PRN, constipation    [START ON 11/28/2024] polyethylene glycol packet 17 g, 17 g, Oral, Daily     senna-docusate 8.6-50 mg per tablet 2 tablet, 2 tablet, Oral, BID       VTE Risk Mitigation (From admission, onward)           Ordered     Place JOSÉ MIGUEL hose  Until discontinued         11/27/24 2336     Reason for No Pharmacological VTE Prophylaxis  Once        Question:  Reasons:  Answer:  Physician Provided (leave comment)  Comment:  Brain mets, risk of bleeding    11/27/24 2336     IP VTE HIGH RISK PATIENT  Once         11/27/24 2336     Place sequential compression device  Until discontinued         11/27/24 2336                         The attending portion of this evaluation, treatment, and documentation was performed per AUDREY Frazier MD via Telemedicine AudioVisual using the secure [employer] software platform with 2 way audio/video. The provider was located off-site and the patient is located in the hospital. The aforementioned video software was utilized to document the relevant history and physical exam            AUDREY Frazier MD  Department of Hospital Medicine   [facility] - Emergency Dept

## 2024-11-28 NOTE — HPI
Sherlyn Saavedra is a 63yo F with PMHx T2DM, HTN, HLD, and known breast cancer with mets to brain who presents to ED after MRI brain w/wo outpatient showing increase in number and size of ring-enhancing lesions in bilateral hemispheres and L cerebellum with increase in ventricular size. Patient has been having severe headaches over  past several weeks. Also notes some gait instability. No new numbness/weakness/tingling in arms or legs. Follows with heme/onc in outpatient setting. NSGY consulted given above findings.

## 2024-11-28 NOTE — SUBJECTIVE & OBJECTIVE
Interval History: Vitals were stable. F/u neurosurgery recs and continue current management.    Oncology Treatment Plan:   OP ERIBULIN Q3W    Medications:  Continuous Infusions:  Scheduled Meds:   amLODIPine  5 mg Oral Daily    atorvastatin  40 mg Oral QHS    busPIRone  15 mg Oral QHS    dexAMETHasone  4 mg Intravenous Q6H    famotidine  20 mg Oral BID    folic acid  1 mg Oral Daily    gabapentin  300 mg Oral BID    lisinopriL-hydrochlorothiazide  2 tablet Oral Daily    magnesium oxide  400 mg Oral Daily    morphine  30 mg Oral BID    polyethylene glycol  17 g Oral Daily    senna-docusate 8.6-50 mg  2 tablet Oral BID     PRN Meds:  Current Facility-Administered Medications:     (Magic mouthwash) 1:1:1 diphenhydrAMINE(Benadryl) 12.5mg/5ml liq, aluminum & magnesium hydroxide-simethicone (Maalox), LIDOcaine viscous 2%, 15 mL, Swish & Spit, Q4H PRN    acetaminophen, 650 mg, Oral, Q4H PRN    albuterol-ipratropium, 3 mL, Nebulization, Q4H PRN    aluminum-magnesium hydroxide-simethicone, 30 mL, Oral, QID PRN    bisacodyL, 10 mg, Rectal, Daily PRN    dextrose 10%, 12.5 g, Intravenous, PRN    dextrose 10%, 25 g, Intravenous, PRN    glucagon (human recombinant), 1 mg, Intramuscular, PRN    glucose, 16 g, Oral, PRN    glucose, 24 g, Oral, PRN    HYDROmorphone, 4 mg, Oral, Q4H PRN    insulin aspart U-100, 0-5 Units, Subcutaneous, QID (AC + HS) PRN    labetaloL, 10 mg, Intravenous, Q4H PRN    melatonin, 6 mg, Oral, Nightly PRN    naloxone, 0.02 mg, Intravenous, PRN    ondansetron, 4 mg, Intravenous, Q6H PRN    prochlorperazine, 5 mg, Intravenous, Q6H PRN    simethicone, 1 tablet, Oral, QID PRN    sodium chloride 0.9%, 10 mL, Intravenous, Q12H PRN     Review of Systems   Constitutional:  Positive for unexpected weight change. Negative for fever.   HENT:  Negative for congestion.    Musculoskeletal:  Negative for gait problem.   Neurological:  Positive for headaches. Negative for weakness and numbness.   Psychiatric/Behavioral:   Negative for behavioral problems.      Objective:     Vital Signs (Most Recent):  Temp: 97.9 °F (36.6 °C) (11/28/24 1108)  Pulse: 79 (11/28/24 1453)  Resp: 18 (11/28/24 1108)  BP: 133/75 (11/28/24 1108)  SpO2: 98 % (11/28/24 1453) Vital Signs (24h Range):  Temp:  [97.7 °F (36.5 °C)-98.5 °F (36.9 °C)] 97.9 °F (36.6 °C)  Pulse:  [70-83] 79  Resp:  [16-20] 18  SpO2:  [97 %-100 %] 98 %  BP: (133-160)/() 133/75     Weight: 81.8 kg (180 lb 5.4 oz)  Body mass index is 35.22 kg/m².  Body surface area is 1.86 meters squared.      Intake/Output Summary (Last 24 hours) at 11/28/2024 1516  Last data filed at 11/28/2024 0614  Gross per 24 hour   Intake 240 ml   Output 350 ml   Net -110 ml        Physical Exam  Vitals and nursing note reviewed.   Constitutional:       General: She is not in acute distress.  Eyes:      Pupils: Pupils are equal, round, and reactive to light.   Cardiovascular:      Rate and Rhythm: Normal rate.      Pulses: Normal pulses.      Heart sounds: Normal heart sounds.   Pulmonary:      Effort: Pulmonary effort is normal.      Breath sounds: Normal breath sounds.   Musculoskeletal:      Right lower leg: No edema.      Left lower leg: No edema.   Skin:     General: Skin is warm.   Neurological:      Mental Status: She is oriented to person, place, and time.      Motor: No weakness.   Psychiatric:         Mood and Affect: Mood normal.         Behavior: Behavior normal.          Significant Labs:   All pertinent labs from the last 24 hours have been reviewed.    Diagnostic Results:  I have reviewed all pertinent imaging results/findings within the past 24 hours.  I have reviewed and interpreted all pertinent imaging results/findings within the past 24 hours.

## 2024-11-28 NOTE — PLAN OF CARE
Pt received pain mgnt during shift.  POC reviewed with patient; understanding verbalized. Pt. with nonskid footwear on, bed in lowest position, and locked with bed rails up x2.  Pt. instructed to call prior to getting OOB.  Pt. has call light and personal items within reach. Patient ambulates in room with assist. VSS and afebrile this shift. All questions and concerns addressed at this time. Will continue to monitor.

## 2024-11-28 NOTE — HOSPITAL COURSE
11/28/2024: Stable on floor. Pending discussion for goals of care and goals of surgery, will complete today.  11/29/2024: Neurologically stable. Possible craniotomy with Dr. Ma for tumor resection on 12/2. Will discuss further with family and staff today.

## 2024-11-28 NOTE — HPI
"Ms. Saavedra is a 65 yo female with a PMHx of primary malignant neoplasm of upper outer quadrant of the left breast with brain/lung mets, DM2, HLD, HTN, and anemia. She is followed in Oncology Clinic by Dr. Hoffmann, who last saw her on 11/21/24.  He noted, "progressive metastatic triple negative breast cancer. Patient has progressive 2 lines of therapy. Recent CT chest abdomen pelvis demonstrates progressive disease."  As she was having new headaches, he ordered a follow up MRI brain.MRI brain done 11/27 shows interval progression of metastatic disease with significant interval increase in size of several ring-enhancing lesions in both cerebral hemispheres and the left cerebellum as compared to the prior with significant surrounding edema.    Admitted to Hem Onco for further investigation and management.  "

## 2024-11-28 NOTE — CONSULTS
[facility] Dept  Neurosurgery  Consult Note    Inpatient consult to Neurosurgery  Consult performed by: Scottie Juan MD  Consult ordered by: Fermin Zaragoza MD  Reason for consult: breast cancer metastases to brain        Subjective:     Chief Complaint/Reason for Admission: headaches    History of Present Illness: Sherlyn Saavedra is a 65yo F with PMHx T2DM, HTN, HLD, and known breast cancer with mets to brain who presents to ED after MRI brain w/wo outpatient showing increase in number and size of ring-enhancing lesions in bilateral hemispheres and L cerebellum with increase in ventricular size. Patient has been having severe headaches over  past several weeks. Also notes some gait instability. No new numbness/weakness/tingling in arms or legs. Follows with heme/onc in outpatient setting. NSGY consulted given above findings.    (Not in a hospital admission)      Review of patient's allergies indicates:   Allergen Reactions    Ace inhibitors Swelling    Lisinopril Rash    Hydrocodone Itching     Hives    Hydrocodone-acetaminoph-supp11 Itching    Percocet [oxycodone-acetaminophen] Itching     Pt had to take an antihistamine to improve itching     Cephalexin      Hives    Pantoprazole Hives    Propoxyphene      Hives    Propoxyphene n-acetaminophen     Propoxyphene-acetaminophen        Past Medical History:   Diagnosis Date    Allergy     Anemia     Breast cancer     Diabetes mellitus     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   dx in 40's; has some neuropathic symptoms in right LE; (uncertain if from DMII)  Diabetes mellitus type 2 (Problem)       Past  "Surgical History:   Procedure Laterality Date    HYSTERECTOMY      tubaligation  1987     Family History    None       Tobacco Use    Smoking status: Former     Current packs/day: 0.00     Types: Cigarettes     Quit date:      Years since quittin.9    Smokeless tobacco: Never   Substance and Sexual Activity    Alcohol use: Not Currently    Drug use: Never    Sexual activity: Not Currently     Review of Systems  Objective:     Weight: 83 kg (183 lb)  Body mass index is 35.74 kg/m².  Vital Signs (Most Recent):  Temp: 98.2 °F (36.8 °C) (24)  Pulse: 78 (24)  Resp: 16 (24)  BP: (!) 147/63 (24)  SpO2: 98 % (24) Vital Signs (24h Range):  Temp:  [98.2 °F (36.8 °C)] 98.2 °F (36.8 °C)  Pulse:  [78] 78  Resp:  [16] 16  SpO2:  [98 %] 98 %  BP: (147)/(63) 147/63     Neurosurgery Physical Exam 2024      General:   Aox3  GCS E4V5M6    CNII-XII:   Intact on detailed exam  PERRL  Visual fields grossly intact  EOMi  Facial sensation preserved  No facial assymetry  Tongue/uvula/palate midline  Shoulder shrug equal  No pronator drift    Extremities:   5/5 motor throughout  Sensorium intact throughout  Coordination intact throughout  DTRs 2+  No pathological reflexes  No sensory level present    +Dysmetria present on finger to nose testing      Significant Labs:  No results for input(s): "GLU", "NA", "K", "CL", "CO2", "BUN", "CREATININE", "CALCIUM", "MG" in the last 48 hours.  Recent Labs   Lab 24   WBC 4.29   HGB 9.9*   HCT 31.9*        No results for input(s): "LABPT", "INR", "APTT" in the last 48 hours.  Microbiology Results (last 7 days)       ** No results found for the last 168 hours. **          All pertinent labs from the last 24 hours have been reviewed.    Significant Diagnostics:  I have reviewed all pertinent imaging results/findings within the past 24 hours.  Assessment/Plan:     Primary malignant neoplasm of upper outer quadrant of " breast, left  Sherlyn Saavedra is a 63yo F with PMHx T2DM, HTN, HLD, and known breast cancer with mets to brain who presents to ED after MRI brain w/wo outpatient showing increase in number and size of ring-enhancing lesions in bilateral hemispheres and L cerebellum with increase in ventricular size. Patient has been having severe headaches over  past several weeks. Also notes some gait instability. No new numbness/weakness/tingling in arms or legs. Follows with heme/onc in outpatient setting. NSGY consulted given above findings.     Plan:  --admit patient to  for further workup and monitoring  - q4h neurochecks on floor   --All labs and diagnostics reviewed   --SBP <160 (cardene ggt; hydralazine & labetalol PRN; transition to home meds when appropriate)   --Na >135   --recommend loading dose of 10mg Dex once followed by 4mg q6h x7d  --Recommend Heme/onc work-up for further staging and prognostication    - appears patient is on third line chemo with low response rate per most recent onc note; craniotomy for tumor resection will be discussed with patient and family tomorrow to align with goals of care  --okay for diet at this time pending surgical decision making  --PUD PPx while steroid treatment ongoing  --Continue to monitor clinically, notify NSGY immediately with any changes in neuro status     Dispo: admit to   Discussed with staff Dr. Camacho          Thank you for your consult. I will follow-up with patient. Please contact us if you have any additional questions.    Scottie Juan MD  Neurosurgery  [facility]

## 2024-11-28 NOTE — ED NOTES
Telemetry Verification   Patient placed on Telemetry Box  Verified with War Room  Box # [id number]   Monitor Tech    Rate 81   Rhythm nsr

## 2024-11-28 NOTE — ASSESSMENT & PLAN NOTE
Sherlyn Saavedra is a 63yo F with PMHx T2DM, HTN, HLD, and known breast cancer with mets to brain who presents to ED after MRI brain w/wo outpatient showing increase in number and size of ring-enhancing lesions in bilateral hemispheres and L cerebellum with increase in ventricular size. Patient has been having severe headaches over  past several weeks. Also notes some gait instability. No new numbness/weakness/tingling in arms or legs. Follows with heme/onc in outpatient setting. NSGY consulted given above findings.     Plan:  --admit patient to  for further workup and monitoring  - q4h neurochecks on floor   --All labs and diagnostics reviewed   --SBP <160 (cardene ggt; hydralazine & labetalol PRN; transition to home meds when appropriate)   --Na >135   --recommend loading dose of 10mg Dex once followed by 4mg q6h x7d  --Recommend Heme/onc work-up for further staging and prognostication    - appears patient is on third line chemo with low response rate per most recent onc note; craniotomy for tumor resection will be discussed with patient and family tomorrow to align with goals of care  --okay for diet at this time pending surgical decision making  --will have goals of care discussion with family today  --PUD PPx while steroid treatment ongoing  --Continue to monitor clinically, notify NSGY immediately with any changes in neuro status     Dispo: admit to   Discussed with staff Dr. Camacho

## 2024-11-28 NOTE — SUBJECTIVE & OBJECTIVE
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   dx in 40's; has some neuropathic symptoms in right LE; (uncertain if from DMII)  Diabetes mellitus type 2 (Problem)         Past Surgical History:   Procedure Laterality Date    HYSTERECTOMY      tubaligation  1987       Review of patient's allergies indicates:   Allergen Reactions    Ace inhibitors Swelling    Lisinopril Rash    Hydrocodone Itching     Hives    Hydrocodone-acetaminoph-supp11 Itching    Percocet [oxycodone-acetaminophen] Itching     Pt had to take an antihistamine to improve itching     Cephalexin      Hives    Pantoprazole Hives    Propoxyphene      Hives    Propoxyphene n-acetaminophen     Propoxyphene-acetaminophen        Current Facility-Administered Medications on File Prior to Encounter   Medication    [COMPLETED] gadobutroL (GADAVIST) injection 8 mL     Current Outpatient Medications on File Prior to Encounter   Medication Sig    (Magic mouthwash) 1:1:1 diphenhydrAMINE(Benadryl) 12.5mg/5ml liq, aluminum & magnesium hydroxide-simethicone (Maalox), LIDOcaine viscous 2% Swish and spit 15 mLs every 4 (four) hours as needed (mouth ulcers). for mouth sores    amLODIPine (NORVASC) 5 MG tablet Take 5 mg by mouth once daily.    amLODIPine (NORVASC) 5 MG tablet TAKE 1 TABLET BY MOUTH EVERY MORNING    atorvastatin (LIPITOR) 40 MG tablet Take 40 mg by mouth.    blood sugar diagnostic (FREESTYLE TEST) Strp Test 4 times per day    busPIRone (BUSPAR) 15 MG tablet Take 1 tablet by mouth at bedtime.    capecitabine  (XELODA) 500 MG Tab Take by mouth 2 (two) times daily.    dexAMETHasone (DECADRON) 4 MG Tab Take 2 tablets (8 mg total) by mouth once daily. Take 2 tablets (8 mg total) by mouth once daily on days 2,3,4 and 9,10,11 of each chemotherapy cycle.    doxycycline (MONODOX) 100 MG capsule EMPTY CONTENTS OF 1 CAPSULE INTO NASAL IRRIGATION SYSTEM, ADD DISTILLED WATER, SALT PACK, MIX & IRRIGATE. PERFORM 2 TIMES DAILY    famotidine (PEPCID) 20 MG tablet Take 20 mg by mouth.    folic acid (FOLVITE) 1 MG tablet Take 1 mg by mouth once daily.    folic acid (FOLVITE) 1 MG tablet take 1 tablet by mouth every morning    gabapentin (NEURONTIN) 300 MG capsule Take 300 mg by mouth.    HYDROmorphone (DILAUDID) 4 MG tablet Take 1 tablet (4 mg total) by mouth every 3 (three) hours as needed for Pain (4 doses/day).    LIDOcaine viscous HCl 2%-diphenhydrAMINE-aluminum-magnesium hydroxide-simethicone Swish and spit 15 mLs by mouth every 4 (four) hours as needed (mouth ulcers). for mouth sores    LIDOcaine-prilocaine (EMLA) cream Apply to affected area once    lisinopriL-hydrochlorothiazide (PRINZIDE,ZESTORETIC) 20-25 mg Tab Take 1 tablet by mouth every morning.    magnesium oxide (MAG-OX) 400 mg (241.3 mg magnesium) tablet Take 400 mg by mouth once daily.    metFORMIN (GLUCOPHAGE-XR) 500 MG ER 24hr tablet Take 1,000 mg by mouth 2 (two) times daily.    metFORMIN (GLUCOPHAGE-XR) 500 MG ER 24hr tablet take 2 tablets by mouth in the morning and at bedtime as directed    morphine (MS CONTIN) 30 MG 12 hr tablet Take 1 tablet (30 mg total) by mouth 2 (two) times daily.    morphine (MS CONTIN) 30 MG 12 hr tablet Take 1 tablet (30 mg total) by mouth 2 (two) times daily.    naloxone (NARCAN) 4 mg/actuation Spry     ondansetron (ZOFRAN-ODT) 8 MG TbDL Dissolve 1 tablet (8 mg total) by mouth 2 (two) times daily.    pantoprazole (PROTONIX) 40 MG tablet Take 40 mg by mouth.    pantoprazole (PROTONIX) 40 MG tablet take 1 tablet by mouth daily    tirzepatide  (MOUNJARO) 2.5 mg/0.5 mL PnIj Inject 2.5 mg into the skin every 7 days.    tirzepatide (MOUNJARO) 5 mg/0.5 mL PnIj Inject 5 mg into the skin once weekly    tiZANidine (ZANAFLEX) 4 MG tablet     [DISCONTINUED] OLANZapine (ZYPREXA) 5 MG tablet TAKE ONE TABLET BY MOUTH EVERY EVENING    [DISCONTINUED] ondansetron (ZOFRAN-ODT) 8 MG TbDL DISSOLVE ONE TABLET UNDER THE TONGUE EVERY 8 HOURS AS NEEDED FOR NAUSEA    [DISCONTINUED] ondansetron (ZOFRAN-ODT) 8 MG TbDL Take 1 tablet (8 mg total) by mouth 2 (two) times daily.    [DISCONTINUED] ondansetron (ZOFRAN-ODT) 8 MG TbDL dissolve 1 tablet on the tongue 3 times daily as needed    [DISCONTINUED] ondansetron (ZOFRAN-ODT) 8 MG TbDL Take 1 tablet (8 mg total) by mouth every 8 (eight) hours as needed (nausea/vomiting).    [DISCONTINUED] prochlorperazine (COMPAZINE) 10 MG tablet Take 10 mg by mouth.     Family History    None       Tobacco Use    Smoking status: Former     Current packs/day: 0.00     Types: Cigarettes     Quit date:      Years since quittin.9    Smokeless tobacco: Never   Substance and Sexual Activity    Alcohol use: Not Currently    Drug use: Never    Sexual activity: Not Currently     Review of Systems   Constitutional:  Positive for activity change, appetite change, diaphoresis and fatigue. Negative for fever.   HENT:  Negative for congestion.    Eyes:  Positive for visual disturbance.   Respiratory:  Negative for cough and shortness of breath.    Cardiovascular:  Negative for chest pain.   Gastrointestinal:  Positive for constipation and nausea. Negative for vomiting.   Endocrine: Positive for cold intolerance.   Genitourinary:  Negative for dysuria.   Musculoskeletal:  Negative for myalgias.   Skin:  Negative for rash.   Allergic/Immunologic: Positive for immunocompromised state.   Neurological:  Positive for speech difficulty and headaches. Negative for seizures.   Hematological:  Does not bruise/bleed easily.   Psychiatric/Behavioral:  Positive for  confusion and decreased concentration.      Objective:     Vital Signs (Most Recent):  Temp: 98.2 °F (36.8 °C) (11/27/24 1855)  Pulse: 72 (11/27/24 2202)  Resp: 20 (11/27/24 2116)  BP: (!) 150/65 (11/27/24 2202)  SpO2: 99 % (11/27/24 2202) Vital Signs (24h Range):  Temp:  [98.2 °F (36.8 °C)] 98.2 °F (36.8 °C)  Pulse:  [72-78] 72  Resp:  [16-20] 20  SpO2:  [98 %-100 %] 99 %  BP: (147-160)/() 150/65     Weight: 83 kg (183 lb)  Body mass index is 35.74 kg/m².     Physical Exam  Constitutional:       General: She is awake. She is not in acute distress.     Appearance: She is overweight. She is not ill-appearing, toxic-appearing or diaphoretic.   HENT:      Head: Normocephalic and atraumatic.   Pulmonary:      Effort: No tachypnea or bradypnea.   Abdominal:      General: Abdomen is protuberant.   Genitourinary:     Comments: No palomares in place  Skin:     Coloration: Skin is sallow.   Neurological:      Mental Status: She is oriented to person, place, and time. She is lethargic.      GCS: GCS eye subscore is 4. GCS verbal subscore is 5. GCS motor subscore is 6.   Psychiatric:         Attention and Perception: Attention and perception normal.         Mood and Affect: Affect is flat.         Speech: Speech is slurred.         Behavior: Behavior is slowed and withdrawn. Behavior is cooperative.         Cognition and Memory: Cognition is impaired. Memory is impaired. She exhibits impaired recent memory. She does not exhibit impaired remote memory.                Significant Labs: All pertinent labs within the past 24 hours have been reviewed.  Recent Results (from the past 24 hours)   CBC auto differential    Collection Time: 11/27/24  8:43 PM   Result Value Ref Range    WBC 4.29 3.90 - 12.70 K/uL    RBC 3.47 (L) 4.00 - 5.40 M/uL    Hemoglobin 9.9 (L) 12.0 - 16.0 g/dL    Hematocrit 31.9 (L) 37.0 - 48.5 %    MCV 92 82 - 98 fL    MCH 28.5 27.0 - 31.0 pg    MCHC 31.0 (L) 32.0 - 36.0 g/dL    RDW 17.9 (H) 11.5 - 14.5 %     Platelets 274 150 - 450 K/uL    MPV 9.3 9.2 - 12.9 fL    Immature Granulocytes 0.7 (H) 0.0 - 0.5 %    Gran # (ANC) 2.7 1.8 - 7.7 K/uL    Immature Grans (Abs) 0.03 0.00 - 0.04 K/uL    Lymph # 0.9 (L) 1.0 - 4.8 K/uL    Mono # 0.6 0.3 - 1.0 K/uL    Eos # 0.1 0.0 - 0.5 K/uL    Baso # 0.03 0.00 - 0.20 K/uL    nRBC 0 0 /100 WBC    Gran % 63.4 38.0 - 73.0 %    Lymph % 19.8 18.0 - 48.0 %    Mono % 14.2 4.0 - 15.0 %    Eosinophil % 1.2 0.0 - 8.0 %    Basophil % 0.7 0.0 - 1.9 %    Differential Method Automated    Comprehensive metabolic panel    Collection Time: 11/27/24  8:43 PM   Result Value Ref Range    Sodium 139 136 - 145 mmol/L    Potassium 4.4 3.5 - 5.1 mmol/L    Chloride 106 95 - 110 mmol/L    CO2 18 (L) 23 - 29 mmol/L    Glucose 80 70 - 110 mg/dL    BUN 10 8 - 23 mg/dL    Creatinine 0.8 0.5 - 1.4 mg/dL    Calcium 9.6 8.7 - 10.5 mg/dL    Total Protein 7.8 6.0 - 8.4 g/dL    Albumin 3.8 3.5 - 5.2 g/dL    Total Bilirubin 0.4 0.1 - 1.0 mg/dL    Alkaline Phosphatase 108 40 - 150 U/L    AST 46 (H) 10 - 40 U/L    ALT 28 10 - 44 U/L    eGFR >60.0 >60 mL/min/1.73 m^2    Anion Gap 15 8 - 16 mmol/L   APTT    Collection Time: 11/27/24  8:43 PM   Result Value Ref Range    aPTT 22.2 21.0 - 32.0 sec   Protime-INR    Collection Time: 11/27/24  8:43 PM   Result Value Ref Range    Prothrombin Time 11.8 9.0 - 12.5 sec    INR 1.1 0.8 - 1.2         Significant Imaging: I have reviewed all pertinent imaging results/findings within the past 24 hours.

## 2024-11-28 NOTE — ASSESSMENT & PLAN NOTE
MRI brain done earlier today showed interval progression of metastatic disease with significant interval increase in size of several ring-enhancing lesions in both cerebral hemispheres and the left cerebellum as compared to the prior with significant surrounding edema.  New punctate focus of enhancement in the left parietal lobe as compared to the prior.  Interval increase in size of the ventricular system with some effacement of the sulci bilaterally is concerning for developing hydrocephalus.    NSG consult in ED by Dr. Juan noted and appreciated   -recommend loading dose of 10mg Dex once followed by 4mg q6h x7d   -Okay for Q4 hour neuro checks on the floor with tele   -Keep Na > 135 and SBP < 160    Will defer aggressive Tx to primary team, as Hospice and Palliative Care transition could be option as well   -?XRT ?Tumor resection  Seizure precautions and Q4 checks

## 2024-11-28 NOTE — HOSPITAL COURSE
Primary malignant neoplasm of upper outer quadrant of the left breast with brain/lung mets, recent MRI shows mets to the brain after presenting with severe headaches which is aggravated by change of position. Neurosurgery consulted- recommends q4h neurocheck, SBP <160 (cardene ggt; hydralazine & labetalol PRN; transition to home meds when appropriate), Na >135, PUD PPx while steroid treatment ongoing. Neurosurgery team is planning stereotactic radiosurgery, preferred to see patient outpatient. Likely discharge tomorrow.    Ms. Saavedra was discharged to on dexamethasone, chronic medications and walker. To follow up with neurosurgery as outpatient. Strict return precautions were discussed with the patient and her daughter, verbalized understanding and agreed with plan.

## 2024-11-28 NOTE — ASSESSMENT & PLAN NOTE
Ms. Saavedra is a 63 yo female with a PMHx of primary malignant neoplasm of upper outer quadrant of the left breast with brain/lung mets, recent MRI shows brain mets.    Plan:  -- On chemotherapy, will be held for now.

## 2024-11-28 NOTE — ED TRIAGE NOTES
"Sherlyn Saavedra, a 64 y.o. female presents to the ED w/ complaint of referral due to abnormal MRI today. " The saw fluid in my head"    Triage note:  Chief Complaint   Patient presents with    Headache     Got MRI done today, saw fluid in the brain, referred to ED for further evaluation. Pt complaining of headache at this time. Hx of breast cancer.      Review of patient's allergies indicates:   Allergen Reactions    Ace inhibitors Swelling    Lisinopril Rash    Hydrocodone Itching     Hives    Hydrocodone-acetaminoph-supp11 Itching    Percocet [oxycodone-acetaminophen] Itching     Pt had to take an antihistamine to improve itching     Cephalexin      Hives    Pantoprazole Hives    Propoxyphene      Hives    Propoxyphene n-acetaminophen     Propoxyphene-acetaminophen      Past Medical History:   Diagnosis Date    Allergy     Anemia     Breast cancer     Diabetes mellitus     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   dx in 40's; has some neuropathic symptoms in right LE; (uncertain if from DMII)  Diabetes mellitus type 2 (Problem)        "

## 2024-11-28 NOTE — SUBJECTIVE & OBJECTIVE
(Not in a hospital admission)      Review of patient's allergies indicates:   Allergen Reactions    Ace inhibitors Swelling    Lisinopril Rash    Hydrocodone Itching     Hives    Hydrocodone-acetaminoph-supp11 Itching    Percocet [oxycodone-acetaminophen] Itching     Pt had to take an antihistamine to improve itching     Cephalexin      Hives    Pantoprazole Hives    Propoxyphene      Hives    Propoxyphene n-acetaminophen     Propoxyphene-acetaminophen        Past Medical History:   Diagnosis Date    Allergy     Anemia     Breast cancer     Diabetes mellitus     HLD (hyperlipidemia) 2015    Hyperlipidemia (Problem)      Hypertension     Primary malignant neoplasm of upper outer quadrant of breast, left 10/10/2023    Secondary malignancy of parietal pleura 2024    Type 2 diabetes mellitus with diabetic polyneuropathy, without long-term current use of insulin 2015    Formatting of this note might be different from the original.   dx in 40's; has some neuropathic symptoms in right LE; (uncertain if from DMII)  Diabetes mellitus type 2 (Problem)       Past Surgical History:   Procedure Laterality Date    HYSTERECTOMY      tubaligation       Family History    None       Tobacco Use    Smoking status: Former     Current packs/day: 0.00     Types: Cigarettes     Quit date:      Years since quittin.9    Smokeless tobacco: Never   Substance and Sexual Activity    Alcohol use: Not Currently    Drug use: Never    Sexual activity: Not Currently     Review of Systems  Objective:     Weight: 83 kg (183 lb)  Body mass index is 35.74 kg/m².  Vital Signs (Most Recent):  Temp: 98.2 °F (36.8 °C) (24)  Pulse: 78 (24)  Resp: 16 (24)  BP: (!) 147/63 (24)  SpO2: 98 % (24) Vital Signs (24h Range):  Temp:  [98.2 °F (36.8 °C)] 98.2 °F (36.8 °C)  Pulse:  [78] 78  Resp:  [16] 16  SpO2:  [98 %] 98 %  BP: (147)/(63) 147/63     Neurosurgery Physical Exam  "11/27/2024      General:   Aox3  GCS E4V5M6    CNII-XII:   Intact on detailed exam  PERRL  Visual fields grossly intact  EOMi  Facial sensation preserved  No facial assymetry  Tongue/uvula/palate midline  Shoulder shrug equal  No pronator drift    Extremities:   5/5 motor throughout  Sensorium intact throughout  Coordination intact throughout  DTRs 2+  No pathological reflexes  No sensory level present    +Dysmetria present on finger to nose testing      Significant Labs:  No results for input(s): "GLU", "NA", "K", "CL", "CO2", "BUN", "CREATININE", "CALCIUM", "MG" in the last 48 hours.  Recent Labs   Lab 11/27/24 2043   WBC 4.29   HGB 9.9*   HCT 31.9*        No results for input(s): "LABPT", "INR", "APTT" in the last 48 hours.  Microbiology Results (last 7 days)       ** No results found for the last 168 hours. **          All pertinent labs from the last 24 hours have been reviewed.    Significant Diagnostics:  I have reviewed all pertinent imaging results/findings within the past 24 hours.  "

## 2024-11-28 NOTE — ED PROVIDER NOTES
Encounter Date: 11/27/2024       History     Chief Complaint   Patient presents with    Headache     Got MRI done today, saw fluid in the brain, referred to ED for further evaluation. Pt complaining of headache at this time. Hx of breast cancer.      This is a 64-year-old female with a history of breast cancer with metastasis to the brain, diabetes, HTN, HLD who presents to the ED for abnormal MRI results today.  Patient and outpatient MRI done 11/21/2024 for follow up of known metastatic lesions in the brain in due to severe headaches.  She was called back today with results which showed interval increase in several areas of metastasis, as well as surrounding vasogenic edema.  A new left parietal lesion was also seen, and there is concern for developing hydrocephalus.  Patient states that she was recently had some gait instability and tremors in her hands but denies any numbness, tingling, or weakness.        Review of patient's allergies indicates:   Allergen Reactions    Ace inhibitors Swelling    Lisinopril Rash    Hydrocodone Itching     Hives    Hydrocodone-acetaminoph-supp11 Itching    Percocet [oxycodone-acetaminophen] Itching     Pt had to take an antihistamine to improve itching     Cephalexin      Hives    Pantoprazole Hives    Propoxyphene      Hives    Propoxyphene n-acetaminophen     Propoxyphene-acetaminophen      Past Medical History:   Diagnosis Date    Allergy     Anemia     Breast cancer     primary malignant neoplasm of upper outer quadrant of breast - progressive metastatic triple negative breast cancer. Patient has progressive 2 lines of therapy.    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  Social History     Tobacco Use    Smoking status: Former     Current packs/day: 0.00     Types: Cigarettes     Quit date:      Years since quittin.9    Smokeless tobacco: Never   Substance Use Topics    Alcohol use: Not Currently    Drug use: Never     Review of Systems   Constitutional:  Negative for fever.   HENT:  Negative for sore throat.    Respiratory:  Negative for shortness of breath.    Cardiovascular:  Negative for chest pain.   Gastrointestinal:  Negative for nausea.   Genitourinary:  Negative for dysuria.   Musculoskeletal:  Negative for back pain.   Skin:  Negative for rash.   Neurological:  Positive for tremors and headaches. Negative for weakness.   Hematological:  Does not bruise/bleed easily.       Physical Exam     Initial Vitals [24 1855]   BP Pulse Resp Temp SpO2   (!) 147/63 78 16 98.2 °F (36.8 °C) 98 %      MAP       --         Physical Exam      Neurological:   Alert and oriented to person place and time.  CN 2-12 intact bilaterally.  Sensation is equal and intact in all extremities.  Strength:   5/5 right elbow flexion, extension, .  5/5 left elbow flexion, extension, .  4/5 strength with hip flexion and extension bilaterally.  5/5 flexion, plantar and dorsiflexion.  5/5 plantar and dorsiflexion.  Mild ataxia with finger-to-nose bilaterally.           ED Course   Procedures  Labs Reviewed   CBC W/ AUTO DIFFERENTIAL - Abnormal       Result Value    WBC 4.29      RBC 3.47 (*)     Hemoglobin 9.9 (*)     Hematocrit 31.9 (*)     MCV 92      MCH 28.5      MCHC 31.0 (*)     RDW 17.9 (*)     Platelets 274      MPV 9.3      Immature Granulocytes 0.7 (*)     Gran # (ANC) 2.7      Immature Grans (Abs) 0.03      Lymph # 0.9 (*)     Mono # 0.6      Eos # 0.1      Baso # 0.03      nRBC 0      Gran % 63.4      Lymph % 19.8      Mono % 14.2      Eosinophil % 1.2      Basophil % 0.7       Differential Method Automated     COMPREHENSIVE METABOLIC PANEL - Abnormal    Sodium 139      Potassium 4.4      Chloride 106      CO2 18 (*)     Glucose 80      BUN 10      Creatinine 0.8      Calcium 9.6      Total Protein 7.8      Albumin 3.8      Total Bilirubin 0.4      Alkaline Phosphatase 108      AST 46 (*)     ALT 28      eGFR >60.0      Anion Gap 15     APTT    aPTT 22.2     PROTIME-INR    Prothrombin Time 11.8      INR 1.1     HEMOGLOBIN A1C          Imaging Results    None          Medications   dexAMETHasone injection 4 mg (has no administration in time range)   famotidine tablet 20 mg (has no administration in time range)   (Magic mouthwash) 1:1:1 diphenhydrAMINE(Benadryl) 12.5mg/5ml liq, aluminum & magnesium hydroxide-simethicone (Maalox), LIDOcaine viscous 2% (has no administration in time range)   amLODIPine tablet 5 mg (has no administration in time range)   atorvastatin tablet 40 mg (has no administration in time range)   busPIRone tablet 15 mg (has no administration in time range)   capecitabine tablet 500 mg (has no administration in time range)   folic acid tablet 1 mg (has no administration in time range)   gabapentin capsule 300 mg (has no administration in time range)   HYDROmorphone tablet 4 mg (has no administration in time range)   lisinopriL-hydrochlorothiazide 10-12.5 mg per tablet 2 tablet (has no administration in time range)   magnesium oxide tablet 400 mg (has no administration in time range)   morphine 12 hr tablet 30 mg (has no administration in time range)   glucose chewable tablet 16 g (has no administration in time range)   glucose chewable tablet 24 g (has no administration in time range)   glucagon (human recombinant) injection 1 mg (has no administration in time range)   insulin aspart U-100 pen 0-5 Units (has no administration in time range)   dextrose 10% bolus 125 mL 125 mL (has no administration in time range)   dextrose 10% bolus 250 mL 250 mL (has no administration in  time range)   morphine injection 4 mg (4 mg Intravenous Given 11/27/24 2116)   ondansetron disintegrating tablet 4 mg (4 mg Oral Given 11/27/24 2116)   dexAMETHasone injection 10 mg (10 mg Intravenous Given 11/27/24 2238)   morphine injection 4 mg (4 mg Intravenous Given 11/27/24 2236)     Medical Decision Making  64-year-old female with a history of breast cancer with metastasis to the brain, diabetes, HTN, HLD who presents to the ED for abnormal MRI results today.  Her vital signs here are stable and she was afebrile.  She has some mild ataxia with finger-to-nose bilaterally, some symmetric decreased strength in the legs our upper extremity strength and plantar and dorsiflexion strength are intact.  I have ordered basic labs, pain medication for headache, and have reached out to Neurosurgery for consultation.    Amount and/or Complexity of Data Reviewed  Labs: ordered.    Risk  Prescription drug management.  Decision regarding hospitalization.               ED Course as of 11/27/24 2335 Wed Nov 27, 2024   2334 Patient seen by Neurosurgery, recommending no emergent intervention, but we will likely plan for a multidisciplinary discussion tomorrow regarding further management of her worsening metastatic lesions.  They would like to increase steroids to a bolus dose of dexamethasone 10 mg right now, followed by dexamethasone 4 mg q.6 hours with the patient admitted to medical team.  Discussed case with on-call for medical oncology, they agree and will arrange for admission. [MB]      ED Course User Index  [MB] Fermin Zaragoza MD                           Clinical Impression:  Final diagnoses:  [G93.89] Brain mass (Primary)  [R51.9, G89.29] Chronic nonintractable headache, unspecified headache type          ED Disposition Condition    Admit Stable                Fermin Zaragoza MD  11/27/24 2336

## 2024-11-28 NOTE — ASSESSMENT & PLAN NOTE
[START ON 11/28/2024] bisacodyL suppository 10 mg, 10 mg, Rectal, Daily PRN, constipation    [START ON 11/28/2024] polyethylene glycol packet 17 g, 17 g, Oral, Daily     senna-docusate 8.6-50 mg per tablet 2 tablet, 2 tablet, Oral, BID

## 2024-11-29 ENCOUNTER — TELEPHONE (OUTPATIENT)
Dept: HEMATOLOGY/ONCOLOGY | Facility: CLINIC | Age: 64
End: 2024-11-29
Payer: MEDICAID

## 2024-11-29 LAB
ALBUMIN SERPL BCP-MCNC: 3.6 G/DL (ref 3.5–5.2)
ALP SERPL-CCNC: 96 U/L (ref 40–150)
ALT SERPL W/O P-5'-P-CCNC: 26 U/L (ref 10–44)
ANION GAP SERPL CALC-SCNC: 10 MMOL/L (ref 8–16)
AST SERPL-CCNC: 31 U/L (ref 10–40)
BASOPHILS # BLD AUTO: 0.01 K/UL (ref 0–0.2)
BASOPHILS NFR BLD: 0.2 % (ref 0–1.9)
BILIRUB SERPL-MCNC: 0.5 MG/DL (ref 0.1–1)
BUN SERPL-MCNC: 14 MG/DL (ref 8–23)
CALCIUM SERPL-MCNC: 9.8 MG/DL (ref 8.7–10.5)
CHLORIDE SERPL-SCNC: 103 MMOL/L (ref 95–110)
CO2 SERPL-SCNC: 24 MMOL/L (ref 23–29)
CREAT SERPL-MCNC: 0.8 MG/DL (ref 0.5–1.4)
DIFFERENTIAL METHOD BLD: ABNORMAL
EOSINOPHIL # BLD AUTO: 0 K/UL (ref 0–0.5)
EOSINOPHIL NFR BLD: 0 % (ref 0–8)
ERYTHROCYTE [DISTWIDTH] IN BLOOD BY AUTOMATED COUNT: 17.3 % (ref 11.5–14.5)
EST. GFR  (NO RACE VARIABLE): >60 ML/MIN/1.73 M^2
GLUCOSE SERPL-MCNC: 167 MG/DL (ref 70–110)
HCT VFR BLD AUTO: 28.8 % (ref 37–48.5)
HGB BLD-MCNC: 9.1 G/DL (ref 12–16)
IMM GRANULOCYTES # BLD AUTO: 0.04 K/UL (ref 0–0.04)
IMM GRANULOCYTES NFR BLD AUTO: 0.7 % (ref 0–0.5)
LYMPHOCYTES # BLD AUTO: 0.8 K/UL (ref 1–4.8)
LYMPHOCYTES NFR BLD: 14.3 % (ref 18–48)
MAGNESIUM SERPL-MCNC: 1.6 MG/DL (ref 1.6–2.6)
MCH RBC QN AUTO: 27.8 PG (ref 27–31)
MCHC RBC AUTO-ENTMCNC: 31.6 G/DL (ref 32–36)
MCV RBC AUTO: 88 FL (ref 82–98)
MONOCYTES # BLD AUTO: 0.4 K/UL (ref 0.3–1)
MONOCYTES NFR BLD: 6.9 % (ref 4–15)
NEUTROPHILS # BLD AUTO: 4.4 K/UL (ref 1.8–7.7)
NEUTROPHILS NFR BLD: 77.9 % (ref 38–73)
NRBC BLD-RTO: 0 /100 WBC
PHOSPHATE SERPL-MCNC: 3.3 MG/DL (ref 2.7–4.5)
PLATELET # BLD AUTO: 300 K/UL (ref 150–450)
PMV BLD AUTO: 9.9 FL (ref 9.2–12.9)
POCT GLUCOSE: 218 MG/DL (ref 70–110)
POTASSIUM SERPL-SCNC: 4 MMOL/L (ref 3.5–5.1)
PROT SERPL-MCNC: 7.2 G/DL (ref 6–8.4)
RBC # BLD AUTO: 3.27 M/UL (ref 4–5.4)
SODIUM SERPL-SCNC: 137 MMOL/L (ref 136–145)
WBC # BLD AUTO: 5.67 K/UL (ref 3.9–12.7)

## 2024-11-29 PROCEDURE — 84100 ASSAY OF PHOSPHORUS: CPT | Performed by: INTERNAL MEDICINE

## 2024-11-29 PROCEDURE — 25000003 PHARM REV CODE 250

## 2024-11-29 PROCEDURE — 80053 COMPREHEN METABOLIC PANEL: CPT | Performed by: INTERNAL MEDICINE

## 2024-11-29 PROCEDURE — 20600001 HC STEP DOWN PRIVATE ROOM

## 2024-11-29 PROCEDURE — 83735 ASSAY OF MAGNESIUM: CPT | Performed by: INTERNAL MEDICINE

## 2024-11-29 PROCEDURE — 25000003 PHARM REV CODE 250: Performed by: INTERNAL MEDICINE

## 2024-11-29 PROCEDURE — 63600175 PHARM REV CODE 636 W HCPCS: Performed by: INTERNAL MEDICINE

## 2024-11-29 PROCEDURE — 36415 COLL VENOUS BLD VENIPUNCTURE: CPT | Performed by: INTERNAL MEDICINE

## 2024-11-29 PROCEDURE — 99233 SBSQ HOSP IP/OBS HIGH 50: CPT | Mod: ,,, | Performed by: HOSPITALIST

## 2024-11-29 PROCEDURE — 85025 COMPLETE CBC W/AUTO DIFF WBC: CPT | Performed by: INTERNAL MEDICINE

## 2024-11-29 RX ORDER — MORPHINE SULFATE 30 MG/1
30 TABLET, FILM COATED, EXTENDED RELEASE ORAL EVERY 8 HOURS
Status: DISCONTINUED | OUTPATIENT
Start: 2024-11-29 | End: 2024-11-30 | Stop reason: HOSPADM

## 2024-11-29 RX ADMIN — GABAPENTIN 300 MG: 300 CAPSULE ORAL at 09:11

## 2024-11-29 RX ADMIN — FOLIC ACID 1 MG: 1 TABLET ORAL at 08:11

## 2024-11-29 RX ADMIN — DEXAMETHASONE SODIUM PHOSPHATE 4 MG: 4 INJECTION INTRA-ARTICULAR; INTRALESIONAL; INTRAMUSCULAR; INTRAVENOUS; SOFT TISSUE at 05:11

## 2024-11-29 RX ADMIN — DEXAMETHASONE SODIUM PHOSPHATE 4 MG: 4 INJECTION INTRA-ARTICULAR; INTRALESIONAL; INTRAMUSCULAR; INTRAVENOUS; SOFT TISSUE at 06:11

## 2024-11-29 RX ADMIN — BUSPIRONE HYDROCHLORIDE 15 MG: 5 TABLET ORAL at 09:11

## 2024-11-29 RX ADMIN — MORPHINE SULFATE 30 MG: 30 TABLET, FILM COATED, EXTENDED RELEASE ORAL at 03:11

## 2024-11-29 RX ADMIN — FAMOTIDINE 20 MG: 20 TABLET ORAL at 08:11

## 2024-11-29 RX ADMIN — POLYETHYLENE GLYCOL 3350 17 G: 17 POWDER, FOR SOLUTION ORAL at 08:11

## 2024-11-29 RX ADMIN — HYDROMORPHONE HYDROCHLORIDE 4 MG: 4 TABLET ORAL at 09:11

## 2024-11-29 RX ADMIN — DEXAMETHASONE SODIUM PHOSPHATE 4 MG: 4 INJECTION INTRA-ARTICULAR; INTRALESIONAL; INTRAMUSCULAR; INTRAVENOUS; SOFT TISSUE at 12:11

## 2024-11-29 RX ADMIN — HYDROMORPHONE HYDROCHLORIDE 4 MG: 4 TABLET ORAL at 04:11

## 2024-11-29 RX ADMIN — MORPHINE SULFATE 30 MG: 30 TABLET, FILM COATED, EXTENDED RELEASE ORAL at 08:11

## 2024-11-29 RX ADMIN — MORPHINE SULFATE 30 MG: 30 TABLET, FILM COATED, EXTENDED RELEASE ORAL at 09:11

## 2024-11-29 RX ADMIN — SENNOSIDES AND DOCUSATE SODIUM 2 TABLET: 50; 8.6 TABLET ORAL at 08:11

## 2024-11-29 RX ADMIN — AMLODIPINE BESYLATE 5 MG: 5 TABLET ORAL at 08:11

## 2024-11-29 RX ADMIN — SENNOSIDES AND DOCUSATE SODIUM 2 TABLET: 50; 8.6 TABLET ORAL at 09:11

## 2024-11-29 RX ADMIN — HYDROMORPHONE HYDROCHLORIDE 4 MG: 4 TABLET ORAL at 12:11

## 2024-11-29 RX ADMIN — GABAPENTIN 300 MG: 300 CAPSULE ORAL at 08:11

## 2024-11-29 RX ADMIN — Medication 400 MG: at 08:11

## 2024-11-29 RX ADMIN — FAMOTIDINE 20 MG: 20 TABLET ORAL at 09:11

## 2024-11-29 RX ADMIN — ATORVASTATIN CALCIUM 40 MG: 40 TABLET, FILM COATED ORAL at 09:11

## 2024-11-29 NOTE — PROGRESS NOTES
[facility] - On[facility] ([facility])  Neurosurgery  Progress Note    Subjective:     History of Present Illness: Sherlyn Saavedra is a 65yo F with PMHx T2DM, HTN, HLD, and known breast cancer with mets to brain who presents to ED after MRI brain w/wo outpatient showing increase in number and size of ring-enhancing lesions in bilateral hemispheres and L cerebellum with increase in ventricular size. Patient has been having severe headaches over  past several weeks. Also notes some gait instability. No new numbness/weakness/tingling in arms or legs. Follows with heme/onc in outpatient setting. NSGY consulted given above findings.    Post-Op Info:  * No surgery found *       Interval History: 11/29/2024: Neurologically stable. Possible craniotomy with Dr. Ma for tumor resection on 12/2. Will discuss further with family and staff today.    Medications:  Continuous Infusions:  Scheduled Meds:   amLODIPine  5 mg Oral Daily    atorvastatin  40 mg Oral QHS    busPIRone  15 mg Oral QHS    dexAMETHasone  4 mg Intravenous Q6H    famotidine  20 mg Oral BID    folic acid  1 mg Oral Daily    gabapentin  300 mg Oral BID    magnesium oxide  400 mg Oral Daily    morphine  30 mg Oral BID    polyethylene glycol  17 g Oral Daily    senna-docusate 8.6-50 mg  2 tablet Oral BID     PRN Meds:  Current Facility-Administered Medications:     (Magic mouthwash) 1:1:1 diphenhydrAMINE(Benadryl) 12.5mg/5ml liq, aluminum & magnesium hydroxide-simethicone (Maalox), LIDOcaine viscous 2%, 15 mL, Swish & Spit, Q4H PRN    acetaminophen, 650 mg, Oral, Q4H PRN    albuterol-ipratropium, 3 mL, Nebulization, Q4H PRN    aluminum-magnesium hydroxide-simethicone, 30 mL, Oral, QID PRN    bisacodyL, 10 mg, Rectal, Daily PRN    dextrose 10%, 12.5 g, Intravenous, PRN    dextrose 10%, 25 g, Intravenous, PRN    glucagon (human recombinant), 1 mg, Intramuscular, PRN    glucose, 16 g, Oral, PRN    glucose, 24 g, Oral, PRN    HYDROmorphone, 4 mg, Oral, Q4H PRN    insulin aspart  U-100, 0-5 Units, Subcutaneous, QID (AC + HS) PRN    labetaloL, 10 mg, Intravenous, Q4H PRN    melatonin, 6 mg, Oral, Nightly PRN    naloxone, 0.02 mg, Intravenous, PRN    ondansetron, 4 mg, Intravenous, Q6H PRN    prochlorperazine, 5 mg, Intravenous, Q6H PRN    simethicone, 1 tablet, Oral, QID PRN    sodium chloride 0.9%, 10 mL, Intravenous, Q12H PRN     Review of Systems  Objective:     Weight: 81.8 kg (180 lb 5.4 oz)  Body mass index is 35.22 kg/m².  Vital Signs (Most Recent):  Temp: 97.9 °F (36.6 °C) (11/29/24 0730)  Pulse: 82 (11/29/24 0730)  Resp: 17 (11/29/24 0859)  BP: (!) 140/65 (11/29/24 0730)  SpO2: 97 % (11/29/24 0730) Vital Signs (24h Range):  Temp:  [97.7 °F (36.5 °C)-98.5 °F (36.9 °C)] 97.9 °F (36.6 °C)  Pulse:  [61-84] 82  Resp:  [16-20] 17  SpO2:  [96 %-99 %] 97 %  BP: (133-162)/(62-75) 140/65       Neurosurgery Physical Exam 11/29/2024        General:   Aox3  GCS [id number]    CNII-XII:   Intact on detailed exam  PERRL  Visual fields grossly intact  EOMi  Facial sensation preserved  No facial assymetry  Tongue/uvula/palate midline  Shoulder shrug equal  No pronator drift    Extremities:   5/5 motor throughout  Sensorium intact throughout  Coordination intact throughout  DTRs 2+  No pathological reflexes  No sensory level present     +Dysmetria present on finger to nose testing    Significant Labs:  Recent Labs   Lab 11/27/24 2043 11/28/24  0405 11/29/24  0534   GLU 80 154* 167*    141 137   K 4.4 4.2 4.0    106 103   CO2 18* 21* 24   BUN 10 11 14   CREATININE 0.8 0.8 0.8   CALCIUM 9.6 9.7 9.8   MG  --  1.6 1.6     Recent Labs   Lab 11/27/24  2043 11/28/24  0405 11/29/24  0534   WBC 4.29 3.99 5.67   HGB 9.9* 9.0* 9.1*   HCT 31.9* 28.4* 28.8*    256 300     Recent Labs   Lab 11/27/24 2043   INR 1.1   APTT 22.2     Microbiology Results (last 7 days)       ** No results found for the last 168 hours. **          All pertinent labs from the last 24 hours have been  reviewed.    Significant Diagnostics:  I have reviewed all pertinent imaging results/findings within the past 24 hours.  Assessment/Plan:     Primary malignant neoplasm of upper outer quadrant of breast, left  Sherlyn Saavedra is a 63yo F with PMHx T2DM, HTN, HLD, and known breast cancer with mets to brain who presents to ED after MRI brain w/wo outpatient showing increase in number and size of ring-enhancing lesions in bilateral hemispheres and L cerebellum with increase in ventricular size. Patient has been having severe headaches over  past several weeks. Also notes some gait instability. No new numbness/weakness/tingling in arms or legs. Follows with heme/onc in outpatient setting. NSGY consulted given above findings.     Plan:  --admit patient to  for further workup and monitoring  - q4h neurochecks on floor   --All labs and diagnostics reviewed   --SBP <160 (cardene ggt; hydralazine & labetalol PRN; transition to home meds when appropriate)   --Na >135   --recommend loading dose of 10mg Dex once followed by 4mg q6h x7d  --Recommend Heme/onc work-up for further staging and prognostication    - appears patient is on third line chemo with low response rate per most recent onc note; craniotomy for tumor resection will be discussed with patient and family tomorrow to align with goals of care  --okay for diet at this time pending surgical decision making  --Dr. Ma to consider craniotomy for tumor resection on 12/2; will continue discussions with family and staff regarding this possibility  --PUD PPx while steroid treatment ongoing  --Continue to monitor clinically, notify NSGY immediately with any changes in neuro status     Dispo: admit to   Discussed with staff Dr. Camacho and Dr. Anil Juan MD  Neurosurgery  [facility] - Oncology ([facility])

## 2024-11-29 NOTE — PLAN OF CARE
No acute events this shift. Patient AAOx4. Afebrile and VSS. x1 assist. Patient complaint of headache. PRN  Dilaudid administered x2. Moderate relief obtained.Daughter at bedside. Bed alarm on. Bed in lowest position and locked. Side rails up x2. All possessions and call light within reach. Non-skid socks worn. Instructed to call for assistance and voiced understanding. All needs of patient currently met. Will continue to monitor with frequent rounding.

## 2024-11-29 NOTE — ASSESSMENT & PLAN NOTE
Sherlyn Saavedra is a 63yo F with PMHx T2DM, HTN, HLD, and known breast cancer with mets to brain who presents to ED after MRI brain w/wo outpatient showing increase in number and size of ring-enhancing lesions in bilateral hemispheres and L cerebellum with increase in ventricular size. Patient has been having severe headaches over  past several weeks. Also notes some gait instability. No new numbness/weakness/tingling in arms or legs. Follows with heme/onc in outpatient setting. NSGY consulted given above findings.     Plan:  --admit patient to  for further workup and monitoring  - q4h neurochecks on floor   --All labs and diagnostics reviewed   --SBP <160 (cardene ggt; hydralazine & labetalol PRN; transition to home meds when appropriate)   --Na >135   --recommend loading dose of 10mg Dex once followed by 4mg q6h x7d  --Recommend Heme/onc work-up for further staging and prognostication    - appears patient is on third line chemo with low response rate per most recent onc note; craniotomy for tumor resection will be discussed with patient and family tomorrow to align with goals of care  --okay for diet at this time pending surgical decision making  --Dr. Ma to consider craniotomy for tumor resection on 12/2; will continue discussions with family and staff regarding this possibility  --PUD PPx while steroid treatment ongoing  --Continue to monitor clinically, notify NSGY immediately with any changes in neuro status     Dispo: admit to   Discussed with staff Dr. Camacho and Dr. Ma

## 2024-11-29 NOTE — PROGRESS NOTES
Admit Assessment    Patient Identification  Sherlyn Saavedra   :  1960  Admit Date:  2024  Attending Provider:  Bryce Osborn MD              Referral:   Pt was admitted to  with a diagnosis of Breast cancer metastasized to brain, and was admitted this hospital stay due to Brain mass [G93.89]  Chest pain [R07.9]  Chronic nonintractable headache, unspecified headache type [R51.9, G89.29].       is involved was referred to the Social Work Department routine referral.  Patient presents as a 64 y.o. year old  female.    Persons interviewed : Patient     Living Situation:      Resides at [address]  [place] [address] [place] [address], phone: [phone] (home).  Reports that she lives alone. Says that her daughter who lives in [place] has recently taken a leave until January from her job to come and assist her.       Current or Past Agencies and Description of Services/Supplies    DME: Patient reports that she does not use any dme at home. She is unsure what dme could be helpful for her. Does not feel a walker would help her with her gait instability.          Home Health: Patient was not on service with home health prior to this hospitalization. She has had in in the past with [facility]. She has previously asked about home health to her provider but at the time felt she just needed assistance with cooking and cleaning up around her house.       IV Infusion: N/a      Nutrition: Oral     Outpatient Pharmacy:     [facility] [address]  [facility] [address]  Phone: [phone] Fax: [phone]      Patient Preference of agencies include Patient does not express a preference     Patient/Caregiver informed of right to choose providers or agencies.  Patient provides permission to release any necessary information to [facility] and to Non[facility] agencies as needed to facilitate patient care, treatment planning, and patient  "discharge planning.  Written and verbal resources provided.      Coping: " Sometimes I am not doing well." Reports that she is trying to stay strong. " I want to live I didn't come all this way and work just to die. If there is something out there to try then I would like to try."            Adjustment to Diagnosis and Treatment: Appropriate    Emotional/Behavioral/Cognitive Issues: Was able to recognize that she was having some cognitive deficits prior to the brain mets being found. She would be driving and not remember where she was going or could drive home and not remember where her house was.             History/Current Symptoms of Anxiety/Depression: No:   History/Current Substance Use:   Social History     Tobacco Use    Smoking status: Former     Current packs/day: 0.00     Types: Cigarettes     Quit date:      Years since quittin.9    Smokeless tobacco: Never   Substance and Sexual Activity    Alcohol use: Not Currently    Drug use: Never    Sexual activity: Not Currently       Indications of Abuse/Neglect: No:   Abuse Screen (yes response referral indicated)  Feels Unsafe at Home or Work/School: no  Feels Threatened by Someone: no  Does anyone try to keep you from having contact with others or doing things outside your home?: no  Physical Signs of Abuse Present: no    Financial:  Payer/Plan Subscr  Sex Relation Sub. Ins. ID Effective Group Num   1. MEDICAID - [place]* [employer] E 1960 Female Self [id number]* 23                                    [id number], [place] [facility] [id number]                            Other identified concerns/needs: None at this time    Plan: TBD     Interventions/Referrals: Will need results from pending evals in order to determine the best level of care upon discharge.   Patient/caregiver engaged in treatment planning process.     providing psychosocial and supportive counseling, resources, education, assistance and discharge planning as " appropriate.  Patient/caregiver state understanding of  available resources,  following, remains available.

## 2024-11-29 NOTE — TELEPHONE ENCOUNTER
MD Monson Peer to Peer requested per Dr Hoffmann     case number [id number]

## 2024-11-29 NOTE — SUBJECTIVE & OBJECTIVE
Interval History: 11/29/2024: Neurologically stable. Possible craniotomy with Dr. Ma for tumor resection on 12/2. Will discuss further with family and staff today.    Medications:  Continuous Infusions:  Scheduled Meds:   amLODIPine  5 mg Oral Daily    atorvastatin  40 mg Oral QHS    busPIRone  15 mg Oral QHS    dexAMETHasone  4 mg Intravenous Q6H    famotidine  20 mg Oral BID    folic acid  1 mg Oral Daily    gabapentin  300 mg Oral BID    magnesium oxide  400 mg Oral Daily    morphine  30 mg Oral BID    polyethylene glycol  17 g Oral Daily    senna-docusate 8.6-50 mg  2 tablet Oral BID     PRN Meds:  Current Facility-Administered Medications:     (Magic mouthwash) 1:1:1 diphenhydrAMINE(Benadryl) 12.5mg/5ml liq, aluminum & magnesium hydroxide-simethicone (Maalox), LIDOcaine viscous 2%, 15 mL, Swish & Spit, Q4H PRN    acetaminophen, 650 mg, Oral, Q4H PRN    albuterol-ipratropium, 3 mL, Nebulization, Q4H PRN    aluminum-magnesium hydroxide-simethicone, 30 mL, Oral, QID PRN    bisacodyL, 10 mg, Rectal, Daily PRN    dextrose 10%, 12.5 g, Intravenous, PRN    dextrose 10%, 25 g, Intravenous, PRN    glucagon (human recombinant), 1 mg, Intramuscular, PRN    glucose, 16 g, Oral, PRN    glucose, 24 g, Oral, PRN    HYDROmorphone, 4 mg, Oral, Q4H PRN    insulin aspart U-100, 0-5 Units, Subcutaneous, QID (AC + HS) PRN    labetaloL, 10 mg, Intravenous, Q4H PRN    melatonin, 6 mg, Oral, Nightly PRN    naloxone, 0.02 mg, Intravenous, PRN    ondansetron, 4 mg, Intravenous, Q6H PRN    prochlorperazine, 5 mg, Intravenous, Q6H PRN    simethicone, 1 tablet, Oral, QID PRN    sodium chloride 0.9%, 10 mL, Intravenous, Q12H PRN     Review of Systems  Objective:     Weight: 81.8 kg (180 lb 5.4 oz)  Body mass index is 35.22 kg/m².  Vital Signs (Most Recent):  Temp: 97.9 °F (36.6 °C) (11/29/24 0730)  Pulse: 82 (11/29/24 0730)  Resp: 17 (11/29/24 0859)  BP: (!) 140/65 (11/29/24 0730)  SpO2: 97 % (11/29/24 0730) Vital Signs (24h  Range):  Temp:  [97.7 °F (36.5 °C)-98.5 °F (36.9 °C)] 97.9 °F (36.6 °C)  Pulse:  [61-84] 82  Resp:  [16-20] 17  SpO2:  [96 %-99 %] 97 %  BP: (133-162)/(62-75) 140/65       Neurosurgery Physical Exam 11/29/2024        General:   Aox3  GCS E4V5M6    CNII-XII:   Intact on detailed exam  PERRL  Visual fields grossly intact  EOMi  Facial sensation preserved  No facial assymetry  Tongue/uvula/palate midline  Shoulder shrug equal  No pronator drift    Extremities:   5/5 motor throughout  Sensorium intact throughout  Coordination intact throughout  DTRs 2+  No pathological reflexes  No sensory level present     +Dysmetria present on finger to nose testing    Significant Labs:  Recent Labs   Lab 11/27/24 2043 11/28/24 0405 11/29/24  0534   GLU 80 154* 167*    141 137   K 4.4 4.2 4.0    106 103   CO2 18* 21* 24   BUN 10 11 14   CREATININE 0.8 0.8 0.8   CALCIUM 9.6 9.7 9.8   MG  --  1.6 1.6     Recent Labs   Lab 11/27/24 2043 11/28/24  0405 11/29/24  0534   WBC 4.29 3.99 5.67   HGB 9.9* 9.0* 9.1*   HCT 31.9* 28.4* 28.8*    256 300     Recent Labs   Lab 11/27/24 2043   INR 1.1   APTT 22.2     Microbiology Results (last 7 days)       ** No results found for the last 168 hours. **          All pertinent labs from the last 24 hours have been reviewed.    Significant Diagnostics:  I have reviewed all pertinent imaging results/findings within the past 24 hours.

## 2024-11-29 NOTE — SUBJECTIVE & OBJECTIVE
Interval History: No active event overnight. Vitals were stable, labs were unremarkable. NSGY planning stereotactic radiosurgery as outpatient, likely discharge tomorrow.    Oncology Treatment Plan:   OP ERIBULIN Q3W    Medications:  Continuous Infusions:  Scheduled Meds:   amLODIPine  5 mg Oral Daily    atorvastatin  40 mg Oral QHS    busPIRone  15 mg Oral QHS    dexAMETHasone  4 mg Intravenous Q6H    famotidine  20 mg Oral BID    folic acid  1 mg Oral Daily    gabapentin  300 mg Oral BID    magnesium oxide  400 mg Oral Daily    morphine  30 mg Oral Q8H    polyethylene glycol  17 g Oral Daily    senna-docusate 8.6-50 mg  2 tablet Oral BID     PRN Meds:  Current Facility-Administered Medications:     (Magic mouthwash) 1:1:1 diphenhydrAMINE(Benadryl) 12.5mg/5ml liq, aluminum & magnesium hydroxide-simethicone (Maalox), LIDOcaine viscous 2%, 15 mL, Swish & Spit, Q4H PRN    acetaminophen, 650 mg, Oral, Q4H PRN    albuterol-ipratropium, 3 mL, Nebulization, Q4H PRN    aluminum-magnesium hydroxide-simethicone, 30 mL, Oral, QID PRN    bisacodyL, 10 mg, Rectal, Daily PRN    dextrose 10%, 12.5 g, Intravenous, PRN    dextrose 10%, 25 g, Intravenous, PRN    glucagon (human recombinant), 1 mg, Intramuscular, PRN    glucose, 16 g, Oral, PRN    glucose, 24 g, Oral, PRN    HYDROmorphone, 4 mg, Oral, Q4H PRN    insulin aspart U-100, 0-5 Units, Subcutaneous, QID (AC + HS) PRN    labetaloL, 10 mg, Intravenous, Q4H PRN    melatonin, 6 mg, Oral, Nightly PRN    naloxone, 0.02 mg, Intravenous, PRN    ondansetron, 4 mg, Intravenous, Q6H PRN    prochlorperazine, 5 mg, Intravenous, Q6H PRN    simethicone, 1 tablet, Oral, QID PRN    sodium chloride 0.9%, 10 mL, Intravenous, Q12H PRN     Review of Systems   Constitutional:  Positive for unexpected weight change. Negative for fever.   HENT:  Negative for congestion.    Musculoskeletal:  Negative for gait problem.   Neurological:  Positive for headaches. Negative for weakness and numbness.    Psychiatric/Behavioral:  Negative for behavioral problems.      Objective:     Vital Signs (Most Recent):  Temp: 98 °F (36.7 °C) (11/29/24 1152)  Pulse: 75 (11/29/24 1536)  Resp: 18 (11/29/24 1152)  BP: 133/63 (11/29/24 1152)  SpO2: 99 % (11/29/24 1152) Vital Signs (24h Range):  Temp:  [97.7 °F (36.5 °C)-98.5 °F (36.9 °C)] 98 °F (36.7 °C)  Pulse:  [61-84] 75  Resp:  [16-18] 18  SpO2:  [96 %-99 %] 99 %  BP: (133-162)/(62-74) 133/63     Weight: 81.8 kg (180 lb 5.4 oz)  Body mass index is 35.22 kg/m².  Body surface area is 1.86 meters squared.      Intake/Output Summary (Last 24 hours) at 11/29/2024 1554  Last data filed at 11/28/2024 2219  Gross per 24 hour   Intake 120 ml   Output 400 ml   Net -280 ml        Physical Exam  Vitals and nursing note reviewed.   Constitutional:       General: She is not in acute distress.  Eyes:      Pupils: Pupils are equal, round, and reactive to light.   Cardiovascular:      Rate and Rhythm: Normal rate.      Pulses: Normal pulses.      Heart sounds: Normal heart sounds.   Pulmonary:      Effort: Pulmonary effort is normal.      Breath sounds: Normal breath sounds.   Musculoskeletal:      Right lower leg: No edema.      Left lower leg: No edema.   Skin:     General: Skin is warm.   Neurological:      Mental Status: She is oriented to person, place, and time.      Motor: No weakness.   Psychiatric:         Mood and Affect: Mood normal.         Behavior: Behavior normal.          Significant Labs:   All pertinent labs from the last 24 hours have been reviewed.    Diagnostic Results:  I have reviewed all pertinent imaging results/findings within the past 24 hours.  I have reviewed and interpreted all pertinent imaging results/findings within the past 24 hours.

## 2024-11-29 NOTE — PROGRESS NOTES
"[facility] - Oncology ([facility])  Hematology/Oncology  Progress Note    Patient Name: Sherlyn Saavedra  Admission Date: 11/27/2024  Hospital Length of Stay: 2 days  Code Status: Full Code     Subjective:     HPI:  Ms. Saavedra is a 65 yo female with a PMHx of primary malignant neoplasm of upper outer quadrant of the left breast with brain/lung mets, DM2, HLD, HTN, and anemia. She is followed in Oncology Clinic by Dr. Hoffmann, who last saw her on 11/21/24.  He noted, "progressive metastatic triple negative breast cancer. Patient has progressive 2 lines of therapy. Recent CT chest abdomen pelvis demonstrates progressive disease."  As she was having new headaches, he ordered a follow up MRI brain.MRI brain done 11/27 shows interval progression of metastatic disease with significant interval increase in size of several ring-enhancing lesions in both cerebral hemispheres and the left cerebellum as compared to the prior with significant surrounding edema.    Admitted to [facility] for further investigation and management.    Interval History: No active event overnight. Vitals were stable, labs were unremarkable. NSGY planning stereotactic radiosurgery as outpatient, likely discharge tomorrow.    Oncology Treatment Plan:   OP ERIBULIN Q3W    Medications:  Continuous Infusions:  Scheduled Meds:   amLODIPine  5 mg Oral Daily    atorvastatin  40 mg Oral QHS    busPIRone  15 mg Oral QHS    dexAMETHasone  4 mg Intravenous Q6H    famotidine  20 mg Oral BID    folic acid  1 mg Oral Daily    gabapentin  300 mg Oral BID    magnesium oxide  400 mg Oral Daily    morphine  30 mg Oral Q8H    polyethylene glycol  17 g Oral Daily    senna-docusate 8.6-50 mg  2 tablet Oral BID     PRN Meds:  Current Facility-Administered Medications:     (Magic mouthwash) 1:1:1 diphenhydrAMINE(Benadryl) 12.5mg/5ml liq, aluminum & magnesium hydroxide-simethicone (Maalox), LIDOcaine viscous 2%, 15 mL, Swish & Spit, Q4H PRN    acetaminophen, 650 mg, Oral, Q4H PRN    " albuterol-ipratropium, 3 mL, Nebulization, Q4H PRN    aluminum-magnesium hydroxide-simethicone, 30 mL, Oral, QID PRN    bisacodyL, 10 mg, Rectal, Daily PRN    dextrose 10%, 12.5 g, Intravenous, PRN    dextrose 10%, 25 g, Intravenous, PRN    glucagon (human recombinant), 1 mg, Intramuscular, PRN    glucose, 16 g, Oral, PRN    glucose, 24 g, Oral, PRN    HYDROmorphone, 4 mg, Oral, Q4H PRN    insulin aspart U-100, 0-5 Units, Subcutaneous, QID (AC + HS) PRN    labetaloL, 10 mg, Intravenous, Q4H PRN    melatonin, 6 mg, Oral, Nightly PRN    naloxone, 0.02 mg, Intravenous, PRN    ondansetron, 4 mg, Intravenous, Q6H PRN    prochlorperazine, 5 mg, Intravenous, Q6H PRN    simethicone, 1 tablet, Oral, QID PRN    sodium chloride 0.9%, 10 mL, Intravenous, Q12H PRN     Review of Systems   Constitutional:  Positive for unexpected weight change. Negative for fever.   HENT:  Negative for congestion.    Musculoskeletal:  Negative for gait problem.   Neurological:  Positive for headaches. Negative for weakness and numbness.   Psychiatric/Behavioral:  Negative for behavioral problems.      Objective:     Vital Signs (Most Recent):  Temp: 98 °F (36.7 °C) (11/29/24 1152)  Pulse: 75 (11/29/24 1536)  Resp: 18 (11/29/24 1152)  BP: 133/63 (11/29/24 1152)  SpO2: 99 % (11/29/24 1152) Vital Signs (24h Range):  Temp:  [97.7 °F (36.5 °C)-98.5 °F (36.9 °C)] 98 °F (36.7 °C)  Pulse:  [61-84] 75  Resp:  [16-18] 18  SpO2:  [96 %-99 %] 99 %  BP: (133-162)/(62-74) 133/63     Weight: 81.8 kg (180 lb 5.4 oz)  Body mass index is 35.22 kg/m².  Body surface area is 1.86 meters squared.      Intake/Output Summary (Last 24 hours) at 11/29/2024 1554  Last data filed at 11/28/2024 2219  Gross per 24 hour   Intake 120 ml   Output 400 ml   Net -280 ml        Physical Exam  Vitals and nursing note reviewed.   Constitutional:       General: She is not in acute distress.  Eyes:      Pupils: Pupils are equal, round, and reactive to light.   Cardiovascular:      Rate  "and Rhythm: Normal rate.      Pulses: Normal pulses.      Heart sounds: Normal heart sounds.   Pulmonary:      Effort: Pulmonary effort is normal.      Breath sounds: Normal breath sounds.   Musculoskeletal:      Right lower leg: No edema.      Left lower leg: No edema.   Skin:     General: Skin is warm.   Neurological:      Mental Status: She is oriented to person, place, and time.      Motor: No weakness.   Psychiatric:         Mood and Affect: Mood normal.         Behavior: Behavior normal.          Significant Labs:   All pertinent labs from the last 24 hours have been reviewed.    Diagnostic Results:  I have reviewed all pertinent imaging results/findings within the past 24 hours.  I have reviewed and interpreted all pertinent imaging results/findings within the past 24 hours.  Assessment/Plan:     * Breast cancer metastasized to brain  Ms. Saavedra is a 65 yo female with a PMHx of primary malignant neoplasm of upper outer quadrant of the left breast with brain/lung mets, DM2, HLD, HTN, and anemia. She is followed in Oncology Clinic by Dr. Hoffmann, who last saw her on 11/21/24. He noted, "progressive metastatic triple negative breast cancer. Patient has progressive 2 lines of therapy. Recent CT chest abdomen pelvis demonstrates progressive disease." As she was having new headaches, he ordered a follow up MRI brain.MRI brain done 11/27 shows interval progression of metastatic disease with significant interval increase in size of several ring-enhancing lesions in both cerebral hemispheres and the left cerebellum as compared to the prior with significant surrounding edema.     Plan:  -- Neurosurgery is planning stereotactic radiosurgery   -- q4h neurochecks on floor   --SBP <160 (cardene ggt; hydralazine & labetalol PRN; transition to home meds when appropriate)   --Na >135   --Loading dose of 10mg Dex once followed by 4mg q6h x7d  --Craniotomy for tumor resection will be discussed with patient and family tomorrow to " align with goals of care  --PUD PPx while steroid treatment ongoing  --Continue to monitor clinically, notify NSGY immediately with any changes in neuro status    Type 2 diabetes mellitus with diabetic polyneuropathy, without long-term current use of insulin  Sliding scale    Hypertension  Known HTN    Plan:  -- SBP <160 (cardene ggt; hydralazine & labetalol PRN; transition to home meds when appropriate)     Primary malignant neoplasm of upper outer quadrant of breast, left  Ms. Saavedra is a 65 yo female with a PMHx of primary malignant neoplasm of upper outer quadrant of the left breast with brain/lung mets, recent MRI shows brain mets.    Plan:  -- On chemotherapy, will be held for now.             Lokesh Crowder MD  Hematology/Oncology  [facility] - Oncology ([facility])

## 2024-11-30 VITALS
TEMPERATURE: 97 F | HEART RATE: 67 BPM | WEIGHT: 177.81 LBS | SYSTOLIC BLOOD PRESSURE: 138 MMHG | HEIGHT: 60 IN | OXYGEN SATURATION: 98 % | RESPIRATION RATE: 19 BRPM | DIASTOLIC BLOOD PRESSURE: 72 MMHG | BODY MASS INDEX: 34.91 KG/M2

## 2024-11-30 LAB
ALBUMIN SERPL BCP-MCNC: 3.8 G/DL (ref 3.5–5.2)
ALP SERPL-CCNC: 104 U/L (ref 40–150)
ALT SERPL W/O P-5'-P-CCNC: 35 U/L (ref 10–44)
ANION GAP SERPL CALC-SCNC: 11 MMOL/L (ref 8–16)
AST SERPL-CCNC: 31 U/L (ref 10–40)
BASOPHILS # BLD AUTO: 0.01 K/UL (ref 0–0.2)
BASOPHILS NFR BLD: 0.1 % (ref 0–1.9)
BILIRUB SERPL-MCNC: 0.3 MG/DL (ref 0.1–1)
BUN SERPL-MCNC: 15 MG/DL (ref 8–23)
CALCIUM SERPL-MCNC: 9.5 MG/DL (ref 8.7–10.5)
CHLORIDE SERPL-SCNC: 104 MMOL/L (ref 95–110)
CO2 SERPL-SCNC: 21 MMOL/L (ref 23–29)
CREAT SERPL-MCNC: 0.8 MG/DL (ref 0.5–1.4)
DIFFERENTIAL METHOD BLD: ABNORMAL
EOSINOPHIL # BLD AUTO: 0 K/UL (ref 0–0.5)
EOSINOPHIL NFR BLD: 0 % (ref 0–8)
ERYTHROCYTE [DISTWIDTH] IN BLOOD BY AUTOMATED COUNT: 17.2 % (ref 11.5–14.5)
EST. GFR  (NO RACE VARIABLE): >60 ML/MIN/1.73 M^2
GLUCOSE SERPL-MCNC: 193 MG/DL (ref 70–110)
HCT VFR BLD AUTO: 34.3 % (ref 37–48.5)
HGB BLD-MCNC: 10.4 G/DL (ref 12–16)
IMM GRANULOCYTES # BLD AUTO: 0.07 K/UL (ref 0–0.04)
IMM GRANULOCYTES NFR BLD AUTO: 1 % (ref 0–0.5)
LYMPHOCYTES # BLD AUTO: 0.9 K/UL (ref 1–4.8)
LYMPHOCYTES NFR BLD: 13.2 % (ref 18–48)
MAGNESIUM SERPL-MCNC: 1.8 MG/DL (ref 1.6–2.6)
MCH RBC QN AUTO: 27.9 PG (ref 27–31)
MCHC RBC AUTO-ENTMCNC: 30.3 G/DL (ref 32–36)
MCV RBC AUTO: 92 FL (ref 82–98)
MONOCYTES # BLD AUTO: 0.5 K/UL (ref 0.3–1)
MONOCYTES NFR BLD: 6.8 % (ref 4–15)
NEUTROPHILS # BLD AUTO: 5.3 K/UL (ref 1.8–7.7)
NEUTROPHILS NFR BLD: 78.9 % (ref 38–73)
NRBC BLD-RTO: 0 /100 WBC
PHOSPHATE SERPL-MCNC: 2.5 MG/DL (ref 2.7–4.5)
PLATELET # BLD AUTO: 315 K/UL (ref 150–450)
PMV BLD AUTO: 10.2 FL (ref 9.2–12.9)
POCT GLUCOSE: 194 MG/DL (ref 70–110)
POCT GLUCOSE: 216 MG/DL (ref 70–110)
POTASSIUM SERPL-SCNC: 4.6 MMOL/L (ref 3.5–5.1)
PROT SERPL-MCNC: 7.2 G/DL (ref 6–8.4)
RBC # BLD AUTO: 3.73 M/UL (ref 4–5.4)
SODIUM SERPL-SCNC: 136 MMOL/L (ref 136–145)
WBC # BLD AUTO: 6.74 K/UL (ref 3.9–12.7)

## 2024-11-30 PROCEDURE — 97530 THERAPEUTIC ACTIVITIES: CPT

## 2024-11-30 PROCEDURE — 36415 COLL VENOUS BLD VENIPUNCTURE: CPT | Performed by: INTERNAL MEDICINE

## 2024-11-30 PROCEDURE — 85025 COMPLETE CBC W/AUTO DIFF WBC: CPT | Performed by: INTERNAL MEDICINE

## 2024-11-30 PROCEDURE — 97116 GAIT TRAINING THERAPY: CPT

## 2024-11-30 PROCEDURE — 25000003 PHARM REV CODE 250

## 2024-11-30 PROCEDURE — 63600175 PHARM REV CODE 636 W HCPCS: Performed by: INTERNAL MEDICINE

## 2024-11-30 PROCEDURE — 80053 COMPREHEN METABOLIC PANEL: CPT | Performed by: INTERNAL MEDICINE

## 2024-11-30 PROCEDURE — 97161 PT EVAL LOW COMPLEX 20 MIN: CPT

## 2024-11-30 PROCEDURE — 99239 HOSP IP/OBS DSCHRG MGMT >30: CPT | Mod: ,,, | Performed by: HOSPITALIST

## 2024-11-30 PROCEDURE — 99232 SBSQ HOSP IP/OBS MODERATE 35: CPT | Mod: ,,, | Performed by: PHYSICIAN ASSISTANT

## 2024-11-30 PROCEDURE — 84100 ASSAY OF PHOSPHORUS: CPT | Performed by: INTERNAL MEDICINE

## 2024-11-30 PROCEDURE — 25000003 PHARM REV CODE 250: Performed by: INTERNAL MEDICINE

## 2024-11-30 PROCEDURE — 97165 OT EVAL LOW COMPLEX 30 MIN: CPT

## 2024-11-30 PROCEDURE — 83735 ASSAY OF MAGNESIUM: CPT | Performed by: INTERNAL MEDICINE

## 2024-11-30 RX ORDER — BISACODYL 10 MG/1
10 SUPPOSITORY RECTAL DAILY PRN
Status: DISCONTINUED | OUTPATIENT
Start: 2024-11-30 | End: 2024-11-30

## 2024-11-30 RX ORDER — MORPHINE SULFATE 30 MG/1
30 TABLET, FILM COATED, EXTENDED RELEASE ORAL 2 TIMES DAILY
Qty: 10 TABLET | Refills: 0 | Status: SHIPPED | OUTPATIENT
Start: 2024-11-30 | End: 2024-12-03 | Stop reason: SDUPTHER

## 2024-11-30 RX ORDER — MORPHINE SULFATE 30 MG/1
30 TABLET, FILM COATED, EXTENDED RELEASE ORAL 2 TIMES DAILY
Qty: 10 TABLET | Refills: 0 | Status: SHIPPED | OUTPATIENT
Start: 2024-11-30 | End: 2024-11-30 | Stop reason: HOSPADM

## 2024-11-30 RX ORDER — DEXAMETHASONE 2 MG/1
2 TABLET ORAL EVERY 12 HOURS
Qty: 42 TABLET | Refills: 0 | Status: SHIPPED | OUTPATIENT
Start: 2024-11-30 | End: 2024-12-21

## 2024-11-30 RX ORDER — MORPHINE SULFATE 30 MG/1
30 TABLET, FILM COATED, EXTENDED RELEASE ORAL 2 TIMES DAILY
Qty: 30 TABLET | Refills: 0 | Status: SHIPPED | OUTPATIENT
Start: 2024-11-30 | End: 2024-11-30 | Stop reason: HOSPADM

## 2024-11-30 RX ADMIN — MORPHINE SULFATE 30 MG: 30 TABLET, FILM COATED, EXTENDED RELEASE ORAL at 06:11

## 2024-11-30 RX ADMIN — Medication 400 MG: at 08:11

## 2024-11-30 RX ADMIN — BISACODYL 10 MG: 10 SUPPOSITORY RECTAL at 11:11

## 2024-11-30 RX ADMIN — MORPHINE SULFATE 30 MG: 30 TABLET, FILM COATED, EXTENDED RELEASE ORAL at 01:11

## 2024-11-30 RX ADMIN — INSULIN ASPART 2 UNITS: 100 INJECTION, SOLUTION INTRAVENOUS; SUBCUTANEOUS at 11:11

## 2024-11-30 RX ADMIN — GABAPENTIN 300 MG: 300 CAPSULE ORAL at 08:11

## 2024-11-30 RX ADMIN — HYDROMORPHONE HYDROCHLORIDE 4 MG: 4 TABLET ORAL at 02:11

## 2024-11-30 RX ADMIN — DEXAMETHASONE SODIUM PHOSPHATE 4 MG: 4 INJECTION INTRA-ARTICULAR; INTRALESIONAL; INTRAMUSCULAR; INTRAVENOUS; SOFT TISSUE at 12:11

## 2024-11-30 RX ADMIN — HYDROMORPHONE HYDROCHLORIDE 4 MG: 4 TABLET ORAL at 06:11

## 2024-11-30 RX ADMIN — FOLIC ACID 1 MG: 1 TABLET ORAL at 08:11

## 2024-11-30 RX ADMIN — DEXAMETHASONE SODIUM PHOSPHATE 4 MG: 4 INJECTION INTRA-ARTICULAR; INTRALESIONAL; INTRAMUSCULAR; INTRAVENOUS; SOFT TISSUE at 11:11

## 2024-11-30 RX ADMIN — SENNOSIDES AND DOCUSATE SODIUM 2 TABLET: 50; 8.6 TABLET ORAL at 08:11

## 2024-11-30 RX ADMIN — DEXAMETHASONE SODIUM PHOSPHATE 4 MG: 4 INJECTION INTRA-ARTICULAR; INTRALESIONAL; INTRAMUSCULAR; INTRAVENOUS; SOFT TISSUE at 06:11

## 2024-11-30 RX ADMIN — POLYETHYLENE GLYCOL 3350 17 G: 17 POWDER, FOR SOLUTION ORAL at 08:11

## 2024-11-30 RX ADMIN — FAMOTIDINE 20 MG: 20 TABLET ORAL at 08:11

## 2024-11-30 RX ADMIN — HYDROMORPHONE HYDROCHLORIDE 4 MG: 4 TABLET ORAL at 01:11

## 2024-11-30 RX ADMIN — AMLODIPINE BESYLATE 5 MG: 5 TABLET ORAL at 08:11

## 2024-11-30 NOTE — ASSESSMENT & PLAN NOTE
"Ms. Saavedra is a 63 yo female with a PMHx of primary malignant neoplasm of upper outer quadrant of the left breast with brain/lung mets, DM2, HLD, HTN, and anemia. She is followed in Oncology Clinic by Dr. Hoffmann, who last saw her on 11/21/24. He noted, "progressive metastatic triple negative breast cancer. Patient has progressive 2 lines of therapy. Recent CT chest abdomen pelvis demonstrates progressive disease." As she was having new headaches, he ordered a follow up MRI brain.MRI brain done 11/27 shows interval progression of metastatic disease with significant interval increase in size of several ring-enhancing lesions in both cerebral hemispheres and the left cerebellum as compared to the prior with significant surrounding edema.     Plan:  -- Neurosurgery is planning stereotactic radiosurgery   --SBP <160 (cardene ggt; hydralazine & labetalol PRN; transition to home meds when appropriate)   --Na >135   --Dexamethasone  --PUD PPx while steroid treatment ongoing  --F/U with neurosurgery as outpatient  "

## 2024-11-30 NOTE — PT/OT/SLP EVAL
Occupational Therapy   Evaluation and Treatment    Co-evaluation with PT to have 2 skilled therapists present to safely assess pt's functional mobility.     Name: Sherlyn Saavedra  MRN: [id number]  Admitting Diagnosis: Breast cancer metastasized to brain  Recent Surgery: * No surgery found *      Recommendations:     Discharge Recommendations: Low Intensity Therapy  Discharge Equipment Recommendations:  walker, rolling  Barriers to discharge:  None    Assessment:     Sherlyn Saavedra is a 64 y.o. female with a medical diagnosis of Breast cancer metastasized to brain.  Pt tolerated session well and without incident, but she requires (A) with ADLs and mobility and is performing below her functional baseline.  Low intensity therapy recommended at d/c due to her current level of function and her home environment.  She presents with the following.  Performance deficits affecting function: weakness, impaired endurance, impaired functional mobility, impaired self care skills, gait instability, impaired balance, pain, decreased lower extremity function, decreased upper extremity function, impaired fine motor, decreased coordination, impaired sensation.      Rehab Prognosis: Good; patient would benefit from acute skilled OT services to address these deficits and reach maximum level of function.       Plan:     Patient to be seen 3 x/week to address the above listed problems via self-care/home management, therapeutic activities, therapeutic exercises  Plan of Care Expires: 12/29/24  Plan of Care Reviewed with: patient, daughter    Subjective     Chief Complaint: back pain while ambulating, difficulty grasping objects   Patient/Family Comments/goals: to get stronger    Occupational Profile:  Living Environment: Pt lives alone in a [facility] with 3 FILIBERTO with a HR in the middle that isn't sturdy.  She has a storage room downstairs, but her daughter encourages her not to use it.  Pt reports no recent falls.    Previous level of function:  Independent with ADLs and mobility but with increased difficulty recently.  Pt drives.  Roles and Routines: mother; retired rehab tech   Equipment Used at Home: other (see comments) (walk-in shower with built-in bench)  Assistance upon Discharge: Her daughter lives out of state, but she has taken off work to (A).      Pain/Comfort:  Pain Rating 1: 0/10 (at rest; pain during mobility)  Location - Orientation 1: generalized  Location 1: back  Pain Addressed 1: Reposition, Distraction, Cessation of Activity, Pre-medicate for activity  Pain Rating Post-Intervention 1: 5/10    Patients cultural, spiritual, Judaism conflicts given the current situation: no    Objective:     Communicated with: nurse and PT prior to session.  Patient found up in chair with telemetry with her daughter present upon OT entry to room.    General Precautions: Standard, fall  Orthopedic Precautions: N/A  Braces: N/A  Respiratory Status: Room air    Occupational Performance:    Bed Mobility:    N/A due to pt's sitting in bedside chair at beginning and end of session    Functional Mobility/Transfers:  Patient completed Sit <> Stand Transfer from bedside chair x 1 trial with stand by assistance  with  rolling walker   Functional Mobility: Pt ambulated ~3 ft with CGA with HHA and then ~80 ft with SBA with RW.  She reported back pain but had no LOB or reports of dizziness.     Activities of Daily Living:  Feeding:  Pt was eating her lunch independently with tray table setup while seated in chair upon OT entry.   Grooming: Pt was able to manipulate mouth wash bottle cap but with increased time.     Cognitive/Visual Perceptual:  Cognitive/Psychosocial Skills:     -       Oriented to: Person, Place, Time, and Situation   -       Follows Commands/attention: Follows all one-step commands  -       Communication: clear/fluent    Physical Exam:  Sensation:    -       Impaired   tingling B hands since last week  Upper Extremity Range of Motion:     -        Right Upper Extremity: WFL but with tremors since last week  -       Left Upper Extremity: WFL but with tremors since last week   Upper Extremity Strength:    -       Right Upper Extremity: WFL  -       Left Upper Extremity: WFL   Strength:    -       Right Upper Extremity: WFL during evaluation but weaker than baseline  -       Left Upper Extremity:  WFL during evaluation but weaker than baseline  Fine Motor Coordination: Impaired mildly while manipulating bottle cap    AMPAC 6 Click ADL:  [facility] Total Score: 19    Treatment & Education:  Pt and her daughter edu on role of OT, POC, safety when performing self care tasks, benefit of performing OOB activity, and safety when performing functional transfers and mobility.    - Self care tasks completed-- as noted above      Patient left up in chair with all lines intact, call button in reach, nurse notified, and her daughter present    GOALS:   Multidisciplinary Problems       Occupational Therapy Goals          Problem: Occupational Therapy    Goal Priority Disciplines Outcome Interventions   Occupational Therapy Goal     OT, PT/OT Progressing    Description: Goals to be met by: 12/14/2024     Patient will increase functional independence with ADLs by performing:    UE Dressing with Modified [place].  LE Dressing with Set-up Assistance.  Grooming while standing at sink with Supervision.  Toileting from toilet with Supervision for hygiene and clothing management.   Supine to sit with Modified [place].  Step transfer with Supervision using LRAD.  Toilet transfer to toilet with Supervision using LRAD.                         History:     Past Medical History:   Diagnosis Date    Allergy     Anemia     Breast cancer     primary malignant neoplasm of upper outer quadrant of breast - progressive metastatic triple negative breast cancer. Patient has progressive 2 lines of therapy.    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

## 2024-11-30 NOTE — PLAN OF CARE
SW received secure chat from pt's nurse regarding RW, SW contacted OME RW to be delivered to the bedside.

## 2024-11-30 NOTE — PLAN OF CARE
Plan of care reviewed with patient and daughter. Pt is afebrile. Free from falls or injury. Dilaudid po given prn for headache pain. Dexamethasone IV given as scheduled. Pt up with assist to bathroom. Bed locked in lowest position, non skid socks on, call light within reach. Pt instructed to call if any assistance is needed. Vitals stable. Daughter at bedside. Bed alarm on. Will cont to annie pt.

## 2024-11-30 NOTE — PROGRESS NOTES
[facility] - Oncology ([facility])  Neurosurgery  Progress Note    Subjective:     History of Present Illness: Sherlyn Saavedra is a 65yo F with PMHx T2DM, HTN, HLD, and known breast cancer with mets to brain who presents to ED after MRI brain w/wo outpatient showing increase in number and size of ring-enhancing lesions in bilateral hemispheres and L cerebellum with increase in ventricular size. Patient has been having severe headaches over  past several weeks. Also notes some gait instability. No new numbness/weakness/tingling in arms or legs. Follows with heme/onc in outpatient setting. NSGY consulted given above findings.    Post-Op Info:  * No surgery found *       Interval History: NAEON. Pt reports abdominal pain today, likely 2/2 constipation. HA's have improved. No other acute complaints. Discussed plan for discharge and f/u with Dr. Ma next week for SRS.     Medications:  Continuous Infusions:  Scheduled Meds:   amLODIPine  5 mg Oral Daily    atorvastatin  40 mg Oral QHS    busPIRone  15 mg Oral QHS    dexAMETHasone  4 mg Intravenous Q6H    famotidine  20 mg Oral BID    folic acid  1 mg Oral Daily    gabapentin  300 mg Oral BID    magnesium oxide  400 mg Oral Daily    morphine  30 mg Oral Q8H    polyethylene glycol  17 g Oral Daily    senna-docusate 8.6-50 mg  2 tablet Oral BID     PRN Meds:  Current Facility-Administered Medications:     (Magic mouthwash) 1:1:1 diphenhydrAMINE(Benadryl) 12.5mg/5ml liq, aluminum & magnesium hydroxide-simethicone (Maalox), LIDOcaine viscous 2%, 15 mL, Swish & Spit, Q4H PRN    acetaminophen, 650 mg, Oral, Q4H PRN    albuterol-ipratropium, 3 mL, Nebulization, Q4H PRN    aluminum-magnesium hydroxide-simethicone, 30 mL, Oral, QID PRN    bisacodyL, 10 mg, Rectal, Daily PRN    dextrose 10%, 12.5 g, Intravenous, PRN    dextrose 10%, 25 g, Intravenous, PRN    glucagon (human recombinant), 1 mg, Intramuscular, PRN    glucose, 16 g, Oral, PRN    glucose, 24 g, Oral, PRN    HYDROmorphone, 4  "mg, Oral, Q4H PRN    insulin aspart U-100, 0-5 Units, Subcutaneous, QID (AC + HS) PRN    labetaloL, 10 mg, Intravenous, Q4H PRN    melatonin, 6 mg, Oral, Nightly PRN    naloxone, 0.02 mg, Intravenous, PRN    ondansetron, 4 mg, Intravenous, Q6H PRN    prochlorperazine, 5 mg, Intravenous, Q6H PRN    simethicone, 1 tablet, Oral, QID PRN    sodium chloride 0.9%, 10 mL, Intravenous, Q12H PRN     Review of Systems  Objective:     Weight: 80.7 kg (177 lb 12.8 oz)  Body mass index is 34.72 kg/m².  Vital Signs (Most Recent):  Temp: 97.9 °F (36.6 °C) (11/30/24 0835)  Pulse: 68 (11/30/24 0835)  Resp: 19 (11/30/24 0835)  BP: (!) 141/81 (11/30/24 0835)  SpO2: 97 % (11/30/24 0835) Vital Signs (24h Range):  Temp:  [97.4 °F (36.3 °C)-98.1 °F (36.7 °C)] 97.9 °F (36.6 °C)  Pulse:  [63-75] 68  Resp:  [17-19] 19  SpO2:  [96 %-100 %] 97 %  BP: (121-145)/(56-81) 141/81                                 Physical Exam         Neurosurgery Physical Exam    Aox3  GCS [id number]    CNII-XII:   Intact on detailed exam  PERRL  Visual fields grossly intact  EOMi  Facial sensation preserved  No facial assymetry  Tongue/uvula/palate midline  Shoulder shrug equal  No pronator drift    Extremities:   5/5 motor throughout  Sensorium intact throughout  Coordination intact throughout  No sensory level present     +Dysmetria present on finger to nose testing    Significant Labs:  Recent Labs   Lab 11/29/24  0534 11/30/24  0456   * 193*    136   K 4.0 4.6    104   CO2 24 21*   BUN 14 15   CREATININE 0.8 0.8   CALCIUM 9.8 9.5   MG 1.6 1.8     Recent Labs   Lab 11/29/24  0534 11/30/24  0456   WBC 5.67 6.74   HGB 9.1* 10.4*   HCT 28.8* 34.3*    315     No results for input(s): "LABPT", "INR", "APTT" in the last 48 hours.  Microbiology Results (last 7 days)       ** No results found for the last 168 hours. **          All pertinent labs from the last 24 hours have been reviewed.    Significant Diagnostics:  I have reviewed and interpreted " all pertinent imaging results/findings within the past 24 hours.  Assessment/Plan:     Primary malignant neoplasm of upper outer quadrant of breast, left  Sherlyn Saavedra is a 65yo F with PMHx T2DM, HTN, HLD, and known breast cancer with mets to brain who presented to ED after MRI brain w/wo outpatient showed increase in number and size of ring-enhancing lesions in bilateral hemispheres and L cerebellum with increase in ventricular size. Patient had been having severe headaches over  past several weeks. Also notes some gait instability. No new numbness/weakness/tingling in arms or legs. Follows with heme/onc in outpatient setting. NSGY consulted given above findings.     Plan:  --admitted to Oncology service  - q4h neurochecks on floor   --All labs and diagnostics reviewed    - MRI brain w/wo 11/27: interval increase in size of several ring enhancing lesions in b/l cerebral hemispheres and L cerebellum, with significant surrounding vasogenic edema, mild increase in ventricular caliber; largest lesions R occipital (3.7 x 2.6 x 3.2 cm ) and L cerebellar (3.1 x 2.0 x 2.3 cm)  --S/p loading dose of 10mg Dex, followed by 4mg q6h   - Upon discharge, may start slow taper over 3 weeks to 2 mg BID  --Plan for follow up in NSGY clinic with Dr. Ma on Tuesday 12/3 followed by gamma knife SRS later next week (will be scheduled on Monday and pt made aware of times)   - Pt will need to be outpatient for clinic appt and SRS  --okay for diet   --PUD PPx while steroid treatment ongoing  --Continue to monitor clinically, notify NSGY immediately with any changes in neuro status     Discussed with staff Dr. Camacho and Dr. Anil Lamas PA-C  Neurosurgery  [facility] - Oncology ([facility])

## 2024-11-30 NOTE — DISCHARGE SUMMARY
"[facility] - Oncology ([facility])  Hematology/Oncology  Discharge Summary      Patient Name: Sherlyn Saavedra  MRN: [id number]  Admission Date: 11/27/2024  Hospital Length of Stay: 3 days  Discharge Date and Time:  11/30/2024 2:55 PM  Attending Physician: Bryce Osborn MD   Discharging Provider: Lokesh Crowder MD  Primary Care Provider: Nan, Primary Doctor    HPI: Ms. Saavedra is a 63 yo female with a PMHx of primary malignant neoplasm of upper outer quadrant of the left breast with brain/lung mets, DM2, HLD, HTN, and anemia. She is followed in Oncology Clinic by Dr. Hoffmann, who last saw her on 11/21/24.  He noted, "progressive metastatic triple negative breast cancer. Patient has progressive 2 lines of therapy. Recent CT chest abdomen pelvis demonstrates progressive disease."  As she was having new headaches, he ordered a follow up MRI brain.MRI brain done 11/27 shows interval progression of metastatic disease with significant interval increase in size of several ring-enhancing lesions in both cerebral hemispheres and the left cerebellum as compared to the prior with significant surrounding edema.    Admitted to Hem Onco for further investigation and management.    * No surgery found *     Hospital Course: Primary malignant neoplasm of upper outer quadrant of the left breast with brain/lung mets, recent MRI shows mets to the brain after presenting with severe headaches which is aggravated by change of position. Neurosurgery consulted- recommends q4h neurocheck, SBP <160 (cardene ggt; hydralazine & labetalol PRN; transition to home meds when appropriate), Na >135, PUD PPx while steroid treatment ongoing. Neurosurgery team is planning stereotactic radiosurgery, preferred to see patient outpatient. Likely discharge tomorrow.    Ms. Saavedra was discharged to on dexamethasone, chronic medications and walker. To follow up with neurosurgery as outpatient. Strict return precautions were discussed with the patient and her " daughter, verbalized understanding and agreed with plan.      Vitals:    11/30/24 1452   BP:    Pulse: 67   Resp:    Temp:      Physical Exam  Vitals and nursing note reviewed.   Constitutional:       General: She is not in acute distress.  Eyes:      Pupils: Pupils are equal, round, and reactive to light.   Cardiovascular:      Rate and Rhythm: Normal rate.      Pulses: Normal pulses.      Heart sounds: Normal heart sounds.   Pulmonary:      Effort: Pulmonary effort is normal.      Breath sounds: Normal breath sounds.   Musculoskeletal:      Right lower leg: No edema.      Left lower leg: No edema.   Skin:     General: Skin is warm.   Neurological:      Mental Status: She is oriented to person, place, and time.      Motor: No weakness.   Psychiatric:         Mood and Affect: Mood normal.         Behavior: Behavior normal.        Goals of Care Treatment Preferences:  Code Status: Full Code    Health care agent: Daughter: Luna Saavedra  [facility] agent number: [phone]          What is most important right now is to focus on remaining as independent as possible, symptom/pain control.  Accordingly, we have decided that the best plan to meet the patient's goals includes continuing with treatment.      Consults:   Consults (From admission, onward)          Status Ordering Provider     Inpatient consult to Neurosurgery  Once        Provider:  (Not yet assigned)    Completed MARY DOBBS            Significant Diagnostic Studies: N/A    Pending Diagnostic Studies:       None          Final Active Diagnoses:    Diagnosis Date Noted POA    PRINCIPAL PROBLEM:  Breast cancer metastasized to brain [C50.919, C79.31] 11/27/2024 Yes    Brain mass [G93.89] 11/28/2024 Yes    Opioid-induced constipation [K59.03, T40.2X5A] 10/03/2024 Yes    Neoplasm related pain [G89.3] 09/18/2024 Yes    Hypertension [I10] 08/09/2024 Yes    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 Yes    Height: 5' (1.524 m)    Weight: 80.7 kg (177 lb 12.8 oz)    Length of need (1-99 months): 90    Please check all that apply: Patient's condition impairs ambulation.      Medications:  Reconciled Home Medications:      Medication List        CHANGE how you take these medications      amLODIPine 5 MG tablet  Commonly known as: NORVASC  TAKE 1 TABLET BY MOUTH EVERY MORNING  What changed: Another medication with the same name was removed. Continue taking this medication, and follow the directions you see here.     dexAMETHasone 2 MG tablet  Commonly known as: DECADRON  Take 1 tablet (2 mg total) by mouth every 12 (twelve) hours. for 21 days  What changed:   medication strength  how much to take  when to take this  additional instructions            CONTINUE taking these medications      * (MAGIC MOUTHWASH) 1:1:1 BENADRYL 12.5MG/5ML LIQ, ALUMINUM & MAGNESIUM  Swish and spit 15 mLs every 4 (four) hours as needed (mouth ulcers). for mouth sores     * LIDOcaine viscous HCl 2%-diphenhydrAMINE-aluminum-magnesium hydroxide-simethicone  Swish and spit 15 mLs by mouth every 4 (four) hours as needed (mouth ulcers). for mouth sores     atorvastatin 40 MG tablet  Commonly known as: LIPITOR  Take 40 mg by mouth.     busPIRone 15 MG tablet  Commonly known as: BUSPAR  Take 1 tablet by mouth at bedtime.     doxycycline 100 MG capsule  Commonly known as: MONODOX  EMPTY CONTENTS OF 1 CAPSULE INTO NASAL IRRIGATION SYSTEM, " ADD DISTILLED WATER, SALT PACK, MIX & IRRIGATE. PERFORM 2 TIMES DAILY     famotidine 20 MG tablet  Commonly known as: PEPCID  Take 20 mg by mouth 2 (two) times daily.     folic acid 1 MG tablet  Commonly known as: FOLVITE  take 1 tablet by mouth every morning     FREESTYLE TEST Strp  Generic drug: blood sugar diagnostic  Test 4 times per day     gabapentin 300 MG capsule  Commonly known as: NEURONTIN  Take 300 mg by mouth.     HYDROmorphone 4 MG tablet  Commonly known as: DILAUDID  Take 1 tablet (4 mg total) by mouth every 3 (three) hours as needed for Pain (4 doses/day).     LIDOcaine-prilocaine cream  Commonly known as: EMLA  Apply to affected area once     magnesium oxide 400 mg (241.3 mg magnesium) tablet  Commonly known as: MAG-OX  Take 400 mg by mouth once daily.     metFORMIN 500 MG ER 24hr tablet  Commonly known as: GLUCOPHAGE-XR  take 2 tablets by mouth in the morning and at bedtime as directed     morphine 30 MG 12 hr tablet  Commonly known as: MS CONTIN  Take 1 tablet (30 mg total) by mouth 2 (two) times daily.     MOUNJARO 5 mg/0.5 mL Pnij  Generic drug: tirzepatide  Inject 5 mg into the skin once weekly     naloxone 4 mg/actuation Spry  Commonly known as: NARCAN     ondansetron 8 MG Tbdl  Commonly known as: ZOFRAN-ODT  Dissolve 1 tablet (8 mg total) by mouth 2 (two) times daily.     pantoprazole 40 MG tablet  Commonly known as: PROTONIX  take 1 tablet by mouth daily           * This list has 2 medication(s) that are the same as other medications prescribed for you. Read the directions carefully, and ask your doctor or other care provider to review them with you.                  Lokesh Crowder MD  Hematology/Oncology  [facility] - Oncology ([facility])

## 2024-11-30 NOTE — PT/OT/SLP EVAL
"Physical Therapy Evaluation  Co-evaluation with OT due to acuity of condition, level of skilled assist needed for assessment of safety with mobility.   Patient Name:  Sherlyn Saavedra   MRN:  [id number]    Recommendations:     Discharge Recommendations: Low Intensity Therapy   Discharge Equipment Recommendations: walker, rolling   Barriers to discharge:  patient below functional baseline    Assessment:     Sherlyn Saavedra is a 64 y.o. female admitted with a medical diagnosis of Breast cancer metastasized to brain.  She presents with the following impairments/functional limitations: weakness, impaired balance, impaired endurance, impaired self care skills, impaired functional mobility, gait instability, pain, decreased upper extremity function, decreased coordination, decreased lower extremity function, impaired fine motor. The patient demo'd good functional strength, impaired balance and mild instability with gait. Improvement in gait mechanics and stability with use of RW. Patient ambulated 80' with RW and stand by assistance. Patient currently demonstrates a need for low intensity therapy on a scheduled basis secondary to a decline in functional status due to illness     Rehab Prognosis: Good; patient would benefit from acute skilled PT services to address these deficits and reach maximum level of function.    Recent Surgery: * No surgery found *      Plan:     During this hospitalization, patient to be seen 3 x/week to address the identified rehab impairments via gait training, therapeutic activities, therapeutic exercises, neuromuscular re-education and progress toward the following goals:    Plan of Care Expires:  12/30/24    Subjective     Chief Complaint: "I had a headache but I got some medicine for it so it feels a lot better. If I lower my head or turn it side to side the pain gets worse"  Patient/Family Comments/goals: return to PLOF  Pain/Comfort:  Pain Rating 1: 0/10  Location - Orientation 1: " generalized  Location 1: back  Pain Addressed 1: Reposition, Distraction, Cessation of Activity  Pain Rating Post-Intervention 1: 5/10 (back pain increased with gait)    Patients cultural, spiritual, Rastafari conflicts given the current situation: no    Living Environment:  The patient lives alone in [facility], 3 FILIBERTO with unilateral HR (HR is in middle of stairs), WIS built in bench. Drives. Used to work as rehab tech.   Prior to admission, patients level of function was independent.  Equipment used at home:  (WIS built in bench).  DME owned (not currently used): none.  Upon discharge, patient will have assistance from daughter (daughter lives in [place], will be staying with patient to help her).    Objective:     Communicated with RN prior to session.  Patient found up in chair with telemetry  upon PT entry to room. Daughter at bedside.     General Precautions: Standard, fall  Orthopedic Precautions:N/A   Braces: N/A  Respiratory Status: Room air    Exams:    Cognitive Exam  Patient is A&O x4 and follows 100% of one -step commands    Fine Motor Coordination   -       Impaired fine motor of UE     Postural Exam Patient presented with the following abnormalities:    -       Rounded shoulders  -       Forward head  -       Kyphosis  -       Posterior pelvic tilt  -       Sensation    -       Light touch intact JIMI LE   Skin Integrity/Edema     -       Skin integrity: visibly intact  -       Edema: NA   R LE ROM WFL   R LE Strength 3+/5 hip flexion,4/5 knee ext/flex, and ankle DF/PF   L LE ROM WFL   L LE Strength  3+/5 hip flexion,4/5 knee ext/flex, and ankle DF/PF       Functional Mobility:    Bed Mobility  Deferred, up in chair   Transfers Sit to Stand:  stand by assistance with no AD from chair   Gait  Gait Distance: 3 ft with HHA and contact guard assist   -80 ft with RW and stand by assistance   Description: wide ILA, decreased step length, decreased calvin, impaired weight shift laterally- improved with use of RW;  gait tolerance limited due to increasing back pain          AM-PAC 6 CLICK MOBILITY  Total Score:18       Treatment & Education:  Patient and daughter educated on:  -role of therapy  -goals of session  -PT POC  -benefits of out of bed mobility and consequences of immobility  -calling for staff assist to mobilize safely  Patient agreeable to mobilize with therapy.      Gait training: cued for upright posture, reciprocal strides, cued to ambulate inside RW ILA, pacing for energy conservation    Patient encouraged to ambulate, sit up in chair 3x/day to prevent deconditioning during hospitalization. Patient verbalized understanding and agreement to mobilize only with RN assist for safety.     Patient left up in chair with all lines intact, call button in reach, and daughter present.    GOALS:   Multidisciplinary Problems       Physical Therapy Goals          Problem: Physical Therapy    Goal Priority Disciplines Outcome Interventions   Physical Therapy Goal     PT, PT/OT Progressing    Description: Goals to be met by:      Patient will increase functional independence with mobility by performin. Supine to sit with Modified [place]  2. Sit to supine with Modified [place]  3. Sit to stand transfer with Modified [place]  4. Gait  x 150 feet with Supervision using LRAD.   5. Ascend/descend 3 stair with unilateral Handrails Supervision using No Assistive Device.    DME Justification  Patient demonstrates a mobility limitation that significantly impairs their ability to participate in one or more mobility related activities of daily living. Patient's mobility limitation cannot be sufficiently resolved with the use of a cane, but can be sufficiently resolved with the use of a rolling walker.The use of a rolling walker will considerably improve their ability to participate in MRADLs. Patient will use the walker on a regular basis at home.                             History:     Past Medical History:    Diagnosis Date    Allergy     Anemia     Breast cancer     primary malignant neoplasm of upper outer quadrant of breast - progressive metastatic triple negative breast cancer. Patient has progressive 2 lines of therapy.    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    Type 2 diabetes mellitus with diabetic polyneuropathy, without long-term current use of insulin 03/04/2015       Past Surgical History:   Procedure Laterality Date    HYSTERECTOMY      tubaligation  1987       Time Tracking:     PT Received On: 11/30/24  PT Start Time: 1330     PT Stop Time: 1347  PT Total Time (min): 17 min     Billable Minutes: Evaluation 9 and Gait Training 8      11/30/2024

## 2024-11-30 NOTE — PLAN OF CARE
Problem: Occupational Therapy  Goal: Occupational Therapy Goal  Description: Goals to be met by: 12/14/2024     Patient will increase functional independence with ADLs by performing:    UE Dressing with Modified [place].  LE Dressing with Set-up Assistance.  Grooming while standing at sink with Supervision.  Toileting from toilet with Supervision for hygiene and clothing management.   Supine to sit with Modified [place].  Step transfer with Supervision using LRAD.  Toilet transfer to toilet with Supervision using LRAD.    Outcome: Progressing     Pt evaluated and OT goals established.    Left message on Karina's phone letting her know Knute Sport needs her labs done for her appointment with Dr. Mariola Angeles. Did let her if they have they have questions to call the office.

## 2024-11-30 NOTE — PLAN OF CARE
Problem: Physical Therapy  Goal: Physical Therapy Goal  Description: Goals to be met by:      Patient will increase functional independence with mobility by performin. Supine to sit with Modified [place]  2. Sit to supine with Modified [place]  3. Sit to stand transfer with Modified [place]  4. Gait  x 150 feet with Supervision using LRAD.   5. Ascend/descend 3 stair with unilateral Handrails Supervision using No Assistive Device.    DME Justification  Patient demonstrates a mobility limitation that significantly impairs their ability to participate in one or more mobility related activities of daily living. Patient's mobility limitation cannot be sufficiently resolved with the use of a cane, but can be sufficiently resolved with the use of a rolling walker.The use of a rolling walker will considerably improve their ability to participate in MRADLs. Patient will use the walker on a regular basis at home.        Outcome: Progressing   Evaluation completed, initiated plan of care.   Arianna Miles, PT  2024

## 2024-11-30 NOTE — SUBJECTIVE & OBJECTIVE
Interval History: NAEON. Pt reports abdominal pain today, likely 2/2 constipation. HA's have improved. No other acute complaints. Discussed plan for discharge and f/u with Dr. Ma next week for SRS.     Medications:  Continuous Infusions:  Scheduled Meds:   amLODIPine  5 mg Oral Daily    atorvastatin  40 mg Oral QHS    busPIRone  15 mg Oral QHS    dexAMETHasone  4 mg Intravenous Q6H    famotidine  20 mg Oral BID    folic acid  1 mg Oral Daily    gabapentin  300 mg Oral BID    magnesium oxide  400 mg Oral Daily    morphine  30 mg Oral Q8H    polyethylene glycol  17 g Oral Daily    senna-docusate 8.6-50 mg  2 tablet Oral BID     PRN Meds:  Current Facility-Administered Medications:     (Magic mouthwash) 1:1:1 diphenhydrAMINE(Benadryl) 12.5mg/5ml liq, aluminum & magnesium hydroxide-simethicone (Maalox), LIDOcaine viscous 2%, 15 mL, Swish & Spit, Q4H PRN    acetaminophen, 650 mg, Oral, Q4H PRN    albuterol-ipratropium, 3 mL, Nebulization, Q4H PRN    aluminum-magnesium hydroxide-simethicone, 30 mL, Oral, QID PRN    bisacodyL, 10 mg, Rectal, Daily PRN    dextrose 10%, 12.5 g, Intravenous, PRN    dextrose 10%, 25 g, Intravenous, PRN    glucagon (human recombinant), 1 mg, Intramuscular, PRN    glucose, 16 g, Oral, PRN    glucose, 24 g, Oral, PRN    HYDROmorphone, 4 mg, Oral, Q4H PRN    insulin aspart U-100, 0-5 Units, Subcutaneous, QID (AC + HS) PRN    labetaloL, 10 mg, Intravenous, Q4H PRN    melatonin, 6 mg, Oral, Nightly PRN    naloxone, 0.02 mg, Intravenous, PRN    ondansetron, 4 mg, Intravenous, Q6H PRN    prochlorperazine, 5 mg, Intravenous, Q6H PRN    simethicone, 1 tablet, Oral, QID PRN    sodium chloride 0.9%, 10 mL, Intravenous, Q12H PRN     Review of Systems  Objective:     Weight: 80.7 kg (177 lb 12.8 oz)  Body mass index is 34.72 kg/m².  Vital Signs (Most Recent):  Temp: 97.9 °F (36.6 °C) (11/30/24 0835)  Pulse: 68 (11/30/24 0835)  Resp: 19 (11/30/24 0835)  BP: (!) 141/81 (11/30/24 0835)  SpO2: 97 % (11/30/24  "0835) Vital Signs (24h Range):  Temp:  [97.4 °F (36.3 °C)-98.1 °F (36.7 °C)] 97.9 °F (36.6 °C)  Pulse:  [63-75] 68  Resp:  [17-19] 19  SpO2:  [96 %-100 %] 97 %  BP: (121-145)/(56-81) 141/81                                 Physical Exam         Neurosurgery Physical Exam    Aox3  GCS [id number]    CNII-XII:   Intact on detailed exam  PERRL  Visual fields grossly intact  EOMi  Facial sensation preserved  No facial assymetry  Tongue/uvula/palate midline  Shoulder shrug equal  No pronator drift    Extremities:   5/5 motor throughout  Sensorium intact throughout  Coordination intact throughout  No sensory level present     +Dysmetria present on finger to nose testing    Significant Labs:  Recent Labs   Lab 11/29/24  0534 11/30/24  0456   * 193*    136   K 4.0 4.6    104   CO2 24 21*   BUN 14 15   CREATININE 0.8 0.8   CALCIUM 9.8 9.5   MG 1.6 1.8     Recent Labs   Lab 11/29/24  0534 11/30/24  0456   WBC 5.67 6.74   HGB 9.1* 10.4*   HCT 28.8* 34.3*    315     No results for input(s): "LABPT", "INR", "APTT" in the last 48 hours.  Microbiology Results (last 7 days)       ** No results found for the last 168 hours. **          All pertinent labs from the last 24 hours have been reviewed.    Significant Diagnostics:  I have reviewed and interpreted all pertinent imaging results/findings within the past 24 hours.  "

## 2024-11-30 NOTE — PLAN OF CARE
Plan of care reviewed with pt and family member. Pt ambulates in room and to restroom with assist x1. All VSS, afebrile, free from falls or injuries; no acute events so far this shift. Discharge paperwork reviewed with pt and family member. Pt waiting for meds and walker to be delivered to bedside before discharge.

## 2024-11-30 NOTE — ASSESSMENT & PLAN NOTE
Sherlyn Saavedra is a 65yo F with PMHx T2DM, HTN, HLD, and known breast cancer with mets to brain who presented to ED after MRI brain w/wo outpatient showed increase in number and size of ring-enhancing lesions in bilateral hemispheres and L cerebellum with increase in ventricular size. Patient had been having severe headaches over  past several weeks. Also notes some gait instability. No new numbness/weakness/tingling in arms or legs. Follows with heme/onc in outpatient setting. NSGY consulted given above findings.     Plan:  --admitted to Oncology service  - q4h neurochecks on floor   --All labs and diagnostics reviewed    - MRI brain w/wo 11/27: interval increase in size of several ring enhancing lesions in b/l cerebral hemispheres and L cerebellum, with significant surrounding vasogenic edema, mild increase in ventricular caliber; largest lesions R occipital (3.7 x 2.6 x 3.2 cm ) and L cerebellar (3.1 x 2.0 x 2.3 cm)  --S/p loading dose of 10mg Dex, followed by 4mg q6h   - Upon discharge, may start slow taper over 3 weeks to 2 mg BID  --Plan for follow up in NSGY clinic with Dr. Ma on Tuesday 12/3 followed by gamma knife SRS later next week (will be scheduled on Monday and pt made aware of times)   - Pt will need to be outpatient for clinic appt and SRS  --okay for diet   --PUD PPx while steroid treatment ongoing  --Continue to monitor clinically, notify NSGY immediately with any changes in neuro status     Discussed with staff Dr. Camacho and Dr. Ma

## 2024-12-02 ENCOUNTER — TELEPHONE (OUTPATIENT)
Dept: HEMATOLOGY/ONCOLOGY | Facility: CLINIC | Age: 64
End: 2024-12-02
Payer: MEDICAID

## 2024-12-02 ENCOUNTER — HOSPITAL ENCOUNTER (OUTPATIENT)
Dept: RADIATION THERAPY | Facility: HOSPITAL | Age: 64
Discharge: HOME | End: 2024-12-02
Attending: SPECIALIST
Payer: MEDICAID

## 2024-12-02 ENCOUNTER — TELEPHONE (OUTPATIENT)
Dept: NEUROSURGERY | Facility: CLINIC | Age: 64
End: 2024-12-02
Payer: MEDICAID

## 2024-12-02 ENCOUNTER — TELEPHONE (OUTPATIENT)
Dept: PALLIATIVE MEDICINE | Facility: CLINIC | Age: 64
End: 2024-12-02
Payer: MEDICAID

## 2024-12-02 DIAGNOSIS — Z51.5 PALLIATIVE CARE ENCOUNTER: ICD-10-CM

## 2024-12-02 DIAGNOSIS — C78.2 SECONDARY MALIGNANCY OF PARIETAL PLEURA: ICD-10-CM

## 2024-12-02 DIAGNOSIS — C50.919 MALIGNANT NEOPLASM OF BREAST METASTATIC TO BRAIN, UNSPECIFIED LATERALITY: Primary | ICD-10-CM

## 2024-12-02 DIAGNOSIS — G89.3 NEOPLASM RELATED PAIN: ICD-10-CM

## 2024-12-02 DIAGNOSIS — C50.412 PRIMARY MALIGNANT NEOPLASM OF UPPER OUTER QUADRANT OF BREAST, LEFT: Primary | ICD-10-CM

## 2024-12-02 DIAGNOSIS — C79.31 MALIGNANT NEOPLASM OF BREAST METASTATIC TO BRAIN, UNSPECIFIED LATERALITY: Primary | ICD-10-CM

## 2024-12-02 RX ORDER — HYDROMORPHONE HYDROCHLORIDE 4 MG/1
4 TABLET ORAL
Qty: 120 TABLET | Refills: 0 | Status: SHIPPED | OUTPATIENT
Start: 2024-12-02 | End: 2025-01-04

## 2024-12-02 NOTE — TELEPHONE ENCOUNTER
Returned patient daughter phone call. Informed her that refills have been sent. Offered virtual to discuss pain regiments due to increase in pain. Daughter accepted. Directions give to [facility]. Daughter verbalized understanding and had no other issues at this time.

## 2024-12-02 NOTE — TELEPHONE ENCOUNTER
Called to set up appt with Dr Hoffmann for advance care planning. Offered 1120 on 12/5 at . Pt's daughter accepted. Ms saavedra also had some questions about the pt's pain medicine. I let her know I would send a msg to her palliative care provider to get in touch with pt. Ms Saavedra v/u.

## 2024-12-02 NOTE — TELEPHONE ENCOUNTER
----- Message from LEXI Tolentino sent at 12/2/2024 10:19 AM CST -----    ----- Message -----  From: Shelbie Barba  Sent: 12/2/2024  10:17 AM CST  To: rByce Osborn Staff    Type:  Needs Medical Advice    Who Called: pt daughter   Would the patient rather a call back or a response via [facility]? call  Best Call Back Number:  [phone]  Additional Information: calling in regards to getting pt further appts scheduled for the fitting of her cap and her brain procedure getting scheduled would like to receive a call back

## 2024-12-02 NOTE — PROGRESS NOTES
The patient location is: [place]  The chief complaint leading to consultation is: discuss GK    Visit type: audiovisual    Face to Face time with patient: 15  35 minutes of total time spent on the encounter, which includes face to face time and non-face to face time preparing to see the patient (eg, review of tests), Obtaining and/or reviewing separately obtained history, Documenting clinical information in the electronic or other health record, Independently interpreting results (not separately reported) and communicating results to the patient/family/caregiver, or Care coordination (not separately reported).         Each patient to whom he or she provides medical services by telemedicine is:  (1) informed of the relationship between the physician and patient and the respective role of any other health care provider with respect to management of the patient; and (2) notified that he or she may decline to receive medical services by telemedicine and may withdraw from such care at any time.    Notes:     Subjective:   HERNANDEZ, Satya Quan , attest that this documentation has been prepared under the direction and in the presence of Luis Fernando Ma MD.     Patient ID: Sherlyn Saavedra is a 64 y.o. female     Chief Complaint: No chief complaint on file.    HPI  MsTequila Saavedra is a 64 y.o. woman with h/o DM2, HLD and progressive metastatic triple negative breast cancer who presents today to [facility] care.  Upon routine staging, imaging demonstrated two small nodular foci of enhancement within the left frontal lobe measuring 3 mm in size and within the left cerebellum measuring 6 mm in size consistent with the central nervous system metastatic disease. The patient is here to discuss possible neurosurgical intervention by way of radiosurgery like gamma knife to treat her metastatic intracranial lesions.  The patient reports that she is doing otherwise well.    Review of Systems   Constitutional:  Negative for activity change,  appetite change, fatigue, fever and unexpected weight change.   HENT:  Negative for facial swelling.    Eyes: Negative.    Respiratory: Negative.     Cardiovascular: Negative.    Gastrointestinal:  Negative for diarrhea, nausea and vomiting.   Endocrine: Negative.    Genitourinary: Negative.    Musculoskeletal:  Negative for back pain, joint swelling, myalgias and neck pain.   Neurological:  Negative for dizziness, seizures, weakness, numbness and headaches.   Psychiatric/Behavioral: Negative.          Past Medical History:   Diagnosis Date    Allergy     Anemia     Breast cancer     primary malignant neoplasm of upper outer quadrant of breast - progressive metastatic triple negative breast cancer. Patient has progressive 2 lines of therapy.    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   Physical Exam  Constitutional:       General: She is not in acute distress.     Appearance: Normal appearance.   HENT:      Head: Normocephalic and atraumatic.   Neurological:      Mental Status: She is alert and oriented to person, place, and time.     IMAGING:  MRI Brain W WO Contrast 11/27/2024:  Interval progression of metastatic disease with significant interval increase in size of several ring-enhancing lesions in both cerebral hemispheres and the left cerebellum as compared to the prior with significant surrounding edema. New punctate focus of enhancement in the left parietal lobe as compared to the prior. Interval increase in size of the ventricular system with some effacement of the sulci bilaterally is concerning for developing hydrocephalus.  Recommend neurosurgical consultation.  Patient was instructed to present to the emergency room for further evaluation.      I have personally reviewed the images with the pt.      I, Dr. Luis Fernando Ma, personally performed the services described in this documentation. All medical record entries made by the scribe, Satya Quan, were at my direction and in my presence.  I have reviewed the chart and agree that the record reflects my personal performance and is accurate and complete. Luis Fernando Ma MD. 12/02/2024    Assessment:       1. Breast cancer.  2. Brain metastases       Plan:   I have personally reviewed the MRI Brain W WO Contrast with the pt which shows interval progression of metastatic disease with significant interval increase in size of several ring-enhancing lesions in both cerebral hemispheres and the left cerebellum as compared to the prior with significant surrounding edema. New punctate focus of enhancement in the left parietal lobe as compared to the prior. Interval increase in size of the ventricular system with some effacement of the sulci bilaterally is concerning for developing hydrocephalus.  Recommend neurosurgical consultation. Patient was instructed to present to the emergency room for further evaluation.    I recommend gamma knife (focused radiation) to treat the patient's intracranial metastatic disease. I have discussed the risks/benefits, indications, and alternatives for the proposed procedure in detail. I have answered all of their questions and patient wish to proceed with radiosurgery. We will schedule patient.    I will discuss with my partner, Dr. YANCY Licona (rad oncologist) for co-treatment planning.  I have also counseled the patient that for large metastases, there is a risk that she may need surgery in the case of swelling.  She understands this risk.

## 2024-12-02 NOTE — TELEPHONE ENCOUNTER
----- Message from Juliet sent at 12/2/2024 12:56 PM CST -----  Type:  Needs Medical Advice    Who Called: BOSSMAN LIGHT [[id number]]  Symptoms (please be specific):    How long has patient had these symptoms:    Pharmacy name and phone #:    Would the patient rather a call back or a response via [facility]?   Best Call Back Number:  [phone]  Additional Information: Patient was discharge from hospital. Daughter Reshounda stated Morphine medication wasn't changed. Patient is having back pain

## 2024-12-03 ENCOUNTER — OFFICE VISIT (OUTPATIENT)
Dept: NEUROSURGERY | Facility: CLINIC | Age: 64
End: 2024-12-03
Payer: MEDICAID

## 2024-12-03 ENCOUNTER — DOCUMENTATION ONLY (OUTPATIENT)
Dept: HEMATOLOGY/ONCOLOGY | Facility: CLINIC | Age: 64
End: 2024-12-03
Payer: MEDICAID

## 2024-12-03 ENCOUNTER — OFFICE VISIT (OUTPATIENT)
Dept: PALLIATIVE MEDICINE | Facility: CLINIC | Age: 64
End: 2024-12-03
Payer: MEDICAID

## 2024-12-03 ENCOUNTER — PATIENT MESSAGE (OUTPATIENT)
Dept: RADIATION THERAPY | Facility: HOSPITAL | Age: 64
End: 2024-12-03
Payer: MEDICAID

## 2024-12-03 ENCOUNTER — OFFICE VISIT (OUTPATIENT)
Dept: RADIATION ONCOLOGY | Facility: CLINIC | Age: 64
End: 2024-12-03
Payer: MEDICAID

## 2024-12-03 DIAGNOSIS — C79.31 MALIGNANT NEOPLASM OF BREAST METASTATIC TO BRAIN, UNSPECIFIED LATERALITY: Primary | ICD-10-CM

## 2024-12-03 DIAGNOSIS — C50.412 PRIMARY MALIGNANT NEOPLASM OF UPPER OUTER QUADRANT OF BREAST, LEFT: Primary | ICD-10-CM

## 2024-12-03 DIAGNOSIS — C78.2 SECONDARY MALIGNANCY OF PARIETAL PLEURA: ICD-10-CM

## 2024-12-03 DIAGNOSIS — C50.919 MALIGNANT NEOPLASM OF BREAST METASTATIC TO BRAIN, UNSPECIFIED LATERALITY: Primary | ICD-10-CM

## 2024-12-03 DIAGNOSIS — G89.3 NEOPLASM RELATED PAIN: ICD-10-CM

## 2024-12-03 DIAGNOSIS — C50.919 MALIGNANT NEOPLASM OF BREAST METASTATIC TO BRAIN, UNSPECIFIED LATERALITY: ICD-10-CM

## 2024-12-03 DIAGNOSIS — C79.31 MALIGNANT NEOPLASM OF BREAST METASTATIC TO BRAIN, UNSPECIFIED LATERALITY: ICD-10-CM

## 2024-12-03 DIAGNOSIS — Z51.5 PALLIATIVE CARE ENCOUNTER: ICD-10-CM

## 2024-12-03 DIAGNOSIS — C79.31 SECONDARY ADENOCARCINOMA OF BRAIN: Primary | ICD-10-CM

## 2024-12-03 PROCEDURE — 3044F HG A1C LEVEL LT 7.0%: CPT | Mod: CPTII,95,, | Performed by: NEUROLOGICAL SURGERY

## 2024-12-03 PROCEDURE — 1111F DSCHRG MED/CURRENT MED MERGE: CPT | Mod: CPTII,95,, | Performed by: NEUROLOGICAL SURGERY

## 2024-12-03 PROCEDURE — 99214 OFFICE O/P EST MOD 30 MIN: CPT | Mod: 95,,, | Performed by: NEUROLOGICAL SURGERY

## 2024-12-03 PROCEDURE — 4010F ACE/ARB THERAPY RXD/TAKEN: CPT | Mod: CPTII,95,, | Performed by: NEUROLOGICAL SURGERY

## 2024-12-03 RX ORDER — MORPHINE SULFATE 30 MG/1
30 TABLET, FILM COATED, EXTENDED RELEASE ORAL 2 TIMES DAILY
Qty: 60 TABLET | Refills: 0 | Status: SHIPPED | OUTPATIENT
Start: 2024-12-03 | End: 2025-01-02

## 2024-12-03 RX ORDER — MORPHINE SULFATE 15 MG/1
15 TABLET, FILM COATED, EXTENDED RELEASE ORAL 2 TIMES DAILY
Qty: 60 TABLET | Refills: 0 | Status: SHIPPED | OUTPATIENT
Start: 2024-12-03 | End: 2025-01-04

## 2024-12-03 NOTE — PROGRESS NOTES
12/3/2024  The patient location is: [place], [place]  The chief complaint leading to consultation is: Brain metastases    Visit type: audiovisual    Face to Face time with patient: 13 minutes  43 minutes of total time spent on the encounter, which includes face to face time and non-face to face time preparing to see the patient (eg, review of tests), Obtaining and/or reviewing separately obtained history, Documenting clinical information in the electronic or other health record, Independently interpreting results (not separately reported) and communicating results to the patient/family/caregiver, or Care coordination (not separately reported). Each patient to whom he or she provides medical services by telemedicine is:  (1) informed of the relationship between the physician and patient and the respective role of any other health care provider with respect to management of the patient; and (2) notified that he or she may decline to receive medical services by telemedicine and may withdraw from such care at any time.      Radiation Oncology Consultation    Assessment   This is a 64 y.o. female with h/o Stage II triple negative Left breast cancer s/p neoadjuvant chemotherapy early 2023 ->Lumpectomy 8/2/23 ->adjuvant whole breast radiation 42.4 Gy in 16 fractions 11/7/23 by my partner Dr. Hines in [place]. She underwent Right VATS with pleural biopsy 8/5/24 that demonstrated metastatic breast cancer. MRI Brain 8/29/24 demonstrated 2 small brain mets. It appears the decision was made to observe these in favor of systemic management. Repeat MRI 11/27/24 with significant interval progression up to 6 mets with largest Right occipital 3.7 cm. She is referred for consideration of treatment options.    She met with Dr. Ma earlier today, and he has recommend radiation rather than resection. I recommend treating the metastases with hypofractionated SRT 30 Gy in 5 fx with Gamma Knife radiosurgery. I discussed potential side  effects including short-term vasogenic edema and late radiation necrosis, which could result in neurologic deficits. At the end of our discussion, she was in agreement with proceeding with the recommended treatment.         Plan   1) Continue dex 2 mg bid; will taper at end of treatment.   2) Schedule Gamma Knife SRT (mask, 5 fractions).          CHIEF COMPLAINT: Brain metastases    HPI/Focused ROS: Ms. Saavedra is a 65 y/o female with h/o localized triple negative Left breast cancer s/p neoadjuvant chemotherapy early 2023 ->Lumpectomy 8/2/23 ->adjuvant whole breast radiation 42.4 Gy in 16 fractions 11/7/23 by my partner Dr. Hines in [place]. She underwent Right VATS with pleural biopsy 8/5/24 that demonstrated metastatic breast cancer. MRI Brain 8/29/24 demonstrated a 0.6 cm Left cerebellar metastasis and 0.3 cm Left frontal metastasis. It appears the decision was made to observe these in favor of systemic management.     She developed progressive around 11/25/24. MRI Brain 11/27/24 demonstrated significant interval worsening of intracranial metastatic disease with 6 metastases up to 3.7 cm (largest in Right occipital lobe w/ mass effect). She presented to ED 11/27/24 and was started on decadron. She was evaluated by Neurosurgery inpatient and discharged on 11/30/24 with plan to discuss resection vs radiosurgery as outpatient. She is referred for consideration of treatment options.     On video visit today, she is accompanied by her daughter. FLANAGAN has improved on decadron. She feels some slight unsteadiness on feet and is using a walker now. Denies focal weakness or visual change.       Is the patient female between ages 15-55:  no    Does the patient have a CIED:  no    Prior Radiation History:   Course 1:     Site  [employer]  Dose/Fx (Gy)  #Fx  Total Dose (Gy)  Start Date  End Date  Elapsed Days    Lt Breast 3D  18X/6X  2.65  16 / 16  42.4  10/17/2023  11/7/2023  21          Past Medical History:   Diagnosis  Date    Allergy     Anemia     Breast cancer     primary malignant neoplasm of upper outer quadrant of breast - progressive metastatic triple negative breast cancer. Patient has progressive 2 lines of therapy.    HLD (hyperlipidemia) 2015    Hyperlipidemia (Problem)      Hypertension     Primary malignant neoplasm of upper outer quadrant of breast, left 10/10/2023    Secondary malignancy of parietal pleura 2024    Type 2 diabetes mellitus with diabetic polyneuropathy, without long-term current use of insulin 2015       Past Surgical History:   Procedure Laterality Date    HYSTERECTOMY      tubaligation         Social History     Tobacco Use    Smoking status: Former     Current packs/day: 0.00     Types: Cigarettes     Quit date:      Years since quittin.9    Smokeless tobacco: Never   Substance Use Topics    Alcohol use: Not Currently    Drug use: Never       Cancer-related family history is not on file.    Current Outpatient Medications on File Prior to Visit   Medication Sig Dispense Refill    (Magic mouthwash) 1:1:1 diphenhydrAMINE(Benadryl) 12.5mg/5ml liq, aluminum & magnesium hydroxide-simethicone (Maalox), LIDOcaine viscous 2% Swish and spit 15 mLs every 4 (four) hours as needed (mouth ulcers). for mouth sores 360 mL 1    amLODIPine (NORVASC) 5 MG tablet TAKE 1 TABLET BY MOUTH EVERY MORNING 90 tablet 1    atorvastatin (LIPITOR) 40 MG tablet Take 40 mg by mouth.      blood sugar diagnostic (FREESTYLE TEST) Strp Test 4 times per day      busPIRone (BUSPAR) 15 MG tablet Take 1 tablet by mouth at bedtime. 30 tablet 5    dexAMETHasone (DECADRON) 2 MG tablet Take 1 tablet (2 mg total) by mouth every 12 (twelve) hours. for 21 days 42 tablet 0    doxycycline (MONODOX) 100 MG capsule EMPTY CONTENTS OF 1 CAPSULE INTO NASAL IRRIGATION SYSTEM, ADD DISTILLED WATER, SALT PACK, MIX & IRRIGATE. PERFORM 2 TIMES DAILY      famotidine (PEPCID) 20 MG tablet Take 20 mg by mouth 2 (two) times daily.       folic acid (FOLVITE) 1 MG tablet take 1 tablet by mouth every morning 30 tablet 3    gabapentin (NEURONTIN) 300 MG capsule Take 300 mg by mouth.      HYDROmorphone (DILAUDID) 4 MG tablet Take 1 tablet (4 mg total) by mouth every 3 (three) hours as needed for Pain (4 doses/day). 120 tablet 0    LIDOcaine viscous HCl 2%-diphenhydrAMINE-aluminum-magnesium hydroxide-simethicone Swish and spit 15 mLs by mouth every 4 (four) hours as needed (mouth ulcers). for mouth sores 360 mL 1    LIDOcaine-prilocaine (EMLA) cream Apply to affected area once 30 g 11    magnesium oxide (MAG-OX) 400 mg (241.3 mg magnesium) tablet Take 400 mg by mouth once daily.      metFORMIN (GLUCOPHAGE-XR) 500 MG ER 24hr tablet take 2 tablets by mouth in the morning and at bedtime as directed 180 tablet 1    naloxone (NARCAN) 4 mg/actuation Spry       ondansetron (ZOFRAN-ODT) 8 MG TbDL Dissolve 1 tablet (8 mg total) by mouth 2 (two) times daily. 30 tablet 2    pantoprazole (PROTONIX) 40 MG tablet take 1 tablet by mouth daily 30 tablet 1    tirzepatide (MOUNJARO) 5 mg/0.5 mL PnIj Inject 5 mg into the skin once weekly 2 mL 2    [DISCONTINUED] morphine (MS CONTIN) 30 MG 12 hr tablet Take 1 tablet (30 mg total) by mouth 2 (two) times daily. 10 tablet 0     No current facility-administered medications on file prior to visit.       Review of patient's allergies indicates:   Allergen Reactions    Ace inhibitors Swelling    Lisinopril Rash    Hydrocodone Itching     Hives    Hydrocodone-acetaminoph-supp11 Itching    Percocet [oxycodone-acetaminophen] Itching     Pt had to take an antihistamine to improve itching     Cephalexin      Hives    Pantoprazole Hives    Propoxyphene      Hives    Propoxyphene n-acetaminophen     Propoxyphene-acetaminophen          Vital Signs: There were no vitals taken for this visit.    ECOG Performance Status: (1) Restricted in physically strenuous activity, ambulatory and able to do work of light nature    Physical  Exam  Constitutional:       Appearance: Normal appearance.   HENT:      Head: Normocephalic and atraumatic.   Eyes:      General: No scleral icterus.     Extraocular Movements: Extraocular movements intact.   Pulmonary:      Effort: No accessory muscle usage or respiratory distress.   Musculoskeletal:      Cervical back: Normal range of motion.   Neurological:      Mental Status: She is alert and oriented to person, place, and time.   Psychiatric:         Mood and Affect: Mood and affect normal.         Judgment: Judgment normal.          Labs:    Imaging: I have personally reviewed the patient's available images and reports and summarized pertinent findings above in HPI.     Pathology: I have personally reviewed the patient's available pathology and summarized pertinent findings above in HPI.       - Thank you for allowing me to participate in the care of your patient.    Dick Licona MD, PhD

## 2024-12-03 NOTE — PROGRESS NOTES
Palliative Medicine Clinic Note  Follow-up           Consult Requested By: Dr. Hoffmann      Reason for Consult: Symptom Management/Advance Care Planning/Goals of Care Discussion    Chief Complaint: Progressive pain          ASSESSMENT/PLAN:      Plan/Recommendations    1. Primary malignant neoplasm of upper outer quadrant of breast, left  Assessment & Plan:  Followed by Dr. Hoffmann and Khurram. Previous oncologist at [facility].   Now on Sacituzumab-Govitecan-Palliative intent   Paclitaxel D/C'd due to progressive disease.   S/p Keytruda, left lumpectomy (Aug. 2023) w/ residual disease post resection, radiation (Nov. 2023).    S/P right VATS washout with partial decortication, pleural biopsy, intercostal nerve blocks, Pleurx catheter insertion Aug 2024. Pleurx removed due to low output.   MRI Brain: 11/21/2024: Interval progression of metastatic disease with significant interval increase in size of several ring-enhancing lesions in both cerebral hemispheres and the left cerebellum as compared to the prior with significant surrounding edema.  New punctate focus of enhancement in the left parietal lobe as compared to the prior.  Interval increase in size of the ventricular system with some effacement of the sulci bilaterally is concerning for developing hydrocephalus.  Recommend neurosurgical consultation.  Patient was instructed to present to the emergency room for further evaluation.  Findings discussed with Dr. Paul via secure chat at 15:35 on 11/27/2024.  CT C/A/P: 11/19/2024: 1.  Interval worsening of extensive nodular soft tissue density lesions throughout the right hemithorax with development of numerous metastatic lesions throughout the liver.  2.  Stable ill-defined 2.5 cm lesion lower pole the left kidney.  Differential considerations are identified as versus metastatic lesion.  3.  Stable metastatic right inguinal and external iliac chain lymphadenopathy.  4.  Enlargement of subcutaneous metastatic lesion the  right posterior back soft tissues.  Right loculated pleural effusion not amenable to drainage per conversation with hemonc provider, Whitley Galarza.         Orders:  -     morphine (MS CONTIN) 30 MG 12 hr tablet; Take 1 tablet (30 mg total) by mouth 2 (two) times daily. Take 1 tablet 15mg + 1 tablet 30mg= 45mg every 12 hours  Dispense: 60 tablet; Refill: 0  -     morphine (MS CONTIN) 15 MG 12 hr tablet; Take 1 tablet (15 mg total) by mouth 2 (two) times daily. Take 1 tablet 15mg + 1 tablet 30mg= 45mg every 12 hours  Dispense: 60 tablet; Refill: 0    2. Neoplasm related pain  Assessment & Plan:    Progressive- Due to progressive disease  Increase MSContin to 45mg BID   Hydromorphone IR 4mg Q3H PRN  Narcan RX given and explained use to pt and family. Pt and family verbalized understanding.    Miralax 1pack/day or Senna 2 tabs at bedtime for opioid-induced constipation.   MOM if Miralax/senna are not effective.   Pain contract- 09/04/2024  UDS- 10/17/2024- Consistent w/ prescribed Hydromorphone and Morphine. Inconsistent with Marijuana. Pt aware she can obtain medical marijuana card.     Orders:  -     morphine (MS CONTIN) 30 MG 12 hr tablet; Take 1 tablet (30 mg total) by mouth 2 (two) times daily. Take 1 tablet 15mg + 1 tablet 30mg= 45mg every 12 hours  Dispense: 60 tablet; Refill: 0  -     morphine (MS CONTIN) 15 MG 12 hr tablet; Take 1 tablet (15 mg total) by mouth 2 (two) times daily. Take 1 tablet 15mg + 1 tablet 30mg= 45mg every 12 hours  Dispense: 60 tablet; Refill: 0    3. Secondary malignancy of parietal pleura  -     morphine (MS CONTIN) 30 MG 12 hr tablet; Take 1 tablet (30 mg total) by mouth 2 (two) times daily. Take 1 tablet 15mg + 1 tablet 30mg= 45mg every 12 hours  Dispense: 60 tablet; Refill: 0  -     morphine (MS CONTIN) 15 MG 12 hr tablet; Take 1 tablet (15 mg total) by mouth 2 (two) times daily. Take 1 tablet 15mg + 1 tablet 30mg= 45mg every 12 hours  Dispense: 60 tablet; Refill: 0    4. Palliative  care encounter  Assessment & Plan:    -Code status: Full Code. Pt and daughter wish to revisit at a later time  -HCPOA: Daughter: Luna Saavedra: [phone]. Pt has 1 adult son-lives in [place]. Pt is single  -GOC:  Continue cancer treatment, symptom management, maintain independence and functional status. Pt aware treatment intent is palliative.   -See HPI for further details              Advance Care Planning   Advance Directives:   Living Will: No    LaPOST: No    Do Not Resuscitate Status: No    Medical Power of : Yes    Agent's Name:  Daughter: Luna Saavedra   Agent's Contact Number:  [phone]    Decision Making:  Patient answered questions and Family answered questions  Goals of Care: The patient endorses that what is most important right now is to focus on remaining as independent as possible and symptom/pain control    Accordingly, we have decided that the best plan to meet the patient's goals includes continuing with treatment. Pt stated she is aware treatment intent is palliative.   Pt chose to remain Full Code for now. Her and her daughter wish to revisit this later.          Follow up: 1 week virtual     Plan discussed with: Patient        SUBJECTIVE:      History of Present Illness / Interval History:  Sherlyn Saavedra is 64 y.o. female with Recurrent Metastatic Left Breast Cancer to Pluera/Brain. MRI 11/21/2024 showed progressive brain mets pending neurosurgery visit.   Now on Sacituzumab-Govitecan-Palliative intent   Paclitaxel D/C'd due to progressive disease.   S/p Keytruda, left lumpectomy (Aug. 2023) w/ residual disease post resection, radiation (Nov. 2023).    S/P right VATS washout with partial decortication, pleural biopsy, intercostal nerve blocks, Pleurx catheter insertion Aug 2024. Pleurx removed due to low output.   Followed by Dr. Hoffmann and Khurram. Previous oncologist at [facility].   Admitted 11/27-11/30 for progressive brain mets.   PMHX: HTN, T2DM    Presents to Palliative Care  Clinic for physical symptoms, advance care planning,, clarification of goals of care, and additional support..   Please see oncology notes for more details on pt's care.         12/3/24:      Past Visits:   Visit type: Transitioned from audiovisual to in-person due to pt's symptoms.        Notes:   Pt seen via audiovisual feed. A&O x3. No acute distress. Daughter, Luna present in the background.   Pt reported progressive right torso and right lower back pain not managed with MScontin 30mg BID and Hydromorphone 4mg Q3H PRN. She was discharged with enough pain pain medicine to last this week.   She is also constipated requiring suppository on 11/30 prior to her discharge from hospital. She has not had a bowel movement since. She was unsure if she is passing gas. She is not taking Miralax/Senna at this time  She noted some right abdominal tenderness and decreased appetite.    We discussed trying MOM for constipation and resuming daily Miralax use.   I suggested pt to come to clinic in person for further evaluation of constipation and worsening pain.     Pt attended clinic with her daughter, Ms. Carrasco  She reported passing gas after logging off from virtual visit. She took MOM prior to coming to clinic.     Past Visits    11/13/2024: Pt attended clinic alone. She was in no acute distress. Vital signs stable on room air.   She reported back pain is stable 3/10 on Hydromorphone IR 4mg Q3HPRN, Zanaflex PRN, MSContin 30mg BID and marijuana  Constipation managed with Miralax and Senna.   Anxiety/Depression has improved. Her  is providing spiritual counseling.   She is requesting help at home with cooking and cleaning. Information on [facility] [place] [facility] Aging given.   Right upper back wound stable. Blister has healed.       10/17/2024: Pt seen in infusion bay. She was in no acute distress. Vital signs stable on room air.   Pain stable on Hydromorphone IR 4mg Q3HPRN, Zanaflex PRN, and MSContin 30mg BID   Forgetfulness  impacting short-term memory. Now keeping a journal  Anxiety/Depression due to cancer diagnosis. Good family support. She wishes to defer mental health counseling at this time.   Intermittent constipation managed  with Miralax daily/every other day and MOM.   Nausea managed with PRN Zofran. Appetite is gradually improving.     10/03/2024: The patient location is: [facility]  The chief complaint leading to consultation is:  Follow-up     Visit type: audiovisual     Face to Face time with patient:  10 minutes  35 minutes of total time spent on the encounter, which includes face to face time and non-face to face time preparing to see the patient (eg, review of tests), Obtaining and/or reviewing separately obtained history, Documenting clinical information in the electronic or other health record, Independently interpreting results (not separately reported) and communicating results to the patient/family/caregiver, or Care coordination (not separately reported).      Each patient to whom he or she provides medical services by telemedicine is:  (1) informed of the relationship between the physician and patient and the respective role of any other health care provider with respect to management of the patient; and (2) notified that he or she may decline to receive medical services by telemedicine and may withdraw from such care at any time.     Notes:   Pt seen via audiovisual feed. A&O x3. No acute distress.   She reported progressive right chest/back/right lateral abd pain over the past week requiring ED visits two days ago. Pain is now better managed with MSContin 30mg BID and Hydromorphone 4mg Q4H PRN.   She is requesting narcotic rx be sent to [facility]  Some nausea/vomiting this morning. Constipation slightly managed with MOM/Miralax. She requested Linzess prescription. She has not tried Senna.   She wants to clarify with Dr. Hoffmann if she needs Thoracentesis due to her CT chest showing  a pleural effusion.   Called pt back following visit. Informed her Dr. Hoffmann said she does not require thoracentesis at this time, as imaging represents cancer progression, not pleural effusion. Pt verbalized understanding,  Informed pt pain med rx have been sent to [facility] as she requested.   Informed pt to take Zofran 20-30 mins prior to taking opioid to prevent nausea.     09/17/2024:  Pt attended clinic with her friend, Mr. Torres. She was in no acute distress. Vital signs stable on room air.   She reported lower back pain has improved with Morphine IR 15mg, however, it does not last 6 hours. No itching/rash with Morphine.   She noted a palpable/painful lesion on her right lateral thigh. She noticed it about a month ago, but it was not painful then.       09/04/2024: History obtained from: Patient and medical record.   Pt attended clinic alone. Her daughter, Luna present via speaker phone. was in no acute distress. Vital signs stable on room air.   I explained the role palliative care often plays in supporting patients with serious illness and their families regarding symptom management, advance care planning, and goals of care discussion. Pt and daughter verbalized understanding.   Pt reported persistent progressive right lower back pain over the past month. 8/10.  She is taking Norco 10mg QID PRN but no longer effective and she takes it with Benadryl due to itching. Pain hinders sleep.   Neuropathy to hands and right leg, somewhat stable at this time.   She is taking medical marijuana for appetite stimulation. She supplements meals with protein drinks. Her Bgs have been borderline low- she is taking Metformin and Moujaro. She will revisit this with her PCP.   Pt and daughter inquired about pt receiving Glutathione infusions for immune health. I have encouraged them to discuss this with Dr. Hoffmann.     We discussed advance care planning, goals of care, and code status, Full Code vs. DNR including risks,  benefits, and alternatives..   She wishes to continue cancer treatment, symptom management, maintain independence and functional status. Pt stated she is aware treatment intent is palliative.   Pt chose to remain Full Code for now. Her and her daughter wish to revisit this later.   Elected HCPOA is daughter: Luna Saavedra: [phone], lives in [place]. Pt has 1 adult son lives in [place]. She is single.         ROS:  Review of Systems   Constitutional:  Positive for appetite change (Decreased) and fatigue. Negative for activity change and unexpected weight change.   HENT:  Negative for hearing loss, sore throat, trouble swallowing and voice change.    Eyes:  Negative for visual disturbance.   Respiratory:  Negative for cough, chest tightness, shortness of breath (Moderate exertional) and wheezing.    Cardiovascular:  Negative for chest pain, palpitations and leg swelling.   Gastrointestinal:  Positive for abdominal pain and constipation (Last BM Nov. 30th). Negative for blood in stool, nausea, rectal pain, vomiting and reflux.   Genitourinary:  Negative for bladder incontinence, difficulty urinating, flank pain, hematuria, nocturia, pelvic pain and urgency.   Musculoskeletal:  Positive for back pain, leg pain and myalgias. Negative for arthralgias and neck pain.   Integumentary:  Positive for wound (Right upper back).   Allergic/Immunologic: Negative for environmental allergies, food allergies and immunocompromised state.   Neurological:  Positive for numbness. Negative for dizziness, tremors, weakness, headaches, memory loss and coordination difficulties.   Hematological:  Negative for adenopathy. Does not bruise/bleed easily.   Psychiatric/Behavioral:  Negative for agitation, behavioral problems, confusion, decreased concentration, depressed mood (Due to cancer diagnosis), dysphoric mood, self-injury, sleep disturbance and suicidal ideas. The patient is not nervous/anxious (Due to cancer diagnosis).         Review of Symptoms      Symptom Assessment (ESAS 0-10 Scale)  Pain:  9  Dyspnea:  0  Anxiety:  0  Nausea:  0  Depression:  0  Anorexia:  0  Fatigue:  8  Insomnia:  0  Restlessness:  0  Agitation:  0     CAM / Delirium:  Negative  Constipation:  Positive  Diarrhea:  Negative    Anxiety:  Is not nervous/anxious (Due to cancer diagnosis)  Constipation:  Constipation (Last BM Nov. 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 Daughter, Luna in [place], Son in [place].   **Primary  to Follow**  Palliative Care  Consult: Yes            Medications:    Current Outpatient Medications:     (Magic mouthwash) 1:1:1 diphenhydrAMINE(Benadryl) 12.5mg/5ml liq, aluminum & magnesium hydroxide-simethicone (Maalox), LIDOcaine viscous 2%, Swish and spit 15 mLs every 4 (four) hours as needed (mouth ulcers). for mouth sores, Disp: 360 mL, Rfl: 1    amLODIPine (NORVASC) 5 MG tablet, TAKE 1 TABLET BY MOUTH EVERY MORNING, Disp: 90 tablet, Rfl: 1    atorvastatin (LIPITOR) 40 MG tablet, Take 40 mg by mouth., Disp: , Rfl:     blood sugar diagnostic (FREESTYLE TEST) Strp, Test 4 times per day, Disp: , Rfl:     busPIRone (BUSPAR) 15 MG tablet, Take 1 tablet by mouth at bedtime., Disp: 30 tablet, Rfl: 5    dexAMETHasone (DECADRON) 2 MG tablet, Take 1 tablet (2 mg total) by mouth every 12 (twelve) hours. for 21 days, Disp: 42 tablet, Rfl: 0    doxycycline  (MONODOX) 100 MG capsule, EMPTY CONTENTS OF 1 CAPSULE INTO NASAL IRRIGATION SYSTEM, ADD DISTILLED WATER, SALT PACK, MIX & IRRIGATE. PERFORM 2 TIMES DAILY, Disp: , Rfl:     famotidine (PEPCID) 20 MG tablet, Take 20 mg by mouth 2 (two) times daily., Disp: , Rfl:     folic acid (FOLVITE) 1 MG tablet, take 1 tablet by mouth every morning, Disp: 30 tablet, Rfl: 3    gabapentin (NEURONTIN) 300 MG capsule, Take 300 mg by mouth., Disp: , Rfl:     HYDROmorphone (DILAUDID) 4 MG tablet, Take 1 tablet (4 mg total) by mouth every 3 (three) hours as needed for Pain (4 doses/day)., Disp: 120 tablet, Rfl: 0    LIDOcaine viscous HCl 2%-diphenhydrAMINE-aluminum-magnesium hydroxide-simethicone, Swish and spit 15 mLs by mouth every 4 (four) hours as needed (mouth ulcers). for mouth sores, Disp: 360 mL, Rfl: 1    LIDOcaine-prilocaine (EMLA) cream, Apply to affected area once, Disp: 30 g, Rfl: 11    magnesium oxide (MAG-OX) 400 mg (241.3 mg magnesium) tablet, Take 400 mg by mouth once daily., Disp: , Rfl:     metFORMIN (GLUCOPHAGE-XR) 500 MG ER 24hr tablet, take 2 tablets by mouth in the morning and at bedtime as directed, Disp: 180 tablet, Rfl: 1    morphine (MS CONTIN) 30 MG 12 hr tablet, Take 1 tablet (30 mg total) by mouth 2 (two) times daily., Disp: 10 tablet, Rfl: 0    naloxone (NARCAN) 4 mg/actuation [place], , Disp: , Rfl:     ondansetron (ZOFRAN-ODT) 8 MG TbDL, Dissolve 1 tablet (8 mg total) by mouth 2 (two) times daily., Disp: 30 tablet, Rfl: 2    pantoprazole (PROTONIX) 40 MG tablet, take 1 tablet by mouth daily, Disp: 30 tablet, Rfl: 1    tirzepatide (MOUNJARO) 5 mg/0.5 mL PnIj, Inject 5 mg into the skin once weekly, Disp: 2 mL, Rfl: 2      External  database queried on 12/03/2024  by AKUA CAMARGO :            Review of patient's allergies indicates:   Allergen Reactions    Ace inhibitors Swelling    Lisinopril Rash    Hydrocodone Itching     Hives    Hydrocodone-acetaminoph-supp11 Itching    Percocet  [oxycodone-acetaminophen] Itching     Pt had to take an antihistamine to improve itching     Cephalexin      Hives    Pantoprazole Hives    Propoxyphene      Hives    Propoxyphene n-acetaminophen     Propoxyphene-acetaminophen            OBJECTIVE:         Physical Exam:  Vitals:      Physical Exam  Vitals and nursing note reviewed.   Constitutional:       General: She is not in acute distress.     Appearance: She is normal weight. She is ill-appearing.   HENT:      Head: Normocephalic and atraumatic.      Nose: Nose normal. No congestion or rhinorrhea.      Mouth/Throat:      Mouth: Mucous membranes are moist.      Pharynx: Oropharynx is clear. No oropharyngeal exudate or posterior oropharyngeal erythema.   Eyes:      General: No scleral icterus.        Right eye: No discharge.         Left eye: No discharge.      Conjunctiva/sclera: Conjunctivae normal.      Pupils: Pupils are equal, round, and reactive to light.   Cardiovascular:      Rate and Rhythm: Normal rate and regular rhythm.      Pulses: Normal pulses.      Heart sounds: Normal heart sounds. No murmur heard.     No gallop.   Pulmonary:      Effort: Pulmonary effort is normal. No respiratory distress.      Breath sounds: Normal breath sounds. No stridor. No wheezing.   Chest:      Chest wall: No tenderness.   Abdominal:      General: Bowel sounds are normal. There is no distension.      Palpations: Abdomen is soft.      Tenderness: There is abdominal tenderness (RUQ).   Genitourinary:     Comments: Deferred  Musculoskeletal:         General: Tenderness (Right lower back) present. No swelling.      Cervical back: Normal range of motion and neck supple.      Right lower leg: No edema.      Left lower leg: No edema.   Skin:     General: Skin is warm and dry.      Capillary Refill: Capillary refill takes less than 2 seconds.      Coloration: Skin is not jaundiced or pale.      Findings: Lesion present. No rash.             Comments: Right upper back lesion.  Open to air. No drainage.   Right lower back lesion. No drainage.    Neurological:      Mental Status: She is alert and oriented to person, place, and time.      Motor: Weakness (Bilateral legs) present.      Gait: Gait abnormal (Ambulates with walker).   Psychiatric:         Attention and Perception: Attention and perception normal.         Mood and Affect: Mood and affect normal.         Speech: Speech normal.         Behavior: Behavior normal. Behavior is cooperative.         Thought Content: Thought content normal. Thought content does not include suicidal ideation. Thought content does not include suicidal plan.         Cognition and Memory: Cognition and memory normal.         Judgment: Judgment normal.       Labs: BMP  Lab Results   Component Value Date     11/30/2024    K 4.6 11/30/2024     11/30/2024    CO2 21 (L) 11/30/2024    BUN 15 11/30/2024    CREATININE 0.8 11/30/2024    CALCIUM 9.5 11/30/2024    ANIONGAP 11 11/30/2024    EGFRNORACEVR >60.0 11/30/2024     Lab Results   Component Value Date    WBC 6.74 11/30/2024    HGB 10.4 (L) 11/30/2024    HCT 34.3 (L) 11/30/2024    MCV 92 11/30/2024     11/30/2024             Imaging: MRI Brain: 11/21/2024: Interval progression of metastatic disease with significant interval increase in size of several ring-enhancing lesions in both cerebral hemispheres and the left cerebellum as compared to the prior with significant surrounding edema.  New punctate focus of enhancement in the left parietal lobe as compared to the prior.  Interval increase in size of the ventricular system with some effacement of the sulci bilaterally is concerning for developing hydrocephalus.  Recommend neurosurgical consultation.  Patient was instructed to present to the emergency room for further evaluation.  Findings discussed with Dr. Paul via secure chat at 15:35 on 11/27/2024.    CT C/A/P: 11/19/2024: 1.  Interval worsening of extensive nodular soft tissue density  lesions throughout the right hemithorax with development of numerous metastatic lesions throughout the liver.  2.  Stable ill-defined 2.5 cm lesion lower pole the left kidney.  Differential considerations are identified as versus metastatic lesion.  3.  Stable metastatic right inguinal and external iliac chain lymphadenopathy.  4.  Enlargement of subcutaneous metastatic lesion the right posterior back soft tissues.       I spent a total of 35 minutes on the day of the visit. This includes face to face time in discussion of goals of care, symptom assessment, coordination of care and emotional support.  This also includes non-face to face time preparing to see the patient (eg, review of tests/imaging), obtaining and/or reviewing separately obtained history, documenting clinical information in the electronic or other health record, independently interpreting results and communicating results to the patient/family/caregiver, or care coordinator.       AKUA HENSLEY NP

## 2024-12-03 NOTE — PROGRESS NOTES
SW spoke with pt and dtr this am while in lobby. Pt stated she has to go back to [facility] for additional treatments. Pt was in good spirits and no needs were expressed at this time. SW will remain available.

## 2024-12-04 ENCOUNTER — PATIENT MESSAGE (OUTPATIENT)
Dept: PALLIATIVE MEDICINE | Facility: CLINIC | Age: 64
End: 2024-12-04
Payer: MEDICAID

## 2024-12-05 ENCOUNTER — TELEPHONE (OUTPATIENT)
Dept: RADIATION THERAPY | Facility: HOSPITAL | Age: 64
End: 2024-12-05
Payer: MEDICAID

## 2024-12-05 ENCOUNTER — TELEPHONE (OUTPATIENT)
Dept: HEMATOLOGY/ONCOLOGY | Facility: CLINIC | Age: 64
End: 2024-12-05
Payer: MEDICAID

## 2024-12-05 ENCOUNTER — OFFICE VISIT (OUTPATIENT)
Dept: HEMATOLOGY/ONCOLOGY | Facility: CLINIC | Age: 64
End: 2024-12-05
Payer: MEDICAID

## 2024-12-05 VITALS
HEART RATE: 85 BPM | TEMPERATURE: 97 F | SYSTOLIC BLOOD PRESSURE: 128 MMHG | DIASTOLIC BLOOD PRESSURE: 61 MMHG | HEIGHT: 60 IN | WEIGHT: 179.69 LBS | BODY MASS INDEX: 35.28 KG/M2 | OXYGEN SATURATION: 100 % | RESPIRATION RATE: 20 BRPM

## 2024-12-05 DIAGNOSIS — C79.31 SECONDARY ADENOCARCINOMA OF BRAIN: ICD-10-CM

## 2024-12-05 DIAGNOSIS — C78.2 SECONDARY MALIGNANCY OF PARIETAL PLEURA: ICD-10-CM

## 2024-12-05 DIAGNOSIS — C50.412 PRIMARY MALIGNANT NEOPLASM OF UPPER OUTER QUADRANT OF BREAST, LEFT: Primary | ICD-10-CM

## 2024-12-05 DIAGNOSIS — C79.2 SECONDARY CANCER OF SKIN: ICD-10-CM

## 2024-12-05 PROCEDURE — 99215 OFFICE O/P EST HI 40 MIN: CPT | Mod: S$PBB,,, | Performed by: INTERNAL MEDICINE

## 2024-12-05 PROCEDURE — 99999 PR PBB SHADOW E&M-EST. PATIENT-LVL IV: CPT | Mod: PBBFAC,,, | Performed by: INTERNAL MEDICINE

## 2024-12-05 PROCEDURE — 77470 SPECIAL RADIATION TREATMENT: CPT | Mod: 59,TC | Performed by: RADIOLOGY

## 2024-12-05 PROCEDURE — 77470 SPECIAL RADIATION TREATMENT: CPT | Mod: 26,59,, | Performed by: RADIOLOGY

## 2024-12-05 PROCEDURE — 3078F DIAST BP <80 MM HG: CPT | Mod: CPTII,,, | Performed by: INTERNAL MEDICINE

## 2024-12-05 PROCEDURE — 1159F MED LIST DOCD IN RCRD: CPT | Mod: CPTII,,, | Performed by: INTERNAL MEDICINE

## 2024-12-05 PROCEDURE — 3074F SYST BP LT 130 MM HG: CPT | Mod: CPTII,,, | Performed by: INTERNAL MEDICINE

## 2024-12-05 PROCEDURE — 3008F BODY MASS INDEX DOCD: CPT | Mod: CPTII,,, | Performed by: INTERNAL MEDICINE

## 2024-12-05 PROCEDURE — 99214 OFFICE O/P EST MOD 30 MIN: CPT | Mod: PBBFAC | Performed by: INTERNAL MEDICINE

## 2024-12-05 PROCEDURE — 1111F DSCHRG MED/CURRENT MED MERGE: CPT | Mod: CPTII,,, | Performed by: INTERNAL MEDICINE

## 2024-12-05 PROCEDURE — 3044F HG A1C LEVEL LT 7.0%: CPT | Mod: CPTII,,, | Performed by: INTERNAL MEDICINE

## 2024-12-05 PROCEDURE — 4010F ACE/ARB THERAPY RXD/TAKEN: CPT | Mod: CPTII,,, | Performed by: INTERNAL MEDICINE

## 2024-12-05 NOTE — PROGRESS NOTES
Subjective:       Patient ID: Sherlyn Saavedra is a 64 y.o. female.    Chief Complaint: Results, Breast Cancer, and Chemotherapy    HPI: 1 64-year-old female with intracranial recurrence triple negative breast carcinoma.  Patient returns for review patient has been seen and evaluated by Neurosurgery is scheduled to start radio surgery CT chest abdomen pelvis demonstrates progressive extra cranial disease.  At this time the patient is to start ERBILIN in once completion of c radiation therapy I have discussed the case with peer to peer MD Monson with concurrence for possible treatment recommendations    Past Medical History:   Diagnosis Date    Allergy     Anemia     Breast cancer     primary malignant neoplasm of upper outer quadrant of breast - progressive metastatic triple negative breast cancer. Patient has progressive 2 lines of therapy.    HLD (hyperlipidemia) 2015    Hyperlipidemia (Problem)      Hypertension     Primary malignant neoplasm of upper outer quadrant of breast, left 10/10/2023    Secondary malignancy of parietal pleura 2024    Type 2 diabetes mellitus with diabetic polyneuropathy, without long-term current use of insulin 2015     No family history on file.  Social History     Socioeconomic History    Marital status: Single   Tobacco Use    Smoking status: Former     Current packs/day: 0.00     Types: Cigarettes     Quit date:      Years since quittin.9    Smokeless tobacco: Never   Substance and Sexual Activity    Alcohol use: Not Currently    Drug use: Never    Sexual activity: Not Currently     Social Drivers of Health     Financial Resource Strain: Patient Declined (2024)    Overall Financial Resource Strain (CARDIA)     Difficulty of Paying Living Expenses: Patient declined   Recent Concern: Financial Resource Strain - Medium Risk (2024)    Overall Financial Resource Strain (CARDIA)     Difficulty of Paying Living Expenses: Somewhat hard   Food Insecurity:  Patient Declined (11/30/2024)    Hunger Vital Sign     Worried About Running Out of Food in the Last Year: Patient declined     Ran Out of Food in the Last Year: Patient declined   Recent Concern: Food Insecurity - Food Insecurity Present (9/12/2024)    Hunger Vital Sign     Worried About Running Out of Food in the Last Year: Sometimes true     Ran Out of Food in the Last Year: Sometimes true   Transportation Needs: Patient Declined (11/30/2024)    TRANSPORTATION NEEDS     Transportation : Patient declined   Physical Activity: Inactive (9/12/2024)    Exercise Vital Sign     Days of Exercise per Week: 0 days     Minutes of Exercise per Session: 0 min   Stress: Patient Declined (11/30/2024)    [employer] - Occupational Stress Questionnaire     Feeling of Stress : Patient declined   Recent Concern: Stress - Stress Concern Present (9/12/2024)    [employer] - Occupational Stress Questionnaire     Feeling of Stress : To some extent   Housing Stability: Patient Declined (11/30/2024)    Housing Stability Vital Sign     Unable to Pay for Housing in the Last Year: Patient declined     Homeless in the Last Year: Patient declined     Past Surgical History:   Procedure Laterality Date    HYSTERECTOMY      tubaligation  1987       Labs:  Lab Results   Component Value Date    WBC 6.74 11/30/2024    HGB 10.4 (L) 11/30/2024    HCT 34.3 (L) 11/30/2024    MCV 92 11/30/2024     11/30/2024     BMP  Lab Results   Component Value Date     11/30/2024    K 4.6 11/30/2024     11/30/2024    CO2 21 (L) 11/30/2024    BUN 15 11/30/2024    CREATININE 0.8 11/30/2024    CALCIUM 9.5 11/30/2024    ANIONGAP 11 11/30/2024     Lab Results   Component Value Date    ALT 35 11/30/2024    AST 31 11/30/2024    ALKPHOS 104 11/30/2024    BILITOT 0.3 11/30/2024       Lab Results   Component Value Date    IRON 37 11/21/2024    TIBC 265 11/21/2024    FERRITIN 1,533 (H) 11/21/2024  "    No results found for: "[id number]"  Lab Results   Component Value Date    FOLATE 4.1 (L) 07/23/2024     Lab Results   Component Value Date    TSH 2.500 08/14/2023         Review of Systems   Constitutional:  Positive for fatigue. Negative for activity change, appetite change, chills, diaphoresis, fever and unexpected weight change.   HENT:  Negative for congestion, dental problem, drooling, ear discharge, ear pain, facial swelling, hearing loss, mouth sores, nosebleeds, postnasal drip, rhinorrhea, sinus pressure, sneezing, sore throat, tinnitus, trouble swallowing and voice change.    Eyes:  Negative for photophobia, pain, discharge, redness, itching and visual disturbance.   Respiratory:  Negative for cough, choking, chest tightness, shortness of breath, wheezing and stridor.    Cardiovascular:  Negative for chest pain, palpitations and leg swelling.   Gastrointestinal:  Negative for abdominal distention, abdominal pain, anal bleeding, blood in stool, constipation, diarrhea, nausea, rectal pain and vomiting.   Endocrine: Negative for cold intolerance, heat intolerance, polydipsia, polyphagia and polyuria.   Genitourinary:  Negative for decreased urine volume, difficulty urinating, dyspareunia, dysuria, enuresis, flank pain, frequency, genital sores, hematuria, menstrual problem, pelvic pain, urgency, vaginal bleeding, vaginal discharge and vaginal pain.   Musculoskeletal:  Negative for arthralgias, back pain, gait problem, joint swelling, myalgias, neck pain and neck stiffness.   Skin:  Negative for color change, pallor and rash.   Allergic/Immunologic: Negative for environmental allergies, food allergies and immunocompromised state.   Neurological:  Positive for weakness. Negative for dizziness, tremors, seizures, syncope, facial asymmetry, speech difficulty, light-headedness, numbness and headaches.   Hematological:  Negative for adenopathy. Does not bruise/bleed easily.   Psychiatric/Behavioral:  Negative " for agitation, behavioral problems, confusion, decreased concentration, dysphoric mood, hallucinations, self-injury, sleep disturbance and suicidal ideas. The patient is not nervous/anxious and is not hyperactive.        Objective:      Physical Exam  Vitals reviewed.   Constitutional:       General: She is not in acute distress.     Appearance: She is well-developed. She is ill-appearing. She is not diaphoretic.   HENT:      Head: Normocephalic and atraumatic.      Right Ear: External ear normal.      Left Ear: External ear normal.      Nose: Nose normal.      Right Sinus: No maxillary sinus tenderness or frontal sinus tenderness.      Left Sinus: No maxillary sinus tenderness or frontal sinus tenderness.      Mouth/Throat:      Pharynx: No oropharyngeal exudate.   Eyes:      General: Lids are normal. No scleral icterus.        Right eye: No discharge.         Left eye: No discharge.      Conjunctiva/sclera: Conjunctivae normal.      Right eye: Right conjunctiva is not injected. No hemorrhage.     Left eye: Left conjunctiva is not injected. No hemorrhage.     Pupils: Pupils are equal, round, and reactive to light.   Neck:      Thyroid: No thyromegaly.      Vascular: No JVD.      Trachea: No tracheal deviation.   Cardiovascular:      Rate and Rhythm: Normal rate.   Pulmonary:      Effort: Pulmonary effort is normal. No respiratory distress.      Breath sounds: No stridor.   Chest:      Chest wall: No tenderness.   Abdominal:      General: Bowel sounds are normal. There is no distension.      Palpations: Abdomen is soft. There is no hepatomegaly, splenomegaly or mass.      Tenderness: There is no abdominal tenderness. There is no rebound.   Musculoskeletal:         General: No tenderness. Normal range of motion.      Cervical back: Normal range of motion and neck supple.   Lymphadenopathy:      Cervical: No cervical adenopathy.      Upper Body:      Right upper body: No supraclavicular adenopathy.      Left upper  body: No supraclavicular adenopathy.   Skin:     General: Skin is dry.      Findings: No erythema or rash.   Neurological:      Mental Status: She is alert and oriented to person, place, and time.      Cranial Nerves: No cranial nerve deficit.      Motor: Weakness present.      Coordination: Coordination normal.      Gait: Gait abnormal.   Psychiatric:         Behavior: Behavior normal.         Thought Content: Thought content normal.         Judgment: Judgment normal.             Assessment:      1. Primary malignant neoplasm of upper outer quadrant of breast, left    2. Secondary adenocarcinoma of brain    3. Secondary cancer of skin    4. Secondary malignancy of parietal pleura           Med Onc Chart Routing      Follow up with physician . Return to clinic to see me on day 8 CBC CMP   Follow up with SANTIAGO    Infusion scheduling note    Injection scheduling note Start chemotherapy next week spoke with navigation; OP ERIBULIN Q3W day 1 and 8   Labs    Imaging    Pharmacy appointment    Other referrals                   Plan:     Extensive conversation with family reviewed images personally with them.  Demonstrates progressive disease at this time my recommendations are that patient initiate systemic therapy once radio surgery has been completed.  Patient understands she has treatable but not curable malignancy 3rd line I have discussed the case extensively with MD Monson.  Reviewed results of next generation sequencing with them as well.  They agree with current plan course of action variant with intracranial disease not able to undergo any research trial at present time.  Discussed implications and answered questions navigation team aware        Yaya Hoffmann Jr, MD FACP

## 2024-12-05 NOTE — TELEPHONE ENCOUNTER
Contacted pt's daughter to review new tx appts for thurs 12/12. Let her know they would all be at  due to pharmacy being down at . Ms Quentin v/u. Pt will have drug ed with Nevin at 0830, labs after and infusion at 11. Ms Saavedra sandra/lonnie. No questions or concerns at this time. ONN will stay available.

## 2024-12-06 ENCOUNTER — APPOINTMENT (OUTPATIENT)
Dept: RADIATION THERAPY | Facility: HOSPITAL | Age: 64
End: 2024-12-06
Payer: MEDICAID

## 2024-12-06 ENCOUNTER — PROCEDURE VISIT (OUTPATIENT)
Dept: RADIATION THERAPY | Facility: HOSPITAL | Age: 64
End: 2024-12-06
Payer: MEDICAID

## 2024-12-06 DIAGNOSIS — C79.31 SECONDARY ADENOCARCINOMA OF BRAIN: Primary | ICD-10-CM

## 2024-12-06 PROCEDURE — 77290 THER RAD SIMULAJ FIELD CPLX: CPT | Mod: TC | Performed by: RADIOLOGY

## 2024-12-06 PROCEDURE — 77295 3-D RADIOTHERAPY PLAN: CPT | Mod: TC | Performed by: RADIOLOGY

## 2024-12-06 PROCEDURE — 77300 RADIATION THERAPY DOSE PLAN: CPT | Mod: 26,,, | Performed by: RADIOLOGY

## 2024-12-06 PROCEDURE — 77300 RADIATION THERAPY DOSE PLAN: CPT | Mod: TC | Performed by: RADIOLOGY

## 2024-12-06 PROCEDURE — 77435 SBRT MANAGEMENT: CPT | Mod: ,,, | Performed by: RADIOLOGY

## 2024-12-06 PROCEDURE — 77370 RADIATION PHYSICS CONSULT: CPT | Performed by: RADIOLOGY

## 2024-12-06 PROCEDURE — 77334 RADIATION TREATMENT AID(S): CPT | Mod: 26,,, | Performed by: RADIOLOGY

## 2024-12-06 PROCEDURE — 77334 RADIATION TREATMENT AID(S): CPT | Mod: TC | Performed by: RADIOLOGY

## 2024-12-06 PROCEDURE — 77373 STRTCTC BDY RAD THER TX DLVR: CPT | Performed by: RADIOLOGY

## 2024-12-06 PROCEDURE — 77263 THER RADIOLOGY TX PLNG CPLX: CPT | Mod: ,,, | Performed by: RADIOLOGY

## 2024-12-06 PROCEDURE — 77295 3-D RADIOTHERAPY PLAN: CPT | Mod: 26,,, | Performed by: RADIOLOGY

## 2024-12-06 PROCEDURE — 77290 THER RAD SIMULAJ FIELD CPLX: CPT | Mod: 26,,, | Performed by: RADIOLOGY

## 2024-12-06 NOTE — PROCEDURES
Patient: Sherlyn Saavedra    MRN: [id number]    : 1960    DATE OF PROCEDURE: 2024      PRE-OPERATIVE DIAGNOSIS:  Brain metastases       POST-OPERATIVE DIAGNOSIS:  Same         PROCEDURE PERFORMED:                 1)         Gamma Knife stereotactic radiosurgery to brain metastases.       RADIATION ONCOLOGIST: Dick Licona      NEUROSURGEON:  ALEJANDRA Neurosurgeon: Luis Fernando Ma       MEDICAL PHYSICIST: ALEJANDRA Medical Physicist: Eliezer Landa       PROCEDURE SUMMARY:    Target # Site Dose per Fraction (Gy) Cumulative Dose % Isodose Line Fraction # Total Fractions   1 Lt Frontal1 6 6 60 1 5   2 Lt Frontal2 6 6 63 1 5   3 Rt Frontal 6 6 80 1 5   4 Lt Parietal 6 6 80 1 5   5 Rt Occipital1 6 6 57 1 5   6 Lt Cerebellum1 6 6 51 1 5       INDICATIONS AND CONSENT:  Sherlyn Saavedra is a 64 y.o. female with h/o Stage II triple negative Left breast cancer s/p neoadjuvant chemotherapy early  ->Lumpectomy 23 ->adjuvant whole breast radiation 42.4 Gy in 16 fractions 23 by my partner Dr. Hines in [place]. She underwent Right VATS with pleural biopsy 24 that demonstrated metastatic breast cancer. MRI Brain 24 demonstrated 2 small brain mets. It appears the decision was made to observe these in favor of systemic management. Repeat MRI 24 with significant interval progression up to 6 mets with largest Right occipital 3.7 cm. It was recommended that she undergo Gamma Knife stereotactic radiosurgery.  The risks of this procedure as well as possible complications were discussed with the patient pre-operatively.       DESCRIPTION OF PROCEDURE:  On the day of the procedure, the patient was positioned on the table in a custom-shaped immobilization mask.  Cone-beam CT was performed for stereotactic reference, and the images were imported into the Gamma Knife planning station. Images were co-registered with pre-planning MRI, which was used to define the target volume(s) and organs at risk. Under direct physician  supervision, acceptable localization of target and normal tissue was achieved with image guidance.  We targeted the lesion(s) with the dose(s) prescribed above to the margin of (each) lesion.  Treatment was carried out without difficulty using automated positioning.  Upon completion of the Gamma Knife procedure, the immobilization mask was removed.

## 2024-12-06 NOTE — ASSESSMENT & PLAN NOTE
-Code status: Full Code. Pt and daughter wish to revisit at a later time  -HCPOA: Daughter: Luna West: [phone]. Pt has 1 adult son-lives in [place]. Pt is single  -GOC:  Continue cancer treatment, symptom management, maintain independence and functional status. Pt aware treatment intent is palliative.   -See HPI for further details

## 2024-12-06 NOTE — ASSESSMENT & PLAN NOTE
Followed by Dr. Hoffmann and Khurram. Previous oncologist at [facility].   Now on Sacituzumab-Govitecan-Palliative intent   Paclitaxel D/C'd due to progressive disease.   S/p Keytruda, left lumpectomy (Aug. 2023) w/ residual disease post resection, radiation (Nov. 2023).    S/P right VATS washout with partial decortication, pleural biopsy, intercostal nerve blocks, Pleurx catheter insertion Aug 2024. Pleurx removed due to low output.   MRI Brain: 11/21/2024: Interval progression of metastatic disease with significant interval increase in size of several ring-enhancing lesions in both cerebral hemispheres and the left cerebellum as compared to the prior with significant surrounding edema.  New punctate focus of enhancement in the left parietal lobe as compared to the prior.  Interval increase in size of the ventricular system with some effacement of the sulci bilaterally is concerning for developing hydrocephalus.  Recommend neurosurgical consultation.  Patient was instructed to present to the emergency room for further evaluation.  Findings discussed with Dr. Paul via secure chat at 15:35 on 11/27/2024.  CT C/A/P: 11/19/2024: 1.  Interval worsening of extensive nodular soft tissue density lesions throughout the right hemithorax with development of numerous metastatic lesions throughout the liver.  2.  Stable ill-defined 2.5 cm lesion lower pole the left kidney.  Differential considerations are identified as versus metastatic lesion.  3.  Stable metastatic right inguinal and external iliac chain lymphadenopathy.  4.  Enlargement of subcutaneous metastatic lesion the right posterior back soft tissues.  Right loculated pleural effusion not amenable to drainage per conversation with hemonc provider, Whitley Galarza.

## 2024-12-06 NOTE — ASSESSMENT & PLAN NOTE
Progressive- Due to progressive disease  Increase MSContin to 45mg BID   Hydromorphone IR 4mg Q3H PRN  Narcan RX given and explained use to pt and family. Pt and family verbalized understanding.    Miralax 1pack/day or Senna 2 tabs at bedtime for opioid-induced constipation.   MOM if Miralax/senna are not effective.   Pain contract- 09/04/2024  UDS- 10/17/2024- Consistent w/ prescribed Hydromorphone and Morphine. Inconsistent with Marijuana. Pt aware she can obtain medical marijuana card.

## 2024-12-09 ENCOUNTER — OFFICE VISIT (OUTPATIENT)
Dept: RADIATION THERAPY | Facility: HOSPITAL | Age: 64
End: 2024-12-09
Payer: MEDICAID

## 2024-12-09 DIAGNOSIS — C79.31 SECONDARY ADENOCARCINOMA OF BRAIN: Primary | ICD-10-CM

## 2024-12-09 DIAGNOSIS — C78.2 SECONDARY MALIGNANCY OF PARIETAL PLEURA: ICD-10-CM

## 2024-12-09 PROCEDURE — 77373 STRTCTC BDY RAD THER TX DLVR: CPT | Performed by: RADIOLOGY

## 2024-12-09 PROCEDURE — 99499 UNLISTED E&M SERVICE: CPT | Mod: ,,, | Performed by: RADIOLOGY

## 2024-12-09 RX ORDER — DEXAMETHASONE 4 MG/1
4 TABLET ORAL 2 TIMES DAILY
Qty: 10 TABLET | Refills: 0 | Status: SHIPPED | OUTPATIENT
Start: 2024-12-09 | End: 2024-12-11 | Stop reason: HOSPADM

## 2024-12-09 RX ORDER — ONDANSETRON 8 MG/1
8 TABLET, ORALLY DISINTEGRATING ORAL EVERY 8 HOURS PRN
Qty: 30 TABLET | Refills: 2 | Status: SHIPPED | OUTPATIENT
Start: 2024-12-09

## 2024-12-09 NOTE — PROGRESS NOTES
Patient: Sherlyn Saavedra    MRN: [id number]    : 1960    DATE OF PROCEDURE: 2024      PRE-OPERATIVE DIAGNOSIS:  Brain metastases       POST-OPERATIVE DIAGNOSIS:  Same         PROCEDURE PERFORMED:                 1)         Gamma Knife stereotactic radiosurgery to brain metastases.       RADIATION ONCOLOGIST: Dick Licona      NEUROSURGEON:  ALEJANDRA Neurosurgeon: Luis Fernando Ma       MEDICAL PHYSICIST: ALEJANDRA Medical Physicist: Eliezer Landa       PROCEDURE SUMMARY:    Target # Site Dose per Fraction (Gy) Cumulative Dose % Isodose Line Fraction # Total Fractions   1 Lt Frontal1 6 12 60 2 5   2 Lt Frontal2 6 12 63 2 5   3 Rt Frontal 6 12 80 2 5   4 Lt Parietal 6 12 80 2 5   5 Rt Occipital1 6 12 57 2 5   6 Lt Cerebellum1 6 12 51 2 5       INDICATIONS AND CONSENT:  Sherlyn Saavedra is a 64 y.o. female with h/o Stage II triple negative Left breast cancer s/p neoadjuvant chemotherapy early  ->Lumpectomy 23 ->adjuvant whole breast radiation 42.4 Gy in 16 fractions 23 by my partner Dr. Hines in [place]. She underwent Right VATS with pleural biopsy 24 that demonstrated metastatic breast cancer. MRI Brain 24 demonstrated 2 small brain mets. It appears the decision was made to observe these in favor of systemic management. Repeat MRI 24 with significant interval progression up to 6 mets with largest Right occipital 3.7 cm. It was recommended that she undergo Gamma Knife stereotactic radiosurgery.  The risks of this procedure as well as possible complications were discussed with the patient pre-operatively.       DESCRIPTION OF PROCEDURE:  On the day of the procedure, the patient was positioned on the table in a custom-shaped immobilization mask.  Cone-beam CT was performed for stereotactic reference, and the images were imported into the Gamma Knife planning station. Images were co-registered with pre-planning MRI, which was used to define the target volume(s) and organs at risk. Under direct physician  supervision, acceptable localization of target and normal tissue was achieved with image guidance.  We targeted the lesion(s) with the dose(s) prescribed above to the margin of (each) lesion.  Treatment was carried out without difficulty using automated positioning.  Upon completion of the Gamma Knife procedure, the immobilization mask was removed.

## 2024-12-10 ENCOUNTER — OFFICE VISIT (OUTPATIENT)
Dept: PALLIATIVE MEDICINE | Facility: CLINIC | Age: 64
End: 2024-12-10
Payer: MEDICAID

## 2024-12-10 ENCOUNTER — TELEPHONE (OUTPATIENT)
Dept: RADIATION ONCOLOGY | Facility: CLINIC | Age: 64
End: 2024-12-10
Payer: MEDICAID

## 2024-12-10 ENCOUNTER — DOCUMENTATION ONLY (OUTPATIENT)
Dept: RADIATION ONCOLOGY | Facility: CLINIC | Age: 64
End: 2024-12-10
Payer: MEDICAID

## 2024-12-10 ENCOUNTER — HOSPITAL ENCOUNTER (INPATIENT)
Facility: HOSPITAL | Age: 64
LOS: 1 days | Discharge: HOME | DRG: 055 | End: 2024-12-11
Attending: STUDENT IN AN ORGANIZED HEALTH CARE EDUCATION/TRAINING PROGRAM | Admitting: HOSPITALIST
Payer: MEDICAID

## 2024-12-10 DIAGNOSIS — R07.9 CHEST PAIN: ICD-10-CM

## 2024-12-10 DIAGNOSIS — C79.9 METASTATIC MALIGNANT NEOPLASM, UNSPECIFIED SITE: ICD-10-CM

## 2024-12-10 DIAGNOSIS — R51.9 ACUTE INTRACTABLE HEADACHE, UNSPECIFIED HEADACHE TYPE: Primary | ICD-10-CM

## 2024-12-10 DIAGNOSIS — C50.412 PRIMARY MALIGNANT NEOPLASM OF UPPER OUTER QUADRANT OF BREAST, LEFT: Primary | ICD-10-CM

## 2024-12-10 DIAGNOSIS — Z51.5 PALLIATIVE CARE ENCOUNTER: ICD-10-CM

## 2024-12-10 LAB
ALBUMIN SERPL BCP-MCNC: 3.2 G/DL (ref 3.5–5.2)
ALP SERPL-CCNC: 154 U/L (ref 40–150)
ALT SERPL W/O P-5'-P-CCNC: 46 U/L (ref 10–44)
AMORPH CRY UR QL COMP ASSIST: ABNORMAL
ANION GAP SERPL CALC-SCNC: 13 MMOL/L (ref 8–16)
AST SERPL-CCNC: 40 U/L (ref 10–40)
BACTERIA #/AREA URNS AUTO: ABNORMAL /HPF
BASOPHILS # BLD AUTO: 0.02 K/UL (ref 0–0.2)
BASOPHILS NFR BLD: 0.2 % (ref 0–1.9)
BILIRUB SERPL-MCNC: 0.4 MG/DL (ref 0.1–1)
BILIRUB UR QL STRIP: NEGATIVE
BUN SERPL-MCNC: 12 MG/DL (ref 8–23)
BUN SERPL-MCNC: 14 MG/DL (ref 6–30)
CALCIUM SERPL-MCNC: 9 MG/DL (ref 8.7–10.5)
CHLORIDE SERPL-SCNC: 102 MMOL/L (ref 95–110)
CHLORIDE SERPL-SCNC: 99 MMOL/L (ref 95–110)
CLARITY UR REFRACT.AUTO: ABNORMAL
CO2 SERPL-SCNC: 21 MMOL/L (ref 23–29)
COLOR UR AUTO: YELLOW
CREAT SERPL-MCNC: 0.7 MG/DL (ref 0.5–1.4)
CREAT SERPL-MCNC: 0.7 MG/DL (ref 0.5–1.4)
DIFFERENTIAL METHOD BLD: ABNORMAL
EOSINOPHIL # BLD AUTO: 0 K/UL (ref 0–0.5)
EOSINOPHIL NFR BLD: 0 % (ref 0–8)
ERYTHROCYTE [DISTWIDTH] IN BLOOD BY AUTOMATED COUNT: 17.4 % (ref 11.5–14.5)
EST. GFR  (NO RACE VARIABLE): >60 ML/MIN/1.73 M^2
GLUCOSE SERPL-MCNC: 147 MG/DL (ref 70–110)
GLUCOSE SERPL-MCNC: 156 MG/DL (ref 70–110)
GLUCOSE UR QL STRIP: NEGATIVE
HCT VFR BLD AUTO: 30.5 % (ref 37–48.5)
HCT VFR BLD CALC: 30 %PCV (ref 36–54)
HGB BLD-MCNC: 9.6 G/DL (ref 12–16)
HGB UR QL STRIP: NEGATIVE
IMM GRANULOCYTES # BLD AUTO: 0.1 K/UL (ref 0–0.04)
IMM GRANULOCYTES NFR BLD AUTO: 1.1 % (ref 0–0.5)
KETONES UR QL STRIP: ABNORMAL
LEUKOCYTE ESTERASE UR QL STRIP: ABNORMAL
LIPASE SERPL-CCNC: 7 U/L (ref 4–60)
LYMPHOCYTES # BLD AUTO: 0.8 K/UL (ref 1–4.8)
LYMPHOCYTES NFR BLD: 8.7 % (ref 18–48)
MCH RBC QN AUTO: 28.3 PG (ref 27–31)
MCHC RBC AUTO-ENTMCNC: 31.5 G/DL (ref 32–36)
MCV RBC AUTO: 90 FL (ref 82–98)
MICROSCOPIC COMMENT: ABNORMAL
MONOCYTES # BLD AUTO: 0.4 K/UL (ref 0.3–1)
MONOCYTES NFR BLD: 4.8 % (ref 4–15)
NEUTROPHILS # BLD AUTO: 7.6 K/UL (ref 1.8–7.7)
NEUTROPHILS NFR BLD: 85.2 % (ref 38–73)
NITRITE UR QL STRIP: NEGATIVE
NRBC BLD-RTO: 0 /100 WBC
PH UR STRIP: 8 [PH] (ref 5–8)
PLATELET # BLD AUTO: 237 K/UL (ref 150–450)
PMV BLD AUTO: 10.6 FL (ref 9.2–12.9)
POC IONIZED CALCIUM: 1.16 MMOL/L (ref 1.06–1.42)
POC TCO2 (MEASURED): 27 MMOL/L (ref 23–27)
POCT GLUCOSE: 151 MG/DL (ref 70–110)
POTASSIUM BLD-SCNC: 4.7 MMOL/L (ref 3.5–5.1)
POTASSIUM SERPL-SCNC: 4.3 MMOL/L (ref 3.5–5.1)
PROT SERPL-MCNC: 6.7 G/DL (ref 6–8.4)
PROT UR QL STRIP: ABNORMAL
RBC # BLD AUTO: 3.39 M/UL (ref 4–5.4)
RBC #/AREA URNS AUTO: 1 /HPF (ref 0–4)
SAMPLE: ABNORMAL
SARS-COV-2 RDRP RESP QL NAA+PROBE: NEGATIVE
SODIUM BLD-SCNC: 134 MMOL/L (ref 136–145)
SODIUM SERPL-SCNC: 136 MMOL/L (ref 136–145)
SP GR UR STRIP: >=1.03 (ref 1–1.03)
SQUAMOUS #/AREA URNS AUTO: 2 /HPF
URN SPEC COLLECT METH UR: ABNORMAL
WBC # BLD AUTO: 8.95 K/UL (ref 3.9–12.7)
WBC #/AREA URNS AUTO: 2 /HPF (ref 0–5)

## 2024-12-10 PROCEDURE — 25500020 PHARM REV CODE 255: Performed by: STUDENT IN AN ORGANIZED HEALTH CARE EDUCATION/TRAINING PROGRAM

## 2024-12-10 PROCEDURE — 87635 SARS-COV-2 COVID-19 AMP PRB: CPT

## 2024-12-10 PROCEDURE — 63600175 PHARM REV CODE 636 W HCPCS: Performed by: STUDENT IN AN ORGANIZED HEALTH CARE EDUCATION/TRAINING PROGRAM

## 2024-12-10 PROCEDURE — 25000003 PHARM REV CODE 250: Performed by: STUDENT IN AN ORGANIZED HEALTH CARE EDUCATION/TRAINING PROGRAM

## 2024-12-10 PROCEDURE — 83690 ASSAY OF LIPASE: CPT | Performed by: EMERGENCY MEDICINE

## 2024-12-10 PROCEDURE — 96375 TX/PRO/DX INJ NEW DRUG ADDON: CPT

## 2024-12-10 PROCEDURE — 85025 COMPLETE CBC W/AUTO DIFF WBC: CPT | Performed by: EMERGENCY MEDICINE

## 2024-12-10 PROCEDURE — 99285 EMERGENCY DEPT VISIT HI MDM: CPT | Mod: 25

## 2024-12-10 PROCEDURE — 96374 THER/PROPH/DIAG INJ IV PUSH: CPT

## 2024-12-10 PROCEDURE — 81001 URINALYSIS AUTO W/SCOPE: CPT | Performed by: EMERGENCY MEDICINE

## 2024-12-10 PROCEDURE — 63600175 PHARM REV CODE 636 W HCPCS

## 2024-12-10 PROCEDURE — 12000002 HC ACUTE/MED SURGE SEMI-PRIVATE ROOM

## 2024-12-10 PROCEDURE — 63600175 PHARM REV CODE 636 W HCPCS: Performed by: EMERGENCY MEDICINE

## 2024-12-10 PROCEDURE — 80047 BASIC METABLC PNL IONIZED CA: CPT

## 2024-12-10 PROCEDURE — 80053 COMPREHEN METABOLIC PANEL: CPT | Performed by: EMERGENCY MEDICINE

## 2024-12-10 RX ORDER — GLUCAGON 1 MG
1 KIT INJECTION
Status: DISCONTINUED | OUTPATIENT
Start: 2024-12-10 | End: 2024-12-11 | Stop reason: HOSPADM

## 2024-12-10 RX ORDER — IPRATROPIUM BROMIDE AND ALBUTEROL SULFATE 2.5; .5 MG/3ML; MG/3ML
3 SOLUTION RESPIRATORY (INHALATION) EVERY 4 HOURS PRN
Status: DISCONTINUED | OUTPATIENT
Start: 2024-12-10 | End: 2024-12-11 | Stop reason: HOSPADM

## 2024-12-10 RX ORDER — PROCHLORPERAZINE EDISYLATE 5 MG/ML
10 INJECTION INTRAMUSCULAR; INTRAVENOUS
Status: COMPLETED | OUTPATIENT
Start: 2024-12-10 | End: 2024-12-10

## 2024-12-10 RX ORDER — IBUPROFEN 200 MG
24 TABLET ORAL
Status: DISCONTINUED | OUTPATIENT
Start: 2024-12-10 | End: 2024-12-11 | Stop reason: HOSPADM

## 2024-12-10 RX ORDER — HYDROMORPHONE HYDROCHLORIDE 1 MG/ML
1 INJECTION, SOLUTION INTRAMUSCULAR; INTRAVENOUS; SUBCUTANEOUS EVERY 4 HOURS PRN
Status: DISCONTINUED | OUTPATIENT
Start: 2024-12-10 | End: 2024-12-11

## 2024-12-10 RX ORDER — ALUMINUM HYDROXIDE, MAGNESIUM HYDROXIDE, AND SIMETHICONE 1200; 120; 1200 MG/30ML; MG/30ML; MG/30ML
30 SUSPENSION ORAL 4 TIMES DAILY PRN
Status: DISCONTINUED | OUTPATIENT
Start: 2024-12-10 | End: 2024-12-11 | Stop reason: HOSPADM

## 2024-12-10 RX ORDER — MORPHINE SULFATE 4 MG/ML
4 INJECTION, SOLUTION INTRAMUSCULAR; INTRAVENOUS
Status: COMPLETED | OUTPATIENT
Start: 2024-12-10 | End: 2024-12-10

## 2024-12-10 RX ORDER — MORPHINE SULFATE 15 MG/1
30 TABLET, FILM COATED, EXTENDED RELEASE ORAL EVERY 8 HOURS
Status: DISCONTINUED | OUTPATIENT
Start: 2024-12-10 | End: 2024-12-11 | Stop reason: HOSPADM

## 2024-12-10 RX ORDER — FAMOTIDINE 20 MG/1
20 TABLET, FILM COATED ORAL 2 TIMES DAILY
Status: DISCONTINUED | OUTPATIENT
Start: 2024-12-10 | End: 2024-12-11 | Stop reason: HOSPADM

## 2024-12-10 RX ORDER — INSULIN ASPART 100 [IU]/ML
0-5 INJECTION, SOLUTION INTRAVENOUS; SUBCUTANEOUS
Status: DISCONTINUED | OUTPATIENT
Start: 2024-12-10 | End: 2024-12-11 | Stop reason: HOSPADM

## 2024-12-10 RX ORDER — LABETALOL HYDROCHLORIDE 5 MG/ML
10 INJECTION, SOLUTION INTRAVENOUS EVERY 4 HOURS PRN
Status: DISCONTINUED | OUTPATIENT
Start: 2024-12-10 | End: 2024-12-11 | Stop reason: HOSPADM

## 2024-12-10 RX ORDER — SIMETHICONE 80 MG
1 TABLET,CHEWABLE ORAL 4 TIMES DAILY PRN
Status: DISCONTINUED | OUTPATIENT
Start: 2024-12-10 | End: 2024-12-11 | Stop reason: HOSPADM

## 2024-12-10 RX ORDER — ONDANSETRON HYDROCHLORIDE 2 MG/ML
4 INJECTION, SOLUTION INTRAVENOUS EVERY 6 HOURS PRN
Status: DISCONTINUED | OUTPATIENT
Start: 2024-12-10 | End: 2024-12-11 | Stop reason: HOSPADM

## 2024-12-10 RX ORDER — PROCHLORPERAZINE EDISYLATE 5 MG/ML
5 INJECTION INTRAMUSCULAR; INTRAVENOUS EVERY 6 HOURS PRN
Status: DISCONTINUED | OUTPATIENT
Start: 2024-12-10 | End: 2024-12-11 | Stop reason: HOSPADM

## 2024-12-10 RX ORDER — ACETAMINOPHEN 325 MG/1
650 TABLET ORAL EVERY 4 HOURS PRN
Status: DISCONTINUED | OUTPATIENT
Start: 2024-12-10 | End: 2024-12-11 | Stop reason: HOSPADM

## 2024-12-10 RX ORDER — FOLIC ACID 1 MG/1
1 TABLET ORAL DAILY
Status: DISCONTINUED | OUTPATIENT
Start: 2024-12-11 | End: 2024-12-11 | Stop reason: HOSPADM

## 2024-12-10 RX ORDER — HYDROMORPHONE HYDROCHLORIDE 1 MG/ML
1 INJECTION, SOLUTION INTRAMUSCULAR; INTRAVENOUS; SUBCUTANEOUS
Status: COMPLETED | OUTPATIENT
Start: 2024-12-10 | End: 2024-12-10

## 2024-12-10 RX ORDER — ONDANSETRON HYDROCHLORIDE 2 MG/ML
4 INJECTION, SOLUTION INTRAVENOUS
Status: COMPLETED | OUTPATIENT
Start: 2024-12-10 | End: 2024-12-10

## 2024-12-10 RX ORDER — AMOXICILLIN 250 MG
2 CAPSULE ORAL 2 TIMES DAILY
Status: DISCONTINUED | OUTPATIENT
Start: 2024-12-10 | End: 2024-12-11 | Stop reason: HOSPADM

## 2024-12-10 RX ORDER — SODIUM CHLORIDE 0.9 % (FLUSH) 0.9 %
10 SYRINGE (ML) INJECTION EVERY 12 HOURS PRN
Status: DISCONTINUED | OUTPATIENT
Start: 2024-12-10 | End: 2024-12-11 | Stop reason: HOSPADM

## 2024-12-10 RX ORDER — BISACODYL 10 MG/1
10 SUPPOSITORY RECTAL DAILY PRN
Status: DISCONTINUED | OUTPATIENT
Start: 2024-12-10 | End: 2024-12-11 | Stop reason: HOSPADM

## 2024-12-10 RX ORDER — TALC
6 POWDER (GRAM) TOPICAL NIGHTLY PRN
Status: DISCONTINUED | OUTPATIENT
Start: 2024-12-10 | End: 2024-12-11 | Stop reason: HOSPADM

## 2024-12-10 RX ORDER — KETOROLAC TROMETHAMINE 30 MG/ML
15 INJECTION, SOLUTION INTRAMUSCULAR; INTRAVENOUS
Status: COMPLETED | OUTPATIENT
Start: 2024-12-10 | End: 2024-12-10

## 2024-12-10 RX ORDER — DIPHENHYDRAMINE HYDROCHLORIDE 50 MG/ML
25 INJECTION INTRAMUSCULAR; INTRAVENOUS
Status: COMPLETED | OUTPATIENT
Start: 2024-12-10 | End: 2024-12-10

## 2024-12-10 RX ORDER — GABAPENTIN 300 MG/1
300 CAPSULE ORAL 2 TIMES DAILY
Status: DISCONTINUED | OUTPATIENT
Start: 2024-12-10 | End: 2024-12-11 | Stop reason: HOSPADM

## 2024-12-10 RX ORDER — IBUPROFEN 200 MG
16 TABLET ORAL
Status: DISCONTINUED | OUTPATIENT
Start: 2024-12-10 | End: 2024-12-11 | Stop reason: HOSPADM

## 2024-12-10 RX ORDER — AMLODIPINE BESYLATE 5 MG/1
5 TABLET ORAL DAILY
Status: DISCONTINUED | OUTPATIENT
Start: 2024-12-11 | End: 2024-12-11 | Stop reason: HOSPADM

## 2024-12-10 RX ORDER — POLYETHYLENE GLYCOL 3350 17 G/17G
17 POWDER, FOR SOLUTION ORAL DAILY
Status: DISCONTINUED | OUTPATIENT
Start: 2024-12-11 | End: 2024-12-11 | Stop reason: HOSPADM

## 2024-12-10 RX ORDER — NALOXONE HCL 0.4 MG/ML
0.02 VIAL (ML) INJECTION
Status: DISCONTINUED | OUTPATIENT
Start: 2024-12-10 | End: 2024-12-11 | Stop reason: HOSPADM

## 2024-12-10 RX ADMIN — SODIUM CHLORIDE, POTASSIUM CHLORIDE, SODIUM LACTATE AND CALCIUM CHLORIDE 1000 ML: 600; 310; 30; 20 INJECTION, SOLUTION INTRAVENOUS at 05:12

## 2024-12-10 RX ADMIN — SENNOSIDES AND DOCUSATE SODIUM 2 TABLET: 50; 8.6 TABLET ORAL at 08:12

## 2024-12-10 RX ADMIN — PROCHLORPERAZINE EDISYLATE 10 MG: 5 INJECTION INTRAMUSCULAR; INTRAVENOUS at 06:12

## 2024-12-10 RX ADMIN — IOHEXOL 75 ML: 350 INJECTION, SOLUTION INTRAVENOUS at 06:12

## 2024-12-10 RX ADMIN — DIPHENHYDRAMINE HYDROCHLORIDE 25 MG: 50 INJECTION, SOLUTION INTRAMUSCULAR; INTRAVENOUS at 07:12

## 2024-12-10 RX ADMIN — BUSPIRONE HYDROCHLORIDE 15 MG: 10 TABLET ORAL at 08:12

## 2024-12-10 RX ADMIN — MORPHINE SULFATE 30 MG: 15 TABLET, EXTENDED RELEASE ORAL at 10:12

## 2024-12-10 RX ADMIN — DEXAMETHASONE SODIUM PHOSPHATE 10 MG: 10 INJECTION INTRAMUSCULAR; INTRAVENOUS at 10:12

## 2024-12-10 RX ADMIN — ONDANSETRON 4 MG: 2 INJECTION INTRAMUSCULAR; INTRAVENOUS at 03:12

## 2024-12-10 RX ADMIN — FAMOTIDINE 20 MG: 20 TABLET ORAL at 08:12

## 2024-12-10 RX ADMIN — KETOROLAC TROMETHAMINE 15 MG: 30 INJECTION, SOLUTION INTRAMUSCULAR; INTRAVENOUS at 07:12

## 2024-12-10 RX ADMIN — GABAPENTIN 300 MG: 300 CAPSULE ORAL at 10:12

## 2024-12-10 RX ADMIN — HYDROMORPHONE HYDROCHLORIDE 1 MG: 1 INJECTION, SOLUTION INTRAMUSCULAR; INTRAVENOUS; SUBCUTANEOUS at 05:12

## 2024-12-10 RX ADMIN — MORPHINE SULFATE 4 MG: 4 INJECTION INTRAVENOUS at 03:12

## 2024-12-10 RX ADMIN — ONDANSETRON 4 MG: 2 INJECTION INTRAMUSCULAR; INTRAVENOUS at 10:12

## 2024-12-10 NOTE — ED NOTES
I-STAT Chem-8+ Results:   Value Reference Range   Sodium 134 136-145 mmol/L   Potassium  4.7 3.5-5.1 mmol/L   Chloride 99  mmol/L   Ionized Calcium 1.16 1.06-1.42 mmol/L   CO2 (measured) 27 23-29 mmol/L   Glucose 156  mg/dL   BUN 14 6-30 mg/dL   Creatinine 0.7 0.5-1.4 mg/dL   Hematocrit 30 36-54%

## 2024-12-10 NOTE — FIRST PROVIDER EVALUATION
Medical screening examination initiated.  I have conducted a focused provider triage encounter, findings are as follows:    Brief history of present illness:  pt with headache, prior radiation for metastatic disease    Vitals:    12/10/24 1352   BP: (!) 150/80   Pulse: 70   Resp: 20   Temp: 98.3 °F (36.8 °C)   TempSrc: Oral   SpO2: 99%   Weight: 81.5 kg (179 lb 10.8 oz)   Height: 5' (1.524 m)       Pertinent physical exam:  awake but resting, no acute distress    Brief workup plan:  CTH  Analgesia  Basic labs    Preliminary workup initiated; this workup will be continued and followed by the physician or advanced practice provider that is assigned to the patient when roomed.

## 2024-12-10 NOTE — ED PROVIDER NOTES
Encounter Date: 12/10/2024       History     Chief Complaint   Patient presents with    Headache     After radiation tx yesterday for breast/brain cancer pt has had a severe HA. Reports HA, back pain, and nausea.      The history is provided by the patient and a relative. The history is limited by the condition of the patient.     Sherlyn Saavedra is a 64 y.o. female, PMH HLD, DMT2, Breast cancer (triple negative) with mets to the brain/skin/parietal pleura on radiation and s/p chemo/lumpectomy presenting with complaints of intractable headache since getting radiation for brain metastasis on yesterday.  She reports severe frontal headache that does not have photophobia or phonophobia however she does report sensitivity with position changes. Patient is on chronic opioids, last BM was 2 days ago.  Patient attempted home pain meds without relief.  Patient reports emesis at 3pm (bilious) and 2am (clear) and has not had p.o. intake in 2 days. Per Hematology note on 12/5/24, pt planned to start ERBILIN after completing radiation therapy.  She reports improvement in her previous nausea after receiving Zofran shortly after arriving in the ED. Patient reports prior history of headaches but states that her baseline headaches are nothing like today.  She reports that this headache is far more severe than anything she has ever experienced in the past.    Review of patient's allergies indicates:   Allergen Reactions    Ace inhibitors Swelling    Lisinopril Rash    Hydrocodone Itching     Hives    Hydrocodone-acetaminoph-supp11 Itching    Percocet [oxycodone-acetaminophen] Itching     Pt had to take an antihistamine to improve itching     Cephalexin      Hives    Pantoprazole Hives    Propoxyphene      Hives    Propoxyphene n-acetaminophen     Propoxyphene-acetaminophen      Past Medical History:   Diagnosis Date    Allergy     Anemia     Breast cancer     primary malignant neoplasm of upper outer quadrant of breast - progressive  metastatic triple negative breast cancer. Patient has progressive 2 lines of therapy.    HLD (hyperlipidemia) 2015    Hyperlipidemia (Problem)      Hypertension     Primary malignant neoplasm of upper outer quadrant of breast, left 10/10/2023    Secondary malignancy of parietal pleura 2024    Type 2 diabetes mellitus with diabetic polyneuropathy, without long-term current use of insulin 2015     Past Surgical History:   Procedure Laterality Date    HYSTERECTOMY      tubaligation       No family history on file.  Social History     Tobacco Use    Smoking status: Former     Current packs/day: 0.00     Types: Cigarettes     Quit date:      Years since quittin.9    Smokeless tobacco: Never   Substance Use Topics    Alcohol use: Not Currently    Drug use: Never     Review of Systems    Physical Exam     Initial Vitals [12/10/24 1352]   BP Pulse Resp Temp SpO2   (!) 150/80 70 20 98.3 °F (36.8 °C) 99 %      MAP       --         Physical Exam    Nursing note and vitals reviewed.  Constitutional: She appears well-developed and well-nourished. She is not diaphoretic. She is active and cooperative.  Non-toxic appearance. She does not appear ill. She appears distressed.   HENT:   Head: Normocephalic.   Eyes: Conjunctivae and EOM are normal. Pupils are equal, round, and reactive to light. Right conjunctiva is not injected. Left conjunctiva is not injected. No scleral icterus. Right eye exhibits no nystagmus. Left eye exhibits no nystagmus.   Cardiovascular:  Normal rate, regular rhythm and intact distal pulses.  No extrasystoles are present.    Exam reveals no gallop, no S3, no S4, no distant heart sounds and no friction rub.       No murmur heard.  Pulmonary/Chest: Breath sounds normal. No respiratory distress. She has no wheezes. She has no rhonchi. She has no rales.   Nodular lesion to the right lateral chest fall.  Family at bedside reports that this is new.   Abdominal: Abdomen is soft. She  exhibits no distension. There is no abdominal tenderness. There is no rebound and no guarding.     Neurological: She is alert and oriented to person, place, and time. She is not disoriented. GCS score is 15. GCS eye subscore is 4. GCS verbal subscore is 5. GCS motor subscore is 6.   Skin: Skin is warm and dry.         ED Course   Procedures  Labs Reviewed   CBC W/ AUTO DIFFERENTIAL - Abnormal       Result Value    WBC 8.95      RBC 3.39 (*)     Hemoglobin 9.6 (*)     Hematocrit 30.5 (*)     MCV 90      MCH 28.3      MCHC 31.5 (*)     RDW 17.4 (*)     Platelets 237      MPV 10.6      Immature Granulocytes 1.1 (*)     Gran # (ANC) 7.6      Immature Grans (Abs) 0.10 (*)     Lymph # 0.8 (*)     Mono # 0.4      Eos # 0.0      Baso # 0.02      nRBC 0      Gran % 85.2 (*)     Lymph % 8.7 (*)     Mono % 4.8      Eosinophil % 0.0      Basophil % 0.2      Differential Method Automated      Narrative:     add on Lipase-[id number] per Gertrudis Devlin MD    12/10/2024  17:31 Karin   COMPREHENSIVE METABOLIC PANEL - Abnormal    Sodium 136      Potassium 4.3      Chloride 102      CO2 21 (*)     Glucose 147 (*)     BUN 12      Creatinine 0.7      Calcium 9.0      Total Protein 6.7      Albumin 3.2 (*)     Total Bilirubin 0.4      Alkaline Phosphatase 154 (*)     AST 40      ALT 46 (*)     eGFR >60.0      Anion Gap 13      Narrative:     add on Lipase-[id number] per Gertrudis Devlin MD    12/10/2024  17:31 Karin   URINALYSIS, REFLEX TO URINE CULTURE - Abnormal    Specimen UA Urine, Clean Catch      Color, UA Yellow      Appearance, UA Hazy (*)     pH, UA 8.0      Specific Gravity, UA >=1.030 (*)     Protein, UA Trace (*)     Glucose, UA Negative      Ketones, UA Trace (*)     Bilirubin (UA) Negative      Occult Blood UA Negative      Nitrite, UA Negative      Leukocytes, UA Trace (*)     Narrative:     Specimen Source->Urine   URINALYSIS MICROSCOPIC - Abnormal    RBC, UA 1      WBC, UA 2      Bacteria Few (*)     Squam  Epithel, UA 2      Amorphous, UA Few      Microscopic Comment SEE COMMENT      Narrative:     Specimen Source->Urine   ISTAT PROCEDURE - Abnormal    POC Glucose 156 (*)     POC BUN 14      POC Creatinine 0.7      POC Sodium 134 (*)     POC Potassium 4.7      POC Chloride 99      POC TCO2 (MEASURED) 27      POC Ionized Calcium 1.16      POC Hematocrit 30 (*)     Sample KYLAH     POCT GLUCOSE - Abnormal    POCT Glucose 151 (*)    LIPASE   LIPASE    Lipase 7      Narrative:     add on Lipase-[id number] per Gertrudis Devlin MD    12/10/2024  17:31 Karin   SARS-COV-2 RNA AMPLIFICATION, QUAL    SARS-CoV-2 RNA, Amplification, Qual Negative     ISTAT CHEM8   POCT GLUCOSE MONITORING CONTINUOUS          Imaging Results              CT Head Without Contrast (Final result)  Result time 12/10/24 18:47:12      Final result by Bryce Wilson MD (12/10/24 18:47:12)                   Impression:      1. Similar areas of edema involving the posterior left frontal lobe, inferior left frontal lobe, right occipital lobe, and left cerebellum, corresponding to metastatic lesions better characterized on MRI from 11/27/2024.  2. Mass effect resulting in partial effacement of the occipital horn of the right lateral ventricle without evidence of hydrocephalus, unchanged compared to the most recent MRI from 11/27/2024.  3. Additional findings, as above.    Electronically signed by resident: Fanny Roberts  Date:    12/10/2024  Time:    18:31    Electronically signed by: Bryce Wilson MD  Date:    12/10/2024  Time:    18:47               Narrative:    EXAMINATION:  CT HEAD WITHOUT CONTRAST    CLINICAL HISTORY:  Headache, new or worsening (Age >= 50y);    TECHNIQUE:  Low dose axial CT images obtained throughout the head without the use of intravenous contrast.  Axial, sagittal and coronal reconstructions were performed.    COMPARISON:  MRI brain 11/27/2024.    FINDINGS:  Stable size and configuration of the ventricles with some effacement of the  occipital horn of the right lateral ventricle, unchanged compared to the most recent MRI from 11/27/2024.  No evidence of hydrocephalus.  No midline shift.    Similar areas of edema and associated mass effect involving the posterior left frontal lobe, inferior left frontal lobe, right occipital lobe and left cerebellum, corresponding to metastatic lesions better characterized on MRI from 11/27/2024.  No parenchymal hemorrhage or major vascular distribution infarct.    No extra-axial blood or fluid collections.    Skull/extracranial contents (limited evaluation):    No displaced calvarial fracture.  Mild mucosal thickening within the paranasal sinuses.  The bilateral mastoid air cells are essentially clear.  Osteoma of the left ethmoid air cells measuring 4 mm.  The extracranial soft tissues are unremarkable.                                        CT Abdomen Pelvis With IV Contrast NO Oral Contrast (Final result)  Result time 12/10/24 20:08:30      Final result by Bryce Wilson MD (12/10/24 20:08:30)                   Impression:      1. In this patient with metastatic breast cancer there is similar involvement of the pleura of the right lung base, increased size and number of liver lesions, increased size of matted masses extending into the posterior right subphrenic/subhepatic space, increased size of right axillary lymph node, and increased size of posterior right chest/abdominal wall mass.  Findings overall consistent with worsening metastatic disease.  2. Additional grossly stable right inguinal and right iliac chain lymph nodes.  3. Right lateral abdominal wall heterogeneously enhancing lesion within the subcutaneous fat, not completely included within field of view of prior imaging and also likely representing metastatic disease.  4. Increased size of heterogeneous lesions involving the mid right kidney and inferior pole of the left kidney, concerning for additional foci of metastatic disease.  5. Interval  increased mass effect upon the posteroinferior aspect of the right atrium, superior most aspect of the IVC and lesser extent intrahepatic portion of the IVC which also shows heterogeneous opacification at these levels presumably related to mixing of blood pool and contrast bolus and timing of contrast bolus; however, local invasion and/or bland or tumor thrombus not excluded.  Further evaluation/follow-up as warranted.  6. Small volume of loculated right pleural fluid, similar to prior.  7. Additional findings, as above.  This report was flagged in Epic as abnormal.    Electronically signed by resident: Fanny Roberts  Date:    12/10/2024  Time:    18:41    Electronically signed by: Bryce Wilson MD  Date:    12/10/2024  Time:    20:08               Narrative:    EXAMINATION:  CT ABDOMEN PELVIS WITH IV CONTRAST    CLINICAL HISTORY:  Abdominal pain, acute, nonlocalized;    TECHNIQUE:  Routine axial CT images of the abdomen were obtained after administration 75cc of IV Omnipaque 350.  No oral contrast was administered.Coronal and Sagittal reformatted images were also obtained.    COMPARISON:  CT chest abdomen pelvis 11/18/2024 and CT abdomen pelvis 10/02/2024.    FINDINGS:  Heart: Normal in size with no pericardial effusion.    Lungs: Similar appearance of pleural based disease involving the partially imaged lower right hemithorax.  Index anterior medial right lung base mass with peripheral enhancement and central hypoenhancement suggestive of central necrotic component measuring 4.5 x 3.3 cm (axial series 2, image 3), previously 4.5 x 3.3 cm.  Multiple additional pleural based lesions involving the right lung base.  Small volume of loculated right pleural fluid.  Bandlike atelectasis of the right lung base.  No left pleural effusion.  Left lung base pulmonary nodule measuring 0.6 cm (axial series 2, image 31), appears new compared to prior CT from 11/19/2024.    Liver: The liver is enlarged measuring 18.0 cm.  Multiple  hypodense lesions of the hepatic parenchyma consistent with metastatic disease with index lesion of the inferior posterior right hepatic lobe increased in size and measuring 2.9 cm (axial series 2, image 69), previously 1.7 cm.  Several of the additional lesions appear enlarged compared to prior.  Additionally, there are several lesions which appear new, for example lesion measuring 1.0 cm in the inferior right hepatic lobe (axial series 2, image 74).  The liver is displaced anteriorly by mass abutting the posterior aspect of the right hepatic lobe.    Gallbladder/biliary system: No calcified gallstones.  No intrahepatic or extrahepatic ductal dilatation.    Pancreas: No mass or peripancreatic fat stranding.    Spleen: Normal in size.    GI Tract/ Mesentery: Small hiatal hernia.  The stomach and duodenum are otherwise unremarkable.  Small stool ball within the rectum without significant rectal wall thickening.  No evidence of bowel obstruction or inflammation. The appendix is unremarkable.  Slightly increased size of matted masses extending into the right posterior subphrenic/subhepatic space measuring approximately 8.5 x 14.1 x 14.9 cm AP by TV by CC (axial series 2, image 49 and coronal series 601, image 90), previously 8.4 x 13.9 x 14.1 cm AP by TV by CC.  Multiple foci of nodular peripheral enhancement with central hypodense areas likely representing necrosis.  The masses are inseparable from the hemidiaphragm and pleural based masses of the right lung base.  No definite intervening fat plane between these masses and the right hepatic lobe, suggesting possible involvement.    Adrenals: Unremarkable.    Kidneys/ Ureters: Normal in size and location.  Mild increased size of heterogeneous lesion involving the inferior pole of the left kidney measuring 3.3 x 3.4 x 3.6 cm AP by TV by CC (axial series 2, image 81 and coronal series 601, image 97), previously 3.2 x 3.0 x 3.4 cm.  Mildly increased size heterogeneous  lesion involving the midpole of the right kidney measuring 1.8 x 1.7 x 2.1 cm AP by TV by CC (axial series 2, image 97 and coronal series 601, image 108), previously 0.9 x 1.1 x 1.8 cm.  No hydronephrosis or nephrolithiasis. Both ureters are normal in course caliber with no ureteral stones or hydroureter.    Bladder: Normal in contour with no bladder wall thickening.    Reproductive organs: The uterus is surgically absent.    Lymph nodes: Stable right external iliac chain lymph node measuring 1.9 cm in short axis (axial series 2, image 134), previously 1.9 cm.  Stable right inguinal lymph node measuring 1.7 cm (axial series 2, image 149), previously 1.7 cm.  Increased size of right axillary lymph node measuring 1.7 cm (axial series 2, image 7), previously 1.1 cm.    Peritoneal space: Trace free intraperitoneal fluid with fluid adjacent to the inferior right hepatic lobe and minimal free fluid within the bilateral pericolic gutters.  No free air.    Abdominal wall: Heterogeneously enhancing right posterior chest/abdominal wall lesion within the subcutaneous fat measuring 3.0 x 5.7 cm (axial series 2, image 30), previously 2.9 x 5.0 cm.  Heterogeneously enhancing right lateral lower abdominal wall soft tissue lesion measuring 2.3 x 1.6 cm (axial series 2, image 163), outside of field of view on most recent CT from 11/19/2024 and incompletely included within the field of view on CT from 10/02/2024.  Moderate body wall edema. Tiny fat containing umbilical hernia.  Stable small fat suspected intramuscular lipoma at the anterior proximal right thigh.    Vasculature: There is associated interval increased mass effect upon the posteroinferior aspect of the right atrium, superior most aspect of the IVC, and lesser extent intrahepatic portion of the IVC which are also heterogeneous at these locations.  Mild calcific atherosclerosis of the abdominal aorta.  No evidence of aneurysmal dilatation. The celiac artery, SMA, CORAL, and  bilateral renal arteries are patent.  The portal vein, SMV, and splenic vein are patent.    Bones: Degenerative disease of the spine with no acute fractures or lytic lesions.                                       Medications   dexAMETHasone (DECADRON) 6 mg in 0.9% NaCl 50 mL IVPB (has no administration in time range)   famotidine tablet 20 mg (20 mg Oral Given 12/10/24 2056)   (Magic mouthwash) 1:1:1 diphenhydrAMINE(Benadryl) 12.5mg/5ml liq, aluminum & magnesium hydroxide-simethicone (Maalox), LIDOcaine viscous 2% (has no administration in time range)   amLODIPine tablet 5 mg (has no administration in time range)   busPIRone tablet 15 mg (15 mg Oral Given 12/10/24 2055)   folic acid tablet 1 mg (has no administration in time range)   gabapentin capsule 300 mg (300 mg Oral Given 12/10/24 2215)   glucose chewable tablet 16 g (has no administration in time range)   glucose chewable tablet 24 g (has no administration in time range)   glucagon (human recombinant) injection 1 mg (has no administration in time range)   insulin aspart U-100 pen 0-5 Units (has no administration in time range)   dextrose 10% bolus 125 mL 125 mL (has no administration in time range)   dextrose 10% bolus 250 mL 250 mL (has no administration in time range)   sodium chloride 0.9% flush 10 mL (has no administration in time range)   naloxone 0.4 mg/mL injection 0.02 mg (has no administration in time range)   acetaminophen tablet 650 mg (has no administration in time range)   polyethylene glycol packet 17 g (has no administration in time range)   senna-docusate 8.6-50 mg per tablet 2 tablet (2 tablets Oral Given 12/10/24 2056)   bisacodyL suppository 10 mg (has no administration in time range)   ondansetron injection 4 mg (has no administration in time range)   prochlorperazine injection Soln 5 mg (has no administration in time range)   albuterol-ipratropium 2.5 mg-0.5 mg/3 mL nebulizer solution 3 mL (has no administration in time range)   melatonin  tablet 6 mg (has no administration in time range)   aluminum-magnesium hydroxide-simethicone 200-200-20 mg/5 mL suspension 30 mL (has no administration in time range)   simethicone chewable tablet 80 mg (has no administration in time range)   labetaloL injection 10 mg (has no administration in time range)   morphine 12 hr tablet 30 mg (30 mg Oral Given 12/10/24 2215)   HYDROmorphone injection 1 mg (has no administration in time range)   ondansetron injection 4 mg (has no administration in time range)   ondansetron injection 4 mg (4 mg Intravenous Given 12/10/24 1522)   morphine injection 4 mg (4 mg Intravenous Given 12/10/24 1522)   HYDROmorphone injection 1 mg (1 mg Intravenous Given 12/10/24 1716)   lactated ringers bolus 1,000 mL (0 mLs Intravenous Stopped 12/10/24 1844)   iohexoL (OMNIPAQUE 350) injection 75 mL (75 mLs Intravenous Given 12/10/24 1800)   prochlorperazine injection Soln 10 mg (10 mg Intravenous Given 12/10/24 1859)   diphenhydrAMINE injection 25 mg (25 mg Intravenous Given 12/10/24 1901)   ketorolac injection 15 mg (15 mg Intravenous Given 12/10/24 1901)   dexAMETHasone (DECADRON) 10 mg in sodium chloride 0.9% 50 mL IVPB (0 mg Intravenous Stopped 12/10/24 2235)     Medical Decision Making  Sherlyn Saavedra  is a 64 y.o. female, presenting with complaints of headache and decreased PO intake with associated N/V, history of present illness obtained from pt and family at bedside as seen above. Patient able to provide limited history of present illness and tolerated physical exam well. Patient found to be in severe distress secondary to pain, stable. Exam notable as above.     DDX includes but is not limited to: intracranial bleed, dehydration, electrolyte abnormality, mass effect, progression of metastatic disease, radiation side effect.  Patient was not have leukocytosis she has been hemodynamically stable throughout his ED stay and has not had tachycardia or fevers.  She does have some new liver enzyme  elevations.  Lipase within normal limits.  She has anemia but not requiring transfusion today.Attempted pain control with IV Dilaudid, morphine, Compazine, Toradol, Benadryl without significant improvement.  She does not have electrolyte abnormalities to explain today's presentation.  CT head without evidence of intracranial bleed.  CT abdomen and pelvis with concern for extension of metastatic disease.  I discussed the areas of concern for new metastatic disease with the patient, she reports understanding about these findings.     See ED course for additional discussion. I discussed this case with Heme-Onc who agreed to evaluate and provide recommendations for this patient.     Data Reviewed/Counseling: I have reviewed the patient's vital signs, nursing notes, and other relevant tests/information. Any incidental findings were discussed with the patient. I had a detailed discussion with the patient regarding the historical points, exam findings, and any diagnostic results supporting the diagnosis. Discussed case with heme Onc, who agreed to evaluate and admit patient.  Disposition: Admitted     Amount and/or Complexity of Data Reviewed  Labs:  Decision-making details documented in ED Course.  Radiology: ordered.    Risk  Prescription drug management.  Decision regarding hospitalization.               ED Course as of 12/10/24 2244   Tue Dec 10, 2024   1814 Lipase: 7  wnl [HB]   1815 WBC: 8.95  No leukocytosis  [HB]   1834 ALP(!): 154 [HB]   1834 ALT(!): 46 [HB]   1834 Hemoglobin(!): 9.6 [HB]   1834 Hematocrit(!): 30.5  Mildly decreased from prior [HB]      ED Course User Index  [HB] Rajni Caceres MD                           Clinical Impression:  Final diagnoses:  [R51.9] Acute intractable headache, unspecified headache type (Primary)  [C79.9] Metastatic malignant neoplasm, unspecified site          ED Disposition Condition    Admit Stable                Rajni Caceres MD  Resident  12/10/24 2244

## 2024-12-10 NOTE — ED TRIAGE NOTES
Sherlyn Saavedra, a 64 y.o. female presents to the ED w/ complaint of headache. Pt arrives to ED via ems with co headache. Pt endorses headache, generalized body pains, and emesis after radiation treatment yesterday. Hx of breast cancer.     Triage note:  Chief Complaint   Patient presents with    Headache     After radiation tx yesterday for breast/brain cancer pt has had a severe HA. Reports HA, back pain, and nausea.      Review of patient's allergies indicates:   Allergen Reactions    Ace inhibitors Swelling    Lisinopril Rash    Hydrocodone Itching     Hives    Hydrocodone-acetaminoph-supp11 Itching    Percocet [oxycodone-acetaminophen] Itching     Pt had to take an antihistamine to improve itching     Cephalexin      Hives    Pantoprazole Hives    Propoxyphene      Hives    Propoxyphene n-acetaminophen     Propoxyphene-acetaminophen      Past Medical History:   Diagnosis Date    Allergy     Anemia     Breast cancer     primary malignant neoplasm of upper outer quadrant of breast - progressive metastatic triple negative breast cancer. Patient has progressive 2 lines of therapy.    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

## 2024-12-11 VITALS
HEIGHT: 60 IN | HEART RATE: 77 BPM | DIASTOLIC BLOOD PRESSURE: 72 MMHG | OXYGEN SATURATION: 100 % | WEIGHT: 179.69 LBS | RESPIRATION RATE: 17 BRPM | SYSTOLIC BLOOD PRESSURE: 142 MMHG | BODY MASS INDEX: 35.28 KG/M2 | TEMPERATURE: 98 F

## 2024-12-11 PROCEDURE — 63600175 PHARM REV CODE 636 W HCPCS: Performed by: STUDENT IN AN ORGANIZED HEALTH CARE EDUCATION/TRAINING PROGRAM

## 2024-12-11 PROCEDURE — 99239 HOSP IP/OBS DSCHRG MGMT >30: CPT | Mod: ,,, | Performed by: HOSPITALIST

## 2024-12-11 PROCEDURE — 25000003 PHARM REV CODE 250: Performed by: STUDENT IN AN ORGANIZED HEALTH CARE EDUCATION/TRAINING PROGRAM

## 2024-12-11 RX ORDER — HYDROMORPHONE HYDROCHLORIDE 2 MG/1
4 TABLET ORAL
Status: DISCONTINUED | OUTPATIENT
Start: 2024-12-11 | End: 2024-12-11 | Stop reason: HOSPADM

## 2024-12-11 RX ORDER — DEXAMETHASONE 4 MG/1
4 TABLET ORAL EVERY 12 HOURS
Status: DISCONTINUED | OUTPATIENT
Start: 2024-12-11 | End: 2024-12-11

## 2024-12-11 RX ORDER — DEXAMETHASONE 4 MG/1
4 TABLET ORAL EVERY 12 HOURS
Status: DISCONTINUED | OUTPATIENT
Start: 2024-12-11 | End: 2024-12-11 | Stop reason: HOSPADM

## 2024-12-11 RX ORDER — DEXAMETHASONE 4 MG/1
4 TABLET ORAL EVERY 12 HOURS
Qty: 20 TABLET | Refills: 0 | Status: SHIPPED | OUTPATIENT
Start: 2024-12-11 | End: 2024-12-21

## 2024-12-11 RX ADMIN — FAMOTIDINE 20 MG: 20 TABLET ORAL at 08:12

## 2024-12-11 RX ADMIN — FOLIC ACID 1 MG: 1 TABLET ORAL at 08:12

## 2024-12-11 RX ADMIN — AMLODIPINE BESYLATE 5 MG: 5 TABLET ORAL at 08:12

## 2024-12-11 RX ADMIN — HYDROMORPHONE HYDROCHLORIDE 1 MG: 1 INJECTION, SOLUTION INTRAMUSCULAR; INTRAVENOUS; SUBCUTANEOUS at 04:12

## 2024-12-11 RX ADMIN — POLYETHYLENE GLYCOL 3350 17 G: 17 POWDER, FOR SOLUTION ORAL at 08:12

## 2024-12-11 RX ADMIN — ACETAMINOPHEN 650 MG: 325 TABLET ORAL at 08:12

## 2024-12-11 RX ADMIN — MORPHINE SULFATE 30 MG: 15 TABLET, EXTENDED RELEASE ORAL at 06:12

## 2024-12-11 RX ADMIN — SENNOSIDES AND DOCUSATE SODIUM 2 TABLET: 50; 8.6 TABLET ORAL at 08:12

## 2024-12-11 RX ADMIN — MORPHINE SULFATE 30 MG: 15 TABLET, EXTENDED RELEASE ORAL at 03:12

## 2024-12-11 RX ADMIN — DEXAMETHASONE SODIUM PHOSPHATE 6 MG: 4 INJECTION INTRA-ARTICULAR; INTRALESIONAL; INTRAMUSCULAR; INTRAVENOUS; SOFT TISSUE at 06:12

## 2024-12-11 RX ADMIN — HYDROMORPHONE HYDROCHLORIDE 1 MG: 1 INJECTION, SOLUTION INTRAMUSCULAR; INTRAVENOUS; SUBCUTANEOUS at 01:12

## 2024-12-11 RX ADMIN — GABAPENTIN 300 MG: 300 CAPSULE ORAL at 08:12

## 2024-12-11 NOTE — ASSESSMENT & PLAN NOTE
She is followed in Oncology Clinic by Dr. Hoffmann, who last saw her on 11/21/24.  He noted, progressive metastatic triple negative breast cancer. Patient has progressive 2 lines of therapy. Recent CT chest abdomen pelvis demonstrates progressive disease.      CT head showed nothing acute but she does have redemonstration of mass effect resulting in partial effacement of the occipital horn of the right lateral ventricle without evidence of hydrocephalus. Also with redomstration of widespread metastatic lesions.

## 2024-12-11 NOTE — HOSPITAL COURSE
64F w/ PMH HLD, DMT2, Breast cancer (triple negative) with mets to the brain/skin/parietal pleura on radiation and s/p chemo/lumpectomy presenting with complaints of intractable headache since getting gamma knife radiation for brain metastasis yesterday. Patient also reported two episodes of emesis prior to admission. Patient follows with Dr. Hoffmann in [place] and is planned to start ERBILIN after completing radiation therapy. Of note, she was recently discharged on 11/30 for severe HA and had new brain mets assoc with hydrocephalus. CT head showed nothing acute but she does have redemonstration of mass effect resulting in partial effacement of the occipital horn of the right lateral ventricle without evidence of hydrocephalus. Also with redomstration of widespread metastatic lesions. CT C/A/P showed demonstration of new metastasis. Patient's headache and nausea improved with pain medications and IV dilaudid. Patient was discharged as she would not be able to receive her gamma knife radiation while admitted to the hospital. She will be discharged with 4mg of decadron BID. She will follow up with Dr. Licona tomorrow to continue her radiation treatment.

## 2024-12-11 NOTE — ASSESSMENT & PLAN NOTE
Patient's FSGs are controlled on current medication regimen.  Last A1c reviewed-   Lab Results   Component Value Date    HGBA1C 5.5 11/28/2024     Most recent fingerstick glucose reviewed-   Recent Labs   Lab 12/10/24  2045   POCTGLUCOSE 151*     Current correctional scale  Low  Maintain anti-hyperglycemic dose as follows-   Antihyperglycemics (From admission, onward)      Start     Stop Route Frequency Ordered    12/10/24 2009  insulin aspart U-100 pen 0-5 Units         -- SubQ Before meals & nightly PRN 12/10/24 2011          Hold Oral hypoglycemics while patient is in the hospital.

## 2024-12-11 NOTE — SUBJECTIVE & OBJECTIVE
Past Medical History:   Diagnosis Date    Allergy     Anemia     Breast cancer     primary malignant neoplasm of upper outer quadrant of breast - progressive metastatic triple negative breast cancer. Patient has progressive 2 lines of therapy.    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    Type 2 diabetes mellitus with diabetic polyneuropathy, without long-term current use of insulin 03/04/2015       Past Surgical History:   Procedure Laterality Date    HYSTERECTOMY      tubaligation  1987       Review of patient's allergies indicates:   Allergen Reactions    Ace inhibitors Swelling    Lisinopril Rash    Hydrocodone Itching     Hives    Hydrocodone-acetaminoph-supp11 Itching    Percocet [oxycodone-acetaminophen] Itching     Pt had to take an antihistamine to improve itching     Cephalexin      Hives    Pantoprazole Hives    Propoxyphene      Hives    Propoxyphene n-acetaminophen     Propoxyphene-acetaminophen        No current facility-administered medications on file prior to encounter.     Current Outpatient Medications on File Prior to Encounter   Medication Sig    (Magic mouthwash) 1:1:1 diphenhydrAMINE(Benadryl) 12.5mg/5ml liq, aluminum & magnesium hydroxide-simethicone (Maalox), LIDOcaine viscous 2% Swish and spit 15 mLs every 4 (four) hours as needed (mouth ulcers). for mouth sores    amLODIPine (NORVASC) 5 MG tablet TAKE 1 TABLET BY MOUTH EVERY MORNING    atorvastatin (LIPITOR) 40 MG tablet Take 40 mg by mouth.    blood sugar diagnostic (FREESTYLE TEST) Strp Test 4 times per day    busPIRone (BUSPAR) 15 MG tablet Take 1 tablet by mouth at bedtime.    dexAMETHasone (DECADRON) 2 MG tablet Take 1 tablet (2 mg total) by mouth every 12 (twelve) hours. for 21 days    dexAMETHasone (DECADRON) 4 MG Tab Take 1 tablet (4 mg total) by mouth 2 (two)  times daily.    doxycycline (MONODOX) 100 MG capsule EMPTY CONTENTS OF 1 CAPSULE INTO NASAL IRRIGATION SYSTEM, ADD DISTILLED WATER, SALT PACK, MIX & IRRIGATE. PERFORM 2 TIMES DAILY    famotidine (PEPCID) 20 MG tablet Take 20 mg by mouth 2 (two) times daily.    folic acid (FOLVITE) 1 MG tablet take 1 tablet by mouth every morning    gabapentin (NEURONTIN) 300 MG capsule Take 300 mg by mouth.    HYDROmorphone (DILAUDID) 4 MG tablet Take 1 tablet (4 mg total) by mouth every 3 (three) hours as needed for Pain (4 doses/day).    LIDOcaine viscous HCl 2%-diphenhydrAMINE-aluminum-magnesium hydroxide-simethicone Swish and spit 15 mLs by mouth every 4 (four) hours as needed (mouth ulcers). for mouth sores    LIDOcaine-prilocaine (EMLA) cream Apply to affected area once    magnesium oxide (MAG-OX) 400 mg (241.3 mg magnesium) tablet Take 400 mg by mouth once daily.    metFORMIN (GLUCOPHAGE-XR) 500 MG ER 24hr tablet take 2 tablets by mouth in the morning and at bedtime as directed    morphine (MS CONTIN) 15 MG 12 hr tablet Take 1 tablet (15 mg total) by mouth 2 (two) times daily. Take 1 tablet 15mg + 1 tablet 30mg= 45mg every 12 hours    morphine (MS CONTIN) 30 MG 12 hr tablet Take 1 tablet (30 mg total) by mouth 2 (two) times daily. Take 1 tablet 15mg + 1 tablet 30mg= 45mg every 12 hours    naloxone (NARCAN) 4 mg/actuation Spry     ondansetron (ZOFRAN-ODT) 8 MG TbDL Dissolve 1 tablet (8 mg total) by mouth every 8 (eight) hours as needed (Nausea).    pantoprazole (PROTONIX) 40 MG tablet take 1 tablet by mouth daily    tirzepatide (MOUNJARO) 5 mg/0.5 mL PnIj Inject 5 mg into the skin once weekly     Family History    None       Tobacco Use    Smoking status: Former     Current packs/day: 0.00     Types: Cigarettes     Quit date:      Years since quittin.9    Smokeless tobacco: Never   Substance and Sexual Activity    Alcohol use: Not Currently    Drug use: Never    Sexual activity: Not Currently     Review of Systems    Musculoskeletal:  Positive for arthralgias, back pain, gait problem and myalgias.   Neurological:  Positive for weakness and headaches.     Objective:     Vital Signs (Most Recent):  Temp: 98.3 °F (36.8 °C) (12/11/24 0600)  Pulse: 61 (12/11/24 0702)  Resp: 16 (12/11/24 0702)  BP: (!) 160/65 (12/11/24 0702)  SpO2: 98 % (12/11/24 0702) Vital Signs (24h Range):  Temp:  [98.3 °F (36.8 °C)-98.6 °F (37 °C)] 98.3 °F (36.8 °C)  Pulse:  [57-70] 61  Resp:  [16-20] 16  SpO2:  [98 %-99 %] 98 %  BP: (115-173)/(56-81) 160/65     Weight: 81.5 kg (179 lb 10.8 oz)  Body mass index is 35.09 kg/m².     Physical Exam  Vitals reviewed.   Constitutional:       Appearance: She is ill-appearing.   HENT:      Head: Normocephalic and atraumatic.   Eyes:      General: No scleral icterus.     Conjunctiva/sclera: Conjunctivae normal.   Pulmonary:      Effort: Pulmonary effort is normal. No respiratory distress.   Skin:     Coloration: Skin is not jaundiced.      Findings: No erythema.   Neurological:      Mental Status: Mental status is at baseline.   Psychiatric:         Mood and Affect: Mood normal.         Behavior: Behavior normal.                Significant Labs: All pertinent labs within the past 24 hours have been reviewed.    Significant Imaging: I have reviewed all pertinent imaging results/findings within the past 24 hours.

## 2024-12-11 NOTE — ED NOTES
Pt care assumed. Report received by LEXI Diop. Pt lying in stretcher in low and locked position and side rails raised x2. Call light, pt's belongings, and bedside table within pt's reach. Pt on continuous cardiac monitoring, pulse oximetry, and BP cycling every 30 minutes. Pt in NAD and verbalized no needs at this time.

## 2024-12-11 NOTE — ED NOTES
Daughter Luna ([phone]) at bedside states that she is leaving for the night and requests to be called if the patient moved rooms or for any updates. Luna brought patient's walker to help with mobility. Pt ambulated to the restroom with walker and minimal assistance. Asked if patient would want hospital bed for more comfort, pt and daughter state that at this point in time they would rather wait to move her again because she is comfortable. Informed them that they can request one at anytime. Pt and family updated on plan of care.

## 2024-12-11 NOTE — ED NOTES
Patient identifiers for Sherlyn Saavedra 64 y.o. female checked and correct.  Chief Complaint   Patient presents with    Headache     After radiation tx yesterday for breast/brain cancer pt has had a severe HA. Reports HA, back pain, and nausea.      Past Medical History:   Diagnosis Date    Allergy     Anemia     Breast cancer     primary malignant neoplasm of upper outer quadrant of breast - progressive metastatic triple negative breast cancer. Patient has progressive 2 lines of therapy.    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    Type 2 diabetes mellitus with diabetic polyneuropathy, without long-term current use of insulin 03/04/2015     Allergies reported:   Review of patient's allergies indicates:   Allergen Reactions    Ace inhibitors Swelling    Lisinopril Rash    Hydrocodone Itching     Hives    Hydrocodone-acetaminoph-supp11 Itching    Percocet [oxycodone-acetaminophen] Itching     Pt had to take an antihistamine to improve itching     Cephalexin      Hives    Pantoprazole Hives    Propoxyphene      Hives    Propoxyphene n-acetaminophen     Propoxyphene-acetaminophen        Appearance: Pt awake, alert & oriented to person, place & time. Pt in no acute distress at present time. Pt is clean and well groomed with clothes appropriately fastened.   Skin: Skin warm, dry & intact. Color consistent with ethnicity. Mucous membranes moist. No breakdown or brusing noted.   Musculoskeletal: Patient moving all extremities well, no obvious swelling or deformities noted.   Respiratory: Respirations spontaneous, even, and non-labored. Visible chest rise noted. Airway is open and patent. No accessory muscle use noted.   Neurologic: Sensation is intact. Speech is clear and appropriate. Eyes open spontaneously, behavior appropriate to situation, follows  commands, facial expression symmetrical, bilateral hand grasp equal and even, purposeful motor response noted.  Cardiac: All peripheral pulses present. No Bilateral lower extremity edema. Cap refill is <3 seconds.  Abdomen: Abdomen soft, non distended, non tender to palpation.   : Pt voids independently, denies dysuria, hematuria, frequency.

## 2024-12-11 NOTE — ASSESSMENT & PLAN NOTE
Continue home pain regimen but start IV dilaudid PRN for severe pain  Start IV dexamethasone  Consider palliative care consult

## 2024-12-11 NOTE — CONSULTS
Consult received and patient evaluated at bedside.  Patient to be admitted to Medical Oncology.  Full H&P to follow.    Chevy Nicholson M.D.  Hematology & Medical Oncology Fellow  [facility] [place]

## 2024-12-11 NOTE — DISCHARGE SUMMARY
[facility] - Emergency Dept  Hematology/Oncology  Discharge Summary      Patient Name: Sherlyn Saavedra  MRN: [id number]  Admission Date: 12/10/2024  Hospital Length of Stay: 1 days  Discharge Date and Time:  12/11/2024 2:39 PM  Attending Physician: Nathalia Trejo MD   Discharging Provider: Julianne Carnes MD  Primary Care Provider: Nan, Primary Doctor    HPI: 64-year-old female with intracranial recurrence triple negative breast carcinoma. Last chemo was 11/7/24 consisting of Trodelvy. She is followed by Dr. Hoffmann. She presented tonight with an intractable headache and nausea since yesterday after radiation which has been refractory to home opioids including Dialaudid 4mg PO. Hypertensive to 173/81, other vitals are stable. Hgb 9.6 whcih is c/w baseline anemia. CT head showed nothing acute but she does have redemonstration of mass effect resulting in partial effacement of the occipital horn of the right lateral ventricle without evidence of hydrocephalus. Also with redomstration of widespread metastatic lesions. CT C/A/P was ordered and there appear to be new mets however the read is pending. UA in process. Received Dilaudid IV, morphine IV, ketorolac, Benadryl, Zofran, and Compazine.     * No surgery found *     Hospital Course: 64F w/ PMH HLD, DMT2, Breast cancer (triple negative) with mets to the brain/skin/parietal pleura on radiation and s/p chemo/lumpectomy presenting with complaints of intractable headache since getting gamma knife radiation for brain metastasis yesterday. Patient also reported two episodes of emesis prior to admission. Patient follows with Dr. Hoffmann in [place] and is planned to start ERBILIN after completing radiation therapy. Of note, she was recently discharged on 11/30 for severe HA and had new brain mets assoc with hydrocephalus. CT head showed nothing acute but she does have redemonstration of mass effect resulting in partial effacement of the occipital horn of the right lateral  ventricle without evidence of hydrocephalus. Also with redomstration of widespread metastatic lesions. CT C/A/P showed demonstration of new metastasis. Patient's headache and nausea improved with pain medications and IV dilaudid. Patient was discharged as she would not be able to receive her gamma knife radiation while admitted to the hospital. She will be discharged with 4mg of decadron BID. She will follow up with Dr. Licona tomorrow to continue her radiation treatment.       Goals of Care Treatment Preferences:  Code Status: Full Code    Health care agent: Daughter: Luna Saavedra  [facility] agent number: [phone]          What is most important right now is to focus on remaining as independent as possible, symptom/pain control.  Accordingly, we have decided that the best plan to meet the patient's goals includes continuing with treatment.      Consults:   Consults (From admission, onward)          Status Ordering Provider     Inpatient consult to Hematology/Oncology  Once        Provider:  (Not yet assigned)    Completed ARTURO ORTIZ            Significant Diagnostic Studies: N/A    Pending Diagnostic Studies:       None          Final Active Diagnoses:    Diagnosis Date Noted POA    PRINCIPAL PROBLEM:  Neoplasm related pain [G89.3] 09/18/2024 Yes    Nausea & vomiting [R11.2] 10/03/2024 Yes    Hypertension [I10] 08/09/2024 Yes    Primary malignant neoplasm of upper outer quadrant of breast, left [C50.412] 10/10/2023 Yes    Type 2 diabetes mellitus with diabetic polyneuropathy, without long-term current use of insulin [E11.42] 03/04/2015 Yes      Problems Resolved During this Admission:      Discharged Condition: poor    Disposition: Home or Self Care    Follow Up: Inpatient gamma knife radiation with Dr. Licona     Patient Instructions:   No discharge procedures on file.  Medications:  Reconciled Home Medications:      Medication List        CHANGE how you take these medications      dexAMETHasone 4 MG  Tab  Commonly known as: DECADRON  Take 1 tablet (4 mg total) by mouth every 12 (twelve) hours. for 10 days  What changed: when to take this            CONTINUE taking these medications      * (MAGIC MOUTHWASH) 1:1:1 BENADRYL 12.5MG/5ML LIQ, ALUMINUM & MAGNESIUM  Swish and spit 15 mLs every 4 (four) hours as needed (mouth ulcers). for mouth sores     * LIDOcaine viscous HCl 2%-diphenhydrAMINE-aluminum-magnesium hydroxide-simethicone  Swish and spit 15 mLs by mouth every 4 (four) hours as needed (mouth ulcers). for mouth sores     amLODIPine 5 MG tablet  Commonly known as: NORVASC  TAKE 1 TABLET BY MOUTH EVERY MORNING     atorvastatin 40 MG tablet  Commonly known as: LIPITOR  Take 40 mg by mouth.     busPIRone 15 MG tablet  Commonly known as: BUSPAR  Take 1 tablet by mouth at bedtime.     doxycycline 100 MG capsule  Commonly known as: MONODOX  EMPTY CONTENTS OF 1 CAPSULE INTO NASAL IRRIGATION SYSTEM, ADD DISTILLED WATER, SALT PACK, MIX & IRRIGATE. PERFORM 2 TIMES DAILY     famotidine 20 MG tablet  Commonly known as: PEPCID  Take 20 mg by mouth 2 (two) times daily.     folic acid 1 MG tablet  Commonly known as: FOLVITE  take 1 tablet by mouth every morning     FREESTYLE TEST Strp  Generic drug: blood sugar diagnostic  Test 4 times per day     gabapentin 300 MG capsule  Commonly known as: NEURONTIN  Take 300 mg by mouth.     HYDROmorphone 4 MG tablet  Commonly known as: DILAUDID  Take 1 tablet (4 mg total) by mouth every 3 (three) hours as needed for Pain (4 doses/day).     LIDOcaine-prilocaine cream  Commonly known as: EMLA  Apply to affected area once     magnesium oxide 400 mg (241.3 mg magnesium) tablet  Commonly known as: MAG-OX  Take 400 mg by mouth once daily.     metFORMIN 500 MG ER 24hr tablet  Commonly known as: GLUCOPHAGE-XR  take 2 tablets by mouth in the morning and at bedtime as directed     * morphine 30 MG 12 hr tablet  Commonly known as: MS CONTIN  Take 1 tablet (30 mg total) by mouth 2 (two) times  daily. Take 1 tablet 15mg + 1 tablet 30mg= 45mg every 12 hours     * morphine 15 MG 12 hr tablet  Commonly known as: MS CONTIN  Take 1 tablet (15 mg total) by mouth 2 (two) times daily. Take 1 tablet 15mg + 1 tablet 30mg= 45mg every 12 hours     MOUNJARO 5 mg/0.5 mL Pnij  Generic drug: tirzepatide  Inject 5 mg into the skin once weekly     naloxone 4 mg/actuation Spry  Commonly known as: NARCAN     ondansetron 8 MG Tbdl  Commonly known as: ZOFRAN-ODT  Dissolve 1 tablet (8 mg total) by mouth every 8 (eight) hours as needed (Nausea).     pantoprazole 40 MG tablet  Commonly known as: PROTONIX  take 1 tablet by mouth daily           * This list has 4 medication(s) that are the same as other medications prescribed for you. Read the directions carefully, and ask your doctor or other care provider to review them with you.                  Julianne Carnes MD  Hematology/Oncology  [facility] Dept

## 2024-12-11 NOTE — HPI
64-year-old female with intracranial recurrence triple negative breast carcinoma. Last chemo was 11/7/24 consisting of Trodelvy. She is followed by Dr. Hoffmann. She presented tonight with an intractable headache and nausea since yesterday after radiation which has been refractory to home opioids including Dialaudid 4mg PO. Hypertensive to 173/81, other vitals are stable. Hgb 9.6 whcih is c/w baseline anemia. CT head showed nothing acute but she does have redemonstration of mass effect resulting in partial effacement of the occipital horn of the right lateral ventricle without evidence of hydrocephalus. Also with redomstration of widespread metastatic lesions. CT C/A/P was ordered and there appear to be new mets however the read is pending. UA in process. Received Dilaudid IV, morphine IV, ketorolac, Benadryl, Zofran, and Compazine.

## 2024-12-11 NOTE — H&P
[facility] - Emergency Dept  [facility] Medicine  History & Physical    Patient Name: Sherlyn Saavedra  MRN: [id number]  Admission Date: 12/10/2024  Attending Physician: Nan att. providers found   Primary Care Provider: No, Primary Doctor         Patient information was obtained from patient, past medical records, and ER records.       Subjective:     Principal Problem:Neoplasm related pain    Chief Complaint:   Chief Complaint   Patient presents with    Headache     After radiation tx yesterday for breast/brain cancer pt has had a severe HA. Reports HA, back pain, and nausea.         HPI: 64-year-old female with intracranial recurrence triple negative breast carcinoma. Last chemo was 11/7/24 consisting of Trodelvy. She is followed by Dr. Hoffmann. She presented tonight with an intractable headache and nausea since yesterday after radiation which has been refractory to home opioids including Dialaudid 4mg PO. Hypertensive to 173/81, other vitals are stable. Hgb 9.6 whcih is c/w baseline anemia. CT head showed nothing acute but she does have redemonstration of mass effect resulting in partial effacement of the occipital horn of the right lateral ventricle without evidence of hydrocephalus. Also with redomstration of widespread metastatic lesions. CT C/A/P was ordered and there appear to be new mets however the read is pending. UA in process. Received Dilaudid IV, morphine IV, ketorolac, Benadryl, Zofran, and Compazine.     Past Medical History:   Diagnosis Date    Allergy     Anemia     Breast cancer     primary malignant neoplasm of upper outer quadrant of breast - progressive metastatic triple negative breast cancer. Patient has progressive 2 lines of therapy.    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    Type 2 diabetes mellitus with diabetic  polyneuropathy, without long-term current use of insulin 03/04/2015       Past Surgical History:   Procedure Laterality Date    HYSTERECTOMY      tubaligation  1987       Review of patient's allergies indicates:   Allergen Reactions    Ace inhibitors Swelling    Lisinopril Rash    Hydrocodone Itching     Hives    Hydrocodone-acetaminoph-supp11 Itching    Percocet [oxycodone-acetaminophen] Itching     Pt had to take an antihistamine to improve itching     Cephalexin      Hives    Pantoprazole Hives    Propoxyphene      Hives    Propoxyphene n-acetaminophen     Propoxyphene-acetaminophen        No current facility-administered medications on file prior to encounter.     Current Outpatient Medications on File Prior to Encounter   Medication Sig    (Magic mouthwash) 1:1:1 diphenhydrAMINE(Benadryl) 12.5mg/5ml liq, aluminum & magnesium hydroxide-simethicone (Maalox), LIDOcaine viscous 2% Swish and spit 15 mLs every 4 (four) hours as needed (mouth ulcers). for mouth sores    amLODIPine (NORVASC) 5 MG tablet TAKE 1 TABLET BY MOUTH EVERY MORNING    atorvastatin (LIPITOR) 40 MG tablet Take 40 mg by mouth.    blood sugar diagnostic (FREESTYLE TEST) Strp Test 4 times per day    busPIRone (BUSPAR) 15 MG tablet Take 1 tablet by mouth at bedtime.    dexAMETHasone (DECADRON) 2 MG tablet Take 1 tablet (2 mg total) by mouth every 12 (twelve) hours. for 21 days    dexAMETHasone (DECADRON) 4 MG Tab Take 1 tablet (4 mg total) by mouth 2 (two) times daily.    doxycycline (MONODOX) 100 MG capsule EMPTY CONTENTS OF 1 CAPSULE INTO NASAL IRRIGATION SYSTEM, ADD DISTILLED WATER, SALT PACK, MIX & IRRIGATE. PERFORM 2 TIMES DAILY    famotidine (PEPCID) 20 MG tablet Take 20 mg by mouth 2 (two) times daily.    folic acid (FOLVITE) 1 MG tablet take 1 tablet by mouth every morning    gabapentin (NEURONTIN) 300 MG capsule Take 300 mg by mouth.    HYDROmorphone (DILAUDID) 4 MG tablet Take 1 tablet (4 mg total) by mouth every 3 (three) hours as needed  for Pain (4 doses/day).    LIDOcaine viscous HCl 2%-diphenhydrAMINE-aluminum-magnesium hydroxide-simethicone Swish and spit 15 mLs by mouth every 4 (four) hours as needed (mouth ulcers). for mouth sores    LIDOcaine-prilocaine (EMLA) cream Apply to affected area once    magnesium oxide (MAG-OX) 400 mg (241.3 mg magnesium) tablet Take 400 mg by mouth once daily.    metFORMIN (GLUCOPHAGE-XR) 500 MG ER 24hr tablet take 2 tablets by mouth in the morning and at bedtime as directed    morphine (MS CONTIN) 15 MG 12 hr tablet Take 1 tablet (15 mg total) by mouth 2 (two) times daily. Take 1 tablet 15mg + 1 tablet 30mg= 45mg every 12 hours    morphine (MS CONTIN) 30 MG 12 hr tablet Take 1 tablet (30 mg total) by mouth 2 (two) times daily. Take 1 tablet 15mg + 1 tablet 30mg= 45mg every 12 hours    naloxone (NARCAN) 4 mg/actuation Spry     ondansetron (ZOFRAN-ODT) 8 MG TbDL Dissolve 1 tablet (8 mg total) by mouth every 8 (eight) hours as needed (Nausea).    pantoprazole (PROTONIX) 40 MG tablet take 1 tablet by mouth daily    tirzepatide (MOUNJARO) 5 mg/0.5 mL PnIj Inject 5 mg into the skin once weekly     Family History    None       Tobacco Use    Smoking status: Former     Current packs/day: 0.00     Types: Cigarettes     Quit date:      Years since quittin.9    Smokeless tobacco: Never   Substance and Sexual Activity    Alcohol use: Not Currently    Drug use: Never    Sexual activity: Not Currently     Review of Systems   Musculoskeletal:  Positive for arthralgias, back pain, gait problem and myalgias.   Neurological:  Positive for weakness and headaches.     Objective:     Vital Signs (Most Recent):  Temp: 98.3 °F (36.8 °C) (24 0600)  Pulse: 61 (24)  Resp: 16 (24)  BP: (!) 160/65 (24)  SpO2: 98 % (24) Vital Signs (24h Range):  Temp:  [98.3 °F (36.8 °C)-98.6 °F (37 °C)] 98.3 °F (36.8 °C)  Pulse:  [57-70] 61  Resp:  [16-20] 16  SpO2:  [98 %-99 %] 98 %  BP:  (115-173)/(56-81) 160/65     Weight: 81.5 kg (179 lb 10.8 oz)  Body mass index is 35.09 kg/m².     Physical Exam  Vitals reviewed.   Constitutional:       Appearance: She is ill-appearing.   HENT:      Head: Normocephalic and atraumatic.   Eyes:      General: No scleral icterus.     Conjunctiva/sclera: Conjunctivae normal.   Pulmonary:      Effort: Pulmonary effort is normal. No respiratory distress.   Skin:     Coloration: Skin is not jaundiced.      Findings: No erythema.   Neurological:      Mental Status: Mental status is at baseline.   Psychiatric:         Mood and Affect: Mood normal.         Behavior: Behavior normal.                Significant Labs: All pertinent labs within the past 24 hours have been reviewed.    Significant Imaging: I have reviewed all pertinent imaging results/findings within the past 24 hours.  Assessment/Plan:     * Neoplasm related pain  Continue home pain regimen but start IV dilaudid PRN for severe pain  Start IV dexamethasone  Consider palliative care consult      Nausea & vomiting  PRN anti-emetics      Type 2 diabetes mellitus with diabetic polyneuropathy, without long-term current use of insulin  Patient's FSGs are controlled on current medication regimen.  Last A1c reviewed-   Lab Results   Component Value Date    HGBA1C 5.5 11/28/2024     Most recent fingerstick glucose reviewed-   Recent Labs   Lab 12/10/24  2045   POCTGLUCOSE 151*     Current correctional scale  Low  Maintain anti-hyperglycemic dose as follows-   Antihyperglycemics (From admission, onward)      Start     Stop Route Frequency Ordered    12/10/24 2009  insulin aspart U-100 pen 0-5 Units         -- SubQ Before meals & nightly PRN 12/10/24 2011          Hold Oral hypoglycemics while patient is in the hospital.    Hypertension  Continue home meds      Primary malignant neoplasm of upper outer quadrant of breast, left  She is followed in Oncology Clinic by Dr. Hoffmann, who last saw her on 11/21/24.  He noted,  progressive metastatic triple negative breast cancer. Patient has progressive 2 lines of therapy. Recent CT chest abdomen pelvis demonstrates progressive disease.      CT head showed nothing acute but she does have redemonstration of mass effect resulting in partial effacement of the occipital horn of the right lateral ventricle without evidence of hydrocephalus. Also with redomstration of widespread metastatic lesions.       VTE Risk Mitigation (From admission, onward)           Ordered     Place sequential compression device  Until discontinued         12/10/24 2011     Place JOSÉ MIGUEL hose  Until discontinued         12/10/24 2011                         The attending portion of this evaluation, treatment, and documentation was performed per Moisés Maldonado MD via Telemedicine AudioVisual using the secure [employer] software platform with 2 way audio/video. The provider was located off-site and the patient is located in the hospital. The aforementioned video software was utilized to document the relevant history and physical exam            Moisés Maldonado MD  Department of Hospital Medicine   [facility] - Emergency Dept

## 2024-12-12 ENCOUNTER — DOCUMENTATION ONLY (OUTPATIENT)
Dept: HEMATOLOGY/ONCOLOGY | Facility: CLINIC | Age: 64
End: 2024-12-12
Payer: MEDICAID

## 2024-12-12 ENCOUNTER — TELEPHONE (OUTPATIENT)
Dept: RADIATION THERAPY | Facility: HOSPITAL | Age: 64
End: 2024-12-12
Payer: MEDICAID

## 2024-12-12 NOTE — PROGRESS NOTES
Spoke with patient's daughter, Luna Saavedra [phone], in regards to housing. Patient and daughter have been staying at local hotel while undergoing gamma knife. Treatment will be will resume on Friday. She will finish on Wednesday. Daughter reports that patient has been paying for the hotel but it is a financial strain. We discussed referral to the [facility]. Gathered information for the referral. They will stay at that current hotel and check out on Friday after her treatment. Referral placed to the [facility] for a check in of [place] 12/15 and check out on Thursday 12/19. Told daughter to contact me at the latest on Friday to let me know their status if the [facility] will be able to accommodate. Provided her my direct number. Patient was being discharged out of the ER on 12/11. Primary team said their was an issue affording medications. Called the pharmacy to get price to assist with cost transfer. Spoke to pharmacy and the medication was already paid on a credit card and currently in route for delivery.

## 2024-12-13 ENCOUNTER — PROCEDURE VISIT (OUTPATIENT)
Dept: RADIATION THERAPY | Facility: HOSPITAL | Age: 64
End: 2024-12-13
Payer: MEDICAID

## 2024-12-13 ENCOUNTER — TELEPHONE (OUTPATIENT)
Dept: HEMATOLOGY/ONCOLOGY | Facility: CLINIC | Age: 64
End: 2024-12-13
Payer: MEDICAID

## 2024-12-13 ENCOUNTER — DOCUMENTATION ONLY (OUTPATIENT)
Dept: HEMATOLOGY/ONCOLOGY | Facility: CLINIC | Age: 64
End: 2024-12-13
Payer: MEDICAID

## 2024-12-13 ENCOUNTER — TELEPHONE (OUTPATIENT)
Dept: RADIATION ONCOLOGY | Facility: CLINIC | Age: 64
End: 2024-12-13
Payer: MEDICAID

## 2024-12-13 DIAGNOSIS — C79.31 SECONDARY ADENOCARCINOMA OF BRAIN: Primary | ICD-10-CM

## 2024-12-13 PROCEDURE — 77373 STRTCTC BDY RAD THER TX DLVR: CPT | Performed by: RADIOLOGY

## 2024-12-13 NOTE — TELEPHONE ENCOUNTER
"Contacted pt's daughter to verify she will be finishing gamma knife on 12/16. Pt will now finish 12/17. Let her know I would move appt with Dr Hoffmann. Ms Saavedra v/u.   Pt's daughter also states that the "cancer on her mom's back is starting to smell" I advised that she let the rad onc down in [place] know that and I told her I would notify Dr Hoffmann. Ms Saavedra agreed.   Will call back once I have pt rescheduled   "

## 2024-12-13 NOTE — TELEPHONE ENCOUNTER
Spoke to pt's daughter Ms. Carrasco via telephone.   Re: pt's pain regimen.     Informed pt's daughter it is ok to take Morphine long-acting 15mg 3 pills to make up for 45mg.     Also informed pt's daughter Morphine long-acting 30mg pills are ready to be picked up at [facility].     Informed pt's daughter that pt's pain regimen is:     Morphine long-acting 45mg every 12 hours with Hydromorphone short-acting 4mg every 3 hours as needed for breakthrough pain.     Informed pt's daughter to continue to assess pt for altered mental status, constipation or any acute changes while pt is receiving opioids.     Pt's daughter verbalized understanding of above.

## 2024-12-13 NOTE — PROGRESS NOTES
Spoke with daughter in regards to referral to the [facility]. The lodge is still booked and will not be able to accommodate on the dates we requested. Request sent to the [facility] [place] utilizing [facility] lodging funds. Will contact daughter back once confirmation number obtained.

## 2024-12-13 NOTE — PROCEDURES
Patient: Sherlyn Saavedra    MRN: [id number]    : 1960    DATE OF PROCEDURE: 2024      PRE-OPERATIVE DIAGNOSIS:  Brain metastases       POST-OPERATIVE DIAGNOSIS:  Same         PROCEDURE PERFORMED:                 1)         Gamma Knife stereotactic radiosurgery to brain metastases.       RADIATION ONCOLOGIST: Dick Licona      NEUROSURGEON:  ALEJANDRA Neurosurgeon: Luis Fernando Ma       MEDICAL PHYSICIST: ALEJANDRA Medical Physicist: Eliezer Landa       PROCEDURE SUMMARY:    Target # Site Dose per Fraction (Gy) Cumulative Dose % Isodose Line Fraction # Total Fractions   1 Lt Frontal1 6 18 60 3 5   2 Lt Frontal2 6 18 63 3 5   3 Rt Frontal 6 18 80 3 5   4 Lt Parietal 6 18 80 3 5   5 Rt Occipital1 6 18 57 3 5   6 Lt Cerebellum1 6 18 51 3 5       INDICATIONS AND CONSENT:  Sherlyn Saavedra is a 64 y.o. female with h/o Stage II triple negative Left breast cancer s/p neoadjuvant chemotherapy early  ->Lumpectomy 23 ->adjuvant whole breast radiation 42.4 Gy in 16 fractions 23 by my partner Dr. Hines in [place]. She underwent Right VATS with pleural biopsy 24 that demonstrated metastatic breast cancer. MRI Brain 24 demonstrated 2 small brain mets. It appears the decision was made to observe these in favor of systemic management. Repeat MRI 24 with significant interval progression up to 6 mets with largest Right occipital 3.7 cm. It was recommended that she undergo Gamma Knife stereotactic radiosurgery.  The risks of this procedure as well as possible complications were discussed with the patient pre-operatively.       DESCRIPTION OF PROCEDURE:  On the day of the procedure, the patient was positioned on the table in a custom-shaped immobilization mask.  Cone-beam CT was performed for stereotactic reference, and the images were imported into the Gamma Knife planning station. Images were co-registered with pre-planning MRI, which was used to define the target volume(s) and organs at risk. Under direct  physician supervision, acceptable localization of target and normal tissue was achieved with image guidance.  We targeted the lesion(s) with the dose(s) prescribed above to the margin of (each) lesion.  Treatment was carried out without difficulty using automated positioning.  Upon completion of the Gamma Knife procedure, the immobilization mask was removed.

## 2024-12-16 ENCOUNTER — PROCEDURE VISIT (OUTPATIENT)
Dept: RADIATION THERAPY | Facility: HOSPITAL | Age: 64
End: 2024-12-16
Payer: MEDICAID

## 2024-12-16 DIAGNOSIS — C79.31 SECONDARY ADENOCARCINOMA OF BRAIN: Primary | ICD-10-CM

## 2024-12-16 PROCEDURE — 77373 STRTCTC BDY RAD THER TX DLVR: CPT | Performed by: RADIOLOGY

## 2024-12-16 NOTE — PROCEDURES
Patient: Sherlyn Saavedra    MRN: [id number]    : 1960    DATE OF PROCEDURE: 2024      PRE-OPERATIVE DIAGNOSIS:  Brain metastases       POST-OPERATIVE DIAGNOSIS:  Same         PROCEDURE PERFORMED:                 1)         Gamma Knife stereotactic radiosurgery to brain metastases.       RADIATION ONCOLOGIST: Dick Licona      NEUROSURGEON:  ALEJANDRA Neurosurgeon: Luis Fernando Ma       MEDICAL PHYSICIST: ALEJANDRA Medical Physicist: Eliezer Landa       PROCEDURE SUMMARY:    Target # Site Dose per Fraction (Gy) Cumulative Dose % Isodose Line Fraction # Total Fractions   1 Lt Frontal1 6 24 60 4 5   2 Lt Frontal2 6 24 63 4 5   3 Rt Frontal 6 24 80 4 5   4 Lt Parietal 6 24 80 4 5   5 Rt Occipital1 6 24 57 4 5   6 Lt Cerebellum1 6 24 51 4 5       INDICATIONS AND CONSENT:  Sherlyn Saavedra is a 64 y.o. female with h/o Stage II triple negative Left breast cancer s/p neoadjuvant chemotherapy early  ->Lumpectomy 23 ->adjuvant whole breast radiation 42.4 Gy in 16 fractions 23 by my partner Dr. Hines in [place]. She underwent Right VATS with pleural biopsy 24 that demonstrated metastatic breast cancer. MRI Brain 24 demonstrated 2 small brain mets. It appears the decision was made to observe these in favor of systemic management. Repeat MRI 24 with significant interval progression up to 6 mets with largest Right occipital 3.7 cm. It was recommended that she undergo Gamma Knife stereotactic radiosurgery.  The risks of this procedure as well as possible complications were discussed with the patient pre-operatively.       DESCRIPTION OF PROCEDURE:  On the day of the procedure, the patient was positioned on the table in a custom-shaped immobilization mask.  Cone-beam CT was performed for stereotactic reference, and the images were imported into the Gamma Knife planning station. Images were co-registered with pre-planning MRI, which was used to define the target volume(s) and organs at risk. Under direct  physician supervision, acceptable localization of target and normal tissue was achieved with image guidance.  We targeted the lesion(s) with the dose(s) prescribed above to the margin of (each) lesion.  Treatment was carried out without difficulty using automated positioning.  Upon completion of the Gamma Knife procedure, the immobilization mask was removed.

## 2024-12-17 ENCOUNTER — OFFICE VISIT (OUTPATIENT)
Dept: HEMATOLOGY/ONCOLOGY | Facility: CLINIC | Age: 64
End: 2024-12-17
Payer: MEDICAID

## 2024-12-17 ENCOUNTER — PROCEDURE VISIT (OUTPATIENT)
Dept: RADIATION THERAPY | Facility: HOSPITAL | Age: 64
End: 2024-12-17
Payer: MEDICAID

## 2024-12-17 ENCOUNTER — DOCUMENTATION ONLY (OUTPATIENT)
Dept: HEMATOLOGY/ONCOLOGY | Facility: CLINIC | Age: 64
End: 2024-12-17
Payer: MEDICAID

## 2024-12-17 DIAGNOSIS — Z79.899 IMMUNODEFICIENCY DUE TO CHEMOTHERAPY: ICD-10-CM

## 2024-12-17 DIAGNOSIS — C79.31 SECONDARY ADENOCARCINOMA OF BRAIN: ICD-10-CM

## 2024-12-17 DIAGNOSIS — C79.2 SECONDARY CANCER OF SKIN: ICD-10-CM

## 2024-12-17 DIAGNOSIS — T45.1X5A IMMUNODEFICIENCY DUE TO CHEMOTHERAPY: ICD-10-CM

## 2024-12-17 DIAGNOSIS — D84.821 IMMUNODEFICIENCY DUE TO CHEMOTHERAPY: ICD-10-CM

## 2024-12-17 DIAGNOSIS — C50.412 PRIMARY MALIGNANT NEOPLASM OF UPPER OUTER QUADRANT OF BREAST, LEFT: Primary | ICD-10-CM

## 2024-12-17 DIAGNOSIS — C78.2 SECONDARY MALIGNANCY OF PARIETAL PLEURA: ICD-10-CM

## 2024-12-17 DIAGNOSIS — C79.31 SECONDARY ADENOCARCINOMA OF BRAIN: Primary | ICD-10-CM

## 2024-12-17 PROCEDURE — 77336 RADIATION PHYSICS CONSULT: CPT | Performed by: RADIOLOGY

## 2024-12-17 PROCEDURE — 77373 STRTCTC BDY RAD THER TX DLVR: CPT | Performed by: RADIOLOGY

## 2024-12-17 PROCEDURE — 1111F DSCHRG MED/CURRENT MED MERGE: CPT | Mod: CPTII,95,, | Performed by: INTERNAL MEDICINE

## 2024-12-17 PROCEDURE — 3044F HG A1C LEVEL LT 7.0%: CPT | Mod: CPTII,95,, | Performed by: INTERNAL MEDICINE

## 2024-12-17 PROCEDURE — 99214 OFFICE O/P EST MOD 30 MIN: CPT | Mod: 25,95,, | Performed by: INTERNAL MEDICINE

## 2024-12-17 PROCEDURE — 4010F ACE/ARB THERAPY RXD/TAKEN: CPT | Mod: CPTII,95,, | Performed by: INTERNAL MEDICINE

## 2024-12-17 NOTE — PROGRESS NOTES
Subjective:       Patient ID: Sherlyn Saavedra is a 64 y.o. female.    Chief Complaint: Results, Breast Cancer, and Chemotherapy    HPI:  64-year-old female currently completion of radiation therapy for recurrent intracranial disease.  Patient is on 3rd line treatment.  In his scheduled to be started on next systemic therapy ECOG status 2    Past Medical History:   Diagnosis Date    Allergy     Anemia     Breast cancer     primary malignant neoplasm of upper outer quadrant of breast - progressive metastatic triple negative breast cancer. Patient has progressive 2 lines of therapy.    HLD (hyperlipidemia) 2015    Hyperlipidemia (Problem)      Hypertension     Primary malignant neoplasm of upper outer quadrant of breast, left 10/10/2023    Secondary malignancy of parietal pleura 2024    Type 2 diabetes mellitus with diabetic polyneuropathy, without long-term current use of insulin 2015     No family history on file.  Social History     Socioeconomic History    Marital status: Single   Tobacco Use    Smoking status: Former     Current packs/day: 0.00     Types: Cigarettes     Quit date:      Years since quittin.9    Smokeless tobacco: Never   Substance and Sexual Activity    Alcohol use: Not Currently    Drug use: Never    Sexual activity: Not Currently     Social Drivers of Health     Financial Resource Strain: Patient Declined (2024)    Overall Financial Resource Strain (CARDIA)     Difficulty of Paying Living Expenses: Patient declined   Recent Concern: Financial Resource Strain - Medium Risk (2024)    Overall Financial Resource Strain (CARDIA)     Difficulty of Paying Living Expenses: Somewhat hard   Food Insecurity: Patient Declined (2024)    Hunger Vital Sign     Worried About Running Out of Food in the Last Year: Patient declined     Ran Out of Food in the Last Year: Patient declined   Recent Concern: Food Insecurity - Food Insecurity Present (2024)    Hunger Vital  "Sign     Worried About Running Out of Food in the Last Year: Sometimes true     Ran Out of Food in the Last Year: Sometimes true   Transportation Needs: Patient Declined (11/30/2024)    TRANSPORTATION NEEDS     Transportation : Patient declined   Physical Activity: Inactive (9/12/2024)    Exercise Vital Sign     Days of Exercise per Week: 0 days     Minutes of Exercise per Session: 0 min   Stress: Patient Declined (11/30/2024)    [employer] - Occupational Stress Questionnaire     Feeling of Stress : Patient declined   Recent Concern: Stress - Stress Concern Present (9/12/2024)    [employer] - Occupational Stress Questionnaire     Feeling of Stress : To some extent   Housing Stability: Patient Declined (11/30/2024)    Housing Stability Vital Sign     Unable to Pay for Housing in the Last Year: Patient declined     Homeless in the Last Year: Patient declined     Past Surgical History:   Procedure Laterality Date    HYSTERECTOMY      tubaligation  1987       Labs:  Lab Results   Component Value Date    WBC 8.95 12/10/2024    HGB 9.6 (L) 12/10/2024    HCT 30 (L) 12/10/2024    MCV 90 12/10/2024     12/10/2024     BMP  Lab Results   Component Value Date     12/10/2024    K 4.3 12/10/2024     12/10/2024    CO2 21 (L) 12/10/2024    BUN 12 12/10/2024    CREATININE 0.7 12/10/2024    CALCIUM 9.0 12/10/2024    ANIONGAP 13 12/10/2024     Lab Results   Component Value Date    ALT 46 (H) 12/10/2024    AST 40 12/10/2024    ALKPHOS 154 (H) 12/10/2024    BILITOT 0.4 12/10/2024       Lab Results   Component Value Date    IRON 37 11/21/2024    TIBC 265 11/21/2024    FERRITIN 1,533 (H) 11/21/2024     No results found for: "[id number]"  Lab Results   Component Value Date    FOLATE 4.1 (L) 07/23/2024     Lab Results   Component Value Date    TSH 2.500 08/14/2023         Review of Systems   Constitutional:  Positive for fatigue. Negative for activity change, " appetite change, chills, diaphoresis, fever and unexpected weight change.   HENT:  Negative for congestion, dental problem, drooling, ear discharge, ear pain, facial swelling, hearing loss, mouth sores, nosebleeds, postnasal drip, rhinorrhea, sinus pressure, sneezing, sore throat, tinnitus, trouble swallowing and voice change.    Eyes:  Negative for photophobia, pain, discharge, redness, itching and visual disturbance.   Respiratory:  Negative for cough, choking, chest tightness, shortness of breath, wheezing and stridor.    Cardiovascular:  Negative for chest pain, palpitations and leg swelling.   Gastrointestinal:  Negative for abdominal distention, abdominal pain, anal bleeding, blood in stool, constipation, diarrhea, nausea, rectal pain and vomiting.   Endocrine: Negative for cold intolerance, heat intolerance, polydipsia, polyphagia and polyuria.   Genitourinary:  Negative for decreased urine volume, difficulty urinating, dyspareunia, dysuria, enuresis, flank pain, frequency, genital sores, hematuria, menstrual problem, pelvic pain, urgency, vaginal bleeding, vaginal discharge and vaginal pain.   Musculoskeletal:  Negative for arthralgias, back pain, gait problem, joint swelling, myalgias, neck pain and neck stiffness.   Skin:  Negative for color change, pallor and rash.   Allergic/Immunologic: Negative for environmental allergies, food allergies and immunocompromised state.   Neurological:  Positive for weakness. Negative for dizziness, tremors, seizures, syncope, facial asymmetry, speech difficulty, light-headedness, numbness and headaches.   Hematological:  Negative for adenopathy. Does not bruise/bleed easily.   Psychiatric/Behavioral:  Negative for agitation, behavioral problems, confusion, decreased concentration, dysphoric mood, hallucinations, self-injury, sleep disturbance and suicidal ideas. The patient is not nervous/anxious and is not hyperactive.        Objective:      Physical  Exam  Constitutional:       Appearance: Normal appearance. She is ill-appearing.   Neurological:      Mental Status: She is alert.             Assessment:      1. Primary malignant neoplasm of upper outer quadrant of breast, left    2. Secondary adenocarcinoma of brain    3. Secondary cancer of skin    4. Secondary malignancy of parietal pleura    5. Immunodeficiency due to chemotherapy           Med Onc Chart Routing      Follow up with physician . Orders have been signed to start cycle 1 day 1 this week of ERIBULIN   Follow up with SANTIAGO . Can be seen by either APAP or myself for day 8 and myself to beginning cycle 2 day 1   Infusion scheduling note    Injection scheduling note Begin cycle 1 day 1 ofERIBULIN this week.  Return for day 8 to see me with CBC CMP   Labs    Imaging    Pharmacy appointment    Other referrals                   Plan:     The patient location is:  [facility]  The chief complaint leading to consultation is:  Metastatic breast cancer    Visit type: audiovisual    Face to Face time with patient: 25 minutes of total time spent on the encounter, which includes face to face time and non-face to face time preparing to see the patient (eg, review of tests), Obtaining and/or reviewing separately obtained history, Documenting clinical information in the electronic or other health record, Independently interpreting results (not separately reported) and communicating results to the patient/family/caregiver, or Care coordination (not separately reported).         Each patient to whom he or she provides medical services by telemedicine is:  (1) informed of the relationship between the physician and patient and the respective role of any other health care provider with respect to management of the patient; and (2) notified that he or she may decline to receive medical services by telemedicine and may withdraw from such care at any time.    Notes:  Extensive conversation reviewed information with her.   At this time proceed with initiation of treatment with cycle 1 day 1ERIBULIN this week.  Can be seen either by myself or APAP for cycle 1 day 8.  In myself for cycle 2 day 1.  Standing labs in for CBC CMP        Yaya Hoffmann Jr, MD FACP

## 2024-12-17 NOTE — PROCEDURES
Patient: Sherlyn Saavedra    MRN: [id number]    : 1960    DATE OF PROCEDURE: 2024      PRE-OPERATIVE DIAGNOSIS:  Brain metastases       POST-OPERATIVE DIAGNOSIS:  Same         PROCEDURE PERFORMED:                 1)         Gamma Knife stereotactic radiosurgery to brain metastases.       RADIATION ONCOLOGIST: Dick Licona      NEUROSURGEON:  ALEJANDRA Neurosurgeon: Luis Fernando Ma       MEDICAL PHYSICIST: ALEJANDRA Medical Physicist: Eliezer Landa       PROCEDURE SUMMARY:    Target # Site Dose per Fraction (Gy) Cumulative Dose % Isodose Line Fraction # Total Fractions   1 Lt Frontal1 6 30 60 5 5   2 Lt Frontal2 6 30 63 5 5   3 Rt Frontal 6 30 80 5 5   4 Lt Parietal 6 30 80 5 5   5 Rt Occipital1 6 30 57 5 5   6 Lt Cerebellum1 6 30 51 5 5       INDICATIONS AND CONSENT:  Sherlyn Saavedra is a 64 y.o. female with h/o Stage II triple negative Left breast cancer s/p neoadjuvant chemotherapy early  ->Lumpectomy 23 ->adjuvant whole breast radiation 42.4 Gy in 16 fractions 23 by my partner Dr. Hines in [place]. She underwent Right VATS with pleural biopsy 24 that demonstrated metastatic breast cancer. MRI Brain 24 demonstrated 2 small brain mets. It appears the decision was made to observe these in favor of systemic management. Repeat MRI 24 with significant interval progression up to 6 mets with largest Right occipital 3.7 cm. It was recommended that she undergo Gamma Knife stereotactic radiosurgery.  The risks of this procedure as well as possible complications were discussed with the patient pre-operatively.       DESCRIPTION OF PROCEDURE:  On the day of the procedure, the patient was positioned on the table in a custom-shaped immobilization mask.  Cone-beam CT was performed for stereotactic reference, and the images were imported into the Gamma Knife planning station. Images were co-registered with pre-planning MRI, which was used to define the target volume(s) and organs at risk. Under direct  physician supervision, acceptable localization of target and normal tissue was achieved with image guidance.  We targeted the lesion(s) with the dose(s) prescribed above to the margin of (each) lesion.  Treatment was carried out without difficulty using automated positioning.  Upon completion of the Gamma Knife procedure, the immobilization mask was removed.

## 2024-12-17 NOTE — PROGRESS NOTES
Contacted pt's daughter to let her know that Dr Hoffmann wanted pt to get started this week with new chemo tx. Was able to get her scheduled for C1D1  at . Scheduled lab draw at [facility]  since pt was there completing gamma knife tx today. Ms Quentin v/u. Will have pt come to   at 1 to meet with Jackelyn for tx ed.     Oncology Navigation   Intake  Date of Diagnosis: 10/10/23  Cancer Type: Breast  Type of Referral: Internal  Date of Referral: 24  Initial Nurse Navigator Contact: 24  Referral to Initial Contact Timeline (days): 0  First Appointment Available: 24  Appointment Date: 24  First Available Date vs. Scheduled Date (days): 0  Multiple appointments: No  Start of Treatment: 24     Treatment  Current Status: Staging work-up       Medical Oncologist: Yaya Hoffmann  Chemotherapy: Planned  Chemotherapy Regimen: Erbulin ( )  Immunotherapy: Initiated  Immunotherapy Name: Nisha  Start Date: 10/10/24    Radiation Therapy: Completed (Gamma Knife)  End Date: 24    Procedures: MRI; PET scan  MRI Schedule Date: 24  PET Scan Schedule Date: 24    General Referrals: Palliative Care; Social Work    ER: Negative  MI: Negative  Her2: Negative       Support Systems: Spouse/significant other; Family members     Acuity  Systemic Treatment - predicted or initiated: Single Chemotherapy Agent (+1)  Treatment Tolerability: Minimal symptoms  ECO  Comorbidities in Medical History: 2  Hospitalization Within the Past Month: 1   Needed: 0  Support: 0  Verbalizes Financial Concerns: 1  Transportation: 0  History of noncompliance/frequent no shows and cancellations: 0  Verbalizes the need for more education: 0  Other Factors (+1 for Each): 0  Navigation Acuity: 5     Follow Up  No follow-ups on file.

## 2024-12-18 ENCOUNTER — DOCUMENTATION ONLY (OUTPATIENT)
Dept: HEMATOLOGY/ONCOLOGY | Facility: CLINIC | Age: 64
End: 2024-12-18
Payer: MEDICAID

## 2024-12-18 ENCOUNTER — TELEPHONE (OUTPATIENT)
Dept: HEMATOLOGY/ONCOLOGY | Facility: CLINIC | Age: 64
End: 2024-12-18
Payer: MEDICAID

## 2024-12-18 DIAGNOSIS — S21.201A OPEN WOUND OF RIGHT SIDE OF BACK, INITIAL ENCOUNTER: Primary | ICD-10-CM

## 2024-12-18 DIAGNOSIS — C79.2 SECONDARY CANCER OF SKIN: ICD-10-CM

## 2024-12-18 NOTE — TELEPHONE ENCOUNTER
Pt requesting HH for wound care for open wound that she states is mal Pt agreeable to internal referral to [facility]. Pt does not want previous HH-[place]. SW will coordinate orders with provider. No other needs expressed. SW will remain available.

## 2024-12-19 ENCOUNTER — TELEPHONE (OUTPATIENT)
Dept: HEMATOLOGY/ONCOLOGY | Facility: CLINIC | Age: 64
End: 2024-12-19
Payer: MEDICAID

## 2024-12-19 ENCOUNTER — INFUSION (OUTPATIENT)
Dept: INFUSION THERAPY | Facility: HOSPITAL | Age: 64
End: 2024-12-19
Attending: INTERNAL MEDICINE
Payer: MEDICAID

## 2024-12-19 VITALS
SYSTOLIC BLOOD PRESSURE: 144 MMHG | RESPIRATION RATE: 18 BRPM | DIASTOLIC BLOOD PRESSURE: 57 MMHG | BODY MASS INDEX: 34.32 KG/M2 | HEIGHT: 60 IN | HEART RATE: 75 BPM | WEIGHT: 174.81 LBS | OXYGEN SATURATION: 99 % | TEMPERATURE: 98 F

## 2024-12-19 DIAGNOSIS — C50.412 PRIMARY MALIGNANT NEOPLASM OF UPPER OUTER QUADRANT OF BREAST, LEFT: Primary | ICD-10-CM

## 2024-12-19 PROCEDURE — 96375 TX/PRO/DX INJ NEW DRUG ADDON: CPT

## 2024-12-19 PROCEDURE — 25000003 PHARM REV CODE 250: Performed by: INTERNAL MEDICINE

## 2024-12-19 PROCEDURE — 63600175 PHARM REV CODE 636 W HCPCS: Performed by: INTERNAL MEDICINE

## 2024-12-19 PROCEDURE — 96409 CHEMO IV PUSH SNGL DRUG: CPT

## 2024-12-19 RX ORDER — ONDANSETRON HYDROCHLORIDE 2 MG/ML
8 INJECTION, SOLUTION INTRAVENOUS
Status: COMPLETED | OUTPATIENT
Start: 2024-12-19 | End: 2024-12-19

## 2024-12-19 RX ORDER — HEPARIN 100 UNIT/ML
500 SYRINGE INTRAVENOUS
Status: DISCONTINUED | OUTPATIENT
Start: 2024-12-19 | End: 2024-12-19 | Stop reason: HOSPADM

## 2024-12-19 RX ADMIN — ERIBULIN MESYLATE 2.7 MG: 0.5 INJECTION INTRAVENOUS at 07:12

## 2024-12-19 RX ADMIN — ONDANSETRON 8 MG: 2 INJECTION INTRAMUSCULAR; INTRAVENOUS at 07:12

## 2024-12-19 RX ADMIN — HEPARIN 500 UNITS: 100 SYRINGE at 08:12

## 2024-12-19 NOTE — NURSING
Infusion # Halaven - 2.7 mg   First Dose    Recent labs? 12/17/24  Last Oncology provider visit- Seen by Shun on 12/17/24     Premeds-Zofran 8mg IVP     Halaven 2.7 mg administered IV at a 10 minute rate per orders; see MAR and vitals for more details. Tolerated well without adverse events, discharged and ambulatory out of clinic.

## 2024-12-19 NOTE — PHYSICIAN QUERY
Please provide the diagnosis or diagnoses associated with the clinical findings:  Other (please specify): breast cancer with new brain metastases

## 2024-12-19 NOTE — TELEPHONE ENCOUNTER
----- Message from Chasity sent at 12/19/2024  1:02 PM CST -----  Contact: Godwin([facility])  ..Type:  Patient Requesting Call    Who Called:Godwin([facility])  Does the patient know what this is regarding?:referral   Would the patient rather a call back or a response via [facility]? call  Best Call Back Number:[phone]  Additional Information: fax:[phone]

## 2024-12-20 ENCOUNTER — OFFICE VISIT (OUTPATIENT)
Dept: RADIATION ONCOLOGY | Facility: CLINIC | Age: 64
End: 2024-12-20
Payer: MEDICAID

## 2024-12-20 VITALS
DIASTOLIC BLOOD PRESSURE: 72 MMHG | BODY MASS INDEX: 34.41 KG/M2 | OXYGEN SATURATION: 98 % | HEART RATE: 79 BPM | SYSTOLIC BLOOD PRESSURE: 146 MMHG | WEIGHT: 175.25 LBS | HEIGHT: 60 IN | RESPIRATION RATE: 19 BRPM | TEMPERATURE: 98 F

## 2024-12-20 DIAGNOSIS — C79.31 SECONDARY ADENOCARCINOMA OF BRAIN: ICD-10-CM

## 2024-12-20 DIAGNOSIS — C79.2 SECONDARY CANCER OF SKIN: ICD-10-CM

## 2024-12-20 DIAGNOSIS — C50.412 PRIMARY MALIGNANT NEOPLASM OF UPPER OUTER QUADRANT OF BREAST, LEFT: ICD-10-CM

## 2024-12-20 DIAGNOSIS — C78.2 SECONDARY MALIGNANCY OF PARIETAL PLEURA: Primary | ICD-10-CM

## 2024-12-20 PROCEDURE — 77290 THER RAD SIMULAJ FIELD CPLX: CPT | Mod: 26,,, | Performed by: SPECIALIST

## 2024-12-20 PROCEDURE — 77290 THER RAD SIMULAJ FIELD CPLX: CPT | Mod: TC | Performed by: SPECIALIST

## 2024-12-20 PROCEDURE — 99214 OFFICE O/P EST MOD 30 MIN: CPT | Mod: PBBFAC | Performed by: SPECIALIST

## 2024-12-20 PROCEDURE — 77014 HC CT GUIDANCE RADIATION THERAPY FLDS PLACEMENT: CPT | Mod: TC | Performed by: SPECIALIST

## 2024-12-20 PROCEDURE — 99999 PR PBB SHADOW E&M-EST. PATIENT-LVL IV: CPT | Mod: PBBFAC,,, | Performed by: SPECIALIST

## 2024-12-20 PROCEDURE — 77263 THER RADIOLOGY TX PLNG CPLX: CPT | Mod: ,,, | Performed by: SPECIALIST

## 2024-12-20 RX ORDER — METRONIDAZOLE 500 MG/1
TABLET ORAL
Qty: 20 TABLET | Refills: 0 | Status: SHIPPED | OUTPATIENT
Start: 2024-12-20

## 2024-12-20 NOTE — PROGRESS NOTES
Met with patient and daughter to discuss upcoming systemic therapy initiation.  Discussed patient diagnosis including staging information. Also discussed that therapy regimen prescribed involves the following drugs: Eribulin and timeline of therapy administration. Went over what to expect on first day of treatment, including what to bring with you, visitor policy, and infusion suite guidelines. Also discussed supportive and shared services available to patient, including social work, financial counseling, oncology nutrition, and oncology psychology.      Covered with patient and daughter potential side effects and symptom management. Reviewed supportive medications that will be given before, during, and after treatment. Also stressed that other side effects are possible outside of those listed. Spent additional time on signs of infection, infection prevention, and proper nutrition/hydration.    Education provided to patient and daughter via [employer] and [employer] specific drug information.  Pt has received a chemotherapy education binder with previous treatment. Also provided contact information for clinic and information related to [facility] communication. Discussed communication process for after-hours needs and some common scenarios in which patient and daughter should call provider for guidance vs. immediately report to the emergency room.  Also spoke with KAYE Garcia about possible assistance from home health for draining wound on pt's back.      Finally, patient and daughter were given my contact information. Encouraged patient and daughter to call with any questions, concerns, or needs. Patient and daughter verbalized understanding of all above information.

## 2024-12-20 NOTE — PROGRESS NOTES
[facility] - Radiation Oncology Follow Up Note    HISTORY OF PRESENT ILLNESS: C50.412 - Malignant neoplasm of upper-outer quadrant of left female breast, Diagnosed 10/11/2023 (Active) Stage IIA, ypT2, N0, M0, G3, HER2 Neg, ER Neg, FL Neg      63-year-old woman 1st seen in consultation on 08/09/2023 with a triple negative ypT2 N0 M0 upper outer left breast cancer     Status post neoadjuvant TC Keytruda 1st on 02/02/2023, followed by AC Keytruda from 04/06/2023 08/26/2023 with good clinical response, followed by clinical progression prior to her lumpectomy on 08/02/2023 recovering a high-grade 3.5 cm infiltrating ductal carcinoma resected with 1 mm medial surgical margins. A 3 mm high-grade DCIS was also recovered, resected with 2 mm posterior margin and negative separately submitted posterior margin. Both sentinel lymph nodes were benign without apparent treatment effect.      She underwent oncoplastic reduction at the time of her primary resection with some wound healing difficulty in the mid inframammary fold resulting in modest delay in initiation of radiotherapy, delivering 42.4 Gy in 16 fractions, completed on 11/07/2023     She underwent benign bilateral diagnostic mammography on 02/26/2024 09/17/2024:  She returns 3 months prior to her scheduled six-month follow up.  She has developed recurrent metastatic disease in the interval, and remains disease free in the breasts     CT of the a chest bdomen and pelvis on 06/29/2024 by report described a moderate pleural effusion on the right with a 6 mm heterogeneously enhancing lesion in the right lung base surrounded by the pleural effusion, possibly representing atelectatic lung or developing abscess/necrosis.  Primary parenchymal mass not excluded.  No other malignant findings     Thoracentesis on the same day recovered 1.6 L of bloody pleural fluid     Thoracentesis on 07/1924 recovered 20 cc of serosanguineous fluid      Pleural biopsy and additional right thoracentesis on 08/05/2024 recovered malignant tissue most consistent with metastatic breast cancer.  Tempus testing showed no potential actionable variants and no treatment options     Bone scan on 08/28/2024 was benign     Brain MRI on 08/29/2024, which I have reviewed today, shows a pair of enhancing lesions consistent with metastatic disease: 6.4 mm in the left cerebellum, shown below, and a very faint nodular area of enhancement in the left frontal lobe near the vertex measuring up to 3 mm, which I am unable to appreciate          She denies any neurologic complaints including balance problems, headache, nausea, vision changes, or mental status changes.     CT of the chest abdomen and pelvis on 08/29/2024 shows interval significant progression of disease in the right hemithorax compared to 06/29/2024, as well as suspicious right inguinal and right external iliac chain adenopathy     Single agent Taxol was initiated on 08/28/2024      INTERVAL HISTORY:  She returns today to discuss palliative radiotherapy to a progressive draining painful malodorous cutaneous metastasis on her back.    MRI of the brain on 11/27/2024 showed significant interval progression with up to 6 metastases, the largest in the right occipital lobe measuring 3.7 cm.    Dr. Licona delivered gamma knife stereotactic radiosurgery to her 6 brain metastases on 12/16/2024    Systemic therapy resumed on 12/1924 with eribulin          PHYSICAL EXAMINATION:  Vitals:    12/20/24 1345   BP: (!) 146/72   Pulse: 79   Resp: 19   Temp: 97.6 °F (36.4 °C)   SpO2: 98%   Weight: 79.5 kg (175 lb 4.3 oz)   Height: 5' (1.524 m)      General:  A&O x4, NAD  HEENT:  PEERLA, CN II-XII intact, EOM intact,   Neuro:  Flattish affect, follows complex direction, muscle strength symmetric and appropriate throughout, normal rapid alternating hand movements and finger-nose  Lymphatics:  no cervical/sclav LAD  Lungs:  CTAB  Heart:   RRR  Abdomen:  NTND +BS  Back:  8 x 8 cm subcutaneous soft mobile soft tissue metastasis just inferior to the right scapula with an overlying 4 x 5 cm focus of epidermal breakdown with a bit of eschar, discharge, and foul smell      ASSESSMENT:  Interval development of additional brain metastases, all now status post gamma knife treatment.  Systemic therapy resumed now on eribulin.  Interval development of a right back cutaneous metastasis breaking through the skin with progressive pain, increasing discharge, and foul odor      PLAN:  I have recommended palliative radiotherapy to the back lesion for reduced pain, reduced discharge, and improved odor.  We reviewed the logistics of that therapy, including the planning and treatment visits, as well as possible acute and chronic side effects in great detail.  She and her daughter voiced an understanding and a desire to proceed.  Informed written consent was obtained and she was given the original after scanning into the EMR.    I hope to get her simulated today, a CT simulation to allow for proper selection of cut out size and prescription depth.  I anticipate treating using an on fos electron field of the appropriate energy, likely delivering 45 Gy in 9 fractions

## 2024-12-23 ENCOUNTER — TELEPHONE (OUTPATIENT)
Dept: HEMATOLOGY/ONCOLOGY | Facility: CLINIC | Age: 64
End: 2024-12-23
Payer: MEDICAID

## 2024-12-23 NOTE — TELEPHONE ENCOUNTER
9:08 am-SW received staff message from clinic nurse stating that [facility] was unable to accept referral due to insurance. SW left message for [facility] intake to call to confirm if HH referral was sent to another HH that can accept, and to inform the pt will have Medicare on 1/1/25. SW will await call back. SW will remain available.    1:58 pm-SW informed pt of the above. Pt stated that she has not heard from HH. SW will f/u with re: finding another HH Co or seek outpt wound clinic. SW will f/u with pt.     3:30 pm- SW called [facility]  [phone], [facility]  [phone], and [facility]  [phone], they do not accept pt's insurance. Thus far [facility] .[phone] is the only  that stated they are in network with pt's insurance. Pt informed of the above, and stated that she only used [facility] in the past for Palliative and is ok with SW sending referral for HH/wound care. SW will fax referral to [facility] in the am when back in the office. Pt will contact [facility] .[phone] in the interim for wound care supplies and update SW. SW will f/u on tomorrow re: the above.

## 2024-12-24 ENCOUNTER — DOCUMENTATION ONLY (OUTPATIENT)
Dept: HEMATOLOGY/ONCOLOGY | Facility: CLINIC | Age: 64
End: 2024-12-24
Payer: MEDICAID

## 2024-12-24 PROCEDURE — 77321 SPECIAL TELETX PORT PLAN: CPT | Mod: TC | Performed by: SPECIALIST

## 2024-12-24 PROCEDURE — 77334 RADIATION TREATMENT AID(S): CPT | Mod: TC | Performed by: SPECIALIST

## 2024-12-24 PROCEDURE — 77334 RADIATION TREATMENT AID(S): CPT | Mod: 26,,, | Performed by: SPECIALIST

## 2024-12-24 PROCEDURE — 77321 SPECIAL TELETX PORT PLAN: CPT | Mod: 26,,, | Performed by: SPECIALIST

## 2024-12-24 NOTE — PROGRESS NOTES
8:28 am-SW faxed referral to [facility] for wound care. P. [phone], F. [phone]. SW will f/u to check status. SW will remain available.     10:58 am- Fax received per Joycelyn at [facility], and pt is scheduled to admit on TH. SW will remain available.

## 2024-12-25 DIAGNOSIS — C50.412 PRIMARY MALIGNANT NEOPLASM OF UPPER OUTER QUADRANT OF BREAST, LEFT: ICD-10-CM

## 2024-12-25 DIAGNOSIS — G89.3 NEOPLASM RELATED PAIN: ICD-10-CM

## 2024-12-25 DIAGNOSIS — C78.2 SECONDARY MALIGNANCY OF PARIETAL PLEURA: ICD-10-CM

## 2024-12-25 DIAGNOSIS — Z51.5 PALLIATIVE CARE ENCOUNTER: ICD-10-CM

## 2024-12-26 ENCOUNTER — INFUSION (OUTPATIENT)
Dept: INFUSION THERAPY | Facility: HOSPITAL | Age: 64
End: 2024-12-26
Attending: INTERNAL MEDICINE
Payer: MEDICAID

## 2024-12-26 ENCOUNTER — OFFICE VISIT (OUTPATIENT)
Dept: HEMATOLOGY/ONCOLOGY | Facility: CLINIC | Age: 64
End: 2024-12-26
Payer: MEDICAID

## 2024-12-26 ENCOUNTER — DOCUMENTATION ONLY (OUTPATIENT)
Dept: HEMATOLOGY/ONCOLOGY | Facility: CLINIC | Age: 64
End: 2024-12-26
Payer: MEDICAID

## 2024-12-26 ENCOUNTER — PATIENT MESSAGE (OUTPATIENT)
Dept: RADIATION ONCOLOGY | Facility: CLINIC | Age: 64
End: 2024-12-26
Payer: MEDICAID

## 2024-12-26 ENCOUNTER — PATIENT MESSAGE (OUTPATIENT)
Dept: PALLIATIVE MEDICINE | Facility: CLINIC | Age: 64
End: 2024-12-26
Payer: MEDICAID

## 2024-12-26 VITALS
BODY MASS INDEX: 33.24 KG/M2 | RESPIRATION RATE: 18 BRPM | WEIGHT: 170.19 LBS | SYSTOLIC BLOOD PRESSURE: 136 MMHG | TEMPERATURE: 98 F | OXYGEN SATURATION: 98 % | HEART RATE: 79 BPM | DIASTOLIC BLOOD PRESSURE: 71 MMHG

## 2024-12-26 DIAGNOSIS — D84.821 IMMUNODEFICIENCY DUE TO CHEMOTHERAPY: ICD-10-CM

## 2024-12-26 DIAGNOSIS — C79.31 SECONDARY ADENOCARCINOMA OF BRAIN: ICD-10-CM

## 2024-12-26 DIAGNOSIS — C50.412 PRIMARY MALIGNANT NEOPLASM OF UPPER OUTER QUADRANT OF BREAST, LEFT: Primary | ICD-10-CM

## 2024-12-26 DIAGNOSIS — C79.2 SECONDARY CANCER OF SKIN: ICD-10-CM

## 2024-12-26 DIAGNOSIS — T45.1X5A IMMUNODEFICIENCY DUE TO CHEMOTHERAPY: ICD-10-CM

## 2024-12-26 DIAGNOSIS — G89.3 NEOPLASM RELATED PAIN: ICD-10-CM

## 2024-12-26 DIAGNOSIS — Z79.899 IMMUNODEFICIENCY DUE TO CHEMOTHERAPY: ICD-10-CM

## 2024-12-26 DIAGNOSIS — I10 HYPERTENSION, UNSPECIFIED TYPE: ICD-10-CM

## 2024-12-26 PROCEDURE — A4216 STERILE WATER/SALINE, 10 ML: HCPCS | Performed by: INTERNAL MEDICINE

## 2024-12-26 PROCEDURE — 25000003 PHARM REV CODE 250: Performed by: INTERNAL MEDICINE

## 2024-12-26 PROCEDURE — 63600175 PHARM REV CODE 636 W HCPCS: Mod: JG | Performed by: INTERNAL MEDICINE

## 2024-12-26 PROCEDURE — 96375 TX/PRO/DX INJ NEW DRUG ADDON: CPT

## 2024-12-26 PROCEDURE — 96409 CHEMO IV PUSH SNGL DRUG: CPT

## 2024-12-26 RX ORDER — ONDANSETRON HYDROCHLORIDE 2 MG/ML
8 INJECTION, SOLUTION INTRAVENOUS
Status: COMPLETED | OUTPATIENT
Start: 2024-12-26 | End: 2024-12-26

## 2024-12-26 RX ORDER — HYDROMORPHONE HYDROCHLORIDE 4 MG/1
4 TABLET ORAL
Qty: 120 TABLET | Refills: 0 | Status: SHIPPED | OUTPATIENT
Start: 2024-12-31 | End: 2024-12-27 | Stop reason: SDUPTHER

## 2024-12-26 RX ORDER — SODIUM CHLORIDE 0.9 % (FLUSH) 0.9 %
10 SYRINGE (ML) INJECTION
Status: DISCONTINUED | OUTPATIENT
Start: 2024-12-26 | End: 2024-12-26 | Stop reason: HOSPADM

## 2024-12-26 RX ORDER — HEPARIN 100 UNIT/ML
500 SYRINGE INTRAVENOUS
Status: DISCONTINUED | OUTPATIENT
Start: 2024-12-26 | End: 2024-12-26 | Stop reason: HOSPADM

## 2024-12-26 RX ADMIN — HEPARIN 500 UNITS: 100 SYRINGE at 02:12

## 2024-12-26 RX ADMIN — ONDANSETRON 8 MG: 2 INJECTION INTRAMUSCULAR; INTRAVENOUS at 02:12

## 2024-12-26 RX ADMIN — Medication 10 ML: at 02:12

## 2024-12-26 RX ADMIN — ERIBULIN MESYLATE 2.7 MG: 0.5 INJECTION INTRAVENOUS at 02:12

## 2024-12-26 NOTE — PLAN OF CARE
Pt tolerated well. Will rtc in 2w. Instructed to continue f/u with home health/wound care and report any worsening symptoms.  Verbalized understanding

## 2024-12-26 NOTE — PROGRESS NOTES
Subjective:       Patient ID: Sherlyn Saavedra is a 64 y.o. female.    Chief Complaint: Results, Chemotherapy, and Breast Cancer    HPI:  64-year-old female presents for cycle 1 day 8OP ERIBULIN Q3W patient reviewed CT from 12/10/2024.  Seen in virtual visit ECOG status 2; case discussed peer to peer MD Monson    Past Medical History:   Diagnosis Date    Allergy     Anemia     Breast cancer     primary malignant neoplasm of upper outer quadrant of breast - progressive metastatic triple negative breast cancer. Patient has progressive 2 lines of therapy.    HLD (hyperlipidemia) 2015    Hyperlipidemia (Problem)      Hypertension     Primary malignant neoplasm of upper outer quadrant of breast, left 10/10/2023    Secondary malignancy of parietal pleura 2024    Type 2 diabetes mellitus with diabetic polyneuropathy, without long-term current use of insulin 2015     No family history on file.  Social History     Socioeconomic History    Marital status: Single   Tobacco Use    Smoking status: Former     Current packs/day: 0.00     Types: Cigarettes     Quit date:      Years since quittin.0    Smokeless tobacco: Never   Substance and Sexual Activity    Alcohol use: Not Currently    Drug use: Never    Sexual activity: Not Currently     Social Drivers of Health     Financial Resource Strain: Patient Declined (2024)    Overall Financial Resource Strain (CARDIA)     Difficulty of Paying Living Expenses: Patient declined   Recent Concern: Financial Resource Strain - Medium Risk (2024)    Overall Financial Resource Strain (CARDIA)     Difficulty of Paying Living Expenses: Somewhat hard   Food Insecurity: Patient Declined (2024)    Hunger Vital Sign     Worried About Running Out of Food in the Last Year: Patient declined     Ran Out of Food in the Last Year: Patient declined   Recent Concern: Food Insecurity - Food Insecurity Present (2024)    Hunger Vital Sign     Worried About  "Running Out of Food in the Last Year: Sometimes true     Ran Out of Food in the Last Year: Sometimes true   Transportation Needs: Patient Declined (11/30/2024)    TRANSPORTATION NEEDS     Transportation : Patient declined   Physical Activity: Inactive (9/12/2024)    Exercise Vital Sign     Days of Exercise per Week: 0 days     Minutes of Exercise per Session: 0 min   Stress: Patient Declined (11/30/2024)    [employer] - Occupational Stress Questionnaire     Feeling of Stress : Patient declined   Recent Concern: Stress - Stress Concern Present (9/12/2024)    [employer] - Occupational Stress Questionnaire     Feeling of Stress : To some extent   Housing Stability: Patient Declined (11/30/2024)    Housing Stability Vital Sign     Unable to Pay for Housing in the Last Year: Patient declined     Homeless in the Last Year: Patient declined     Past Surgical History:   Procedure Laterality Date    HYSTERECTOMY      tubaligation  1987       Labs:  Lab Results   Component Value Date    WBC 6.49 12/26/2024    HGB 9.5 (L) 12/26/2024    HCT 28.9 (L) 12/26/2024    MCV 87 12/26/2024     12/26/2024     BMP  Lab Results   Component Value Date     (L) 12/26/2024    K 4.0 12/26/2024    CL 97 12/26/2024    CO2 22 (L) 12/26/2024    BUN 21 12/26/2024    CREATININE 0.9 12/26/2024    CALCIUM 9.8 12/26/2024    ANIONGAP 15 12/26/2024     Lab Results   Component Value Date     (H) 12/26/2024    AST 33 12/26/2024    ALKPHOS 255 (H) 12/26/2024    BILITOT 0.3 12/26/2024       Lab Results   Component Value Date    IRON 37 11/21/2024    TIBC 265 11/21/2024    FERRITIN 1,533 (H) 11/21/2024     No results found for: "[id number]"  Lab Results   Component Value Date    FOLATE 4.1 (L) 07/23/2024     Lab Results   Component Value Date    TSH 2.500 08/14/2023         Review of Systems   Constitutional:  Positive for fatigue. Negative for activity change, appetite change, " chills, diaphoresis, fever and unexpected weight change.   HENT:  Negative for congestion, dental problem, drooling, ear discharge, ear pain, facial swelling, hearing loss, mouth sores, nosebleeds, postnasal drip, rhinorrhea, sinus pressure, sneezing, sore throat, tinnitus, trouble swallowing and voice change.    Eyes:  Negative for photophobia, pain, discharge, redness, itching and visual disturbance.   Respiratory:  Negative for cough, choking, chest tightness, shortness of breath, wheezing and stridor.    Cardiovascular:  Negative for chest pain, palpitations and leg swelling.   Gastrointestinal:  Negative for abdominal distention, abdominal pain, anal bleeding, blood in stool, constipation, diarrhea, nausea, rectal pain and vomiting.   Endocrine: Negative for cold intolerance, heat intolerance, polydipsia, polyphagia and polyuria.   Genitourinary:  Negative for decreased urine volume, difficulty urinating, dyspareunia, dysuria, enuresis, flank pain, frequency, genital sores, hematuria, menstrual problem, pelvic pain, urgency, vaginal bleeding, vaginal discharge and vaginal pain.   Musculoskeletal:  Positive for arthralgias, gait problem and myalgias. Negative for back pain, joint swelling, neck pain and neck stiffness.   Skin:  Negative for color change, pallor and rash.   Allergic/Immunologic: Negative for environmental allergies, food allergies and immunocompromised state.   Neurological:  Positive for weakness. Negative for dizziness, tremors, seizures, syncope, facial asymmetry, speech difficulty, light-headedness, numbness and headaches.   Hematological:  Negative for adenopathy. Does not bruise/bleed easily.   Psychiatric/Behavioral:  Positive for dysphoric mood. Negative for agitation, behavioral problems, confusion, decreased concentration, hallucinations, self-injury, sleep disturbance and suicidal ideas. The patient is not nervous/anxious and is not hyperactive.        Objective:      Physical  Exam  Constitutional:       Appearance: Normal appearance. She is ill-appearing.   Neurological:      Mental Status: She is alert.             Assessment:      1. Primary malignant neoplasm of upper outer quadrant of breast, left    2. Neoplasm related pain    3. Immunodeficiency due to chemotherapy    4. Secondary adenocarcinoma of brain    5. Secondary cancer of skin    6. Hypertension, unspecified type           Med Onc Chart Routing      Follow up with physician . Return in 2 weeks CBC CMP for cycle 2 day 1 would prefer physical in-person visit if possible   Follow up with [facility]    Infusion scheduling note    Injection scheduling note OP ERIBULIN Q3W day 1 and 8q21 days   Labs    Imaging    Pharmacy appointment    Other referrals                   Plan:     The patient location is:  [place]  The chief complaint leading to consultation is:  Breast cancer    Visit type: audiovisual    Face to Face time with patient: 25 minutes of total time spent on the encounter, which includes face to face time and non-face to face time preparing to see the patient (eg, review of tests), Obtaining and/or reviewing separately obtained history, Documenting clinical information in the electronic or other health record, Independently interpreting results (not separately reported) and communicating results to the patient/family/caregiver, or Care coordination (not separately reported).         Each patient to whom he or she provides medical services by telemedicine is:  (1) informed of the relationship between the physician and patient and the respective role of any other health care provider with respect to management of the patient; and (2) notified that he or she may decline to receive medical services by telemedicine and may withdraw from such care at any time.    Notes:   Patient's counts are acceptable for treatment proceed with treatment as outlined reviewed scans from 12/10 demonstrating clear progression from October of 2024.   Case was discussed peer to peer MD Monson concur with plan of action discussed other chemo therapeutic options including doxorubicin variant previous exposure doxorubicin feel at this point with we can condition that high likelihood of possibility of increased toxicity.  Told patient that at this point she has a treatable but not curable malignancy.  In that therapeutic options for limited      Yaya Hoffmann Jr, MD FACP

## 2024-12-27 ENCOUNTER — PATIENT MESSAGE (OUTPATIENT)
Dept: PALLIATIVE MEDICINE | Facility: CLINIC | Age: 64
End: 2024-12-27
Payer: MEDICAID

## 2024-12-27 ENCOUNTER — DOCUMENTATION ONLY (OUTPATIENT)
Dept: RADIATION ONCOLOGY | Facility: CLINIC | Age: 64
End: 2024-12-27
Payer: MEDICAID

## 2024-12-27 ENCOUNTER — TELEPHONE (OUTPATIENT)
Dept: HEMATOLOGY/ONCOLOGY | Facility: CLINIC | Age: 64
End: 2024-12-27
Payer: MEDICAID

## 2024-12-27 DIAGNOSIS — C50.412 PRIMARY MALIGNANT NEOPLASM OF UPPER OUTER QUADRANT OF BREAST, LEFT: ICD-10-CM

## 2024-12-27 DIAGNOSIS — Z51.5 PALLIATIVE CARE ENCOUNTER: ICD-10-CM

## 2024-12-27 DIAGNOSIS — G89.3 NEOPLASM RELATED PAIN: ICD-10-CM

## 2024-12-27 DIAGNOSIS — C78.2 SECONDARY MALIGNANCY OF PARIETAL PLEURA: ICD-10-CM

## 2024-12-27 DIAGNOSIS — C50.412 PRIMARY MALIGNANT NEOPLASM OF UPPER OUTER QUADRANT OF BREAST, LEFT: Primary | ICD-10-CM

## 2024-12-27 PROCEDURE — 77412 RADIATION TX DELIVERY LVL 3: CPT | Performed by: SPECIALIST

## 2024-12-27 RX ORDER — HYDROMORPHONE HYDROCHLORIDE 4 MG/1
4 TABLET ORAL
Qty: 60 TABLET | Refills: 0 | Status: SHIPPED | OUTPATIENT
Start: 2024-12-27 | End: 2024-12-27 | Stop reason: SDUPTHER

## 2024-12-27 RX ORDER — HYDROMORPHONE HYDROCHLORIDE 4 MG/1
4 TABLET ORAL
Qty: 30 TABLET | Refills: 0 | Status: SHIPPED | OUTPATIENT
Start: 2024-12-27 | End: 2025-01-01

## 2024-12-27 RX ORDER — HYDROMORPHONE HYDROCHLORIDE 4 MG/1
4 TABLET ORAL
Qty: 60 TABLET | Refills: 0 | Status: CANCELLED | OUTPATIENT
Start: 2024-12-27 | End: 2025-01-01

## 2024-12-27 RX ORDER — HYDROMORPHONE HYDROCHLORIDE 4 MG/1
4 TABLET ORAL
Qty: 30 TABLET | Refills: 0 | Status: SHIPPED | OUTPATIENT
Start: 2024-12-27 | End: 2024-12-27 | Stop reason: SDUPTHER

## 2024-12-27 NOTE — PLAN OF CARE
Day 1 outpatient xrt to skin tumor on back. Verbal instructions given. Skin care and side effects reviewed. Contact info and patient schedule provided. Patient verbalized understanding.

## 2024-12-27 NOTE — TELEPHONE ENCOUNTER
----- Message from Shivam sent at 12/27/2024 12:24 PM CST -----  Contact: Sherlyn Plata would like a call back at [phone] in regards to needing to speak with nurse about a medication for patient,saying its unable to be filled.  Thanks   Am

## 2024-12-27 NOTE — TELEPHONE ENCOUNTER
----- Message from Shivam sent at 12/27/2024 12:24 PM CST -----  Contact: Sherlyn Plata would like a call back at [phone] in regards to needing to speak with nurse about a medication for patient,saying its unable to be filled.  Thanks   Am

## 2024-12-30 ENCOUNTER — DOCUMENTATION ONLY (OUTPATIENT)
Dept: RADIATION ONCOLOGY | Facility: CLINIC | Age: 64
End: 2024-12-30
Payer: MEDICAID

## 2024-12-30 ENCOUNTER — OFFICE VISIT (OUTPATIENT)
Dept: PALLIATIVE MEDICINE | Facility: CLINIC | Age: 64
End: 2024-12-30
Payer: MEDICAID

## 2024-12-30 DIAGNOSIS — Z51.5 PALLIATIVE CARE ENCOUNTER: ICD-10-CM

## 2024-12-30 DIAGNOSIS — C78.2 SECONDARY MALIGNANCY OF PARIETAL PLEURA: ICD-10-CM

## 2024-12-30 DIAGNOSIS — G89.3 NEOPLASM RELATED PAIN: ICD-10-CM

## 2024-12-30 DIAGNOSIS — C79.31 SECONDARY ADENOCARCINOMA OF BRAIN: ICD-10-CM

## 2024-12-30 DIAGNOSIS — C50.412 PRIMARY MALIGNANT NEOPLASM OF UPPER OUTER QUADRANT OF BREAST, LEFT: Primary | ICD-10-CM

## 2024-12-30 PROCEDURE — 1111F DSCHRG MED/CURRENT MED MERGE: CPT | Mod: CPTII,95,,

## 2024-12-30 PROCEDURE — 77412 RADIATION TX DELIVERY LVL 3: CPT | Performed by: SPECIALIST

## 2024-12-30 PROCEDURE — 3044F HG A1C LEVEL LT 7.0%: CPT | Mod: CPTII,95,,

## 2024-12-30 PROCEDURE — 4010F ACE/ARB THERAPY RXD/TAKEN: CPT | Mod: CPTII,95,,

## 2024-12-30 PROCEDURE — 99214 OFFICE O/P EST MOD 30 MIN: CPT | Mod: 95,,,

## 2024-12-30 RX ORDER — MORPHINE SULFATE 60 MG/1
60 TABLET, FILM COATED, EXTENDED RELEASE ORAL 2 TIMES DAILY
Qty: 28 TABLET | Refills: 0 | Status: SHIPPED | OUTPATIENT
Start: 2024-12-30 | End: 2025-01-14

## 2024-12-30 RX ORDER — HYDROMORPHONE HYDROCHLORIDE 4 MG/1
4 TABLET ORAL
Qty: 180 TABLET | Refills: 0 | Status: SHIPPED | OUTPATIENT
Start: 2024-12-30 | End: 2025-01-30

## 2024-12-30 NOTE — PATIENT INSTRUCTIONS
We have increased your long-acting Morphine to 60mg twice a day   Continue Hydromorphone 4mg every 3-4 hours as needed for pain   Do not take Hydromorphone unless you need it.   Narcan has been prescribed if there is any concern of opioid overdose.   Signs of opioid overdose include:   Loss of consciousness  Unresponsive to outside stimulus  Awake, but unable to talk  Breathing is very slow and shallow, erratic, or has stopped  For lighter skinned people, the skin tone turns bluish purple, for darker skinned people, it turns grayish or ashen.  Choking sounds, or a snore-like gurgling noise (sometimes called the death rattle)  Vomiting  Body is very limp  Face is very pale or clammy  Fingernails and lips turn blue or purplish black  Pulse (heartbeat) is slow, erratic, or not there at all.   If you administer this medicine immediately go to the nearest emergency department for evaluation and stop taking all opioids.

## 2024-12-31 ENCOUNTER — TELEPHONE (OUTPATIENT)
Dept: HEMATOLOGY/ONCOLOGY | Facility: CLINIC | Age: 64
End: 2024-12-31
Payer: MEDICAID

## 2024-12-31 PROCEDURE — 77412 RADIATION TX DELIVERY LVL 3: CPT | Performed by: SPECIALIST

## 2024-12-31 NOTE — PROGRESS NOTES
Palliative Medicine Clinic Note  Follow-up           Consult Requested By: Dr. Hoffmann      Reason for Consult: Symptom Management/Advance Care Planning/Goals of Care Discussion    Chief Complaint: Progressive pain          ASSESSMENT/PLAN:      Plan/Recommendations       1. Primary malignant neoplasm of upper outer quadrant of breast, left  Assessment & Plan:    Followed by Dr. Hoffmann, [facility], Neurosurgery. Previous oncologist at [facility].   S/p Keytruda, left lumpectomy (Aug. 2023) w/ residual disease post resection, radiation (Nov. 2023).    On Eribulin Q3W  Stopped Sacituzumab-Govitecan-due to progressive disease  Stopped Paclitaxel D/C'd due to progressive disease.   S/P right VATS washout with partial decortication, pleural biopsy, intercostal nerve blocks, Pleurx catheter insertion Aug 2024. Pleurx removed due to low output.   CT Head: 12/10/2024: 1. Similar areas of edema involving the posterior left frontal lobe, inferior left frontal lobe, right occipital lobe, and left cerebellum, corresponding to metastatic lesions better characterized on MRI from 11/27/2024.  2. Mass effect resulting in partial effacement of the occipital horn of the right lateral ventricle without evidence of hydrocephalus, unchanged compared to the most recent MRI from 11/27/2024.  3. Additional findings, as above.    CT A/P: 12/10/2024: 1. In this patient with metastatic breast cancer there is similar involvement of the pleura of the right lung base, increased size and number of liver lesions, increased size of matted masses extending into the posterior right subphrenic/subhepatic space, increased size of right axillary lymph node, and increased size of posterior right chest/abdominal wall mass.  Findings overall consistent with worsening metastatic disease.  2. Additional grossly stable right inguinal and right iliac chain lymph nodes.  3. Right lateral abdominal wall heterogeneously enhancing lesion within the subcutaneous fat, not  completely included within field of view of prior imaging and also likely representing metastatic disease.  4. Increased size of heterogeneous lesions involving the mid right kidney and inferior pole of the left kidney, concerning for additional foci of metastatic disease.  5. Interval increased mass effect upon the posteroinferior aspect of the right atrium, superior most aspect of the IVC and lesser extent intrahepatic portion of the IVC which also shows heterogeneous opacification at these levels presumably related to mixing of blood pool and contrast bolus and timing of contrast bolus; however, local invasion and/or bland or tumor thrombus not excluded.  Further evaluation/follow-up as warranted.  6. Small volume of loculated right pleural fluid, similar to prior.  7. Additional findings, as above.  MRI Brain: 11/27/2024: Interval progression of metastatic disease with significant interval increase in size of several ring-enhancing lesions in both cerebral hemispheres and the left cerebellum as compared to the prior with significant surrounding edema.  New punctate focus of enhancement in the left parietal lobe as compared to the prior.  Interval increase in size of the ventricular system with some effacement of the sulci bilaterally is concerning for developing hydrocephalus.  Recommend neurosurgical consultation.  Patient was instructed to present to the emergency room for further evaluation.  Findings discussed with Dr. Paul via secure chat at 15:35 on 11/27/2024.      Orders:  -     HYDROmorphone (DILAUDID) 4 MG tablet; Take 1 tablet (4 mg total) by mouth every 3 (three) hours as needed for Pain (Max 6 doses/day).  Dispense: 180 tablet; Refill: 0  -     morphine (MS CONTIN) 60 MG 12 hr tablet; Take 1 tablet (60 mg total) by mouth 2 (two) times daily. for 14 days  Dispense: 28 tablet; Refill: 0    2. Neoplasm related pain  Assessment & Plan:    Progressive- Due to progressive disease  Increase MSContin to  60mg BID- Titrate or rotate to Oxycodone per pt's response  Hydromorphone IR 4mg Q3H PRN- Max 6 doses per day  Narcan RX given and explained use to pt and family. Pt and family verbalized understanding.    Miralax 1pack/day or Senna 2 tabs at bedtime for opioid-induced constipation.   MOM if Miralax/senna are not effective.   Pain contract- 09/04/2024  UDS- 10/17/2024- Consistent w/ prescribed Hydromorphone and Morphine. Inconsistent with Marijuana. Pt aware she can obtain medical marijuana card.     Orders:  -     HYDROmorphone (DILAUDID) 4 MG tablet; Take 1 tablet (4 mg total) by mouth every 3 (three) hours as needed for Pain (Max 6 doses/day).  Dispense: 180 tablet; Refill: 0  -     morphine (MS CONTIN) 60 MG 12 hr tablet; Take 1 tablet (60 mg total) by mouth 2 (two) times daily. for 14 days  Dispense: 28 tablet; Refill: 0    3. Palliative care encounter  Assessment & Plan:    -Code status: Full Code. Pt and daughter wish to revisit at a later time  -HCPOA: Daughter: Luna West: [phone]. Pt has 1 adult son-lives in [place]. Pt is single  -GOC:  Continue cancer treatment, symptom management, maintain independence and functional status. Pt aware treatment intent is palliative.   -See HPI for further details     Orders:  -     HYDROmorphone (DILAUDID) 4 MG tablet; Take 1 tablet (4 mg total) by mouth every 3 (three) hours as needed for Pain (Max 6 doses/day).  Dispense: 180 tablet; Refill: 0  -     morphine (MS CONTIN) 60 MG 12 hr tablet; Take 1 tablet (60 mg total) by mouth 2 (two) times daily. for 14 days  Dispense: 28 tablet; Refill: 0    4. Secondary malignancy of parietal pleura  -     HYDROmorphone (DILAUDID) 4 MG tablet; Take 1 tablet (4 mg total) by mouth every 3 (three) hours as needed for Pain (Max 6 doses/day).  Dispense: 180 tablet; Refill: 0  -     morphine (MS CONTIN) 60 MG 12 hr tablet; Take 1 tablet (60 mg total) by mouth 2 (two) times daily. for 14 days  Dispense: 28 tablet; Refill: 0          Advance Care Planning   Advance Directives:   Living Will: No    LaPOST: No    Do Not Resuscitate Status: No    Medical Power of : Yes    Agent's Name:  Daughter: Luna Saavedra   Agent's Contact Number:  [phone]    Decision Making:  Patient answered questions and Family answered questions  Goals of Care: The patient endorses that what is most important right now is to focus on remaining as independent as possible and symptom/pain control    Accordingly, we have decided that the best plan to meet the patient's goals includes continuing with treatment. Pt stated she is aware treatment intent is palliative.   Pt chose to remain Full Code for now. Her and her daughter wish to revisit this later.          Follow up: 1 week virtual- Morphine ER increase      Plan discussed with: Patient        SUBJECTIVE:      History of Present Illness / Interval History:  Sherlyn Saavedra is 64 y.o. female with Recurrent Metastatic Left Breast Cancer to Pluera/Brain.   On Eribulin Q3W  Stopped Sacituzumab-Govitecan-due to progressive disease  Stopped Paclitaxel D/C'd due to progressive disease.   S/p Keytruda, left lumpectomy (Aug. 2023) w/ residual disease post resection, radiation (Nov. 2023).    S/P right VATS washout with partial decortication, pleural biopsy, intercostal nerve blocks, Pleurx catheter insertion Aug 2024. Pleurx removed due to low output.   Followed by Dr. Hoffmann, [facility], Neurosurgery. Previous oncologist at [facility].   Admitted 11/27-11/30 for progressive brain mets.   PMHX: HTN, T2DM    Presents to Palliative Care Clinic for physical symptoms, advance care planning,, clarification of goals of care, and additional support..   Please see oncology notes for more details on pt's care.         12/30/24:     The patient location is: [facility]  The chief complaint leading to consultation is:  Follow-up     Visit type: audiovisual     Face to Face time with patient:  10 minutes  35 minutes of total time spent  "on the encounter, which includes face to face time and non-face to face time preparing to see the patient (eg, review of tests), Obtaining and/or reviewing separately obtained history, Documenting clinical information in the electronic or other health record, Independently interpreting results (not separately reported) and communicating results to the patient/family/caregiver, or Care coordination (not separately reported).      Each patient to whom he or she provides medical services by telemedicine is:  (1) informed of the relationship between the physician and patient and the respective role of any other health care provider with respect to management of the patient; and (2) notified that he or she may decline to receive medical services by telemedicine and may withdraw from such care at any time.     Notes:   Pt seen via audiovisual feed. A&O x3. No acute distress. Her daughter, Ms. Carrasco also present.   Pt reported lower back pain 5/10 following Hydromorphone IR 2 hours prior. She is taking Morphine ER 45mg BID and Hydromorphone 4mg IR Q3H PRN up to 8 doses/day  She has restarted medical marijuana due to progressive pain. She notes she is feeling sleepy and tired.   She also reported using hydromorphone IR "prevent pain". We discussed only using Hydromorphone IR as needed. She also notes she sometimes feels more pain in between Hydromorphone doses.   No nausea/vomiting/constipation/diarrhea. Appetite is intact. She is tolerating radiation well.   She is traveling to [place] with her daughter on 12/31/2024 with plans to return on Sunday, January 5, 2024.     Past Visits    Visit type: Transitioned from audiovisual to in-person due to pt's symptoms.   Notes:   12/03/2024: Pt seen via audiovisual feed. A&O x3. No acute distress. Daughter, Luna present in the background.   Pt reported progressive right torso and right lower back pain not managed with MScontin 30mg BID and Hydromorphone 4mg Q3H PRN. She was " discharged with enough pain pain medicine to last this week.   She is also constipated requiring suppository on 11/30 prior to her discharge from hospital. She has not had a bowel movement since. She was unsure if she is passing gas. She is not taking Miralax/Senna at this time  She noted some right abdominal tenderness and decreased appetite.    We discussed trying MOM for constipation and resuming daily Miralax use.   I suggested pt to come to clinic in person for further evaluation of constipation and worsening pain.     Pt attended clinic with her daughter, Ms. Carrasco  She reported passing gas after logging off from virtual visit. She took MOM prior to coming to clinic.     11/13/2024: Pt attended clinic alone. She was in no acute distress. Vital signs stable on room air.   She reported back pain is stable 3/10 on Hydromorphone IR 4mg Q3HPRN, Zanaflex PRN, MSContin 30mg BID and marijuana  Constipation managed with Miralax and Senna.   Anxiety/Depression has improved. Her  is providing spiritual counseling.   She is requesting help at home with cooking and cleaning. Information on [facility] [place] [facility] given.   Right upper back wound stable. Blister has healed.       10/17/2024: Pt seen in infusion bay. She was in no acute distress. Vital signs stable on room air.   Pain stable on Hydromorphone IR 4mg Q3HPRN, Zanaflex PRN, and MSContin 30mg BID   Forgetfulness impacting short-term memory. Now keeping a journal  Anxiety/Depression due to cancer diagnosis. Good family support. She wishes to defer mental health counseling at this time.   Intermittent constipation managed  with Miralax daily/every other day and MOM.   Nausea managed with PRN Zofran. Appetite is gradually improving.     10/03/2024: The patient location is: [facility]  The chief complaint leading to consultation is:  Follow-up     Visit type: audiovisual     Face to Face time with patient:  10 minutes  35 minutes of total time spent on the  encounter, which includes face to face time and non-face to face time preparing to see the patient (eg, review of tests), Obtaining and/or reviewing separately obtained history, Documenting clinical information in the electronic or other health record, Independently interpreting results (not separately reported) and communicating results to the patient/family/caregiver, or Care coordination (not separately reported).      Each patient to whom he or she provides medical services by telemedicine is:  (1) informed of the relationship between the physician and patient and the respective role of any other health care provider with respect to management of the patient; and (2) notified that he or she may decline to receive medical services by telemedicine and may withdraw from such care at any time.     Notes:   Pt seen via audiovisual feed. A&O x3. No acute distress.   She reported progressive right chest/back/right lateral abd pain over the past week requiring ED visits two days ago. Pain is now better managed with MSContin 30mg BID and Hydromorphone 4mg Q4H PRN.   She is requesting narcotic rx be sent to [facility] on [facility]  Some nausea/vomiting this morning. Constipation slightly managed with MOM/Miralax. She requested Linzess prescription. She has not tried Senna.   She wants to clarify with Dr. Hoffmann if she needs Thoracentesis due to her CT chest showing a pleural effusion.   Called pt back following visit. Informed her Dr. Hoffmann said she does not require thoracentesis at this time, as imaging represents cancer progression, not pleural effusion. Pt verbalized understanding,  Informed pt pain med rx have been sent to [facility] as she requested.   Informed pt to take Zofran 20-30 mins prior to taking opioid to prevent nausea.     09/17/2024:  Pt attended clinic with her friend, Mr. Torres. She was in no acute distress. Vital signs stable on room air.   She reported lower back pain has improved with  Morphine IR 15mg, however, it does not last 6 hours. No itching/rash with Morphine.   She noted a palpable/painful lesion on her right lateral thigh. She noticed it about a month ago, but it was not painful then.       09/04/2024: History obtained from: Patient and medical record.   Pt attended clinic alone. Her daughter, Luna present via speaker phone. was in no acute distress. Vital signs stable on room air.   I explained the role palliative care often plays in supporting patients with serious illness and their families regarding symptom management, advance care planning, and goals of care discussion. Pt and daughter verbalized understanding.   Pt reported persistent progressive right lower back pain over the past month. 8/10.  She is taking Norco 10mg QID PRN but no longer effective and she takes it with Benadryl due to itching. Pain hinders sleep.   Neuropathy to hands and right leg, somewhat stable at this time.   She is taking medical marijuana for appetite stimulation. She supplements meals with protein drinks. Her Bgs have been borderline low- she is taking Metformin and Moujaro. She will revisit this with her PCP.   Pt and daughter inquired about pt receiving Glutathione infusions for immune health. I have encouraged them to discuss this with Dr. Hoffmann.     We discussed advance care planning, goals of care, and code status, Full Code vs. DNR including risks, benefits, and alternatives..   She wishes to continue cancer treatment, symptom management, maintain independence and functional status. Pt stated she is aware treatment intent is palliative.   Pt chose to remain Full Code for now. Her and her daughter wish to revisit this later.   Elected HCPOA is daughter: Luna West: [phone], lives in [place]. Pt has 1 adult son lives in [place]. She is single.         ROS:  Review of Systems   Constitutional:  Positive for appetite change (Decreased) and fatigue. Negative for activity change and  unexpected weight change.   HENT:  Negative for hearing loss, sore throat, trouble swallowing and voice change.    Eyes:  Negative for visual disturbance.   Respiratory:  Negative for cough, chest tightness, shortness of breath (Moderate exertional) and wheezing.    Cardiovascular:  Negative for chest pain, palpitations and leg swelling.   Gastrointestinal:  Positive for abdominal pain and constipation (Last BM Nov. 30th). Negative for blood in stool, nausea, rectal pain, vomiting and reflux.   Genitourinary:  Negative for bladder incontinence, difficulty urinating, flank pain, hematuria, nocturia, pelvic pain and urgency.   Musculoskeletal:  Positive for back pain, leg pain and myalgias. Negative for arthralgias and neck pain.   Integumentary:  Positive for wound (Right upper back).   Allergic/Immunologic: Negative for environmental allergies, food allergies and immunocompromised state.   Neurological:  Positive for weakness (Bilateral lower xtremities. Pt ambulating w/walker) and numbness. Negative for dizziness, tremors, headaches, memory loss and coordination difficulties.   Hematological:  Negative for adenopathy. Does not bruise/bleed easily.   Psychiatric/Behavioral:  Negative for agitation, behavioral problems, confusion, decreased concentration, depressed mood (Due to cancer diagnosis), dysphoric mood, self-injury, sleep disturbance and suicidal ideas. The patient is not nervous/anxious (Due to cancer diagnosis).        Review of Symptoms      Symptom Assessment (ESAS 0-10 Scale)  Pain:  5  Dyspnea:  0  Anxiety:  0  Nausea:  0  Depression:  0  Anorexia:  0  Fatigue:  8  Insomnia:  0  Restlessness:  0  Agitation:  0     CAM / Delirium:  Negative  Constipation:  Negative  Diarrhea:  Negative    Anxiety:  Is not nervous/anxious (Due to cancer diagnosis)  Constipation:  Constipation (Last BM Nov. 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 Daughter, Luna in [place], Son in [place].   **Primary  to Follow**  Palliative Care  Consult: Yes            Medications:    Current Outpatient Medications:     (Magic mouthwash) 1:1:1 diphenhydrAMINE(Benadryl) 12.5mg/5ml liq, aluminum & magnesium hydroxide-simethicone (Maalox), LIDOcaine viscous 2%, Swish and spit 15 mLs every 4 (four) hours as needed (mouth ulcers). for mouth sores, Disp: 360 mL, Rfl: 1    amLODIPine (NORVASC) 5 MG tablet, TAKE 1 TABLET BY MOUTH EVERY MORNING, Disp: 90 tablet, Rfl: 1    atorvastatin (LIPITOR) 40 MG tablet, Take 40 mg by mouth., Disp: , Rfl:     blood sugar diagnostic (FREESTYLE TEST) Strp, Test 4 times per day, Disp: , Rfl:     busPIRone (BUSPAR) 15 MG tablet, Take 1 tablet by mouth at bedtime., Disp: 30 tablet, Rfl: 5    doxycycline (MONODOX) 100 MG capsule, EMPTY CONTENTS OF 1 CAPSULE INTO NASAL IRRIGATION SYSTEM, ADD DISTILLED WATER, SALT PACK, MIX & IRRIGATE. PERFORM 2 TIMES DAILY, Disp: , Rfl:     famotidine (PEPCID) 20 MG tablet, Take 20 mg by mouth 2 (two) times daily., Disp: , Rfl:     folic acid (FOLVITE) 1 MG tablet, take 1 tablet by mouth every morning, Disp: 30 tablet, Rfl: 3    gabapentin (NEURONTIN) 300 MG capsule, Take 300 mg by mouth., Disp: , Rfl:     HYDROmorphone (DILAUDID) 4 MG tablet, Take 1 tablet (4 mg total) by mouth every 3 (three) hours as needed for Pain (Max 6 doses/day)., Disp: 180 tablet, Rfl: 0    LIDOcaine viscous HCl  2%-diphenhydrAMINE-aluminum-magnesium hydroxide-simethicone, Swish and spit 15 mLs by mouth every 4 (four) hours as needed (mouth ulcers). for mouth sores, Disp: 360 mL, Rfl: 1    LIDOcaine-prilocaine (EMLA) cream, Apply to affected area once, Disp: 30 g, Rfl: 11    magnesium oxide (MAG-OX) 400 mg (241.3 mg magnesium) tablet, Take 400 mg by mouth once daily., Disp: , Rfl:     metFORMIN (GLUCOPHAGE-XR) 500 MG ER 24hr tablet, take 2 tablets by mouth in the morning and at bedtime as directed, Disp: 180 tablet, Rfl: 1    metroNIDAZOLE (FLAGYL) 500 MG tablet, Crush 1/2 tablet and sprinkle/apply to the open wound with every dressing change for odor control, Disp: 20 tablet, Rfl: 0    morphine (MS CONTIN) 60 MG 12 hr tablet, Take 1 tablet (60 mg total) by mouth 2 (two) times daily. for 14 days, Disp: 28 tablet, Rfl: 0    naloxone (NARCAN) 4 mg/actuation [place], , Disp: , Rfl:     ondansetron (ZOFRAN-ODT) 8 MG TbDL, Dissolve 1 tablet (8 mg total) by mouth every 8 (eight) hours as needed (Nausea)., Disp: 30 tablet, Rfl: 2    pantoprazole (PROTONIX) 40 MG tablet, take 1 tablet by mouth daily, Disp: 30 tablet, Rfl: 1    tirzepatide (MOUNJARO) 5 mg/0.5 mL PnIj, Inject 5 mg into the skin once weekly, Disp: 2 mL, Rfl: 2      External  database queried on 12/31/2024  by AKUA CAMARGO :            Review of patient's allergies indicates:   Allergen Reactions    Ace inhibitors Swelling    Lisinopril Rash    Hydrocodone Itching     Hives    Hydrocodone-acetaminoph-supp11 Itching    Percocet [oxycodone-acetaminophen] Itching     Pt had to take an antihistamine to improve itching     Cephalexin      Hives    Pantoprazole Hives    Propoxyphene      Hives    Propoxyphene n-acetaminophen     Propoxyphene-acetaminophen            OBJECTIVE:         Physical Exam:  Vitals:      Physical Exam  Vitals reviewed: Limited due to virtual visit.   Constitutional:       General: She is not in acute distress.     Appearance: She is  ill-appearing.   Eyes:      General: No scleral icterus.        Right eye: No discharge.         Left eye: No discharge.      Conjunctiva/sclera: Conjunctivae normal.   Pulmonary:      Effort: Pulmonary effort is normal. No respiratory distress.   Neurological:      Mental Status: She is alert and oriented to person, place, and time.   Psychiatric:         Attention and Perception: Attention and perception normal.         Mood and Affect: Mood and affect normal.         Speech: Speech normal.         Behavior: Behavior normal. Behavior is cooperative.         Thought Content: Thought content normal. Thought content does not include suicidal ideation. Thought content does not include suicidal plan.         Cognition and Memory: Cognition and memory normal.         Judgment: Judgment normal.       Labs: BMP  Lab Results   Component Value Date     (L) 12/26/2024    K 4.0 12/26/2024    CL 97 12/26/2024    CO2 22 (L) 12/26/2024    BUN 21 12/26/2024    CREATININE 0.9 12/26/2024    CALCIUM 9.8 12/26/2024    ANIONGAP 15 12/26/2024    EGFRNORACEVR >60 12/26/2024     Lab Results   Component Value Date    WBC 6.49 12/26/2024    HGB 9.5 (L) 12/26/2024    HCT 28.9 (L) 12/26/2024    MCV 87 12/26/2024     12/26/2024             Imaging: CT Head: 12/10/2024: 1. Similar areas of edema involving the posterior left frontal lobe, inferior left frontal lobe, right occipital lobe, and left cerebellum, corresponding to metastatic lesions better characterized on MRI from 11/27/2024.  2. Mass effect resulting in partial effacement of the occipital horn of the right lateral ventricle without evidence of hydrocephalus, unchanged compared to the most recent MRI from 11/27/2024.  3. Additional findings, as above.    CT A/P: 12/10/2024: 1. In this patient with metastatic breast cancer there is similar involvement of the pleura of the right lung base, increased size and number of liver lesions, increased size of matted masses  extending into the posterior right subphrenic/subhepatic space, increased size of right axillary lymph node, and increased size of posterior right chest/abdominal wall mass.  Findings overall consistent with worsening metastatic disease.  2. Additional grossly stable right inguinal and right iliac chain lymph nodes.  3. Right lateral abdominal wall heterogeneously enhancing lesion within the subcutaneous fat, not completely included within field of view of prior imaging and also likely representing metastatic disease.  4. Increased size of heterogeneous lesions involving the mid right kidney and inferior pole of the left kidney, concerning for additional foci of metastatic disease.  5. Interval increased mass effect upon the posteroinferior aspect of the right atrium, superior most aspect of the IVC and lesser extent intrahepatic portion of the IVC which also shows heterogeneous opacification at these levels presumably related to mixing of blood pool and contrast bolus and timing of contrast bolus; however, local invasion and/or bland or tumor thrombus not excluded.  Further evaluation/follow-up as warranted.  6. Small volume of loculated right pleural fluid, similar to prior.  7. Additional findings, as above.    MRI Brain: 11/27/2024: Interval progression of metastatic disease with significant interval increase in size of several ring-enhancing lesions in both cerebral hemispheres and the left cerebellum as compared to the prior with significant surrounding edema.  New punctate focus of enhancement in the left parietal lobe as compared to the prior.  Interval increase in size of the ventricular system with some effacement of the sulci bilaterally is concerning for developing hydrocephalus.  Recommend neurosurgical consultation.  Patient was instructed to present to the emergency room for further evaluation.  Findings discussed with Dr. Paul via secure chat at 15:35 on 11/27/2024.     I spent a total of 35  minutes on the day of the visit. This includes face to face time in discussion of goals of care, symptom assessment, coordination of care and emotional support.  This also includes non-face to face time preparing to see the patient (eg, review of tests/imaging), obtaining and/or reviewing separately obtained history, documenting clinical information in the electronic or other health record, independently interpreting results and communicating results to the patient/family/caregiver, or care coordinator.       AKUA HENSLEY NP

## 2024-12-31 NOTE — ASSESSMENT & PLAN NOTE
Followed by Dr. Hoffmann, [facility], Neurosurgery. Previous oncologist at [facility].   S/p Keytruda, left lumpectomy (Aug. 2023) w/ residual disease post resection, radiation (Nov. 2023).    On Eribulin Q3W  Stopped Sacituzumab-Govitecan-due to progressive disease  Stopped Paclitaxel D/C'd due to progressive disease.   S/P right VATS washout with partial decortication, pleural biopsy, intercostal nerve blocks, Pleurx catheter insertion Aug 2024. Pleurx removed due to low output.   CT Head: 12/10/2024: 1. Similar areas of edema involving the posterior left frontal lobe, inferior left frontal lobe, right occipital lobe, and left cerebellum, corresponding to metastatic lesions better characterized on MRI from 11/27/2024.  2. Mass effect resulting in partial effacement of the occipital horn of the right lateral ventricle without evidence of hydrocephalus, unchanged compared to the most recent MRI from 11/27/2024.  3. Additional findings, as above.    CT A/P: 12/10/2024: 1. In this patient with metastatic breast cancer there is similar involvement of the pleura of the right lung base, increased size and number of liver lesions, increased size of matted masses extending into the posterior right subphrenic/subhepatic space, increased size of right axillary lymph node, and increased size of posterior right chest/abdominal wall mass.  Findings overall consistent with worsening metastatic disease.  2. Additional grossly stable right inguinal and right iliac chain lymph nodes.  3. Right lateral abdominal wall heterogeneously enhancing lesion within the subcutaneous fat, not completely included within field of view of prior imaging and also likely representing metastatic disease.  4. Increased size of heterogeneous lesions involving the mid right kidney and inferior pole of the left kidney, concerning for additional foci of metastatic disease.  5. Interval increased mass effect upon the posteroinferior aspect of the right atrium,  superior most aspect of the IVC and lesser extent intrahepatic portion of the IVC which also shows heterogeneous opacification at these levels presumably related to mixing of blood pool and contrast bolus and timing of contrast bolus; however, local invasion and/or bland or tumor thrombus not excluded.  Further evaluation/follow-up as warranted.  6. Small volume of loculated right pleural fluid, similar to prior.  7. Additional findings, as above.  MRI Brain: 11/27/2024: Interval progression of metastatic disease with significant interval increase in size of several ring-enhancing lesions in both cerebral hemispheres and the left cerebellum as compared to the prior with significant surrounding edema.  New punctate focus of enhancement in the left parietal lobe as compared to the prior.  Interval increase in size of the ventricular system with some effacement of the sulci bilaterally is concerning for developing hydrocephalus.  Recommend neurosurgical consultation.  Patient was instructed to present to the emergency room for further evaluation.  Findings discussed with Dr. Paul via secure chat at 15:35 on 11/27/2024.

## 2024-12-31 NOTE — ASSESSMENT & PLAN NOTE
Progressive- Due to progressive disease  Increase MSContin to 60mg BID- Titrate or rotate to Oxycodone per pt's response  Hydromorphone IR 4mg Q3H PRN- Max 6 doses per day  Narcan RX given and explained use to pt and family. Pt and family verbalized understanding.    Miralax 1pack/day or Senna 2 tabs at bedtime for opioid-induced constipation.   MOM if Miralax/senna are not effective.   Pain contract- 09/04/2024  UDS- 10/17/2024- Consistent w/ prescribed Hydromorphone and Morphine. Inconsistent with Marijuana. Pt aware she can obtain medical marijuana card.

## 2024-12-31 NOTE — ASSESSMENT & PLAN NOTE
-Code status: Full Code. Pt and daughter wish to revisit at a later time  -HCPOA: Daughter: Luna [place]: [phone]. Pt has 1 adult son-lives in [place]. Pt is single  -GOC:  Continue cancer treatment, symptom management, maintain independence and functional status. Pt aware treatment intent is palliative.   -See HPI for further details

## 2024-12-31 NOTE — TELEPHONE ENCOUNTER
Pt's daughter called regarding having pt's medical records sent to Dr Ilda Flower at [facility]. I let her know medical records would have to send the whole record but I could forward the latest dr's notes and path/imaging reports to them. Pt's daughter v/u. Routed promised medical records to requested 's office.

## 2025-01-02 ENCOUNTER — HOSPITAL ENCOUNTER (OUTPATIENT)
Dept: RADIATION THERAPY | Facility: HOSPITAL | Age: 65
Discharge: HOME | End: 2025-01-02
Attending: SPECIALIST
Payer: MEDICARE

## 2025-01-03 ENCOUNTER — TELEPHONE (OUTPATIENT)
Dept: NUTRITION | Facility: CLINIC | Age: 65
End: 2025-01-03
Payer: MEDICARE

## 2025-01-03 NOTE — TELEPHONE ENCOUNTER
Introduced myself to new oncology patient over the phone. Gave my phone number along with guidance on when to contact me about an appointment, for concerns like significant loss of appetite and weight loss.   Signature: Renetta Polanco, MPH, RD, LDN

## 2025-01-06 ENCOUNTER — PATIENT MESSAGE (OUTPATIENT)
Dept: RADIATION ONCOLOGY | Facility: CLINIC | Age: 65
End: 2025-01-06
Payer: MEDICARE

## 2025-01-06 ENCOUNTER — DOCUMENTATION ONLY (OUTPATIENT)
Dept: RADIATION ONCOLOGY | Facility: CLINIC | Age: 65
End: 2025-01-06
Payer: MEDICARE

## 2025-01-06 PROCEDURE — 77412 RADIATION TX DELIVERY LVL 3: CPT | Performed by: SPECIALIST

## 2025-01-06 NOTE — PLAN OF CARE
Day 4 outpatient xrt to skin lesion on back. she is tolerating her skin treatment without difficulty, with a overwhelming reduction in odor largely due to continued use of topical Flagyl . There is ongoing drainage with a modest ulceration rather than raised lesion. Will continue to monitor.

## 2025-01-07 ENCOUNTER — PATIENT MESSAGE (OUTPATIENT)
Dept: HEMATOLOGY/ONCOLOGY | Facility: CLINIC | Age: 65
End: 2025-01-07
Payer: MEDICARE

## 2025-01-07 ENCOUNTER — DOCUMENTATION ONLY (OUTPATIENT)
Dept: RADIATION ONCOLOGY | Facility: CLINIC | Age: 65
End: 2025-01-07
Payer: MEDICARE

## 2025-01-07 PROCEDURE — 77412 RADIATION TX DELIVERY LVL 3: CPT | Performed by: SPECIALIST

## 2025-01-08 ENCOUNTER — TELEPHONE (OUTPATIENT)
Dept: HEMATOLOGY/ONCOLOGY | Facility: CLINIC | Age: 65
End: 2025-01-08
Payer: MEDICARE

## 2025-01-08 ENCOUNTER — OFFICE VISIT (OUTPATIENT)
Dept: PALLIATIVE MEDICINE | Facility: CLINIC | Age: 65
End: 2025-01-08
Payer: MEDICARE

## 2025-01-08 DIAGNOSIS — C50.412 PRIMARY MALIGNANT NEOPLASM OF UPPER OUTER QUADRANT OF BREAST, LEFT: Primary | ICD-10-CM

## 2025-01-08 DIAGNOSIS — Z51.5 PALLIATIVE CARE ENCOUNTER: ICD-10-CM

## 2025-01-08 DIAGNOSIS — G89.3 NEOPLASM RELATED PAIN: ICD-10-CM

## 2025-01-08 PROCEDURE — 77336 RADIATION PHYSICS CONSULT: CPT | Performed by: SPECIALIST

## 2025-01-08 NOTE — PROGRESS NOTES
Palliative Medicine Clinic Note  Follow-up           Consult Requested By: Dr. Hoffmann      Reason for Consult: Symptom Management/Advance Care Planning/Goals of Care Discussion    Chief Complaint: Pain          ASSESSMENT/PLAN:      Plan/Recommendations     1. Primary malignant neoplasm of upper outer quadrant of breast, left  Assessment & Plan:    Followed by Dr. Hoffmann, [facility], Neurosurgery. Previous oncologist at [facility].   S/p Keytruda, left lumpectomy (Aug. 2023) w/ residual disease post resection, radiation (Nov. 2023).    On Eribulin Q3W  Stopped Sacituzumab-Govitecan-due to progressive disease  Stopped Paclitaxel D/C'd due to progressive disease.   S/P right VATS washout with partial decortication, pleural biopsy, intercostal nerve blocks, Pleurx catheter insertion Aug 2024. Pleurx removed due to low output.   CT Head: 12/10/2024: 1. Similar areas of edema involving the posterior left frontal lobe, inferior left frontal lobe, right occipital lobe, and left cerebellum, corresponding to metastatic lesions better characterized on MRI from 11/27/2024.  2. Mass effect resulting in partial effacement of the occipital horn of the right lateral ventricle without evidence of hydrocephalus, unchanged compared to the most recent MRI from 11/27/2024.  3. Additional findings, as above.    CT A/P: 12/10/2024: 1. In this patient with metastatic breast cancer there is similar involvement of the pleura of the right lung base, increased size and number of liver lesions, increased size of matted masses extending into the posterior right subphrenic/subhepatic space, increased size of right axillary lymph node, and increased size of posterior right chest/abdominal wall mass.  Findings overall consistent with worsening metastatic disease.  2. Additional grossly stable right inguinal and right iliac chain lymph nodes.  3. Right lateral abdominal wall heterogeneously enhancing lesion within the subcutaneous fat, not completely  included within field of view of prior imaging and also likely representing metastatic disease.  4. Increased size of heterogeneous lesions involving the mid right kidney and inferior pole of the left kidney, concerning for additional foci of metastatic disease.  5. Interval increased mass effect upon the posteroinferior aspect of the right atrium, superior most aspect of the IVC and lesser extent intrahepatic portion of the IVC which also shows heterogeneous opacification at these levels presumably related to mixing of blood pool and contrast bolus and timing of contrast bolus; however, local invasion and/or bland or tumor thrombus not excluded.  Further evaluation/follow-up as warranted.  6. Small volume of loculated right pleural fluid, similar to prior.  7. Additional findings, as above.  MRI Brain: 11/27/2024: Interval progression of metastatic disease with significant interval increase in size of several ring-enhancing lesions in both cerebral hemispheres and the left cerebellum as compared to the prior with significant surrounding edema.  New punctate focus of enhancement in the left parietal lobe as compared to the prior.  Interval increase in size of the ventricular system with some effacement of the sulci bilaterally is concerning for developing hydrocephalus.  Recommend neurosurgical consultation.  Patient was instructed to present to the emergency room for further evaluation.  Findings discussed with Dr. Paul via secure chat at 15:35 on 11/27/2024.        2. Neoplasm related pain  Assessment & Plan:    Stable  Continue MSContin 60mg BID- Titrate or rotate to Oxycontin per pt's response  Hydromorphone IR 4mg Q3H PRN- Max 6 doses per day  Narcan RX given and explained use to pt and family. Pt and family verbalized understanding.    Miralax 1pack/day or Senna 2 tabs at bedtime for opioid-induced constipation.   MOM if Miralax/senna are not effective.   Pain contract- 09/04/2024  UDS- 10/17/2024-  Consistent w/ prescribed Hydromorphone and Morphine. Inconsistent with Marijuana. Pt aware she can obtain medical marijuana card.       3. Palliative care encounter  Assessment & Plan:    -Code status: Full Code. Pt and daughter wish to revisit at a later time  -HCPOA: Daughter: Luna Saavedra: [phone]. Pt has 1 adult son-lives in [place]. Pt is single  -GOC:  Continue cancer treatment, symptom management, maintain independence and functional status. Pt aware treatment intent is palliative.   -See HPI for further details               Advance Care Planning   Advance Directives:   Living Will: No    LaPOST: No    Do Not Resuscitate Status: No    Medical Power of : Yes    Agent's Name:  Daughter: Luna Saavedra   Agent's Contact Number:  [phone]    Decision Making:  Patient answered questions and Family answered questions  Goals of Care: The patient endorses that what is most important right now is to focus on remaining as independent as possible and symptom/pain control    Accordingly, we have decided that the best plan to meet the patient's goals includes continuing with treatment. Pt stated she is aware treatment intent is palliative.   Pt chose to remain Full Code for now. Her and her daughter wish to revisit this later.          Follow up: PRN- if pt moves to [place]     Plan discussed with: Patient and her daughter, Ms. Carrasco.       SUBJECTIVE:      History of Present Illness / Interval History:  Sherlyn Saavedra is 65 y.o. female with Recurrent Metastatic Left Breast Cancer to Pluera/Brain.   On Eribulin Q3W  Stopped Sacituzumab-Govitecan-due to progressive disease  Stopped Paclitaxel D/C'd due to progressive disease.   S/p Keytruda, left lumpectomy (Aug. 2023) w/ residual disease post resection, radiation (Nov. 2023).    S/P right VATS washout with partial decortication, pleural biopsy, intercostal nerve blocks, Pleurx catheter insertion Aug 2024. Pleurx removed due to low output.   Followed by  Dr. Hoffmann, [facility], Neurosurgery. Previous oncologist at [facility].   Admitted 11/27-11/30 for progressive brain mets.   PMHX: HTN, T2DM    Presents to Palliative Care Clinic for physical symptoms, advance care planning,, clarification of goals of care, and additional support..   Please see oncology notes for more details on pt's care.         1/8/25:          The patient location is: [facility]  The chief complaint leading to consultation is:  Follow-up     Visit type: audiovisual     Face to Face time with patient:  15 minutes  35 minutes of total time spent on the encounter, which includes face to face time and non-face to face time preparing to see the patient (eg, review of tests), Obtaining and/or reviewing separately obtained history, Documenting clinical information in the electronic or other health record, Independently interpreting results (not separately reported) and communicating results to the patient/family/caregiver, or Care coordination (not separately reported).      Each patient to whom he or she provides medical services by telemedicine is:  (1) informed of the relationship between the physician and patient and the respective role of any other health care provider with respect to management of the patient; and (2) notified that he or she may decline to receive medical services by telemedicine and may withdraw from such care at any time.     Notes:   Pt seen via audiovisual feed. A&O x3. No acute distress.   Daughter, Ms. Carrasco also present.   Pt reported lower back pain is better managed with Mscontin 60mg BID and Hydromorphone IR 4mg Q3H PRN.   They are discussing appropriate steroid weaning schedule with [facility]- Dr. Hines.   She is having headaches and visual changes. She notes Dr. Hines is aware.   She had a fall on her knees on Jan 6th, daughter stated she caught her. No head impact at time of fall.   She wishes to f/u PCP- Dr. Del Rosario to re-assess diabetes regimen.   Pt and daughter  "stated they are planning move to [place] and plan to see an oncologist there on Jan 17th       Past Visits    12/30/2024: The patient location is: [facility]  The chief complaint leading to consultation is:  Follow-up     Visit type: audiovisual     Face to Face time with patient:  10 minutes  35 minutes of total time spent on the encounter, which includes face to face time and non-face to face time preparing to see the patient (eg, review of tests), Obtaining and/or reviewing separately obtained history, Documenting clinical information in the electronic or other health record, Independently interpreting results (not separately reported) and communicating results to the patient/family/caregiver, or Care coordination (not separately reported).      Each patient to whom he or she provides medical services by telemedicine is:  (1) informed of the relationship between the physician and patient and the respective role of any other health care provider with respect to management of the patient; and (2) notified that he or she may decline to receive medical services by telemedicine and may withdraw from such care at any time.     Notes:   Pt seen via audiovisual feed. A&O x3. No acute distress. Her daughter, Ms. Carrasco also present.   Pt reported lower back pain 5/10 following Hydromorphone IR 2 hours prior. She is taking Morphine ER 45mg BID and Hydromorphone 4mg IR Q3H PRN up to 8 doses/day  She has restarted medical marijuana due to progressive pain. She notes she is feeling sleepy and tired.   She also reported using hydromorphone IR "prevent pain". We discussed only using Hydromorphone IR as needed. She also notes she sometimes feels more pain in between Hydromorphone doses.   No nausea/vomiting/constipation/diarrhea. Appetite is intact. She is tolerating radiation well.   She is traveling to [place] with her daughter on 12/31/2024 with plans to return on Sunday, January 5, 2024.       Visit type: Transitioned " from audiovisual to in-person due to pt's symptoms.   Notes:   12/03/2024: Pt seen via audiovisual feed. A&O x3. No acute distress. Daughter, Luna present in the background.   Pt reported progressive right torso and right lower back pain not managed with MScontin 30mg BID and Hydromorphone 4mg Q3H PRN. She was discharged with enough pain pain medicine to last this week.   She is also constipated requiring suppository on 11/30 prior to her discharge from hospital. She has not had a bowel movement since. She was unsure if she is passing gas. She is not taking Miralax/Senna at this time  She noted some right abdominal tenderness and decreased appetite.    We discussed trying MOM for constipation and resuming daily Miralax use.   I suggested pt to come to clinic in person for further evaluation of constipation and worsening pain.     Pt attended clinic with her daughter, Ms. Carrasco  She reported passing gas after logging off from virtual visit. She took MOM prior to coming to clinic.     11/13/2024: Pt attended clinic alone. She was in no acute distress. Vital signs stable on room air.   She reported back pain is stable 3/10 on Hydromorphone IR 4mg Q3HPRN, Zanaflex PRN, MSContin 30mg BID and marijuana  Constipation managed with Miralax and Senna.   Anxiety/Depression has improved. Her  is providing spiritual counseling.   She is requesting help at home with cooking and cleaning. Information on [facility] given.   Right upper back wound stable. Blister has healed.       10/17/2024: Pt seen in infusion bay. She was in no acute distress. Vital signs stable on room air.   Pain stable on Hydromorphone IR 4mg Q3HPRN, Zanaflex PRN, and MSContin 30mg BID   Forgetfulness impacting short-term memory. Now keeping a journal  Anxiety/Depression due to cancer diagnosis. Good family support. She wishes to defer mental health counseling at this time.   Intermittent constipation managed  with Miralax daily/every  other day and MOM.   Nausea managed with PRN Zofran. Appetite is gradually improving.     10/03/2024: The patient location is: [facility]  The chief complaint leading to consultation is:  Follow-up     Visit type: audiovisual     Face to Face time with patient:  10 minutes  35 minutes of total time spent on the encounter, which includes face to face time and non-face to face time preparing to see the patient (eg, review of tests), Obtaining and/or reviewing separately obtained history, Documenting clinical information in the electronic or other health record, Independently interpreting results (not separately reported) and communicating results to the patient/family/caregiver, or Care coordination (not separately reported).      Each patient to whom he or she provides medical services by telemedicine is:  (1) informed of the relationship between the physician and patient and the respective role of any other health care provider with respect to management of the patient; and (2) notified that he or she may decline to receive medical services by telemedicine and may withdraw from such care at any time.     Notes:   Pt seen via audiovisual feed. A&O x3. No acute distress.   She reported progressive right chest/back/right lateral abd pain over the past week requiring ED visits two days ago. Pain is now better managed with MSContin 30mg BID and Hydromorphone 4mg Q4H PRN.   She is requesting narcotic rx be sent to [facility] [address]  Some nausea/vomiting this morning. Constipation slightly managed with MOM/Miralax. She requested Linzess prescription. She has not tried Senna.   She wants to clarify with Dr. Hoffmann if she needs Thoracentesis due to her CT chest showing a pleural effusion.   Called pt back following visit. Informed her Dr. Hoffmann said she does not require thoracentesis at this time, as imaging represents cancer progression, not pleural effusion. Pt verbalized understanding,  Informed pt  pain med rx have been sent to [facility] as she requested.   Informed pt to take Zofran 20-30 mins prior to taking opioid to prevent nausea.     09/17/2024:  Pt attended clinic with her friend, Mr. Torres. She was in no acute distress. Vital signs stable on room air.   She reported lower back pain has improved with Morphine IR 15mg, however, it does not last 6 hours. No itching/rash with Morphine.   She noted a palpable/painful lesion on her right lateral thigh. She noticed it about a month ago, but it was not painful then.       09/04/2024: History obtained from: Patient and medical record.   Pt attended clinic alone. Her daughter, Luna present via speaker phone. was in no acute distress. Vital signs stable on room air.   I explained the role palliative care often plays in supporting patients with serious illness and their families regarding symptom management, advance care planning, and goals of care discussion. Pt and daughter verbalized understanding.   Pt reported persistent progressive right lower back pain over the past month. 8/10.  She is taking Norco 10mg QID PRN but no longer effective and she takes it with Benadryl due to itching. Pain hinders sleep.   Neuropathy to hands and right leg, somewhat stable at this time.   She is taking medical marijuana for appetite stimulation. She supplements meals with protein drinks. Her Bgs have been borderline low- she is taking Metformin and Moujaro. She will revisit this with her PCP.   Pt and daughter inquired about pt receiving Glutathione infusions for immune health. I have encouraged them to discuss this with Dr. Hoffmann.     We discussed advance care planning, goals of care, and code status, Full Code vs. DNR including risks, benefits, and alternatives..   She wishes to continue cancer treatment, symptom management, maintain independence and functional status. Pt stated she is aware treatment intent is palliative.   Pt chose to remain Full Code for now. Her  and her daughter wish to revisit this later.   Elected HCPOA is daughter: Luna West: [phone], lives in [place]. Pt has 1 adult son lives in [place]. She is single.         ROS:  Review of Systems   Constitutional:  Positive for appetite change (Decreased) and fatigue. Negative for activity change and unexpected weight change.   HENT:  Negative for hearing loss, sore throat, trouble swallowing and voice change.    Eyes:  Positive for visual disturbance.   Respiratory:  Positive for shortness of breath (Moderate exertional). Negative for cough, chest tightness and wheezing.    Cardiovascular:  Negative for chest pain, palpitations and leg swelling.   Gastrointestinal:  Positive for abdominal pain. Negative for blood in stool, constipation, nausea, rectal pain, vomiting and reflux.   Genitourinary:  Negative for bladder incontinence, difficulty urinating, flank pain, hematuria, nocturia, pelvic pain and urgency.   Musculoskeletal:  Positive for back pain, leg pain and myalgias. Negative for arthralgias and neck pain.   Integumentary:  Positive for wound (Right upper back).   Allergic/Immunologic: Negative for environmental allergies, food allergies and immunocompromised state.   Neurological:  Positive for weakness (Bilateral lower xtremities. Pt ambulating w/walker), numbness, headaches and memory loss. Negative for dizziness, tremors and coordination difficulties.   Hematological:  Negative for adenopathy. Does not bruise/bleed easily.   Psychiatric/Behavioral:  Negative for agitation, behavioral problems, confusion, decreased concentration, depressed mood (Due to cancer diagnosis), dysphoric mood, self-injury, sleep disturbance and suicidal ideas. The patient is not nervous/anxious (Due to cancer diagnosis).        Review of Symptoms      Symptom Assessment (ESAS 0-10 Scale)  Pain:  4  Dyspnea:  0  Anxiety:  0  Nausea:  0  Depression:  0  Anorexia:  0  Fatigue:  8  Insomnia:  0  Restlessness:  0  Agitation:   0     CAM / Delirium:  Negative  Constipation:  Negative  Diarrhea:  Negative    Anxiety:  Is not nervous/anxious (Due to cancer diagnosis)  Constipation:  No constipation    Bowel Management Plan (BMP):  Yes      Comments:  Miralax/Senna    Pain Assessment:    Location(s): back    Back       Location: lower and right        Quality: Aching        Quantity: 4/10 in intensity        Chronicity: Onset 1 month(s) ago, gradually improving        Aggravating Factors: Activity        Alleviating Factors: Opiates       Associated Symptoms: Myalgias    Modified Melo Scale:  0    Performance Status:  90    ECOG Performance Status ndGndrndanddndend:nd nd2nd Living Arrangements:  Lives alone    Psychosocial/Cultural:   See Palliative Psychosocial Note: Yes  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 Daughter, Luna in [place], Son in [place].   **Primary  to Follow**  Palliative Care  Consult: Yes            Medications:    Current Outpatient Medications:     (Magic mouthwash) 1:1:1 diphenhydrAMINE(Benadryl) 12.5mg/5ml liq, aluminum & magnesium hydroxide-simethicone (Maalox), LIDOcaine viscous 2%, Swish and spit 15 mLs every 4 (four) hours as needed (mouth ulcers). for mouth sores, Disp: 360 mL, Rfl: 1    amLODIPine (NORVASC) 5 MG tablet, TAKE 1 TABLET BY MOUTH EVERY MORNING, Disp: 90 tablet, Rfl: 1    atorvastatin (LIPITOR) 40 MG tablet, Take 40 mg by mouth., Disp: , Rfl:     blood sugar diagnostic (FREESTYLE TEST) Strp, Test 4 times per day, Disp: , Rfl:     busPIRone (BUSPAR) 15 MG tablet, Take 1 tablet by mouth at bedtime., Disp: 30 tablet, Rfl: 5    doxycycline (MONODOX) 100 MG capsule, EMPTY CONTENTS OF 1 CAPSULE INTO NASAL IRRIGATION SYSTEM, ADD DISTILLED WATER, SALT PACK, MIX & IRRIGATE. PERFORM 2 TIMES DAILY, Disp: , Rfl:     famotidine (PEPCID) 20 MG tablet, Take 20 mg by mouth 2 (two) times daily., Disp: , Rfl:     folic acid (FOLVITE) 1 MG tablet, take 1 tablet by mouth every morning, Disp: 30 tablet, Rfl: 3     gabapentin (NEURONTIN) 300 MG capsule, Take 300 mg by mouth., Disp: , Rfl:     HYDROmorphone (DILAUDID) 4 MG tablet, Take 1 tablet (4 mg total) by mouth every 3 (three) hours as needed for Pain (Max 6 doses/day)., Disp: 180 tablet, Rfl: 0    LIDOcaine viscous HCl 2%-diphenhydrAMINE-aluminum-magnesium hydroxide-simethicone, Swish and spit 15 mLs by mouth every 4 (four) hours as needed (mouth ulcers). for mouth sores, Disp: 360 mL, Rfl: 1    LIDOcaine-prilocaine (EMLA) cream, Apply to affected area once, Disp: 30 g, Rfl: 11    magnesium oxide (MAG-OX) 400 mg (241.3 mg magnesium) tablet, Take 400 mg by mouth once daily., Disp: , Rfl:     metFORMIN (GLUCOPHAGE-XR) 500 MG ER 24hr tablet, take 2 tablets by mouth in the morning and at bedtime as directed, Disp: 180 tablet, Rfl: 1    metroNIDAZOLE (FLAGYL) 500 MG tablet, Crush 1/2 tablet and sprinkle/apply to the open wound with every dressing change for odor control, Disp: 20 tablet, Rfl: 0    morphine (MS CONTIN) 60 MG 12 hr tablet, Take 1 tablet (60 mg total) by mouth 2 (two) times daily. for 14 days, Disp: 28 tablet, Rfl: 0    naloxone (NARCAN) 4 mg/actuation [place], , Disp: , Rfl:     ondansetron (ZOFRAN-ODT) 8 MG TbDL, Dissolve 1 tablet (8 mg total) by mouth every 8 (eight) hours as needed (Nausea)., Disp: 30 tablet, Rfl: 2    pantoprazole (PROTONIX) 40 MG tablet, take 1 tablet by mouth daily, Disp: 30 tablet, Rfl: 1    tirzepatide (MOUNJARO) 5 mg/0.5 mL PnIj, Inject 5 mg into the skin once weekly, Disp: 2 mL, Rfl: 2      External  database queried on 01/08/2025  by AKUA CAMARGO :            Review of patient's allergies indicates:   Allergen Reactions    Ace inhibitors Swelling    Lisinopril Rash    Hydrocodone Itching     Hives    Hydrocodone-acetaminoph-supp11 Itching    Percocet [oxycodone-acetaminophen] Itching     Pt had to take an antihistamine to improve itching     Cephalexin      Hives    Pantoprazole Hives    Propoxyphene      Hives     Propoxyphene n-acetaminophen     Propoxyphene-acetaminophen            OBJECTIVE:         Physical Exam:  Vitals:      Physical Exam  Vitals reviewed: Limited due to virtual visit.   Constitutional:       General: She is not in acute distress.     Appearance: She is ill-appearing.   Eyes:      General: No scleral icterus.        Right eye: No discharge.         Left eye: No discharge.      Conjunctiva/sclera: Conjunctivae normal.   Pulmonary:      Effort: Pulmonary effort is normal. No respiratory distress.   Neurological:      Mental Status: She is alert and oriented to person, place, and time.   Psychiatric:         Attention and Perception: Attention and perception normal.         Mood and Affect: Mood and affect normal.         Speech: Speech normal.         Behavior: Behavior normal. Behavior is cooperative.         Thought Content: Thought content normal. Thought content does not include suicidal ideation. Thought content does not include suicidal plan.         Cognition and Memory: Cognition and memory normal.         Judgment: Judgment normal.       Labs: BMP  Lab Results   Component Value Date     (L) 12/26/2024    K 4.0 12/26/2024    CL 97 12/26/2024    CO2 22 (L) 12/26/2024    BUN 21 12/26/2024    CREATININE 0.9 12/26/2024    CALCIUM 9.8 12/26/2024    ANIONGAP 15 12/26/2024    EGFRNORACEVR >60 12/26/2024     Lab Results   Component Value Date    WBC 6.49 12/26/2024    HGB 9.5 (L) 12/26/2024    HCT 28.9 (L) 12/26/2024    MCV 87 12/26/2024     12/26/2024             Imaging: CT Head: 12/10/2024: 1. Similar areas of edema involving the posterior left frontal lobe, inferior left frontal lobe, right occipital lobe, and left cerebellum, corresponding to metastatic lesions better characterized on MRI from 11/27/2024.  2. Mass effect resulting in partial effacement of the occipital horn of the right lateral ventricle without evidence of hydrocephalus, unchanged compared to the most recent MRI from  11/27/2024.  3. Additional findings, as above.    CT A/P: 12/10/2024: 1. In this patient with metastatic breast cancer there is similar involvement of the pleura of the right lung base, increased size and number of liver lesions, increased size of matted masses extending into the posterior right subphrenic/subhepatic space, increased size of right axillary lymph node, and increased size of posterior right chest/abdominal wall mass.  Findings overall consistent with worsening metastatic disease.  2. Additional grossly stable right inguinal and right iliac chain lymph nodes.  3. Right lateral abdominal wall heterogeneously enhancing lesion within the subcutaneous fat, not completely included within field of view of prior imaging and also likely representing metastatic disease.  4. Increased size of heterogeneous lesions involving the mid right kidney and inferior pole of the left kidney, concerning for additional foci of metastatic disease.  5. Interval increased mass effect upon the posteroinferior aspect of the right atrium, superior most aspect of the IVC and lesser extent intrahepatic portion of the IVC which also shows heterogeneous opacification at these levels presumably related to mixing of blood pool and contrast bolus and timing of contrast bolus; however, local invasion and/or bland or tumor thrombus not excluded.  Further evaluation/follow-up as warranted.  6. Small volume of loculated right pleural fluid, similar to prior.  7. Additional findings, as above.    MRI Brain: 11/27/2024: Interval progression of metastatic disease with significant interval increase in size of several ring-enhancing lesions in both cerebral hemispheres and the left cerebellum as compared to the prior with significant surrounding edema.  New punctate focus of enhancement in the left parietal lobe as compared to the prior.  Interval increase in size of the ventricular system with some effacement of the sulci bilaterally is  concerning for developing hydrocephalus.  Recommend neurosurgical consultation.  Patient was instructed to present to the emergency room for further evaluation.  Findings discussed with Dr. Paul via secure chat at 15:35 on 11/27/2024.     I spent a total of 35 minutes on the day of the visit. This includes face to face time in discussion of goals of care, symptom assessment, coordination of care and emotional support.  This also includes non-face to face time preparing to see the patient (eg, review of tests/imaging), obtaining and/or reviewing separately obtained history, documenting clinical information in the electronic or other health record, independently interpreting results and communicating results to the patient/family/caregiver, or care coordinator.       AKUA HENSLEY NP

## 2025-01-08 NOTE — PATIENT INSTRUCTIONS
Continue your pain medications as prescribed.   Contact us once you have transferred your healthcare to [place].

## 2025-01-08 NOTE — PROGRESS NOTES
Subjective:       Patient ID: Sherlyn Saavedra is a 65 y.o. female.    Chief Complaint: Breast Cancer and Chemotherapy    Primary Oncologist/Hematologist: Dr. Hoffmann    HPI: Ms. Saavedra is a 65 year old female who is following up for her relapsed triple negative breast carcinoma. She is here for cycle 2 day 1 of eribulin (halaven) q 3 weeks (treated on days 1 and 8).Most recent images 1210/24 showing progression of disease. MDA consult was done and advised to switch therapeutic options. Completed outpatient xrt to her back 1/7/2025.   Cancer Hx: diagnosed in 2022 with triple negative breast cancer patient treated with induction chemotherapy consisting of carboplatin Taxol cyclophosphamide doxorubicin and Keytruda patient had residual disease at time of resection. In treated with Xeloda with primary breast irradiation patient has had increasing chest pain shortness of breath patient underwent imaging demonstrating a pleural effusion on the left. Biopsy consistent with metastatic breast carcinoma. Single agent Taxol was initiated on 08/28/2024. MRI of the brain on 11/27/2024 showed significant interval progression with up to 6 metastases, the largest in the right occipital lobe measuring 3.7 cm. Dr. Licona delivered gamma knife stereotactic radiosurgery to her 6 brain metastases on 12/16/2024. Systemic therapy resumed on 12/1924 with eribulin     Today: Patient presents with daughter. She does have swelling around feet and ankles. She has not been wearing compression socks, but she states she can and will continue to elevate legs as well. She did have a cold a week ago, took theraflu, denies any fevers. She states she is feeling better but still has some congestion and cough present. She states her sob is baseline. She states her back is sore from radiation but is getting better.     Social History     Socioeconomic History    Marital status: Single   Tobacco Use    Smoking status: Former     Current packs/day: 0.00      Types: Cigarettes     Quit date:      Years since quittin.0    Smokeless tobacco: Never   Substance and Sexual Activity    Alcohol use: Not Currently    Drug use: Never    Sexual activity: Not Currently     Social Drivers of Health     Financial Resource Strain: Patient Declined (2024)    Overall Financial Resource Strain (CARDIA)     Difficulty of Paying Living Expenses: Patient declined   Recent Concern: Financial Resource Strain - Medium Risk (2024)    Overall Financial Resource Strain (CARDIA)     Difficulty of Paying Living Expenses: Somewhat hard   Food Insecurity: Patient Declined (2024)    Hunger Vital Sign     Worried About Running Out of Food in the Last Year: Patient declined     Ran Out of Food in the Last Year: Patient declined   Recent Concern: Food Insecurity - Food Insecurity Present (2024)    Hunger Vital Sign     Worried About Running Out of Food in the Last Year: Sometimes true     Ran Out of Food in the Last Year: Sometimes true   Transportation Needs: Patient Declined (2024)    TRANSPORTATION NEEDS     Transportation : Patient declined   Physical Activity: Inactive (2024)    Exercise Vital Sign     Days of Exercise per Week: 0 days     Minutes of Exercise per Session: 0 min   Stress: Patient Declined (2024)    [employer] - Occupational Stress Questionnaire     Feeling of Stress : Patient declined   Recent Concern: Stress - Stress Concern Present (2024)    [employer] - Occupational Stress Questionnaire     Feeling of Stress : To some extent   Housing Stability: Patient Declined (2024)    Housing Stability Vital Sign     Unable to Pay for Housing in the Last Year: Patient declined     Homeless in the Last Year: Patient declined       Past Medical History:   Diagnosis Date    Allergy     Anemia     Breast cancer     primary malignant neoplasm of upper outer quadrant of breast -  progressive metastatic triple negative breast cancer. Patient has progressive 2 lines of therapy.    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    Past Surgical History:   Procedure Laterality Date    HYSTERECTOMY      tubaligation  1987       Review of Systems   Constitutional:  Positive for fatigue. Negative for activity change, appetite change, chills, diaphoresis, fever and unexpected weight change.   HENT:  Positive for congestion. Negative for nosebleeds and rhinorrhea.    Respiratory:  Positive for cough and shortness of breath.    Cardiovascular:  Negative for chest pain and leg swelling.   Gastrointestinal:  Negative for abdominal pain, anal bleeding, blood in stool, constipation, diarrhea, nausea and vomiting.   Genitourinary:  Negative for hematuria.   Musculoskeletal:  Positive for arthralgias.   Skin:  Negative for color change and pallor.   Allergic/Immunologic: Positive for immunocompromised state.   Neurological:  Negative for dizziness and headaches.         Medication List with Changes/Refills   New Medications    AZITHROMYCIN (Z-JESUSITA) 250 MG TABLET    Take 2 tablets by mouth on day 1; Take 1 tablet by mouth on days 2-5   Current Medications    (MAGIC MOUTHWASH) 1:1:1 DIPHENHYDRAMINE(BENADRYL) 12.5MG/5ML LIQ, ALUMINUM & MAGNESIUM HYDROXIDE-SIMETHICONE (MAALOX), LIDOCAINE VISCOUS 2%    Swish and spit 15 mLs every 4 (four) hours as needed (mouth ulcers). for mouth sores    AMLODIPINE (NORVASC) 5 MG TABLET    TAKE 1 TABLET BY MOUTH EVERY MORNING    ATORVASTATIN (LIPITOR) 40 MG TABLET    Take 40 mg by mouth.    BLOOD SUGAR DIAGNOSTIC (FREESTYLE TEST) STRP    Test 4 times per day    BUSPIRONE  (BUSPAR) 15 MG TABLET    Take 1 tablet by mouth at bedtime.    DOXYCYCLINE (MONODOX) 100 MG CAPSULE    EMPTY CONTENTS OF 1 CAPSULE INTO NASAL IRRIGATION SYSTEM, ADD DISTILLED WATER, SALT PACK, MIX & IRRIGATE. PERFORM 2 TIMES DAILY    FAMOTIDINE (PEPCID) 20 MG TABLET    Take 20 mg by mouth 2 (two) times daily.    FOLIC ACID (FOLVITE) 1 MG TABLET    take 1 tablet by mouth every morning    GABAPENTIN (NEURONTIN) 300 MG CAPSULE    Take 300 mg by mouth.    HYDROMORPHONE (DILAUDID) 4 MG TABLET    Take 1 tablet (4 mg total) by mouth every 3 (three) hours as needed for Pain (Max 6 doses/day).    LIDOCAINE VISCOUS HCL 2%-DIPHENHYDRAMINE-ALUMINUM-MAGNESIUM HYDROXIDE-SIMETHICONE    Swish and spit 15 mLs by mouth every 4 (four) hours as needed (mouth ulcers). for mouth sores    LIDOCAINE-PRILOCAINE (EMLA) CREAM    Apply to affected area once    MAGNESIUM OXIDE (MAG-OX) 400 MG (241.3 MG MAGNESIUM) TABLET    Take 400 mg by mouth once daily.    METFORMIN (GLUCOPHAGE-XR) 500 MG ER 24HR TABLET    take 2 tablets by mouth in the morning and at bedtime as directed    METRONIDAZOLE (FLAGYL) 500 MG TABLET    Crush 1/2 tablet and sprinkle/apply to the open wound with every dressing change for odor control    MORPHINE (MS CONTIN) 60 MG 12 HR TABLET    Take 1 tablet (60 mg total) by mouth 2 (two) times daily. for 14 days    NALOXONE (NARCAN) 4 MG/ACTUATION SPRY        ONDANSETRON (ZOFRAN-ODT) 8 MG TBDL    Dissolve 1 tablet (8 mg total) by mouth every 8 (eight) hours as needed (Nausea).    PANTOPRAZOLE (PROTONIX) 40 MG TABLET    take 1 tablet by mouth daily    TIRZEPATIDE (MOUNJARO) 5 MG/0.5 ML PNIJ    Inject 5 mg into the skin once weekly     Objective:     Vitals:    01/09/25 1019   BP: (!) 146/64   Pulse: 108   Temp: 98.6 °F (37 °C)     Physical Exam  Vitals reviewed.   Constitutional:       General: She is not in acute distress.     Appearance: She is not ill-appearing, toxic-appearing or diaphoretic.   HENT:      Head: Normocephalic and  atraumatic.   Cardiovascular:      Rate and Rhythm: Normal rate.   Pulmonary:      Effort: Pulmonary effort is normal.   Musculoskeletal:      Right lower leg: Edema present.      Left lower leg: Edema present.   Skin:     General: Skin is warm.      Coloration: Skin is not jaundiced or pale.      Findings: No bruising.   Neurological:      Mental Status: She is alert.      Gait: Gait abnormal (wheelchair).   Psychiatric:         Mood and Affect: Mood normal.         Behavior: Behavior normal.         Thought Content: Thought content normal.          Labs/Results:  CMP  Sodium   Date Value Ref Range Status   01/09/2025 139 136 - 145 mmol/L Final     Potassium   Date Value Ref Range Status   01/09/2025 4.4 3.5 - 5.1 mmol/L Final     Chloride   Date Value Ref Range Status   01/09/2025 98 95 - 110 mmol/L Final     CO2   Date Value Ref Range Status   01/09/2025 28 23 - 29 mmol/L Final     Glucose   Date Value Ref Range Status   01/09/2025 143 (H) 70 - 110 mg/dL Final     BUN   Date Value Ref Range Status   01/09/2025 12 8 - 23 mg/dL Final     Creatinine   Date Value Ref Range Status   01/09/2025 0.7 0.5 - 1.4 mg/dL Final     Calcium   Date Value Ref Range Status   01/09/2025 9.2 8.7 - 10.5 mg/dL Final     Total Protein   Date Value Ref Range Status   01/09/2025 6.7 6.0 - 8.4 g/dL Final     Albumin   Date Value Ref Range Status   01/09/2025 3.1 (L) 3.5 - 5.2 g/dL Final     Total Bilirubin   Date Value Ref Range Status   01/09/2025 0.5 0.1 - 1.0 mg/dL Final     Comment:     For infants and newborns, interpretation of results should be based  on gestational age, weight and in agreement with clinical  observations.    Premature Infant recommended reference ranges:  Up to 24 hours.............<8.0 mg/dL  Up to 48 hours............<12.0 mg/dL  3-5 days..................<15.0 mg/dL  6-29 days.................<15.0 mg/dL       Alkaline Phosphatase   Date Value Ref Range Status   01/09/2025 376 (H) 40 - 150 U/L Final     AST    Date Value Ref Range Status   01/09/2025 39 10 - 40 U/L Final     ALT   Date Value Ref Range Status   01/09/2025 70 (H) 10 - 44 U/L Final     Anion Gap   Date Value Ref Range Status   01/09/2025 13 8 - 16 mmol/L Final     eGFR   Date Value Ref Range Status   01/09/2025 >60 >60 mL/min/1.73 m^2 Final     Lab Results   Component Value Date    WBC 11.22 01/09/2025    RBC 3.16 (L) 01/09/2025    HGB 9.2 (L) 01/09/2025    HCT 29.3 (L) 01/09/2025    MCV 93 01/09/2025    MCH 29.1 01/09/2025    MCHC 31.4 (L) 01/09/2025    RDW 16.4 (H) 01/09/2025     01/09/2025    MPV 9.2 01/09/2025    GRAN 9.4 (H) 01/09/2025    GRAN 83.8 (H) 01/09/2025    LYMPH 0.8 (L) 01/09/2025    LYMPH 7.0 (L) 01/09/2025    MONO 0.5 01/09/2025    MONO 4.3 01/09/2025    EOS 0.0 01/09/2025    BASO 0.03 01/09/2025    EOSINOPHIL 0.0 01/09/2025    BASOPHIL 0.3 01/09/2025     CT head 12/10/24  Impression:  1. Similar areas of edema involving the posterior left frontal lobe, inferior left frontal lobe, right occipital lobe, and left cerebellum, corresponding to metastatic lesions better characterized on MRI from 11/27/2024.  2. Mass effect resulting in partial effacement of the occipital horn of the right lateral ventricle without evidence of hydrocephalus, unchanged compared to the most recent MRI from 11/27/2024.  3. Additional findings, as above.    Ct abd/pelvis 12/20/24  Impression:  1. In this patient with metastatic breast cancer there is similar involvement of the pleura of the right lung base, increased size and number of liver lesions, increased size of matted masses extending into the posterior right subphrenic/subhepatic space, increased size of right axillary lymph node, and increased size of posterior right chest/abdominal wall mass.  Findings overall consistent with worsening metastatic disease.  2. Additional grossly stable right inguinal and right iliac chain lymph nodes.  3. Right lateral abdominal wall heterogeneously enhancing lesion  within the subcutaneous fat, not completely included within field of view of prior imaging and also likely representing metastatic disease.  4. Increased size of heterogeneous lesions involving the mid right kidney and inferior pole of the left kidney, concerning for additional foci of metastatic disease.  5. Interval increased mass effect upon the posteroinferior aspect of the right atrium, superior most aspect of the IVC and lesser extent intrahepatic portion of the IVC which also shows heterogeneous opacification at these levels presumably related to mixing of blood pool and contrast bolus and timing of contrast bolus; however, local invasion and/or bland or tumor thrombus not excluded.  Further evaluation/follow-up as warranted.  6. Small volume of loculated right pleural fluid, similar to prior.    Assessment:     Problem List Items Addressed This Visit       Primary malignant neoplasm of upper outer quadrant of breast, left - Primary    Secondary malignancy of parietal pleura    Secondary adenocarcinoma of brain    [Secondary cancer of skin]    Immunodeficiency due to chemotherapy    Relevant Medications    azithromycin (Z-JESUSITA) 250 MG tablet     Other Visit Diagnoses       Cough, unspecified type        Relevant Medications    azithromycin (Z-JESUSITA) 250 MG tablet          Plan:     Primary malignant neoplasm of upper outer quadrant of breast, left, Secondary malignancy of parietal pleura  --s/p taxol weeks (3 weeks on and 1 week off)  --Single agent Taxol was initiated on 08/28/2024. MRI of the brain on 11/27/2024 showed significant interval progression with up to 6 metastases, the largest in the right occipital lobe measuring 3.7 cm. Dr. Licona delivered gamma knife stereotactic radiosurgery to her 6 brain metastases on 12/16/2024. Systemic therapy resumed on 12/1924 with eribulin  --Most recent images 1210/24 showing progression of disease. MDA consult was done and adivsed to switch therapeutic options.  --s/p  outpatient xrt to her back 1/7/2025   --continue with cycle 2 day 1 of eribulin (halaven) q 3 weeks (treated on days 1 and 8).   --ANC:9.4 plts:199 crcl:74.2 ml/min  --elevate legs and compression socks for swelling.   --z-caleb for cough and congestion lasting longer then 5 days.       Follow-Up: 1 week with cbc cmp prior for cycle 2 day 8 with primary oncologist. 3 weeks with cbc cmp prior for cycle 3 day 1. Standing orders in    Whitley Galarza PA-C  Hematology Oncology    Route Chart for Scheduling  Med Onc Route Chart for Scheduling    Treatment Plan Information   OP ERIBULIN Q3W Yaya Hoffmann MD   Associated diagnosis: Primary malignant neoplasm of upper outer quadrant of breast, left No Stage Recommended ypT2, pN0, cM0, G3, ER-, LA-, HER2- noted on 10/10/2023   Line of treatment: Third Line  Treatment Goal: Control     Upcoming Treatment Dates - OP ERIBULIN Q3W    1/9/2025       Chemotherapy       eriBULin (HALAVEN) 2.55 mg in 0.9% NaCl 105.1 mL IVPB       Antiemetics       ondansetron injection 8 mg  1/16/2025       Chemotherapy       eriBULin (HALAVEN) 2.55 mg in 0.9% NaCl 105.1 mL IVPB       Antiemetics       ondansetron injection 8 mg  1/30/2025       Chemotherapy       eriBULin (HALAVEN) 2.55 mg in 0.9% NaCl 105.1 mL IVPB       Antiemetics       ondansetron injection 8 mg  2/6/2025       Chemotherapy       eriBULin (HALAVEN) 2.55 mg in 0.9% NaCl 105.1 mL IVPB       Antiemetics       ondansetron injection 8 mg    Therapy Plan Information  FERRLECIT (FERRIC GLUCONATE) QW for Iron deficiency anemia due to chronic blood loss, noted on 9/21/2024  Anaphylaxis/Hypersensitivity  EPINEPHrine (EPIPEN) 0.3 mg/0.3 mL pen injection 0.3 mg  0.3 mg, Intramuscular, PRN  diphenhydrAMINE injection 50 mg  50 mg, Intravenous, PRN  hydrocortisone sodium succinate injection 100 mg  100 mg, Intravenous, PRN  Flushes  heparin, porcine (PF) 100 unit/mL injection flush 500 Units  500 Units, Intravenous, PRN  sodium chloride 0.9%  flush 10 mL  10 mL, Intravenous, 1 time a week  0.9% NaCl 100 mL flush bag  Intravenous, 1 time a week  6. Flushes  alteplase injection 2 mg  2 mg, Intra-Catheter, PRN    PORT FLUSH for Primary malignant neoplasm of upper outer quadrant of breast, left, noted on 10/10/2023  Flushes  heparin, porcine (PF) 100 unit/mL injection flush 500 Units  500 Units, Intravenous, Every visit  sodium chloride 0.9% flush 10 mL  10 mL, Intravenous, Every visit      No therapy plan of the specified type found.

## 2025-01-08 NOTE — TELEPHONE ENCOUNTER
LVM asking Ms Saavedra to call me back regarding her question about pt's chemo options with new insurance. Left direct contact number     [phone]: pt's daughter called ONN back. I assured her that whatever pt's insurance the tx plans pt has received are standard of care based on [place]CC[place] guidelines. Pt's daughter states that she just wanted to make sure that having more coverage wouldn't open up more options for pt. Assured her it would not. Ms Saavedra v/u

## 2025-01-09 ENCOUNTER — TELEPHONE (OUTPATIENT)
Dept: HEMATOLOGY/ONCOLOGY | Facility: CLINIC | Age: 65
End: 2025-01-09
Payer: MEDICARE

## 2025-01-09 ENCOUNTER — TELEPHONE (OUTPATIENT)
Dept: INFUSION THERAPY | Facility: HOSPITAL | Age: 65
End: 2025-01-09
Payer: MEDICARE

## 2025-01-09 ENCOUNTER — OFFICE VISIT (OUTPATIENT)
Dept: HEMATOLOGY/ONCOLOGY | Facility: CLINIC | Age: 65
DRG: 193 | End: 2025-01-09
Payer: MEDICARE

## 2025-01-09 VITALS
HEIGHT: 60 IN | TEMPERATURE: 99 F | BODY MASS INDEX: 34.02 KG/M2 | SYSTOLIC BLOOD PRESSURE: 146 MMHG | OXYGEN SATURATION: 93 % | WEIGHT: 173.31 LBS | HEART RATE: 108 BPM | DIASTOLIC BLOOD PRESSURE: 64 MMHG

## 2025-01-09 DIAGNOSIS — D84.821 IMMUNODEFICIENCY DUE TO CHEMOTHERAPY: ICD-10-CM

## 2025-01-09 DIAGNOSIS — Z79.899 IMMUNODEFICIENCY DUE TO CHEMOTHERAPY: ICD-10-CM

## 2025-01-09 DIAGNOSIS — C79.31 SECONDARY ADENOCARCINOMA OF BRAIN: ICD-10-CM

## 2025-01-09 DIAGNOSIS — R05.9 COUGH, UNSPECIFIED TYPE: ICD-10-CM

## 2025-01-09 DIAGNOSIS — C78.2 SECONDARY MALIGNANCY OF PARIETAL PLEURA: ICD-10-CM

## 2025-01-09 DIAGNOSIS — C50.412 PRIMARY MALIGNANT NEOPLASM OF UPPER OUTER QUADRANT OF BREAST, LEFT: Primary | ICD-10-CM

## 2025-01-09 DIAGNOSIS — T45.1X5A IMMUNODEFICIENCY DUE TO CHEMOTHERAPY: ICD-10-CM

## 2025-01-09 PROCEDURE — 3008F BODY MASS INDEX DOCD: CPT | Mod: CPTII,S$GLB,,

## 2025-01-09 PROCEDURE — 99215 OFFICE O/P EST HI 40 MIN: CPT | Mod: S$GLB,,,

## 2025-01-09 PROCEDURE — 99999 PR PBB SHADOW E&M-EST. PATIENT-LVL III: CPT | Mod: PBBFAC,,,

## 2025-01-09 PROCEDURE — 3078F DIAST BP <80 MM HG: CPT | Mod: CPTII,S$GLB,,

## 2025-01-09 PROCEDURE — 1157F ADVNC CARE PLAN IN RCRD: CPT | Mod: CPTII,S$GLB,,

## 2025-01-09 PROCEDURE — 3077F SYST BP >= 140 MM HG: CPT | Mod: CPTII,S$GLB,,

## 2025-01-09 PROCEDURE — 1101F PT FALLS ASSESS-DOCD LE1/YR: CPT | Mod: CPTII,S$GLB,,

## 2025-01-09 PROCEDURE — 3288F FALL RISK ASSESSMENT DOCD: CPT | Mod: CPTII,S$GLB,,

## 2025-01-09 PROCEDURE — 1111F DSCHRG MED/CURRENT MED MERGE: CPT | Mod: CPTII,S$GLB,,

## 2025-01-09 RX ORDER — AZITHROMYCIN 250 MG/1
TABLET, FILM COATED ORAL
Qty: 6 TABLET | Refills: 0 | Status: ON HOLD | OUTPATIENT
Start: 2025-01-09 | End: 2025-01-14 | Stop reason: HOSPADM

## 2025-01-09 NOTE — TELEPHONE ENCOUNTER
Spoke with MsTequila Quentin and informed her that due to her new insurance, her auth is not yet approved to receive today. She was encouraged to get labs and see LISA [place] today. Ms. Quentin states understanding. Infusion notified.

## 2025-01-09 NOTE — TELEPHONE ENCOUNTER
SW returned pts vm call. Pt called SW to inquire about denial for treatment and wanted SW to check for a reason. SW sent message to pre-service dept to see if change in insurance was a factor. Per LU Burks, no denial or approval as of yet, just stating pending due to insurance change. SW informed pt of the above, and that she will be contacted when treatment is authorized. SW will remain available.

## 2025-01-10 ENCOUNTER — PATIENT MESSAGE (OUTPATIENT)
Dept: HEMATOLOGY/ONCOLOGY | Facility: CLINIC | Age: 65
End: 2025-01-10
Payer: MEDICARE

## 2025-01-10 ENCOUNTER — HOSPITAL ENCOUNTER (INPATIENT)
Facility: HOSPITAL | Age: 65
LOS: 4 days | Discharge: HOSPICE | DRG: 193 | End: 2025-01-14
Attending: EMERGENCY MEDICINE | Admitting: INTERNAL MEDICINE
Payer: MEDICARE

## 2025-01-10 ENCOUNTER — PATIENT MESSAGE (OUTPATIENT)
Dept: PALLIATIVE MEDICINE | Facility: CLINIC | Age: 65
End: 2025-01-10
Payer: MEDICARE

## 2025-01-10 DIAGNOSIS — J96.01 ACUTE HYPOXEMIC RESPIRATORY FAILURE: ICD-10-CM

## 2025-01-10 DIAGNOSIS — C50.412 PRIMARY MALIGNANT NEOPLASM OF UPPER OUTER QUADRANT OF BREAST, LEFT: ICD-10-CM

## 2025-01-10 DIAGNOSIS — Z13.6 SCREENING FOR CARDIOVASCULAR CONDITION: ICD-10-CM

## 2025-01-10 DIAGNOSIS — J10.1 INFLUENZA A: Primary | ICD-10-CM

## 2025-01-10 DIAGNOSIS — R09.02 HYPOXIA: ICD-10-CM

## 2025-01-10 DIAGNOSIS — Z51.5 ENCOUNTER FOR PALLIATIVE CARE: ICD-10-CM

## 2025-01-10 DIAGNOSIS — R07.9 CHEST PAIN: ICD-10-CM

## 2025-01-10 DIAGNOSIS — J69.0 ASPIRATION PNEUMONIA, UNSPECIFIED ASPIRATION PNEUMONIA TYPE, UNSPECIFIED LATERALITY, UNSPECIFIED PART OF LUNG: ICD-10-CM

## 2025-01-10 LAB
ALBUMIN SERPL BCP-MCNC: 2.7 G/DL (ref 3.5–5.2)
ALP SERPL-CCNC: 313 U/L (ref 40–150)
ALT SERPL W/O P-5'-P-CCNC: 55 U/L (ref 10–44)
ANION GAP SERPL CALC-SCNC: 14 MMOL/L (ref 8–16)
APTT PPP: <21 SEC (ref 21–32)
AST SERPL-CCNC: 40 U/L (ref 10–40)
BASOPHILS # BLD AUTO: 0.04 K/UL (ref 0–0.2)
BASOPHILS NFR BLD: 0.3 % (ref 0–1.9)
BILIRUB SERPL-MCNC: 0.7 MG/DL (ref 0.1–1)
BILIRUB UR QL STRIP: NEGATIVE
BNP SERPL-MCNC: 68 PG/ML (ref 0–99)
BUN SERPL-MCNC: 12 MG/DL (ref 8–23)
CALCIUM SERPL-MCNC: 8.9 MG/DL (ref 8.7–10.5)
CHLORIDE SERPL-SCNC: 101 MMOL/L (ref 95–110)
CLARITY UR: CLEAR
CO2 SERPL-SCNC: 23 MMOL/L (ref 23–29)
COLOR UR: YELLOW
CREAT SERPL-MCNC: 0.6 MG/DL (ref 0.5–1.4)
DIFFERENTIAL METHOD BLD: ABNORMAL
EOSINOPHIL # BLD AUTO: 0.1 K/UL (ref 0–0.5)
EOSINOPHIL NFR BLD: 1.1 % (ref 0–8)
ERYTHROCYTE [DISTWIDTH] IN BLOOD BY AUTOMATED COUNT: 16.7 % (ref 11.5–14.5)
EST. GFR  (NO RACE VARIABLE): >60 ML/MIN/1.73 M^2
GLUCOSE SERPL-MCNC: 104 MG/DL (ref 70–110)
GLUCOSE UR QL STRIP: NEGATIVE
HCT VFR BLD AUTO: 27.7 % (ref 37–48.5)
HGB BLD-MCNC: 8.7 G/DL (ref 12–16)
HGB UR QL STRIP: NEGATIVE
IMM GRANULOCYTES # BLD AUTO: 0.56 K/UL (ref 0–0.04)
IMM GRANULOCYTES NFR BLD AUTO: 4.5 % (ref 0–0.5)
INFLUENZA A, MOLECULAR: POSITIVE
INFLUENZA B, MOLECULAR: NEGATIVE
INR PPP: 1.1 (ref 0.8–1.2)
KETONES UR QL STRIP: ABNORMAL
LACTATE SERPL-SCNC: 1.7 MMOL/L (ref 0.5–2.2)
LACTATE SERPL-SCNC: 2 MMOL/L (ref 0.5–2.2)
LEUKOCYTE ESTERASE UR QL STRIP: NEGATIVE
LYMPHOCYTES # BLD AUTO: 0.8 K/UL (ref 1–4.8)
LYMPHOCYTES NFR BLD: 6.7 % (ref 18–48)
MCH RBC QN AUTO: 29.1 PG (ref 27–31)
MCHC RBC AUTO-ENTMCNC: 31.4 G/DL (ref 32–36)
MCV RBC AUTO: 93 FL (ref 82–98)
MONOCYTES # BLD AUTO: 0.4 K/UL (ref 0.3–1)
MONOCYTES NFR BLD: 3 % (ref 4–15)
NEUTROPHILS # BLD AUTO: 10.5 K/UL (ref 1.8–7.7)
NEUTROPHILS NFR BLD: 84.4 % (ref 38–73)
NITRITE UR QL STRIP: NEGATIVE
NRBC BLD-RTO: 1 /100 WBC
OHS QRS DURATION: 68 MS
OHS QTC CALCULATION: 431 MS
PH UR STRIP: 8 [PH] (ref 5–8)
PLATELET # BLD AUTO: 157 K/UL (ref 150–450)
PMV BLD AUTO: 9.5 FL (ref 9.2–12.9)
POCT GLUCOSE: 130 MG/DL (ref 70–110)
POTASSIUM SERPL-SCNC: 3.8 MMOL/L (ref 3.5–5.1)
PROCALCITONIN SERPL IA-MCNC: 0.35 NG/ML
PROT SERPL-MCNC: 5.9 G/DL (ref 6–8.4)
PROT UR QL STRIP: ABNORMAL
PROTHROMBIN TIME: 12.6 SEC (ref 9–12.5)
RBC # BLD AUTO: 2.99 M/UL (ref 4–5.4)
SARS-COV-2 RDRP RESP QL NAA+PROBE: NEGATIVE
SODIUM SERPL-SCNC: 138 MMOL/L (ref 136–145)
SP GR UR STRIP: 1.01 (ref 1–1.03)
SPECIMEN SOURCE: ABNORMAL
TROPONIN I SERPL DL<=0.01 NG/ML-MCNC: 0.06 NG/ML (ref 0–0.03)
TROPONIN I SERPL DL<=0.01 NG/ML-MCNC: 0.09 NG/ML (ref 0–0.03)
URN SPEC COLLECT METH UR: ABNORMAL
UROBILINOGEN UR STRIP-ACNC: NEGATIVE EU/DL
WBC # BLD AUTO: 12.46 K/UL (ref 3.9–12.7)

## 2025-01-10 PROCEDURE — 83880 ASSAY OF NATRIURETIC PEPTIDE: CPT | Performed by: EMERGENCY MEDICINE

## 2025-01-10 PROCEDURE — 87502 INFLUENZA DNA AMP PROBE: CPT | Performed by: EMERGENCY MEDICINE

## 2025-01-10 PROCEDURE — 80053 COMPREHEN METABOLIC PANEL: CPT | Performed by: EMERGENCY MEDICINE

## 2025-01-10 PROCEDURE — 96365 THER/PROPH/DIAG IV INF INIT: CPT

## 2025-01-10 PROCEDURE — 85610 PROTHROMBIN TIME: CPT | Performed by: EMERGENCY MEDICINE

## 2025-01-10 PROCEDURE — 94761 N-INVAS EAR/PLS OXIMETRY MLT: CPT

## 2025-01-10 PROCEDURE — 63600175 PHARM REV CODE 636 W HCPCS: Performed by: EMERGENCY MEDICINE

## 2025-01-10 PROCEDURE — 96375 TX/PRO/DX INJ NEW DRUG ADDON: CPT

## 2025-01-10 PROCEDURE — 81003 URINALYSIS AUTO W/O SCOPE: CPT | Performed by: EMERGENCY MEDICINE

## 2025-01-10 PROCEDURE — 85025 COMPLETE CBC W/AUTO DIFF WBC: CPT | Performed by: EMERGENCY MEDICINE

## 2025-01-10 PROCEDURE — 84484 ASSAY OF TROPONIN QUANT: CPT | Performed by: EMERGENCY MEDICINE

## 2025-01-10 PROCEDURE — 94640 AIRWAY INHALATION TREATMENT: CPT

## 2025-01-10 PROCEDURE — 85730 THROMBOPLASTIN TIME PARTIAL: CPT | Performed by: EMERGENCY MEDICINE

## 2025-01-10 PROCEDURE — 87635 SARS-COV-2 COVID-19 AMP PRB: CPT | Performed by: EMERGENCY MEDICINE

## 2025-01-10 PROCEDURE — 21400001 HC TELEMETRY ROOM

## 2025-01-10 PROCEDURE — 99285 EMERGENCY DEPT VISIT HI MDM: CPT | Mod: 25

## 2025-01-10 PROCEDURE — 11000001 HC ACUTE MED/SURG PRIVATE ROOM

## 2025-01-10 PROCEDURE — 25000242 PHARM REV CODE 250 ALT 637 W/ HCPCS: Performed by: EMERGENCY MEDICINE

## 2025-01-10 PROCEDURE — 84484 ASSAY OF TROPONIN QUANT: CPT | Mod: 91 | Performed by: INTERNAL MEDICINE

## 2025-01-10 PROCEDURE — 93005 ELECTROCARDIOGRAM TRACING: CPT

## 2025-01-10 PROCEDURE — 84145 PROCALCITONIN (PCT): CPT | Performed by: EMERGENCY MEDICINE

## 2025-01-10 PROCEDURE — 83605 ASSAY OF LACTIC ACID: CPT | Performed by: EMERGENCY MEDICINE

## 2025-01-10 PROCEDURE — 25000003 PHARM REV CODE 250: Performed by: EMERGENCY MEDICINE

## 2025-01-10 PROCEDURE — 87040 BLOOD CULTURE FOR BACTERIA: CPT | Mod: 59 | Performed by: EMERGENCY MEDICINE

## 2025-01-10 PROCEDURE — 63600175 PHARM REV CODE 636 W HCPCS: Performed by: INTERNAL MEDICINE

## 2025-01-10 PROCEDURE — 25000003 PHARM REV CODE 250: Performed by: INTERNAL MEDICINE

## 2025-01-10 PROCEDURE — 27000221 HC OXYGEN, UP TO 24 HOURS

## 2025-01-10 PROCEDURE — 93010 ELECTROCARDIOGRAM REPORT: CPT | Mod: ,,, | Performed by: INTERNAL MEDICINE

## 2025-01-10 RX ORDER — GLUCAGON 1 MG
1 KIT INJECTION
Status: DISCONTINUED | OUTPATIENT
Start: 2025-01-10 | End: 2025-01-14 | Stop reason: HOSPADM

## 2025-01-10 RX ORDER — FENTANYL CITRATE 50 UG/ML
50 INJECTION, SOLUTION INTRAMUSCULAR; INTRAVENOUS
Status: COMPLETED | OUTPATIENT
Start: 2025-01-10 | End: 2025-01-10

## 2025-01-10 RX ORDER — SODIUM CHLORIDE 0.9 % (FLUSH) 0.9 %
10 SYRINGE (ML) INJECTION EVERY 12 HOURS PRN
Status: DISCONTINUED | OUTPATIENT
Start: 2025-01-10 | End: 2025-01-14 | Stop reason: HOSPADM

## 2025-01-10 RX ORDER — INSULIN ASPART 100 [IU]/ML
0-5 INJECTION, SOLUTION INTRAVENOUS; SUBCUTANEOUS
Status: DISCONTINUED | OUTPATIENT
Start: 2025-01-10 | End: 2025-01-14 | Stop reason: HOSPADM

## 2025-01-10 RX ORDER — ACETAMINOPHEN 325 MG/1
650 TABLET ORAL EVERY 4 HOURS PRN
Status: DISCONTINUED | OUTPATIENT
Start: 2025-01-10 | End: 2025-01-14 | Stop reason: HOSPADM

## 2025-01-10 RX ORDER — GABAPENTIN 300 MG/1
300 CAPSULE ORAL 3 TIMES DAILY
Status: DISCONTINUED | OUTPATIENT
Start: 2025-01-10 | End: 2025-01-14 | Stop reason: HOSPADM

## 2025-01-10 RX ORDER — HYDROMORPHONE HYDROCHLORIDE 2 MG/1
4 TABLET ORAL
Status: DISCONTINUED | OUTPATIENT
Start: 2025-01-10 | End: 2025-01-14 | Stop reason: HOSPADM

## 2025-01-10 RX ORDER — BLOOD-GLUCOSE,RECEIVER,CONT
EACH MISCELLANEOUS
COMMUNITY
Start: 2025-01-09

## 2025-01-10 RX ORDER — IBUPROFEN 200 MG
16 TABLET ORAL
Status: DISCONTINUED | OUTPATIENT
Start: 2025-01-10 | End: 2025-01-14 | Stop reason: HOSPADM

## 2025-01-10 RX ORDER — VANCOMYCIN 2 GRAM/500 ML IN 0.9 % SODIUM CHLORIDE INTRAVENOUS
2000 ONCE
Status: COMPLETED | OUTPATIENT
Start: 2025-01-10 | End: 2025-01-10

## 2025-01-10 RX ORDER — ONDANSETRON HYDROCHLORIDE 2 MG/ML
4 INJECTION, SOLUTION INTRAVENOUS EVERY 6 HOURS PRN
Status: DISCONTINUED | OUTPATIENT
Start: 2025-01-10 | End: 2025-01-14 | Stop reason: HOSPADM

## 2025-01-10 RX ORDER — AZITHROMYCIN 250 MG/1
250 TABLET, FILM COATED ORAL DAILY
Status: COMPLETED | OUTPATIENT
Start: 2025-01-11 | End: 2025-01-14

## 2025-01-10 RX ORDER — HYDROMORPHONE HYDROCHLORIDE 1 MG/ML
1 INJECTION, SOLUTION INTRAMUSCULAR; INTRAVENOUS; SUBCUTANEOUS EVERY 4 HOURS PRN
Status: DISCONTINUED | OUTPATIENT
Start: 2025-01-10 | End: 2025-01-14 | Stop reason: HOSPADM

## 2025-01-10 RX ORDER — MORPHINE SULFATE 30 MG/1
60 TABLET, FILM COATED, EXTENDED RELEASE ORAL 2 TIMES DAILY
Status: DISCONTINUED | OUTPATIENT
Start: 2025-01-10 | End: 2025-01-14 | Stop reason: HOSPADM

## 2025-01-10 RX ORDER — NALOXONE HCL 0.4 MG/ML
0.02 VIAL (ML) INJECTION
Status: DISCONTINUED | OUTPATIENT
Start: 2025-01-10 | End: 2025-01-14 | Stop reason: HOSPADM

## 2025-01-10 RX ORDER — LEVALBUTEROL INHALATION SOLUTION 0.63 MG/3ML
1.25 SOLUTION RESPIRATORY (INHALATION)
Status: COMPLETED | OUTPATIENT
Start: 2025-01-10 | End: 2025-01-10

## 2025-01-10 RX ORDER — IBUPROFEN 200 MG
24 TABLET ORAL
Status: DISCONTINUED | OUTPATIENT
Start: 2025-01-10 | End: 2025-01-14 | Stop reason: HOSPADM

## 2025-01-10 RX ORDER — ACETAMINOPHEN 325 MG/1
650 TABLET ORAL
Status: COMPLETED | OUTPATIENT
Start: 2025-01-10 | End: 2025-01-10

## 2025-01-10 RX ORDER — PYRIDOXINE HCL (VITAMIN B6) 200 MG
1 TABLET, EXTENDED RELEASE ORAL DAILY
Status: ON HOLD | COMMUNITY
End: 2025-01-14 | Stop reason: HOSPADM

## 2025-01-10 RX ORDER — OSELTAMIVIR PHOSPHATE 75 MG/1
75 CAPSULE ORAL 2 TIMES DAILY
Status: DISCONTINUED | OUTPATIENT
Start: 2025-01-10 | End: 2025-01-14

## 2025-01-10 RX ORDER — MORPHINE SULFATE 30 MG/1
60 TABLET, FILM COATED, EXTENDED RELEASE ORAL ONCE
Status: COMPLETED | OUTPATIENT
Start: 2025-01-10 | End: 2025-01-10

## 2025-01-10 RX ORDER — OSELTAMIVIR PHOSPHATE 75 MG/1
75 CAPSULE ORAL
Status: COMPLETED | OUTPATIENT
Start: 2025-01-10 | End: 2025-01-10

## 2025-01-10 RX ADMIN — SODIUM CHLORIDE 1000 ML: 9 INJECTION, SOLUTION INTRAVENOUS at 01:01

## 2025-01-10 RX ADMIN — ACETAMINOPHEN 650 MG: 325 TABLET ORAL at 01:01

## 2025-01-10 RX ADMIN — OSELTAMAVIR PHOSPHATE 75 MG: 75 CAPSULE ORAL at 09:01

## 2025-01-10 RX ADMIN — MEROPENEM 2 G: 2 INJECTION, POWDER, FOR SOLUTION INTRAVENOUS at 01:01

## 2025-01-10 RX ADMIN — MORPHINE SULFATE 60 MG: 30 TABLET, EXTENDED RELEASE ORAL at 01:01

## 2025-01-10 RX ADMIN — OSELTAMIVIR PHOSPHATE 75 MG: 75 CAPSULE ORAL at 02:01

## 2025-01-10 RX ADMIN — MEROPENEM 2 G: 2 INJECTION, POWDER, FOR SOLUTION INTRAVENOUS at 09:01

## 2025-01-10 RX ADMIN — FENTANYL CITRATE 50 MCG: 50 INJECTION INTRAMUSCULAR; INTRAVENOUS at 01:01

## 2025-01-10 RX ADMIN — HYDROMORPHONE HYDROCHLORIDE 1 MG: 1 INJECTION, SOLUTION INTRAMUSCULAR; INTRAVENOUS; SUBCUTANEOUS at 07:01

## 2025-01-10 RX ADMIN — GABAPENTIN 300 MG: 300 CAPSULE ORAL at 09:01

## 2025-01-10 RX ADMIN — DEXAMETHASONE 6 MG: 4 TABLET ORAL at 07:01

## 2025-01-10 RX ADMIN — VANCOMYCIN HYDROCHLORIDE 2000 MG: 10 INJECTION, POWDER, LYOPHILIZED, FOR SOLUTION INTRAVENOUS at 04:01

## 2025-01-10 RX ADMIN — MORPHINE SULFATE 60 MG: 30 TABLET, FILM COATED, EXTENDED RELEASE ORAL at 09:01

## 2025-01-10 RX ADMIN — LEVALBUTEROL HYDROCHLORIDE 1.25 MG: 0.63 SOLUTION RESPIRATORY (INHALATION) at 12:01

## 2025-01-10 NOTE — ASSESSMENT & PLAN NOTE
Comprehensive Intake Entered On:  6/13/2019 11:51 AM CDT    Performed On:  6/13/2019 11:50 AM CDT by Bridget Shaikh               Summary   Chief Complaint :   f/u right ankle injury   Menstrual Status :   N/A   Systolic Blood Pressure :   110 mmHg   Diastolic Blood Pressure :   74 mmHg   Mean Arterial Pressure :   86 mmHg   Peripheral Pulse Rate :   82 bpm   BP Method :   Electronic   HR Method :   Electronic   Temperature Tympanic :   98.0 DegF(Converted to: 36.7 DegC)    Bridget Shaikh - 6/13/2019 11:50 AM CDT   Undergoing palliative chemo currently with progression of disease on most recent imaging  Follows with Palliative as outpatient  Has remained Full Code in the past; may have to readdress depending on course

## 2025-01-10 NOTE — HPI
The patient is a 64 yo female with past medical history of diabetes, hypertension, metastatic breast cancer (mets to brain and lungs)  who presented to the ED with shortness of breath for 1 day. She was seen at palliative care clinic yesterday and started on Zpack. Her symptoms did not improve. She noted low oxygen saturations. She also has cough, leg swelling and abdominal pain. Chest x-ray with increasing right pleural effusion. She also tested positive for Influenza A. Patient noted to have oxygen saturation of 80%. Hospital medicine consulted for admission.

## 2025-01-10 NOTE — ED PROVIDER NOTES
"Emergency Medicine Provider Note - 1/10/2025       History     Chief Complaint   Patient presents with    Shortness of Breath     SOB onset yesterday. Reports low O2 and tachycardia at home.       Allergies:  Review of patient's allergies indicates:   Allergen Reactions    Ace inhibitors Swelling    Lisinopril Rash    Hydrocodone Itching     Hives    Hydrocodone-acetaminoph-supp11 Itching    Percocet [oxycodone-acetaminophen] Itching     Pt had to take an antihistamine to improve itching     Cephalexin      Hives    Pantoprazole Hives    Propoxyphene      Hives    Propoxyphene n-acetaminophen     Propoxyphene-acetaminophen         History of Present Illness   HPI    1/10/2025, 12:46 PM***  The history is provided by the {adult  relatives:33285::"patient"}    Sherlyn Saavedra is a 65 y.o. female presenting to the ED for ***      Arrival mode: {[facility] Arrival Choices:53836}     PCP: Nan, Primary Doctor     Past Medical History:  Past Medical History:   Diagnosis Date    Allergy     Anemia     Breast cancer     primary malignant neoplasm of upper outer quadrant of breast - progressive metastatic triple negative breast cancer. Patient has progressive 2 lines of therapy.    HLD (hyperlipidemia) 2015    Hyperlipidemia (Problem)      Hypertension     Primary malignant neoplasm of upper outer quadrant of breast, left 10/10/2023    Secondary malignancy of parietal pleura 2024    Type 2 diabetes mellitus with diabetic polyneuropathy, without long-term current use of insulin 2015       Past Surgical History:  Past Surgical History:   Procedure Laterality Date    HYSTERECTOMY      tubaligation           Family History:  No family history on file.    Social History:  Social History     Tobacco Use    Smoking status: Former     Current packs/day: 0.00     Types: Cigarettes     Quit date:      Years since quittin.0    Smokeless tobacco: Never   Substance and Sexual Activity    Alcohol use: Not " "Currently    Drug use: Never    Sexual activity: Not Currently       The Past Medical History, Social History, Surgical History and Family History was reviewed as documented above.     Review of Systems   Review of Systems  ***     Physical Exam     Initial Vitals [01/10/25 1158]   BP Pulse Resp Temp SpO2   135/64 (!) 135 (!) 30 99.2 °F (37.3 °C) (!) 80 %      MAP       --          Physical Exam    Nursing Notes and Vital Signs Reviewed.  Constitutional: Patient is in {DISTRESS LEVEL:72446}. Well-developed and well-nourished.  Head: Atraumatic. Normocephalic.  Eyes: PERRL. EOM intact. Conjunctivae are not pale. No scleral icterus.  ENT: Mucous membranes are moist. Oropharynx is clear and symmetric***.    Neck: Supple. Full ROM. No lymphadenopathy.  Cardiovascular: Regular rate. Regular rhythm***. No murmurs, rubs, or gallops. Distal pulses are 2+ and symmetric***.  Pulmonary/Chest: No respiratory distress. Clear to auscultation bilaterally***. No wheezing or rales.  Abdominal: Soft and non-distended.  There is no tenderness.  No rebound, guarding, or rigidity. Good bowel sounds***.  Musculoskeletal: Moves all extremities. No obvious deformities. No edema. No calf tenderness***.  Genitourinary: {Choices of Genitourinary (Optional):29494::"N/A"}  Skin: Warm and dry.  Neurological:  Alert, awake, and appropriate.  Normal speech.  No acute focal neurological deficits are appreciated.  Psychiatric: Normal affect. Good eye contact. Appropriate in content.     ED Course   ED Procedures:  Procedures    ED Vital Signs:  Vitals:    01/10/25 1158 01/10/25 1244   BP: 135/64    Pulse: (!) 135 (!) 120   Resp: (!) 30 18   Temp: 99.2 °F (37.3 °C)    TempSrc: Oral    SpO2: (!) 80% (!) 93%   Weight: 79.6 kg (175 lb 7.8 oz)    Height: 5' (1.524 m)        All Lab Results:  ***    Reviewed Prior Labs:  ***    The EKG was ordered, reviewed, and independently interpreted by the ED provider:  Rate: *** BPM  Rhythm: " {rhythm:45489}  Interpretation: ***. No STEMI.  When compared to EKG performed ***, there are no significant change    ECG Results              EKG 12-lead (In process)        Collection Time Result Time QRS Duration OHS QTC Calculation    01/10/25 12:13:43 01/10/25 12:31:33 68 431                     In process by Interface, Lab In [facility] (01/10/25 12:31:38)                   Narrative:    Test Reason : Z13.6,    Vent. Rate : 121 BPM     Atrial Rate : 121 BPM     P-R Int : 168 ms          QRS Dur :  68 ms      QT Int : 304 ms       P-R-T Axes :  45 -12  61 degrees    QTcB Int : 431 ms    Sinus tachycardia  Minimal voltage criteria for LVH, may be normal variant  Inferior infarct ,age undetermined  Cannot rule out Anterior infarct ,age undetermined  Abnormal ECG  No previous ECGs available    Referred By: AAAREFERRAL SELF           Confirmed By:                                     Imaging Results:  Imaging Results    None               The Emergency Provider reviewed the vital signs and test results, which are outlined above.     ED Discussion   ED Medication(s):  Medications   meropenem 2 g in 0.9% NaCl 100 mL IVPB (MB+) (has no administration in time range)   vancomycin - pharmacy to dose (has no administration in time range)   morphine 12 hr tablet 60 mg (has no administration in time range)   acetaminophen tablet 650 mg (has no administration in time range)   levalbuterol nebulizer solution 1.2495 mg (1.2495 mg Nebulization Given 1/10/25 1244)       ED Course as of 01/10/25 1246   Fri Xander 10, 2025   1215 7/29/2024 Echo:     CONCLUSIONS:   1. Normal left ventricular cavity size. Normal left ventricular systolic function. LVEF   55 - 65%. Mild (Grade I) diastolic dysfunction (impaired relaxation).   2. Normal right ventricular size. Normal right ventricular systolic function.      [LB]   1224 Spoke with Iesha, Clinical Pharmacist:Discussing options for severe sepsis/pneumonia.  She recommends Merrem..  Patient is  asking for maintenance MS contin.  [LB]      ED Course User Index  [LB] Ofelia Gallego, DO               ***     *** Reassessment: Dr. Gallego reassessed the pt.  The pt is resting comfortably and is NAD.  Pt states their sx have improved.*** Discussed test results, shared treatment plan, specific conditions for return, and the need for f/u.***  Answered their questions at this time.  Pt understands and agrees to the plan.  The pt has remained hemodynamically stable through ED course and is stable for discharge.    I discussed with patient and/or family/caretaker that evaluation in the ED does not suggest any emergent or life threatening medical conditions requiring immediate intervention beyond what was provided in the ED, and I believe patient is safe for discharge.  Regardless, an unremarkable evaluation in the ED does not preclude the development or presence of a serious of life threatening condition. As such, patient was instructed to return immediately for any worsening or change in current symptoms.     MIPS Measures     Smoker? {Yes or No:25296}     Hypertension: {Julián Choices of HTN:02253}  ***   Medical Decision Making                 Medical Decision Making  Differential Diagnoses: Based on the available information and the initial assessment, the working DIFFERENTIAL DIAGNOSIS considered during this evaluation included, but not limited to: Acute Pulmonary Edema, Covid-19, Influenza, Pleural Effusion, Pulmonary Embolism, Pneumonia, and Pneumothorax    Differential diagnosis:  Sepsis bundle initiated.  Spoke verbally with clinical pharmacist Iesha, recommended Merrem as antibiotic.  EKG: Sinus tachycardia.  No ST segment elevation.        Amount and/or Complexity of Data Reviewed  Labs: ordered.  Radiology: ordered.    Risk  OTC drugs.  Prescription drug management.        Coding    Referrals:  No orders of the defined types were placed in this encounter.      Prescription Management: I performed  a review of the patient's current Rx medication list as input by nursing staff.    Patient's Medications   New Prescriptions    No medications on file   Previous Medications    (MAGIC MOUTHWASH) 1:1:1 DIPHENHYDRAMINE(BENADRYL) 12.5MG/5ML LIQ, ALUMINUM & MAGNESIUM HYDROXIDE-SIMETHICONE (MAALOX), LIDOCAINE VISCOUS 2%    Swish and spit 15 mLs every 4 (four) hours as needed (mouth ulcers). for mouth sores    AMLODIPINE (NORVASC) 5 MG TABLET    TAKE 1 TABLET BY MOUTH EVERY MORNING    ATORVASTATIN (LIPITOR) 40 MG TABLET    Take 40 mg by mouth.    AZITHROMYCIN (Z-JESUSITA) 250 MG TABLET    Take 2 tablets by mouth on day 1; Take 1 tablet by mouth on days 2-5    BLOOD SUGAR DIAGNOSTIC (FREESTYLE TEST) STRP    Test 4 times per day    BLOOD-GLUCOSE METER,CONTINUOUS (DEXCOM G7 ) MISC    use as directed    BUSPIRONE (BUSPAR) 15 MG TABLET    Take 1 tablet by mouth at bedtime.    DOXYCYCLINE (MONODOX) 100 MG CAPSULE    EMPTY CONTENTS OF 1 CAPSULE INTO NASAL IRRIGATION SYSTEM, ADD DISTILLED WATER, SALT PACK, MIX & IRRIGATE. PERFORM 2 TIMES DAILY    FAMOTIDINE (PEPCID) 20 MG TABLET    Take 20 mg by mouth 2 (two) times daily.    FOLIC ACID (FOLVITE) 1 MG TABLET    take 1 tablet by mouth every morning    GABAPENTIN (NEURONTIN) 300 MG CAPSULE    Take 300 mg by mouth.    HYDROMORPHONE (DILAUDID) 4 MG TABLET    Take 1 tablet (4 mg total) by mouth every 3 (three) hours as needed for Pain (Max 6 doses/day).    INSULIN GLARGINE U-100, LANTUS, (LANTUS SOLOSTAR U-100 INSULIN) 100 UNIT/ML (3 ML) INPN PEN    Inject 30 Units under the skin daily with breakfast.    INSULIN LISPRO 100 UNIT/ML PEN    Inject 6 Units under the skin 3 (three) times daily as needed for blood sugar over 150 with meals, if over 250 give 12 units with meal    LIDOCAINE VISCOUS HCL 2%-DIPHENHYDRAMINE-ALUMINUM-MAGNESIUM HYDROXIDE-SIMETHICONE    Swish and spit 15 mLs by mouth every 4 (four) hours as needed (mouth ulcers). for mouth sores    LIDOCAINE-PRILOCAINE (EMLA)  "CREAM    Apply to affected area once    MAGNESIUM OXIDE (MAG-OX) 400 MG (241.3 MG MAGNESIUM) TABLET    Take 400 mg by mouth once daily.    METFORMIN (GLUCOPHAGE-XR) 500 MG ER 24HR TABLET    take 2 tablets by mouth in the morning and at bedtime as directed    METRONIDAZOLE (FLAGYL) 500 MG TABLET    Crush 1/2 tablet and sprinkle/apply to the open wound with every dressing change for odor control    MORPHINE (MS CONTIN) 60 MG 12 HR TABLET    Take 1 tablet (60 mg total) by mouth 2 (two) times daily. for 14 days    NALOXONE (NARCAN) 4 MG/ACTUATION SPRY        ONDANSETRON (ZOFRAN-ODT) 8 MG TBDL    Dissolve 1 tablet (8 mg total) by mouth every 8 (eight) hours as needed (Nausea).    PANTOPRAZOLE (PROTONIX) 40 MG TABLET    take 1 tablet by mouth daily    PEN NEEDLE, DIABETIC (PEN NEEDLE) 32 GAUGE X 5/32" NDLE    Use with insulin as directed 4 times a day    TIRZEPATIDE (MOUNJARO) 5 MG/0.5 ML PNIJ    Inject 5 mg into the skin once weekly   Modified Medications    No medications on file   Discontinued Medications    No medications on file        Discussed case verbally with: {Specialist List (Optional):95235}      Portions of this note may have been created with voice recognition software. Occasional "wrong-word" or "sound-a-like" substitutions may have occurred due to the inherent limitations of voice recognition software. Please, read the note carefully and recognize, using context, where substitutions have occurred.          Clinical Impression       ICD-10-CM ICD-9-CM   1. Screening for cardiovascular condition  Z13.6 V81.2        Disposition        Disposition: {Julián Plan; Disposition:29962}  Patient condition: {Condition:94483::"Good"}    ED Follow-up                   "

## 2025-01-10 NOTE — CONSULTS
Pharmacokinetic Initial Assessment: IV Vancomycin    Assessment/Plan:  - Initiate intravenous vancomycin with loading dose of 2000 mg once followed by a maintenance dose of vancomycin 750mg IV every 12 hours.  - Desired empiric serum trough concentration is 15 to 20 mcg/mL.  - Draw vancomycin trough level 60 min prior to fourth dose on 1/12 at approximately 0300.    Pharmacy will continue to follow and monitor vancomycin.    Please contact pharmacy at extension [phone] for questions regarding this assessment.    Thank you for the consult,   Ellie Brenner, PharmD       Patient brief summary:  Sherlyn Saavedra is a 65 y.o. female initiated on antimicrobial therapy with IV Vancomycin for treatment of suspected lower respiratory infection and bacteremia.     Drug Allergies:   Review of patient's allergies indicates:   Allergen Reactions    Ace inhibitors Swelling    Lisinopril Rash    Hydrocodone Itching     Hives    Hydrocodone-acetaminoph-supp11 Itching    Percocet [oxycodone-acetaminophen] Itching     Pt had to take an antihistamine to improve itching     Cephalexin      Hives    Pantoprazole Hives    Propoxyphene      Hives    Propoxyphene n-acetaminophen     Propoxyphene-acetaminophen        Actual Body Weight: 79.6 kg    Renal Function:   Estimated Creatinine Clearance: 87.2 mL/min (based on SCr of 0.6 mg/dL).,     Dialysis Method (if applicable):  N/A    CBC (last 72 hours):  Recent Labs   Lab Result Units 01/09/25  0950 01/10/25  1314   WBC K/uL 11.22 12.46   Hemoglobin g/dL 9.2* 8.7*   Hematocrit % 29.3* 27.7*   Platelets K/uL 199 157   Gran % % 83.8* 84.4*   Lymph % % 7.0* 6.7*   Mono % % 4.3 3.0*   Eosinophil % % 0.0 1.1   Basophil % % 0.3 0.3   Differential Method  Automated Automated       Metabolic Panel (last 72 hours):  Recent Labs   Lab Result Units 01/09/25  0950 01/10/25  1314   Sodium mmol/L 139 138   Potassium mmol/L 4.4 3.8   Chloride mmol/L 98 101   CO2 mmol/L 28 23   Glucose mg/dL 143* 104   BUN mg/dL 12  12   Creatinine mg/dL 0.7 0.6   Albumin g/dL 3.1* 2.7*   Total Bilirubin mg/dL 0.5 0.7   Alkaline Phosphatase U/L 376* 313*   AST U/L 39 40   ALT U/L 70* 55*       Microbiologic Results:  Microbiology Results (last 7 days)       Procedure Component Value Units Date/Time    Influenza A & B by Molecular [[id number]]  (Abnormal) Collected: 01/10/25 1223    Order Status: Completed Specimen: Nasopharyngeal Swab Updated: 01/10/25 1312     Influenza A, Molecular Positive     Influenza B, Molecular Negative     Flu A & B Source Nasal swab    Blood culture x two cultures. Draw prior to antibiotics. [[id number]] Collected: 01/10/25 1226    Order Status: Sent Specimen: Blood from Peripheral, Forearm, Right     Blood culture x two cultures. Draw prior to antibiotics. [[id number]] Collected: 01/10/25 1220    Order Status: Sent Specimen: Blood from Peripheral, Antecubital, Right           Thank you for allowing us to participate in this patient's care.     Ellie Brenner, PharmD 01/10/2025 5:08 PM

## 2025-01-10 NOTE — SUBJECTIVE & OBJECTIVE
Past Medical History:   Diagnosis Date    Allergy     Anemia     Breast cancer     primary malignant neoplasm of upper outer quadrant of breast - progressive metastatic triple negative breast cancer. Patient has progressive 2 lines of therapy.    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    Type 2 diabetes mellitus with diabetic polyneuropathy, without long-term current use of insulin 03/04/2015       Past Surgical History:   Procedure Laterality Date    HYSTERECTOMY      tubaligation  1987       Review of patient's allergies indicates:   Allergen Reactions    Ace inhibitors Swelling    Lisinopril Rash    Hydrocodone Itching     Hives    Hydrocodone-acetaminoph-supp11 Itching    Percocet [oxycodone-acetaminophen] Itching     Pt had to take an antihistamine to improve itching     Cephalexin      Hives    Pantoprazole Hives    Propoxyphene      Hives    Propoxyphene n-acetaminophen     Propoxyphene-acetaminophen        No current facility-administered medications on file prior to encounter.     Current Outpatient Medications on File Prior to Encounter   Medication Sig    amLODIPine (NORVASC) 5 MG tablet TAKE 1 TABLET BY MOUTH EVERY MORNING (Patient taking differently: Take 5 mg by mouth once daily.)    atorvastatin (LIPITOR) 40 MG tablet Take 40 mg by mouth once daily.    azithromycin (Z-JESUSITA) 250 MG tablet Take 2 tablets by mouth on day 1; Take 1 tablet by mouth on days 2-5 (Patient taking differently: Take 250 mg by mouth once daily. Take 2 tablets by mouth on day 1; Take 1 tablet by mouth on days 2-5)    blood-glucose meter,continuous (DEXCOM G7 ) Misc use as directed    folic acid (FOLVITE) 1 MG tablet take 1 tablet by mouth every morning    HYDROmorphone (DILAUDID) 4 MG tablet Take 1 tablet (4 mg total) by mouth every 3 (three) hours as  needed for Pain (Max 6 doses/day).    magnesium oxide (MAG-OX) 400 mg (241.3 mg magnesium) tablet Take 400 mg by mouth once daily.    metFORMIN (GLUCOPHAGE-XR) 500 MG ER 24hr tablet take 2 tablets by mouth in the morning and at bedtime as directed    morphine (MS CONTIN) 60 MG 12 hr tablet Take 1 tablet (60 mg total) by mouth 2 (two) times daily. for 14 days    om3/dha/epa/fish/kril/lut/zeax (MEGARED ADV TOTAL BODY REFRESH ORAL) Take 1 tablet by mouth Daily.    pyridoxine, vitamin B6, 200 mg TbSR Take 1 tablet by mouth Daily.    vitamin D3-folic acid 125 mcg (5,000 unit)-1 mg Tab Take 1 tablet by mouth Daily.    (Magic mouthwash) 1:1:1 diphenhydrAMINE(Benadryl) 12.5mg/5ml liq, aluminum & magnesium hydroxide-simethicone (Maalox), LIDOcaine viscous 2% Swish and spit 15 mLs every 4 (four) hours as needed (mouth ulcers). for mouth sores    blood sugar diagnostic (FREESTYLE TEST) Strp Test 4 times per day    busPIRone (BUSPAR) 15 MG tablet Take 1 tablet by mouth at bedtime. (Patient not taking: Reported on 1/10/2025)    doxycycline (MONODOX) 100 MG capsule EMPTY CONTENTS OF 1 CAPSULE INTO NASAL IRRIGATION SYSTEM, ADD DISTILLED WATER, SALT PACK, MIX & IRRIGATE. PERFORM 2 TIMES DAILY (Patient not taking: Reported on 1/10/2025)    gabapentin (NEURONTIN) 300 MG capsule Take 300 mg by mouth 3 (three) times daily. Patient takes once a day    insulin glargine U-100, Lantus, (LANTUS SOLOSTAR U-100 INSULIN) 100 unit/mL (3 mL) InPn pen Inject 30 Units under the skin daily with breakfast.    insulin lispro 100 unit/mL pen Inject 6 Units under the skin 3 (three) times daily as needed for blood sugar over 150 with meals, if over 250 give 12 units with meal    LIDOcaine-prilocaine (EMLA) cream Apply to affected area once    metroNIDAZOLE (FLAGYL) 500 MG tablet Crush 1/2 tablet and sprinkle/apply to the open wound with every dressing change for odor control (Patient not taking: Reported on 1/10/2025)    naloxone (NARCAN) 4 mg/actuation  "Spry     ondansetron (ZOFRAN-ODT) 8 MG TbDL Dissolve 1 tablet (8 mg total) by mouth every 8 (eight) hours as needed (Nausea).    pen needle, diabetic (PEN NEEDLE) 32 gauge x " Ndle Use with insulin as directed 4 times a day    [DISCONTINUED] famotidine (PEPCID) 20 MG tablet Take 20 mg by mouth 2 (two) times daily.    [DISCONTINUED] LIDOcaine viscous HCl 2%-diphenhydrAMINE-aluminum-magnesium hydroxide-simethicone Swish and spit 15 mLs by mouth every 4 (four) hours as needed (mouth ulcers). for mouth sores    [DISCONTINUED] pantoprazole (PROTONIX) 40 MG tablet take 1 tablet by mouth daily    [DISCONTINUED] tirzepatide (MOUNJARO) 5 mg/0.5 mL PnIj Inject 5 mg into the skin once weekly     Family History    None       Tobacco Use    Smoking status: Former     Current packs/day: 0.00     Types: Cigarettes     Quit date:      Years since quittin.0    Smokeless tobacco: Never   Substance and Sexual Activity    Alcohol use: Not Currently    Drug use: Never    Sexual activity: Not Currently     Review of Systems   Constitutional:  Positive for activity change and appetite change.   Respiratory:  Positive for cough and shortness of breath.    Cardiovascular:  Positive for leg swelling.   Musculoskeletal:  Positive for back pain.   All other systems reviewed and are negative.    Objective:     Vital Signs (Most Recent):  Temp: 99.2 °F (37.3 °C) (01/10/25 1158)  Pulse: 95 (01/10/25 1730)  Resp: (!) 21 (01/10/25 1730)  BP: (!) 123/58 (01/10/25 1730)  SpO2: (!) 92 % (01/10/25 1730) Vital Signs (24h Range):  Temp:  [99.2 °F (37.3 °C)] 99.2 °F (37.3 °C)  Pulse:  [] 95  Resp:  [18-34] 21  SpO2:  [80 %-97 %] 92 %  BP: (105-139)/(53-64) 123/58     Weight: 79.6 kg (175 lb 7.8 oz)  Body mass index is 34.27 kg/m².     Physical Exam  HENT:      Head: Normocephalic and atraumatic.   Cardiovascular:      Rate and Rhythm: Normal rate and regular rhythm.      Heart sounds: No murmur heard.  Pulmonary:      Effort: Pulmonary " effort is normal. No respiratory distress.      Breath sounds: Decreased breath sounds present. No wheezing.   Abdominal:      General: Bowel sounds are normal. There is no distension.      Palpations: Abdomen is soft.      Tenderness: There is no abdominal tenderness.   Musculoskeletal:         General: Swelling present.      Right lower leg: Edema present.      Left lower leg: Edema present.   Skin:     General: Skin is warm and dry.   Neurological:      Mental Status: She is alert and oriented to person, place, and time. Mental status is at baseline.                Significant Labs: All pertinent labs within the past 24 hours have been reviewed.  Recent Lab Results         01/10/25  1641   01/10/25  1314   01/10/25  1223   01/10/25  1213        Influenza A, Molecular     Positive         Influenza B, Molecular     Negative         Procalcitonin   0.35  Comment: A concentration < 0.25 ng/mL represents a low risk of bacterial   infection.  Procalcitonin may not be accurate among patients with localized   infection, recent trauma or major surgery, immunosuppressed state,   invasive fungal infection, renal dysfunction. Decisions regarding   initiation or continuation of antibiotic therapy should not be based   solely on procalcitonin levels.             Albumin   2.7           ALP   313           ALT   55           Anion Gap   14           PTT   <21.0  Comment: Refer to local heparin nomogram for intensity/dose specific   therapeutic   range.             AST   40           Baso #   0.04           Basophil %   0.3           BILIRUBIN TOTAL   0.7  Comment: For infants and newborns, interpretation of results should be based  on gestational age, weight and in agreement with clinical  observations.    Premature Infant recommended reference ranges:  Up to 24 hours.............<8.0 mg/dL  Up to 48 hours............<12.0 mg/dL  3-5 days..................<15.0 mg/dL  6-29 days.................<15.0 mg/dL             BNP    68  Comment: Values of less than 100 pg/ml are consistent with non-CHF populations.           BUN   12           Calcium   8.9           Chloride   101           CO2   23           Creatinine   0.6           Differential Method   Automated           eGFR   >60           Eos #   0.1           Eos %   1.1           Flu A & B Source     Nasal swab         Glucose   104           Gran # (ANC)   10.5           Gran %   84.4           Hematocrit   27.7           Hemoglobin   8.7           Immature Grans (Abs)   0.56  Comment: Mild elevation in immature granulocytes is non specific and   can be seen in a variety of conditions including stress response,   acute inflammation, trauma and pregnancy. Correlation with other   laboratory and clinical findings is essential.             Immature Granulocytes   4.5           INR   1.1  Comment: Coumadin Therapy:  2.0 - 3.0 for INR for all indicators except mechanical heart valves  and antiphospholipid syndromes which should use 2.5 - 3.5.             Lactic Acid Level 1.7  Comment: Falsely low lactic acid results can be found in samples   containing >=13.0 mg/dL total bilirubin and/or >=3.5 mg/dL   direct bilirubin.     2.0  Comment: Falsely low lactic acid results can be found in samples   containing >=13.0 mg/dL total bilirubin and/or >=3.5 mg/dL   direct bilirubin.             Lymph #   0.8           Lymph %   6.7           MCH   29.1           MCHC   31.4           MCV   93           Mono #   0.4           Mono %   3.0           MPV   9.5           nRBC   1           QRS Duration       68       [facility] QTC Calculation       431       Platelet Count   157           Potassium   3.8           PROTEIN TOTAL   5.9           PT   12.6           RBC   2.99           RDW   16.7           SARS-CoV-2 RNA, Amplification, Qual     Negative  Comment: This test utilizes isothermal nucleic acid amplification technology   to   detect the SARS-CoV-2 RdRp nucleic acid segment. The analytical    sensitivity   (limit of detection) is 500 copies/swab.    A POSITIVE result is indicative of the presence of SARS-CoV-2 RNA;   clinical   correlation with patient history and other diagnostic information is   necessary to determine patient infection status.    A NEGATIVE result means that SARS-CoV-2 nucleic acids are not present   above   the limit of detection. A NEGATIVE result should be treated as   presumptive.   It does not rule out the possibility of COVID-19 and should not be   the sole   basis for treatment decisions.    If COVID-19 is strongly suspected based on clinical and exposure   history,   re-testing using an alternate molecular assay should be considered.    This test is Food and Drug Administration (FDA) approved. Performance   characteristics of this has been independently verified by [facility] Department of Pathology and Laboratory Medicine.           Sodium   138           Troponin I   0.058  Comment: The reference interval for Troponin I represents the 99th percentile   cutoff   for our facility and is consistent with 3rd generation assay   performance.             WBC   12.46                   Significant Imaging: I have reviewed all pertinent imaging results/findings within the past 24 hours.

## 2025-01-10 NOTE — SUBJECTIVE & OBJECTIVE
Past Medical History:   Diagnosis Date    Allergy     Anemia     Breast cancer     primary malignant neoplasm of upper outer quadrant of breast - progressive metastatic triple negative breast cancer. Patient has progressive 2 lines of therapy.    HLD (hyperlipidemia) 2015    Hyperlipidemia (Problem)      Hypertension     Primary malignant neoplasm of upper outer quadrant of breast, left 10/10/2023    Secondary malignancy of parietal pleura 2024    Type 2 diabetes mellitus with diabetic polyneuropathy, without long-term current use of insulin 2015       Past Surgical History:   Procedure Laterality Date    HYSTERECTOMY      tubaligation         Review of patient's allergies indicates:   Allergen Reactions    Ace inhibitors Swelling    Lisinopril Rash    Hydrocodone Itching     Hives    Hydrocodone-acetaminoph-supp11 Itching    Percocet [oxycodone-acetaminophen] Itching     Pt had to take an antihistamine to improve itching     Cephalexin      Hives    Pantoprazole Hives    Propoxyphene      Hives    Propoxyphene n-acetaminophen     Propoxyphene-acetaminophen        Family History    None       Tobacco Use    Smoking status: Former     Current packs/day: 0.00     Types: Cigarettes     Quit date:      Years since quittin.0    Smokeless tobacco: Never   Substance and Sexual Activity    Alcohol use: Not Currently    Drug use: Never    Sexual activity: Not Currently         Review of Systems   All other systems reviewed and are negative.    Objective:     Vital Signs (Most Recent):  Temp: 99.2 °F (37.3 °C) (01/10/25 1158)  Pulse: 104 (01/10/25 1515)  Resp: (!) 25 (01/10/25 1515)  BP: (!) 105/53 (01/10/25 1515)  SpO2: (!) 92 % (01/10/25 1515) Vital Signs (24h Range):  Temp:  [99.2 °F (37.3 °C)] 99.2 °F (37.3 °C)  Pulse:  [104-135] 104  Resp:  [18-34] 25  SpO2:  [80 %-97 %] 92 %  BP: (105-139)/(53-64) 105/53     Weight: 79.6 kg (175 lb 7.8 oz)  Body mass index is 34.27  kg/m².      Intake/Output Summary (Last 24 hours) at 1/10/2025 1615  Last data filed at 1/10/2025 1445  Gross per 24 hour   Intake 1100 ml   Output --   Net 1100 ml        Physical Exam  Vitals and nursing note reviewed.   Constitutional:       General: She is not in acute distress.     Appearance: She is ill-appearing.   HENT:      Head: Normocephalic and atraumatic.   Eyes:      General: No scleral icterus.     Extraocular Movements: Extraocular movements intact.      Conjunctiva/sclera: Conjunctivae normal.      Pupils: Pupils are equal, round, and reactive to light.   Cardiovascular:      Rate and Rhythm: Regular rhythm. Tachycardia present.      Pulses: Normal pulses.      Heart sounds: No murmur heard.  Pulmonary:      Effort: Pulmonary effort is normal. No respiratory distress.      Breath sounds: No stridor. Rales present. No wheezing.      Comments: Absent BS in the R base, wound dressing in place on her R posterior chest  Abdominal:      General: Abdomen is flat. There is no distension.      Palpations: Abdomen is soft.      Tenderness: There is no abdominal tenderness.   Musculoskeletal:      Cervical back: Normal range of motion and neck supple. No rigidity or tenderness.      Right lower leg: Edema (1+) present.      Left lower leg: Edema (1+) present.   Skin:     General: Skin is warm and dry.      Coloration: Skin is not jaundiced or pale.   Neurological:      General: No focal deficit present.      Mental Status: She is alert and oriented to person, place, and time. Mental status is at baseline.          Vents:  Oxygen Concentration (%): 40 (01/10/25 1244)    Lines/Drains/Airways       Central Venous Catheter Line  Duration             Port A Cath Single Lumen 08/23/24 1100 Atrial Left 140 days              Drain  Duration             Female External Urinary Catheter w/ Suction 01/10/25 1558 <1 day              Peripheral Intravenous Line  Duration                  Peripheral IV - Single Lumen  01/10/25 1259 18 G 1 in Anterior;Right <1 day                    Significant Labs:    CBC/Anemia Profile:  Recent Labs   Lab 01/09/25  0950 01/10/25  1314   WBC 11.22 12.46   HGB 9.2* 8.7*   HCT 29.3* 27.7*    157   MCV 93 93   RDW 16.4* 16.7*        Chemistries:  Recent Labs   Lab 01/09/25  0950 01/10/25  1314    138   K 4.4 3.8   CL 98 101   CO2 28 23   BUN 12 12   CREATININE 0.7 0.6   CALCIUM 9.2 8.9   ALBUMIN 3.1* 2.7*   PROT 6.7 5.9*   BILITOT 0.5 0.7   ALKPHOS 376* 313*   ALT 70* 55*   AST 39 40       All pertinent labs within the past 24 hours have been reviewed.    Significant Imaging:   I have reviewed all pertinent imaging results/findings within the past 24 hours.

## 2025-01-10 NOTE — PHARMACY MED REC
"Admission Medication History     The home medication history was taken by Chichi Flynn.    You may go to "Admission" then "Reconcile Home Medications" tabs to review and/or act upon these items.     The home medication list has been updated by the Pharmacy department.   Please read ALL comments highlighted in yellow.   Please address this information as you see fit.    Feel free to contact us if you have any questions or require assistance.      The medications listed below were removed from the home medication list. Please reorder if appropriate:  Patient reports no longer taking the following medication(s):  Pepcid 20 mg  Protonix 40 mg  Mounjaro 5 mg/0.5mL       Medications listed below were obtained from: Patient/family and Analytic software- [employer], patient brought current medication list, Thom Saavedra (daughter at bedside)      LAST MED REC COMPLETED:     Chichi Flynn  [id number]    Current Outpatient Medications on File Prior to Encounter   Medication Sig Dispense Refill Last Dose/Taking    amLODIPine (NORVASC) 5 MG tablet TAKE 1 TABLET BY MOUTH EVERY MORNING (Patient taking differently: Take 5 mg by mouth once daily.) 90 tablet 1 Past Week    atorvastatin (LIPITOR) 40 MG tablet Take 40 mg by mouth once daily.   1/9/2025    azithromycin (Z-JESUSITA) 250 MG tablet Take 2 tablets by mouth on day 1; Take 1 tablet by mouth on days 2-5 (Patient taking differently: Take 250 mg by mouth once daily. Take 2 tablets by mouth on day 1; Take 1 tablet by mouth on days 2-5) 6 tablet 0 1/9/2025    blood-glucose meter,continuous (DEXCOM G7 ) Misc use as directed   Taking    folic acid (FOLVITE) 1 MG tablet take 1 tablet by mouth every morning 30 tablet 3 1/9/2025    HYDROmorphone (DILAUDID) 4 MG tablet Take 1 tablet (4 mg total) by mouth every 3 (three) hours as needed for Pain (Max 6 doses/day). 180 tablet 0 1/9/2025 at 10:00 PM    magnesium oxide (MAG-OX) 400 mg (241.3 mg magnesium) tablet Take 400 mg by " mouth once daily.   1/9/2025    metFORMIN (GLUCOPHAGE-XR) 500 MG ER 24hr tablet take 2 tablets by mouth in the morning and at bedtime as directed 180 tablet 1 1/9/2025    morphine (MS CONTIN) 60 MG 12 hr tablet Take 1 tablet (60 mg total) by mouth 2 (two) times daily. for 14 days 28 tablet 0 1/10/2025 at 12:00 AM    om3/dha/epa/fish/kril/lut/zeax (MEGARED ADV TOTAL BODY REFRESH ORAL) Take 1 tablet by mouth Daily.   1/9/2025    pyridoxine, vitamin B6, 200 mg TbSR Take 1 tablet by mouth Daily.   1/9/2025    vitamin D3-folic acid 125 mcg (5,000 unit)-1 mg Tab Take 1 tablet by mouth Daily.   1/9/2025    (Magic mouthwash) 1:1:1 diphenhydrAMINE(Benadryl) 12.5mg/5ml liq, aluminum & magnesium hydroxide-simethicone (Maalox), LIDOcaine viscous 2% Swish and spit 15 mLs every 4 (four) hours as needed (mouth ulcers). for mouth sores 360 mL 1 Unknown    blood sugar diagnostic (FREESTYLE TEST) Strp Test 4 times per day       busPIRone (BUSPAR) 15 MG tablet Take 1 tablet by mouth at bedtime. (Patient not taking: Reported on 1/10/2025) 30 tablet 5 Not Taking    doxycycline (MONODOX) 100 MG capsule EMPTY CONTENTS OF 1 CAPSULE INTO NASAL IRRIGATION SYSTEM, ADD DISTILLED WATER, SALT PACK, MIX & IRRIGATE. PERFORM 2 TIMES DAILY (Patient not taking: Reported on 1/10/2025)   Not Taking    gabapentin (NEURONTIN) 300 MG capsule Take 300 mg by mouth 3 (three) times daily. Patient takes once a day       insulin glargine U-100, Lantus, (LANTUS SOLOSTAR U-100 INSULIN) 100 unit/mL (3 mL) InPn pen Inject 30 Units under the skin daily with breakfast. 15 mL 1     insulin lispro 100 unit/mL pen Inject 6 Units under the skin 3 (three) times daily as needed for blood sugar over 150 with meals, if over 250 give 12 units with meal 9 mL 1     LIDOcaine-prilocaine (EMLA) cream Apply to affected area once 30 g 11 Unknown    metroNIDAZOLE (FLAGYL) 500 MG tablet Crush 1/2 tablet and sprinkle/apply to the open wound with every dressing change for odor control  "(Patient not taking: Reported on 1/10/2025) 20 tablet 0 Not Taking    naloxone (NARCAN) 4 mg/actuation Spry        ondansetron (ZOFRAN-ODT) 8 MG TbDL Dissolve 1 tablet (8 mg total) by mouth every 8 (eight) hours as needed (Nausea). 30 tablet 2 Unknown    pen needle, diabetic (PEN NEEDLE) 32 gauge x 5/32" Ndle Use with insulin as directed 4 times a day 100 each 2                            .          "

## 2025-01-10 NOTE — ASSESSMENT & PLAN NOTE
Patient with Hypoxic Respiratory failure which is Acute.  she is not on home oxygen. Supplemental oxygen was provided and noted- Oxygen Concentration (%):  [40] 40    .   Signs/symptoms of respiratory failure include- tachypnea, increased work of breathing, and use of accessory muscles. Contributing diagnoses includes - Pleural effusion and Pneumonia Labs and images were reviewed. Patient Has not had a recent ABG. Will treat underlying causes and adjust management of respiratory failure as follows- thoracentesis  -supplemental oxygen  -treat influenza  -antibiotics

## 2025-01-10 NOTE — H&P
[facility] - Emergency Dept.  [facility] Medicine  History & Physical    Patient Name: Sherlyn Saavedra  MRN: [id number]  Patient Class: IP- Inpatient  Admission Date: 1/10/2025  Attending Physician: Serjio Flores MD   Primary Care Provider: [facility] Primary [facility]         Patient information was obtained from patient, relative(s), past medical records, and ER records.     Subjective:     Principal Problem:Acute hypoxemic respiratory failure    Chief Complaint:   Chief Complaint   Patient presents with    Shortness of Breath     SOB onset yesterday. Reports low O2 and tachycardia at home.        HPI: The patient is a 64 yo female with past medical history of diabetes, hypertension, metastatic breast cancer (mets to brain and lungs)  who presented to the ED with shortness of breath for 1 day. She was seen at palliative care clinic yesterday and started on Zpack. Her symptoms did not improve. She noted low oxygen saturations. She also has cough, leg swelling and abdominal pain. Chest x-ray with increasing right pleural effusion. She also tested positive for Influenza A. Patient noted to have oxygen saturation of 80%. Hospital medicine consulted for admission.    Past Medical History:   Diagnosis Date    Allergy     Anemia     Breast cancer     primary malignant neoplasm of upper outer quadrant of breast - progressive metastatic triple negative breast cancer. Patient has progressive 2 lines of therapy.    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    Type 2 diabetes mellitus with diabetic polyneuropathy, without long-term current use of insulin 03/04/2015       Past Surgical History:   Procedure Laterality Date    HYSTERECTOMY      tubaligation  1987       Review of patient's allergies indicates:   Allergen Reactions    Ace inhibitors Swelling    Lisinopril  Rash    Hydrocodone Itching     Hives    Hydrocodone-acetaminoph-supp11 Itching    Percocet [oxycodone-acetaminophen] Itching     Pt had to take an antihistamine to improve itching     Cephalexin      Hives    Pantoprazole Hives    Propoxyphene      Hives    Propoxyphene n-acetaminophen     Propoxyphene-acetaminophen        No current facility-administered medications on file prior to encounter.     Current Outpatient Medications on File Prior to Encounter   Medication Sig    amLODIPine (NORVASC) 5 MG tablet TAKE 1 TABLET BY MOUTH EVERY MORNING (Patient taking differently: Take 5 mg by mouth once daily.)    atorvastatin (LIPITOR) 40 MG tablet Take 40 mg by mouth once daily.    azithromycin (Z-JESUSITA) 250 MG tablet Take 2 tablets by mouth on day 1; Take 1 tablet by mouth on days 2-5 (Patient taking differently: Take 250 mg by mouth once daily. Take 2 tablets by mouth on day 1; Take 1 tablet by mouth on days 2-5)    blood-glucose meter,continuous (DEXCOM G7 ) Misc use as directed    folic acid (FOLVITE) 1 MG tablet take 1 tablet by mouth every morning    HYDROmorphone (DILAUDID) 4 MG tablet Take 1 tablet (4 mg total) by mouth every 3 (three) hours as needed for Pain (Max 6 doses/day).    magnesium oxide (MAG-OX) 400 mg (241.3 mg magnesium) tablet Take 400 mg by mouth once daily.    metFORMIN (GLUCOPHAGE-XR) 500 MG ER 24hr tablet take 2 tablets by mouth in the morning and at bedtime as directed    morphine (MS CONTIN) 60 MG 12 hr tablet Take 1 tablet (60 mg total) by mouth 2 (two) times daily. for 14 days    om3/dha/epa/fish/kril/lut/zeax (MEGARED ADV TOTAL BODY REFRESH ORAL) Take 1 tablet by mouth Daily.    pyridoxine, vitamin B6, 200 mg TbSR Take 1 tablet by mouth Daily.    vitamin D3-folic acid 125 mcg (5,000 unit)-1 mg Tab Take 1 tablet by mouth Daily.    (Magic mouthwash) 1:1:1 diphenhydrAMINE(Benadryl) 12.5mg/5ml liq, aluminum & magnesium hydroxide-simethicone (Maalox), LIDOcaine viscous 2% Swish and spit 15  "mLs every 4 (four) hours as needed (mouth ulcers). for mouth sores    blood sugar diagnostic (FREESTYLE TEST) Strp Test 4 times per day    busPIRone (BUSPAR) 15 MG tablet Take 1 tablet by mouth at bedtime. (Patient not taking: Reported on 1/10/2025)    doxycycline (MONODOX) 100 MG capsule EMPTY CONTENTS OF 1 CAPSULE INTO NASAL IRRIGATION SYSTEM, ADD DISTILLED WATER, SALT PACK, MIX & IRRIGATE. PERFORM 2 TIMES DAILY (Patient not taking: Reported on 1/10/2025)    gabapentin (NEURONTIN) 300 MG capsule Take 300 mg by mouth 3 (three) times daily. Patient takes once a day    insulin glargine U-100, Lantus, (LANTUS SOLOSTAR U-100 INSULIN) 100 unit/mL (3 mL) InPn pen Inject 30 Units under the skin daily with breakfast.    insulin lispro 100 unit/mL pen Inject 6 Units under the skin 3 (three) times daily as needed for blood sugar over 150 with meals, if over 250 give 12 units with meal    LIDOcaine-prilocaine (EMLA) cream Apply to affected area once    metroNIDAZOLE (FLAGYL) 500 MG tablet Crush 1/2 tablet and sprinkle/apply to the open wound with every dressing change for odor control (Patient not taking: Reported on 1/10/2025)    naloxone (NARCAN) 4 mg/actuation Spry     ondansetron (ZOFRAN-ODT) 8 MG TbDL Dissolve 1 tablet (8 mg total) by mouth every 8 (eight) hours as needed (Nausea).    pen needle, diabetic (PEN NEEDLE) 32 gauge x 5/32" Ndle Use with insulin as directed 4 times a day    [DISCONTINUED] famotidine (PEPCID) 20 MG tablet Take 20 mg by mouth 2 (two) times daily.    [DISCONTINUED] LIDOcaine viscous HCl 2%-diphenhydrAMINE-aluminum-magnesium hydroxide-simethicone Swish and spit 15 mLs by mouth every 4 (four) hours as needed (mouth ulcers). for mouth sores    [DISCONTINUED] pantoprazole (PROTONIX) 40 MG tablet take 1 tablet by mouth daily    [DISCONTINUED] tirzepatide (MOUNJARO) 5 mg/0.5 mL PnIj Inject 5 mg into the skin once weekly     Family History    None       Tobacco Use    Smoking status: Former     Current " packs/day: 0.00     Types: Cigarettes     Quit date:      Years since quittin.0    Smokeless tobacco: Never   Substance and Sexual Activity    Alcohol use: Not Currently    Drug use: Never    Sexual activity: Not Currently     Review of Systems   Constitutional:  Positive for activity change and appetite change.   Respiratory:  Positive for cough and shortness of breath.    Cardiovascular:  Positive for leg swelling.   Musculoskeletal:  Positive for back pain.   All other systems reviewed and are negative.    Objective:     Vital Signs (Most Recent):  Temp: 99.2 °F (37.3 °C) (01/10/25 1158)  Pulse: 95 (01/10/25 173)  Resp: (!) 21 (01/10/25 1730)  BP: (!) 123/58 (01/10/25 1730)  SpO2: (!) 92 % (01/10/25 1730) Vital Signs (24h Range):  Temp:  [99.2 °F (37.3 °C)] 99.2 °F (37.3 °C)  Pulse:  [] 95  Resp:  [18-34] 21  SpO2:  [80 %-97 %] 92 %  BP: (105-139)/(53-64) 123/58     Weight: 79.6 kg (175 lb 7.8 oz)  Body mass index is 34.27 kg/m².     Physical Exam  HENT:      Head: Normocephalic and atraumatic.   Cardiovascular:      Rate and Rhythm: Normal rate and regular rhythm.      Heart sounds: No murmur heard.  Pulmonary:      Effort: Pulmonary effort is normal. No respiratory distress.      Breath sounds: Decreased breath sounds present. No wheezing.   Abdominal:      General: Bowel sounds are normal. There is no distension.      Palpations: Abdomen is soft.      Tenderness: There is no abdominal tenderness.   Musculoskeletal:         General: Swelling present.      Right lower leg: Edema present.      Left lower leg: Edema present.   Skin:     General: Skin is warm and dry.   Neurological:      Mental Status: She is alert and oriented to person, place, and time. Mental status is at baseline.                Significant Labs: All pertinent labs within the past 24 hours have been reviewed.  Recent Lab Results         01/10/25  1641   01/10/25  1314   01/10/25  1223   01/10/25  1213        Influenza A,  Molecular     Positive         Influenza B, Molecular     Negative         Procalcitonin   0.35  Comment: A concentration < 0.25 ng/mL represents a low risk of bacterial   infection.  Procalcitonin may not be accurate among patients with localized   infection, recent trauma or major surgery, immunosuppressed state,   invasive fungal infection, renal dysfunction. Decisions regarding   initiation or continuation of antibiotic therapy should not be based   solely on procalcitonin levels.             Albumin   2.7           ALP   313           ALT   55           Anion Gap   14           PTT   <21.0  Comment: Refer to local heparin nomogram for intensity/dose specific   therapeutic   range.             AST   40           Baso #   0.04           Basophil %   0.3           BILIRUBIN TOTAL   0.7  Comment: For infants and newborns, interpretation of results should be based  on gestational age, weight and in agreement with clinical  observations.    Premature Infant recommended reference ranges:  Up to 24 hours.............<8.0 mg/dL  Up to 48 hours............<12.0 mg/dL  3-5 days..................<15.0 mg/dL  6-29 days.................<15.0 mg/dL             BNP   68  Comment: Values of less than 100 pg/ml are consistent with non-CHF populations.           BUN   12           Calcium   8.9           Chloride   101           CO2   23           Creatinine   0.6           Differential Method   Automated           eGFR   >60           Eos #   0.1           Eos %   1.1           Flu A & B Source     Nasal swab         Glucose   104           Gran # (ANC)   10.5           Gran %   84.4           Hematocrit   27.7           Hemoglobin   8.7           Immature Grans (Abs)   0.56  Comment: Mild elevation in immature granulocytes is non specific and   can be seen in a variety of conditions including stress response,   acute inflammation, trauma and pregnancy. Correlation with other   laboratory and clinical findings is essential.              Immature Granulocytes   4.5           INR   1.1  Comment: Coumadin Therapy:  2.0 - 3.0 for INR for all indicators except mechanical heart valves  and antiphospholipid syndromes which should use 2.5 - 3.5.             Lactic Acid Level 1.7  Comment: Falsely low lactic acid results can be found in samples   containing >=13.0 mg/dL total bilirubin and/or >=3.5 mg/dL   direct bilirubin.     2.0  Comment: Falsely low lactic acid results can be found in samples   containing >=13.0 mg/dL total bilirubin and/or >=3.5 mg/dL   direct bilirubin.             Lymph #   0.8           Lymph %   6.7           MCH   29.1           MCHC   31.4           MCV   93           Mono #   0.4           Mono %   3.0           MPV   9.5           nRBC   1           QRS Duration       68       [facility] QTC Calculation       431       Platelet Count   157           Potassium   3.8           PROTEIN TOTAL   5.9           PT   12.6           RBC   2.99           RDW   16.7           SARS-CoV-2 RNA, Amplification, Qual     Negative  Comment: This test utilizes isothermal nucleic acid amplification technology   to   detect the SARS-CoV-2 RdRp nucleic acid segment. The analytical   sensitivity   (limit of detection) is 500 copies/swab.    A POSITIVE result is indicative of the presence of SARS-CoV-2 RNA;   clinical   correlation with patient history and other diagnostic information is   necessary to determine patient infection status.    A NEGATIVE result means that SARS-CoV-2 nucleic acids are not present   above   the limit of detection. A NEGATIVE result should be treated as   presumptive.   It does not rule out the possibility of COVID-19 and should not be   the sole   basis for treatment decisions.    If COVID-19 is strongly suspected based on clinical and exposure   history,   re-testing using an alternate molecular assay should be considered.    This test is Food and Drug Administration (FDA) approved. Performance   characteristics of this  "has been independently verified by [facility] Department of Pathology and Laboratory Medicine.           Sodium   138           Troponin I   0.058  Comment: The reference interval for Troponin I represents the 99th percentile   cutoff   for our facility and is consistent with 3rd generation assay   performance.             WBC   12.46                   Significant Imaging: I have reviewed all pertinent imaging results/findings within the past 24 hours.  Assessment/Plan:     * Acute hypoxemic respiratory failure  Patient with Hypoxic Respiratory failure which is Acute.  she is not on home oxygen. Supplemental oxygen was provided and noted- Oxygen Concentration (%):  [40] 40    .   Signs/symptoms of respiratory failure include- tachypnea, increased work of breathing, and use of accessory muscles. Contributing diagnoses includes - Pleural effusion and Pneumonia Labs and images were reviewed. Patient Has not had a recent ABG. Will treat underlying causes and adjust management of respiratory failure as follows- thoracentesis  -supplemental oxygen  -treat influenza  -antibiotics    Influenza A  Tamiflu   Supportive care      Neoplasm related pain  -continue home medication  -prn IV dilaudid for breakthrough pain      Type 2 diabetes mellitus with diabetic polyneuropathy, without long-term current use of insulin  Patient's FSGs are controlled on current medication regimen.  Last A1c reviewed-   Lab Results   Component Value Date    HGBA1C 5.5 11/28/2024     Most recent fingerstick glucose reviewed- No results for input(s): "POCTGLUCOSE" in the last 24 hours.  Current correctional scale  Low  Maintain anti-hyperglycemic dose as follows-   Antihyperglycemics (From admission, onward)      Start     Stop Route Frequency Ordered    01/10/25 1851  insulin aspart U-100 pen 0-5 Units         -- SubQ Before meals & nightly PRN 01/10/25 1752          Hold Oral hypoglycemics while patient is in the " hospital.        Secondary malignancy of parietal pleura  Patient has metastatic cancer of breast primary. The cancer has metastasized to brain and lungs. The patient is under the care of an outpatient oncologist. The patient is undergoing active chemotherapy as follows- [unfilled] .Their staging information is listed below.   Cancer Staging   Primary malignant neoplasm of upper outer quadrant of breast, left  Staging form: Breast, AJCC 8th Edition  - Pathologic stage from 10/10/2023: No Stage Recommended (ypT2, pN0, cM0, G3, ER-, AR-, HER2-) - Signed by Nino Hines MD on 10/10/2023        Primary malignant neoplasm of upper outer quadrant of breast, left  -followed by Dr. Hoffmann and palliative care        VTE Risk Mitigation (From admission, onward)           Ordered     IP VTE HIGH RISK PATIENT  Once         01/10/25 1752     Place sequential compression device  Until discontinued         01/10/25 1752                                    Serjio Flores MD  Department of Hospital Medicine  [facility] - Emergency Dept.

## 2025-01-10 NOTE — ASSESSMENT & PLAN NOTE
Patient with Hypoxic Respiratory failure which is Acute.  she is not on home oxygen. Supplemental oxygen was provided and noted- Oxygen Concentration (%):  [40] 40    .   Signs/symptoms of respiratory failure include- respiratory distress. Contributing diagnoses includes - Pneumonia and Flu  Labs and images were reviewed. Patient Has not had a recent ABG. Will treat underlying causes and adjust management of respiratory failure as follows- Tamiflu, broad spectrum Abx, supplemental oxygen    Flu A positive  My suspicion for superinfection is fairly low at this time, but she is immunocompromised, so broad spectrum Abx for the time being is reasonable  Continue Tamiflu  Wean supplemental oxygen for goal SpO2 > 90%  Will give a one time dose of Lasix

## 2025-01-10 NOTE — ASSESSMENT & PLAN NOTE
Patient has metastatic cancer of breast primary. The cancer has metastasized to brain and lungs. The patient is under the care of an outpatient oncologist. The patient is undergoing active chemotherapy as follows- [unfilled] .Their staging information is listed below.   Cancer Staging   Primary malignant neoplasm of upper outer quadrant of breast, left  Staging form: Breast, AJCC 8th Edition  - Pathologic stage from 10/10/2023: No Stage Recommended (ypT2, pN0, cM0, G3, ER-, [place]-, HER2-) - Signed by Nino Hines MD on 10/10/2023

## 2025-01-10 NOTE — ASSESSMENT & PLAN NOTE
"Patient's FSGs are controlled on current medication regimen.  Last A1c reviewed-   Lab Results   Component Value Date    HGBA1C 5.5 11/28/2024     Most recent fingerstick glucose reviewed- No results for input(s): "POCTGLUCOSE" in the last 24 hours.  Current correctional scale  Low  Maintain anti-hyperglycemic dose as follows-   Antihyperglycemics (From admission, onward)      Start     Stop Route Frequency Ordered    01/10/25 1851  insulin aspart U-100 pen 0-5 Units         -- SubQ Before meals & nightly PRN 01/10/25 1752          Hold Oral hypoglycemics while patient is in the hospital.      "

## 2025-01-10 NOTE — ED PROVIDER NOTES
Emergency Medicine Provider Note - 1/10/2025       SCRIBE NOTE: I, Merrick Zeng, am scribing for, and in the presence of Ofelia Gallego DO, FACEP     History     Chief Complaint   Patient presents with    Shortness of Breath     SOB onset yesterday. Reports low O2 and tachycardia at home.       Allergies:  Review of patient's allergies indicates:   Allergen Reactions    Ace inhibitors Swelling    Lisinopril Rash    Hydrocodone Itching     Hives    Hydrocodone-acetaminoph-supp11 Itching    Percocet [oxycodone-acetaminophen] Itching     Pt had to take an antihistamine to improve itching     Cephalexin      Hives    Pantoprazole Hives    Propoxyphene      Hives    Propoxyphene n-acetaminophen     Propoxyphene-acetaminophen        History of Present Illness   HPI    1/10/2025, 12:00 PM  The history is provided by the daughter and patient    Sherlyn Saavedra is a 65 y.o. female with a PMHx of allergies, anemia, breast cancer, hyperlipidemia, hypertension, primary malignant neoplasm of upper outer quadrant of breast , secondary malignancy of parietal pleura, and diabetes mellitus type 2 who is presenting to the ED for shortness of breath that started 1 day ago. Patient was diagnosed with triple negative breast cancer in 2022 and has been treated with induction chemotherapy consisting of carboplatin Taxol cyclophosphamide doxorubicin and Keytruda. Patient's last chemo treatment was 2 weeks ago. The patient was supposed to have chemo treatment on yesterday but was not able to due to a ybarra in insurance. Patient did finish radiation 3 days ago.  Patient completed a office visit with Whitley Galarza PA-C (Hematology and Oncology) on yesterday for swelling around her feet and ankles. Patient's daughter reports the patient had cold like sxs a week prior to visit and had been given Theraflu. Patient's daughter reports she was sick the week of Christmas and attempted to take all preventative measures with her mother. Associated  sxs include coughing, leg swelling, and abdominal pain. Sxs are intermittent and constant in severity. Patient denies fever, rhinorrhea, nausea, vomiting, diarrhea, and sore throat. When asked about her antibiotics the patient's daughter reports the patient took two yesterday.      Arrival mode: Private Vehicle     PCP: Nan, Primary Doctor     Past Medical History:  Past Medical History:   Diagnosis Date    Allergy     Anemia     Breast cancer     primary malignant neoplasm of upper outer quadrant of breast - progressive metastatic triple negative breast cancer. Patient has progressive 2 lines of therapy.    HLD (hyperlipidemia) 2015    Hyperlipidemia (Problem)      Hypertension     Primary malignant neoplasm of upper outer quadrant of breast, left 10/10/2023    Secondary malignancy of parietal pleura 2024    Type 2 diabetes mellitus with diabetic polyneuropathy, without long-term current use of insulin 2015       Past Surgical History:  Past Surgical History:   Procedure Laterality Date    HYSTERECTOMY      tubaligation           Family History:  No family history on file.    Social History:  Social History     Tobacco Use    Smoking status: Former     Current packs/day: 0.00     Types: Cigarettes     Quit date:      Years since quittin.0    Smokeless tobacco: Never   Substance and Sexual Activity    Alcohol use: Not Currently    Drug use: Never    Sexual activity: Not Currently       I have reviewed the Past Medical History, Past Surgical History, Family History and Social History as documented above.      Review of Systems   Review of Systems   Constitutional:  Negative for fever.   HENT:  Negative for rhinorrhea and sore throat.    Respiratory:  Positive for cough and shortness of breath.    Cardiovascular:  Positive for leg swelling.   Gastrointestinal:  Positive for abdominal pain. Negative for diarrhea, nausea and vomiting.   All other systems reviewed and are negative.          Physical Exam     Initial Vitals [01/10/25 1158]   BP Pulse Resp Temp SpO2   135/64 (!) 135 (!) 30 99.2 °F (37.3 °C) (!) 80 %      MAP       --          Physical Exam    Nursing Notes and Vital Signs Reviewed.  Constitutional: Patient is in no apparent distress. Patient is ill and pale appearing.   Head: Atraumatic. Normocephalic.  Eyes: PERRL. EOM intact. Conjunctivae are not pale. No scleral icterus.  ENT: Mucous membranes are moist. Oropharynx is clear and symmetric.    Neck: Supple. Full ROM.   Cardiovascular: Tachycardic.  Regular rhythm. No murmurs, rubs, or gallops. Distal pulses are 2+ and symmetric.  Pulmonary/Chest: No respiratory distress. Bilateral wheezing.   Abdominal: Soft and non-distended. Mild abdominal  tenderness.  No rebound, guarding, or rigidity. Genitourinary: N/A  Musculoskeletal: Moves all extremities. No obvious deformities. No edema.   Skin: Lesion to the right upper back about 5 cm wide.   Neurological:  Alert, awake, and appropriate.  Normal speech.  No acute focal neurological deficits are appreciated.  Psychiatric: Normal affect. Good eye contact. Appropriate in content.    Picture of back:     ED Course   ED Procedures:  Critical Care    Date/Time: 1/10/2025 4:56 PM    Performed by: Ofelia Gallego DO  Authorized by: Ofelia Gallego DO  Direct patient critical care time: 15 minutes  Additional history critical care time: 10 minutes  Ordering / reviewing critical care time: 5 minutes  Documentation critical care time: 5 minutes  Consulting other physicians critical care time: 5 minutes  Total critical care time (exclusive of procedural time) : 40 minutes  Critical care time was exclusive of separately billable procedures and treating other patients and teaching time.  Critical care was necessary to treat or prevent imminent or life-threatening deterioration of the following conditions: Hypoxia.  Critical care was time spent personally by me on the following activities: blood  draw for specimens, development of treatment plan with patient or surrogate, discussions with consultants, interpretation of cardiac output measurements, evaluation of patient's response to treatment, examination of patient, obtaining history from patient or surrogate, ordering and performing treatments and interventions, ordering and review of laboratory studies, ordering and review of radiographic studies, pulse oximetry, re-evaluation of patient's condition and review of old charts.          ED Vital Signs:  Vitals:    01/10/25 1158 01/10/25 1244 01/10/25 1250 01/10/25 1313   BP: 135/64   (!) 117/55   Pulse: (!) 135 (!) 120 (!) 117 (!) 123   Resp: (!) 30 18 (!) 29 (!) 34   Temp: 99.2 °F (37.3 °C)      TempSrc: Oral      SpO2: (!) 80% (!) 93% (!) 94% (!) 93%   Weight: 79.6 kg (175 lb 7.8 oz)      Height: 5' (1.524 m)       01/10/25 1315 01/10/25 1330 01/10/25 1333 01/10/25 1345   BP: (!) 116/56 136/60  130/60   Pulse: (!) 121 110  106   Resp: (!) 27 (!) 30  (!) 28   Temp:       TempSrc:       SpO2: 95% 97% 95% 95%   Weight:       Height:        01/10/25 1400 01/10/25 1415 01/10/25 1430 01/10/25 1445   BP: 139/61 (!) 125/58 (!) 125/57 (!) 113/56   Pulse: 107 (!) 115 110 105   Resp: (!) 27 (!) 26 (!) 26 (!) 24   Temp:       TempSrc:       SpO2: (!) 94% (!) 92% (!) 92% (!) 92%   Weight:       Height:        01/10/25 1515   BP: (!) 105/53   Pulse: 104   Resp: (!) 25   Temp:    TempSrc:    SpO2: (!) 92%   Weight:    Height:        All Lab Results:  Results for orders placed or performed during the hospital encounter of 01/10/25   EKG 12-lead    Collection Time: 01/10/25 12:13 PM   Result Value Ref Range    QRS Duration 68 ms    OHS QTC Calculation 431 ms   Influenza A & B by Molecular    Collection Time: 01/10/25 12:23 PM    Specimen: Nasopharyngeal Swab   Result Value Ref Range    Influenza A, Molecular Positive (A) Negative    Influenza B, Molecular Negative Negative    Flu A & B Source Nasal swab    COVID-19 Rapid  Screening    Collection Time: 01/10/25 12:23 PM   Result Value Ref Range    SARS-CoV-2 RNA, Amplification, Qual Negative Negative   CBC auto differential    Collection Time: 01/10/25  1:14 PM   Result Value Ref Range    WBC 12.46 3.90 - 12.70 K/uL    RBC 2.99 (L) 4.00 - 5.40 M/uL    Hemoglobin 8.7 (L) 12.0 - 16.0 g/dL    Hematocrit 27.7 (L) 37.0 - 48.5 %    MCV 93 82 - 98 fL    MCH 29.1 27.0 - 31.0 pg    MCHC 31.4 (L) 32.0 - 36.0 g/dL    RDW 16.7 (H) 11.5 - 14.5 %    Platelets 157 150 - 450 K/uL    MPV 9.5 9.2 - 12.9 fL    Immature Granulocytes 4.5 (H) 0.0 - 0.5 %    Gran # (ANC) 10.5 (H) 1.8 - 7.7 K/uL    Immature Grans (Abs) 0.56 (H) 0.00 - 0.04 K/uL    Lymph # 0.8 (L) 1.0 - 4.8 K/uL    Mono # 0.4 0.3 - 1.0 K/uL    Eos # 0.1 0.0 - 0.5 K/uL    Baso # 0.04 0.00 - 0.20 K/uL    nRBC 1 (A) 0 /100 WBC    Gran % 84.4 (H) 38.0 - 73.0 %    Lymph % 6.7 (L) 18.0 - 48.0 %    Mono % 3.0 (L) 4.0 - 15.0 %    Eosinophil % 1.1 0.0 - 8.0 %    Basophil % 0.3 0.0 - 1.9 %    Differential Method Automated    Comprehensive metabolic panel    Collection Time: 01/10/25  1:14 PM   Result Value Ref Range    Sodium 138 136 - 145 mmol/L    Potassium 3.8 3.5 - 5.1 mmol/L    Chloride 101 95 - 110 mmol/L    CO2 23 23 - 29 mmol/L    Glucose 104 70 - 110 mg/dL    BUN 12 8 - 23 mg/dL    Creatinine 0.6 0.5 - 1.4 mg/dL    Calcium 8.9 8.7 - 10.5 mg/dL    Total Protein 5.9 (L) 6.0 - 8.4 g/dL    Albumin 2.7 (L) 3.5 - 5.2 g/dL    Total Bilirubin 0.7 0.1 - 1.0 mg/dL    Alkaline Phosphatase 313 (H) 40 - 150 U/L    AST 40 10 - 40 U/L    ALT 55 (H) 10 - 44 U/L    eGFR >60 >60 mL/min/1.73 m^2    Anion Gap 14 8 - 16 mmol/L   Lactic acid, plasma #1    Collection Time: 01/10/25  1:14 PM   Result Value Ref Range    Lactate (Lactic Acid) 2.0 0.5 - 2.2 mmol/L   Troponin I    Collection Time: 01/10/25  1:14 PM   Result Value Ref Range    Troponin I 0.058 (H) 0.000 - 0.026 ng/mL   Procalcitonin    Collection Time: 01/10/25  1:14 PM   Result Value Ref Range     Procalcitonin 0.35 (H) <0.25 ng/mL   Brain natriuretic peptide    Collection Time: 01/10/25  1:14 PM   Result Value Ref Range    BNP 68 0 - 99 pg/mL   Protime-INR    Collection Time: 01/10/25  1:14 PM   Result Value Ref Range    Prothrombin Time 12.6 (H) 9.0 - 12.5 sec    INR 1.1 0.8 - 1.2   APTT    Collection Time: 01/10/25  1:14 PM   Result Value Ref Range    aPTT <21.0 21.0 - 32.0 sec   Lactic acid, plasma #2    Collection Time: 01/10/25  4:41 PM   Result Value Ref Range    Lactate (Lactic Acid) 1.7 0.5 - 2.2 mmol/L             The EKG was ordered, reviewed, and independently interpreted by the ED provider:  Time: 12:13  Rate: 121 BPM  Rhythm: sinus tachycardia  Interpretation: Minimal voltage criteria for LVH, may be normal variant. Inferior infarct. No STEMI.      ECG Results              EKG 12-lead (Preliminary result)        Collection Time Result Time QRS Duration OHS QTC Calculation    01/10/25 12:13:43 01/10/25 16:54:37 68 431                     Wet Read by Ofelia Gallego DO (01/10/25 16:55:04, [facility] - Emergency Dept[facility] Emergency Medicine)    Sinus tachycardia.  Left axis deviation.  Q-wave in 3 and AVF.  No ST segment elevation.  No STEMI                      In process by Interface, Lab In [facility] (01/10/25 12:31:38)                   Narrative:    Test Reason : Z13.6,    Vent. Rate : 121 BPM     Atrial Rate : 121 BPM     P-R Int : 168 ms          QRS Dur :  68 ms      QT Int : 304 ms       P-R-T Axes :  45 -12  61 degrees    QTcB Int : 431 ms    Sinus tachycardia  Minimal voltage criteria for LVH, may be normal variant  Inferior infarct ,age undetermined  Cannot rule out Anterior infarct ,age undetermined  Abnormal ECG  No previous ECGs available    Referred By: AAAREFERRAL SELF           Confirmed By:                                     Imaging Results:  Imaging Results              X-Ray Chest AP Portable (Final result)  Result time 01/10/25 13:25:39      Final result by Favian Wright MD  (01/10/25 13:25:39)                   Impression:      1.  Interval development of a reticular interstitial changes throughout the lungs concerning for CHF versus interstitial infectious process.  Superimposed on these changes are multiple nodular patchy opacities throughout the periphery of the right upper lobe and throughout the left lung as well as dense infiltrate involving the middle lobe and right lower lobe.  Infectious inflammatory processes must be considered.  Less likely could all of these changes be related to the patient's underlying neoplastic process.    2.  Large right effusion again seen.    3.  Stable findings as noted above.      Electronically signed by: Favian Wright MD  Date:    01/10/2025  Time:    13:25               Narrative:    EXAMINATION:  XR CHEST AP PORTABLE    CLINICAL HISTORY:  Sepsis; personal history of breast cancer    COMPARISON:  November 27, 2024    FINDINGS:  Persistent large right effusion.  Worsening vascular congestion/reticular interstitial changes throughout the lungs.  There are patchy, nodular alveolar opacities within the periphery of the right lung and throughout the left lung.  Dense middle lobe/left lower lobe infiltrates.  The cardiac silhouette size is enlarged.  The trachea is midline and the mediastinal width is normal. Negative for left effusion or pneumothorax.  Pulmonary vasculature is congested.  Negative for osseous abnormalities. Right-sided chest port again seen                                            The Emergency Provider reviewed the vital signs and test results, which are outlined above.     ED Discussion   ED Medication(s):  Medications   meropenem 2 g in 0.9% NaCl 100 mL IVPB (MB+) (0 g Intravenous Stopped 1/10/25 1445)   vancomycin - pharmacy to dose (has no administration in time range)   vancomycin 2 g in 0.9% sodium chloride 500 mL IVPB (2,000 mg Intravenous New Bag 1/10/25 1612)   vancomycin 750 mg in 0.9% NaCl 250 mL IVPB (admixture device)  (750 mg Intravenous Trough Due As Scheduled Before Dose 1/12/25 0300)   morphine 12 hr tablet 60 mg (60 mg Oral Given 1/10/25 1315)   acetaminophen tablet 650 mg (650 mg Oral Given 1/10/25 1315)   levalbuterol nebulizer solution 1.2495 mg (1.2495 mg Nebulization Given 1/10/25 1244)   sodium chloride 0.9% bolus 1,000 mL 1,000 mL (0 mLs Intravenous Stopped 1/10/25 1428)   fentaNYL 50 mcg/mL injection 50 mcg (50 mcg Intravenous Given 1/10/25 1330)   oseltamivir capsule 75 mg (75 mg Oral Given 1/10/25 1417)       ED Course as of 01/10/25 1713   Fri Xander 10, 2025   1215 7/29/2024 Echo:     CONCLUSIONS:   1. Normal left ventricular cavity size. Normal left ventricular systolic function. LVEF   55 - 65%. Mild (Grade I) diastolic dysfunction (impaired relaxation).   2. Normal right ventricular size. Normal right ventricular systolic function.      [LB]   1224 Spoke with Iesha Clinical Pharmacist:Discussing options for severe sepsis/pneumonia.  She recommends Merrem..  Patient is asking for maintenance MS contin.  [LB]   1348 Influenza A, Molecular(!): Positive [LB]   1559 Dr. Isabel (Critical Care) evaluated patient . . . Recommends floor.  [LB]      ED Course User Index  [LB] Ofelia Gallego, DO             MIPS Measures     Smoker? No     Hypertension: Patient has a known history of hypertension.         Medical Decision Making                 Medical Decision Making  Differential diagnosis: Pneumonia, influenza, symptomatic adenoma, pleural effusion    ED course: Patient appears ill.  Tachycardic with heart rates of 120s to 130s.  Blood pressure 125/57.  Patient requiring supplemental oxygen.  Wheezing noted.  Patient treated with sepsis bundle.  White cell count 12.46.  H/H 8.7/27.7.  Positive left shift.  Mild elevation of alkaline phosphatase.  Otherwise CMP essentially normal.  Lactic 2.0.  Troponin 0.058.  EKG does not show any ST segment elevation.  Procalcitonin 0.35.  BNP 68.  PT 12 point 6.  INR 1.1.   Influenza A positive.  Patient treated with Tamiflu.  COVID negative.  Patient was treated with Merrem as well as vancomycin.  Patient does have history metastatic lung cancer.  Recent chemotherapy.  Chest x-ray shows interval development of reticular interstitial changes throughout the lungs concerning for CHF versus interstitial infectious process.  Large right pleural effusion.  Recommend ICU as patient appears ill, requiring supplemental oxygen, and tachypneic.  Patient evaluated by Dr. Isabel.  Recommended patient be admitted to tele.  Appreciate all consultants.    Amount and/or Complexity of Data Reviewed  Independent Historian:      Details: Patient's daughter was present at bed side to help with patient PMHx.  Labs: ordered. Decision-making details documented in ED Course.  Radiology: ordered and independent interpretation performed.  ECG/medicine tests: ordered and independent interpretation performed. Decision-making details documented in ED Course.    Risk  OTC drugs.  Prescription drug management.  Decision regarding hospitalization.        Prescription Management: I performed a review of the patient's current Rx medication list as input by nursing staff.    Patient's Medications   New Prescriptions    No medications on file   Previous Medications    (MAGIC MOUTHWASH) 1:1:1 DIPHENHYDRAMINE(BENADRYL) 12.5MG/5ML LIQ, ALUMINUM & MAGNESIUM HYDROXIDE-SIMETHICONE (MAALOX), LIDOCAINE VISCOUS 2%    Swish and spit 15 mLs every 4 (four) hours as needed (mouth ulcers). for mouth sores    AMLODIPINE (NORVASC) 5 MG TABLET    TAKE 1 TABLET BY MOUTH EVERY MORNING    ATORVASTATIN (LIPITOR) 40 MG TABLET    Take 40 mg by mouth once daily.    AZITHROMYCIN (Z-JESUSITA) 250 MG TABLET    Take 2 tablets by mouth on day 1; Take 1 tablet by mouth on days 2-5    BLOOD SUGAR DIAGNOSTIC (FREESTYLE TEST) STRP    Test 4 times per day    BLOOD-GLUCOSE METER,CONTINUOUS (DEXCOM G7 ) MISC    use as directed    BUSPIRONE (BUSPAR) 15  "MG TABLET    Take 1 tablet by mouth at bedtime.    DOXYCYCLINE (MONODOX) 100 MG CAPSULE    EMPTY CONTENTS OF 1 CAPSULE INTO NASAL IRRIGATION SYSTEM, ADD DISTILLED WATER, SALT PACK, MIX & IRRIGATE. PERFORM 2 TIMES DAILY    FOLIC ACID (FOLVITE) 1 MG TABLET    take 1 tablet by mouth every morning    GABAPENTIN (NEURONTIN) 300 MG CAPSULE    Take 300 mg by mouth 3 (three) times daily. Patient takes once a day    HYDROMORPHONE (DILAUDID) 4 MG TABLET    Take 1 tablet (4 mg total) by mouth every 3 (three) hours as needed for Pain (Max 6 doses/day).    INSULIN GLARGINE U-100, LANTUS, (LANTUS SOLOSTAR U-100 INSULIN) 100 UNIT/ML (3 ML) INPN PEN    Inject 30 Units under the skin daily with breakfast.    INSULIN LISPRO 100 UNIT/ML PEN    Inject 6 Units under the skin 3 (three) times daily as needed for blood sugar over 150 with meals, if over 250 give 12 units with meal    LIDOCAINE-PRILOCAINE (EMLA) CREAM    Apply to affected area once    MAGNESIUM OXIDE (MAG-OX) 400 MG (241.3 MG MAGNESIUM) TABLET    Take 400 mg by mouth once daily.    METFORMIN (GLUCOPHAGE-XR) 500 MG ER 24HR TABLET    take 2 tablets by mouth in the morning and at bedtime as directed    METRONIDAZOLE (FLAGYL) 500 MG TABLET    Crush 1/2 tablet and sprinkle/apply to the open wound with every dressing change for odor control    MORPHINE (MS CONTIN) 60 MG 12 HR TABLET    Take 1 tablet (60 mg total) by mouth 2 (two) times daily. for 14 days    NALOXONE (NARCAN) 4 MG/ACTUATION SPRY        OM3/DHA/EPA/FISH/KRIL/LUT/ZEAX (MEGARED ADV TOTAL BODY REFRESH ORAL)    Take 1 tablet by mouth Daily.    ONDANSETRON (ZOFRAN-ODT) 8 MG TBDL    Dissolve 1 tablet (8 mg total) by mouth every 8 (eight) hours as needed (Nausea).    PEN NEEDLE, DIABETIC (PEN NEEDLE) 32 GAUGE X 5/32" NDLE    Use with insulin as directed 4 times a day    PYRIDOXINE, VITAMIN B6, 200 MG TBSR    Take 1 tablet by mouth Daily.    VITAMIN D3-FOLIC ACID 125 MCG (5,000 UNIT)-1 MG TAB    Take 1 tablet by mouth " "Daily.   Modified Medications    No medications on file   Discontinued Medications    FAMOTIDINE (PEPCID) 20 MG TABLET    Take 20 mg by mouth 2 (two) times daily.    LIDOCAINE VISCOUS HCL 2%-DIPHENHYDRAMINE-ALUMINUM-MAGNESIUM HYDROXIDE-SIMETHICONE    Swish and spit 15 mLs by mouth every 4 (four) hours as needed (mouth ulcers). for mouth sores    PANTOPRAZOLE (PROTONIX) 40 MG TABLET    take 1 tablet by mouth daily    TIRZEPATIDE (MOUNJARO) 5 MG/0.5 ML PNIJ    Inject 5 mg into the skin once weekly        Discussed case verbally with:N/A    Referrals:  No orders of the defined types were placed in this encounter.        Portions of this note may have been created with voice recognition software. Occasional "wrong-word" or "sound-a-like" substitutions may have occurred due to the inherent limitations of voice recognition software. Please, read the note carefully and recognize, using context, where substitutions have occurred.          Clinical Impression       ICD-10-CM ICD-9-CM   1. Influenza A  J10.1 487.1   2. Screening for cardiovascular condition  Z13.6 V81.2   3. Hypoxia  R09.02 799.02   4. Aspiration pneumonia, unspecified aspiration pneumonia type, unspecified laterality, unspecified part of lung  J69.0 507.0         ED Disposition       4:24 PM: Discussed case with  ([facility]). Dr. Flores agrees with current care and management of pt and accepts admission.   Admitting Service: inpatient  Admitting Physician: Dr. Flores  Admit to: Med Tele            Scribe Attestation:   Scribe #1: [email or web address],  performed the above scribed service and the documentation accurately describes the services I performed. I attest to the accuracy of the note.     Attending:   Physician Attestation Statement for Scribe #1: IOfelia, DO, FACEP, personally performed the services described in this documentation, as scribed by Merrick Zeng , in my presence, and it is both accurate and complete.      "              Ofelia Gallego,   01/10/25 171

## 2025-01-10 NOTE — HPI
Ms Saavedra is a 66 y/o AAF with relapsed triple negative breast cancer (widely metastatic, palliative chemo, follows with Dr Hoffmann), HTN, HLD, and DM who presents to the ER today with SOB.  She says that she noted some LE edema worsening yesterday and went to her PCP for labs, which she was told were fine.  Maybe a bit of SOB at that time, but not much.  When she woke up today, she was profoundly short of breath.  It didn't get better through the morning, so she came on into the ER.  She reports a cough productive of clear sputum.  Multiple recent sick contacts, including her daughter who accompanies her.   Denies F/C, chest pain.      CXR shows stable large R effusion (malignant), and some patchy consolidation bilaterally.  Flu A positive.  BP is stable.  Oxygenation stable on 6L during my exam.

## 2025-01-10 NOTE — CONSULTS
[facility] - Emergency Dept.  Critical Care Medicine  Consult Note    Patient Name: Sherlyn Saavedra  MRN: [id number]  Admission Date: 1/10/2025  Hospital Length of Stay: 0 days  Code Status: Prior  Attending Physician: Ofelia Gallego DO   Primary Care Provider: No, Primary Doctor   Principal Problem: <principal problem not specified>    Inpatient consult to Critical Care Medicine  Consult performed by: Augustus Isabel MD  Consult ordered by: Ofelia Gallego DO        Subjective:     HPI:  Ms Saavedra is a 66 y/o AAF with relapsed triple negative breast cancer (widely metastatic, palliative chemo, follows with Dr Hoffmann), HTN, HLD, and DM who presents to the ER today with SOB.  She says that she noted some LE edema worsening yesterday and went to her PCP for labs, which she was told were fine.  Maybe a bit of SOB at that time, but not much.  When she woke up today, she was profoundly short of breath.  It didn't get better through the morning, so she came on into the ER.  She reports a cough productive of clear sputum.  Multiple recent sick contacts, including her daughter who accompanies her.   Denies F/C, chest pain.      CXR shows stable large R effusion (malignant), and some patchy consolidation bilaterally.  Flu A positive.  BP is stable.  Oxygenation stable on 6L during my exam.      Hospital/ICU Course:  No notes on file    Past Medical History:   Diagnosis Date    Allergy     Anemia     Breast cancer     primary malignant neoplasm of upper outer quadrant of breast - progressive metastatic triple negative breast cancer. Patient has progressive 2 lines of therapy.    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    Type 2 diabetes mellitus with diabetic polyneuropathy, without long-term current use of insulin 03/04/2015       Past Surgical  History:   Procedure Laterality Date    HYSTERECTOMY      tubaligation         Review of patient's allergies indicates:   Allergen Reactions    Ace inhibitors Swelling    Lisinopril Rash    Hydrocodone Itching     Hives    Hydrocodone-acetaminoph-supp11 Itching    Percocet [oxycodone-acetaminophen] Itching     Pt had to take an antihistamine to improve itching     Cephalexin      Hives    Pantoprazole Hives    Propoxyphene      Hives    Propoxyphene n-acetaminophen     Propoxyphene-acetaminophen        Family History    None       Tobacco Use    Smoking status: Former     Current packs/day: 0.00     Types: Cigarettes     Quit date:      Years since quittin.0    Smokeless tobacco: Never   Substance and Sexual Activity    Alcohol use: Not Currently    Drug use: Never    Sexual activity: Not Currently         Review of Systems   All other systems reviewed and are negative.    Objective:     Vital Signs (Most Recent):  Temp: 99.2 °F (37.3 °C) (01/10/25 1158)  Pulse: 104 (01/10/25 1515)  Resp: (!) 25 (01/10/25 1515)  BP: (!) 105/53 (01/10/25 1515)  SpO2: (!) 92 % (01/10/25 1515) Vital Signs (24h Range):  Temp:  [99.2 °F (37.3 °C)] 99.2 °F (37.3 °C)  Pulse:  [104-135] 104  Resp:  [18-34] 25  SpO2:  [80 %-97 %] 92 %  BP: (105-139)/(53-64) 105/53     Weight: 79.6 kg (175 lb 7.8 oz)  Body mass index is 34.27 kg/m².      Intake/Output Summary (Last 24 hours) at 1/10/2025 1615  Last data filed at 1/10/2025 1445  Gross per 24 hour   Intake 1100 ml   Output --   Net 1100 ml        Physical Exam  Vitals and nursing note reviewed.   Constitutional:       General: She is not in acute distress.     Appearance: She is ill-appearing.   HENT:      Head: Normocephalic and atraumatic.   Eyes:      General: No scleral icterus.     Extraocular Movements: Extraocular movements intact.      Conjunctiva/sclera: Conjunctivae normal.      Pupils: Pupils are equal, round, and reactive to light.   Cardiovascular:      Rate and  Rhythm: Regular rhythm. Tachycardia present.      Pulses: Normal pulses.      Heart sounds: No murmur heard.  Pulmonary:      Effort: Pulmonary effort is normal. No respiratory distress.      Breath sounds: No stridor. Rales present. No wheezing.      Comments: Absent BS in the R base, wound dressing in place on her R posterior chest  Abdominal:      General: Abdomen is flat. There is no distension.      Palpations: Abdomen is soft.      Tenderness: There is no abdominal tenderness.   Musculoskeletal:      Cervical back: Normal range of motion and neck supple. No rigidity or tenderness.      Right lower leg: Edema (1+) present.      Left lower leg: Edema (1+) present.   Skin:     General: Skin is warm and dry.      Coloration: Skin is not jaundiced or pale.   Neurological:      General: No focal deficit present.      Mental Status: She is alert and oriented to person, place, and time. Mental status is at baseline.          Vents:  Oxygen Concentration (%): 40 (01/10/25 1244)    Lines/Drains/Airways       Central Venous Catheter Line  Duration             Port A Cath Single Lumen 08/23/24 1100 Atrial Left 140 days              Drain  Duration             Female External Urinary Catheter w/ Suction 01/10/25 1558 <1 day              Peripheral Intravenous Line  Duration                  Peripheral IV - Single Lumen 01/10/25 1259 18 G 1 in Anterior;Right <1 day                    Significant Labs:    CBC/Anemia Profile:  Recent Labs   Lab 01/09/25  0950 01/10/25  1314   WBC 11.22 12.46   HGB 9.2* 8.7*   HCT 29.3* 27.7*    157   MCV 93 93   RDW 16.4* 16.7*        Chemistries:  Recent Labs   Lab 01/09/25  0950 01/10/25  1314    138   K 4.4 3.8   CL 98 101   CO2 28 23   BUN 12 12   CREATININE 0.7 0.6   CALCIUM 9.2 8.9   ALBUMIN 3.1* 2.7*   PROT 6.7 5.9*   BILITOT 0.5 0.7   ALKPHOS 376* 313*   ALT 70* 55*   AST 39 40       All pertinent labs within the past 24 hours have been reviewed.    Significant Imaging:  "  I have reviewed all pertinent imaging results/findings within the past 24 hours.    ABG  No results for input(s): "PH", "PO2", "PCO2", "HCO3", "BE" in the last 168 hours.  Assessment/Plan:     Pulmonary  Acute hypoxemic respiratory failure  Patient with Hypoxic Respiratory failure which is Acute.  she is not on home oxygen. Supplemental oxygen was provided and noted- Oxygen Concentration (%):  [40] 40    .   Signs/symptoms of respiratory failure include- respiratory distress. Contributing diagnoses includes - Pneumonia and Flu  Labs and images were reviewed. Patient Has not had a recent ABG. Will treat underlying causes and adjust management of respiratory failure as follows- Tamiflu, broad spectrum Abx, supplemental oxygen    Flu A positive  My suspicion for superinfection is fairly low at this time, but she is immunocompromised, so broad spectrum Abx for the time being is reasonable  Continue Tamiflu  Wean supplemental oxygen for goal SpO2 > 90%  Will give a one time dose of Lasix    ID  Influenza A  Droplet precautions  Continue Tamiflu  Broad spectrum Abx for now given immunocompromise and risk of superinfection    Immunology/Multi System  Immunodeficiency due to chemotherapy  Last chemo was reportedly a week and a half ago  Broad spectrum Abx for now    Oncology  Primary malignant neoplasm of upper outer quadrant of breast, left  Undergoing palliative chemo currently with progression of disease on most recent imaging  Follows with Palliative as outpatient  Has remained Full Code in the past; may have to readdress depending on course       I believe that she is stable for admission to the floor at this time.  If her condition changes, please don't hesitate to call.  Pulmonary will follow, at least briefly.    Thank you for your consult. I will follow-up with patient. Please contact us if you have any additional questions.     Augustus Isabel MD  Critical Care Medicine  '[facility] - Emergency Dept.  "

## 2025-01-10 NOTE — ASSESSMENT & PLAN NOTE
Droplet precautions  Continue Tamiflu  Broad spectrum Abx for now given immunocompromise and risk of superinfection

## 2025-01-11 ENCOUNTER — PATIENT MESSAGE (OUTPATIENT)
Dept: HEMATOLOGY/ONCOLOGY | Facility: CLINIC | Age: 65
End: 2025-01-11
Payer: MEDICARE

## 2025-01-11 LAB
ALBUMIN SERPL BCP-MCNC: 2.4 G/DL (ref 3.5–5.2)
ALP SERPL-CCNC: 283 U/L (ref 40–150)
ALT SERPL W/O P-5'-P-CCNC: 49 U/L (ref 10–44)
ANION GAP SERPL CALC-SCNC: 10 MMOL/L (ref 8–16)
AST SERPL-CCNC: 40 U/L (ref 10–40)
BASOPHILS # BLD AUTO: 0.03 K/UL (ref 0–0.2)
BASOPHILS NFR BLD: 0.2 % (ref 0–1.9)
BILIRUB SERPL-MCNC: 0.5 MG/DL (ref 0.1–1)
BUN SERPL-MCNC: 10 MG/DL (ref 8–23)
CALCIUM SERPL-MCNC: 9 MG/DL (ref 8.7–10.5)
CHLORIDE SERPL-SCNC: 103 MMOL/L (ref 95–110)
CO2 SERPL-SCNC: 25 MMOL/L (ref 23–29)
CREAT SERPL-MCNC: 0.7 MG/DL (ref 0.5–1.4)
DACRYOCYTES BLD QL SMEAR: ABNORMAL
DIFFERENTIAL METHOD BLD: ABNORMAL
EOSINOPHIL # BLD AUTO: 0 K/UL (ref 0–0.5)
EOSINOPHIL NFR BLD: 0 % (ref 0–8)
ERYTHROCYTE [DISTWIDTH] IN BLOOD BY AUTOMATED COUNT: 16.9 % (ref 11.5–14.5)
EST. GFR  (NO RACE VARIABLE): >60 ML/MIN/1.73 M^2
GLUCOSE SERPL-MCNC: 140 MG/DL (ref 70–110)
GRAM STN SPEC: NORMAL
HCT VFR BLD AUTO: 27.6 % (ref 37–48.5)
HGB BLD-MCNC: 8.5 G/DL (ref 12–16)
IMM GRANULOCYTES # BLD AUTO: 0.47 K/UL (ref 0–0.04)
IMM GRANULOCYTES NFR BLD AUTO: 3.7 % (ref 0–0.5)
LYMPHOCYTES # BLD AUTO: 0.6 K/UL (ref 1–4.8)
LYMPHOCYTES NFR BLD: 4.9 % (ref 18–48)
MAGNESIUM SERPL-MCNC: 1.5 MG/DL (ref 1.6–2.6)
MCH RBC QN AUTO: 28.6 PG (ref 27–31)
MCHC RBC AUTO-ENTMCNC: 30.8 G/DL (ref 32–36)
MCV RBC AUTO: 93 FL (ref 82–98)
MONOCYTES # BLD AUTO: 0.4 K/UL (ref 0.3–1)
MONOCYTES NFR BLD: 3.5 % (ref 4–15)
NEUTROPHILS # BLD AUTO: 11.1 K/UL (ref 1.8–7.7)
NEUTROPHILS NFR BLD: 87.7 % (ref 38–73)
NRBC BLD-RTO: 0 /100 WBC
PHOSPHATE SERPL-MCNC: 3.7 MG/DL (ref 2.7–4.5)
PLATELET # BLD AUTO: 149 K/UL (ref 150–450)
PLATELET BLD QL SMEAR: ABNORMAL
PMV BLD AUTO: 9.5 FL (ref 9.2–12.9)
POCT GLUCOSE: 177 MG/DL (ref 70–110)
POCT GLUCOSE: 201 MG/DL (ref 70–110)
POCT GLUCOSE: 221 MG/DL (ref 70–110)
POCT GLUCOSE: 296 MG/DL (ref 70–110)
POCT GLUCOSE: 317 MG/DL (ref 70–110)
POLYCHROMASIA BLD QL SMEAR: ABNORMAL
POTASSIUM SERPL-SCNC: 4.3 MMOL/L (ref 3.5–5.1)
PROT SERPL-MCNC: 5.9 G/DL (ref 6–8.4)
RBC # BLD AUTO: 2.97 M/UL (ref 4–5.4)
SODIUM SERPL-SCNC: 138 MMOL/L (ref 136–145)
WBC # BLD AUTO: 12.66 K/UL (ref 3.9–12.7)

## 2025-01-11 PROCEDURE — 21400001 HC TELEMETRY ROOM

## 2025-01-11 PROCEDURE — 11000001 HC ACUTE MED/SURG PRIVATE ROOM

## 2025-01-11 PROCEDURE — 63700000 PHARM REV CODE 250 ALT 637 W/O HCPCS: Performed by: INTERNAL MEDICINE

## 2025-01-11 PROCEDURE — 27100171 HC OXYGEN HIGH FLOW UP TO 24 HOURS

## 2025-01-11 PROCEDURE — 63600175 PHARM REV CODE 636 W HCPCS: Performed by: EMERGENCY MEDICINE

## 2025-01-11 PROCEDURE — 63600175 PHARM REV CODE 636 W HCPCS: Performed by: INTERNAL MEDICINE

## 2025-01-11 PROCEDURE — 27100092 HC HIGH FLOW DELIVERY CANNULA

## 2025-01-11 PROCEDURE — 27000249 HC VAPOTHERM CIRCUIT

## 2025-01-11 PROCEDURE — 94799 UNLISTED PULMONARY SVC/PX: CPT

## 2025-01-11 PROCEDURE — 94761 N-INVAS EAR/PLS OXIMETRY MLT: CPT

## 2025-01-11 PROCEDURE — 25000003 PHARM REV CODE 250: Performed by: INTERNAL MEDICINE

## 2025-01-11 PROCEDURE — 87205 SMEAR GRAM STAIN: CPT | Performed by: INTERNAL MEDICINE

## 2025-01-11 PROCEDURE — 0W993ZZ DRAINAGE OF RIGHT PLEURAL CAVITY, PERCUTANEOUS APPROACH: ICD-10-PCS | Performed by: INTERNAL MEDICINE

## 2025-01-11 PROCEDURE — 80053 COMPREHEN METABOLIC PANEL: CPT | Performed by: INTERNAL MEDICINE

## 2025-01-11 PROCEDURE — 84100 ASSAY OF PHOSPHORUS: CPT | Performed by: INTERNAL MEDICINE

## 2025-01-11 PROCEDURE — 27000221 HC OXYGEN, UP TO 24 HOURS

## 2025-01-11 PROCEDURE — 87070 CULTURE OTHR SPECIMN AEROBIC: CPT | Performed by: INTERNAL MEDICINE

## 2025-01-11 PROCEDURE — 99900035 HC TECH TIME PER 15 MIN (STAT)

## 2025-01-11 PROCEDURE — 25000003 PHARM REV CODE 250: Performed by: EMERGENCY MEDICINE

## 2025-01-11 PROCEDURE — 36415 COLL VENOUS BLD VENIPUNCTURE: CPT | Performed by: INTERNAL MEDICINE

## 2025-01-11 PROCEDURE — 85025 COMPLETE CBC W/AUTO DIFF WBC: CPT | Performed by: INTERNAL MEDICINE

## 2025-01-11 PROCEDURE — 83735 ASSAY OF MAGNESIUM: CPT | Performed by: INTERNAL MEDICINE

## 2025-01-11 RX ORDER — MAGNESIUM SULFATE HEPTAHYDRATE 40 MG/ML
2 INJECTION, SOLUTION INTRAVENOUS ONCE
Status: COMPLETED | OUTPATIENT
Start: 2025-01-11 | End: 2025-01-11

## 2025-01-11 RX ORDER — LANOLIN ALCOHOL/MO/W.PET/CERES
800 CREAM (GRAM) TOPICAL ONCE
Status: DISCONTINUED | OUTPATIENT
Start: 2025-01-11 | End: 2025-01-11

## 2025-01-11 RX ORDER — FUROSEMIDE 10 MG/ML
40 INJECTION INTRAMUSCULAR; INTRAVENOUS ONCE
Status: COMPLETED | OUTPATIENT
Start: 2025-01-11 | End: 2025-01-11

## 2025-01-11 RX ADMIN — MEROPENEM 2 G: 2 INJECTION, POWDER, FOR SOLUTION INTRAVENOUS at 08:01

## 2025-01-11 RX ADMIN — INSULIN ASPART 3 UNITS: 100 INJECTION, SOLUTION INTRAVENOUS; SUBCUTANEOUS at 05:01

## 2025-01-11 RX ADMIN — OSELTAMAVIR PHOSPHATE 75 MG: 75 CAPSULE ORAL at 08:01

## 2025-01-11 RX ADMIN — INSULIN ASPART 1 UNITS: 100 INJECTION, SOLUTION INTRAVENOUS; SUBCUTANEOUS at 12:01

## 2025-01-11 RX ADMIN — INSULIN ASPART 2 UNITS: 100 INJECTION, SOLUTION INTRAVENOUS; SUBCUTANEOUS at 08:01

## 2025-01-11 RX ADMIN — GABAPENTIN 300 MG: 300 CAPSULE ORAL at 08:01

## 2025-01-11 RX ADMIN — MEROPENEM 2 G: 2 INJECTION, POWDER, FOR SOLUTION INTRAVENOUS at 01:01

## 2025-01-11 RX ADMIN — DEXAMETHASONE 6 MG: 4 TABLET ORAL at 09:01

## 2025-01-11 RX ADMIN — GABAPENTIN 300 MG: 300 CAPSULE ORAL at 09:01

## 2025-01-11 RX ADMIN — VANCOMYCIN HYDROCHLORIDE 750 MG: 750 INJECTION, POWDER, LYOPHILIZED, FOR SOLUTION INTRAVENOUS at 03:01

## 2025-01-11 RX ADMIN — HYDROMORPHONE HYDROCHLORIDE 4 MG: 2 TABLET ORAL at 11:01

## 2025-01-11 RX ADMIN — MAGNESIUM SULFATE HEPTAHYDRATE 2 G: 40 INJECTION, SOLUTION INTRAVENOUS at 05:01

## 2025-01-11 RX ADMIN — MORPHINE SULFATE 60 MG: 30 TABLET, FILM COATED, EXTENDED RELEASE ORAL at 09:01

## 2025-01-11 RX ADMIN — FUROSEMIDE 40 MG: 10 INJECTION, SOLUTION INTRAMUSCULAR; INTRAVENOUS at 03:01

## 2025-01-11 RX ADMIN — ONDANSETRON 4 MG: 2 INJECTION INTRAMUSCULAR; INTRAVENOUS at 06:01

## 2025-01-11 RX ADMIN — MORPHINE SULFATE 60 MG: 30 TABLET, FILM COATED, EXTENDED RELEASE ORAL at 08:01

## 2025-01-11 RX ADMIN — AZITHROMYCIN DIHYDRATE 250 MG: 250 TABLET ORAL at 09:01

## 2025-01-11 RX ADMIN — OSELTAMAVIR PHOSPHATE 75 MG: 75 CAPSULE ORAL at 09:01

## 2025-01-11 RX ADMIN — GABAPENTIN 300 MG: 300 CAPSULE ORAL at 03:01

## 2025-01-11 NOTE — PLAN OF CARE
Pt is stable. No Sx of acute distress  Denies any pain since admission   6L NC   Droplet precautions maintained   Mild edema to lower extremities   Abx given as ordered  Cardiac monitor: [id number]  Bed rest until morning     Chart orders reviewed. Bed in lowest position, wheels locked, call light within reach. Pt remains injury free. Will cont POC

## 2025-01-11 NOTE — ASSESSMENT & PLAN NOTE
Patient's FSGs are controlled on current medication regimen.  Last A1c reviewed-   Lab Results   Component Value Date    HGBA1C 5.5 11/28/2024     Most recent fingerstick glucose reviewed-   Recent Labs   Lab 01/10/25  1818 01/11/25  0040 01/11/25  0525 01/11/25  1120   POCTGLUCOSE 130* 221* 201* 177*     Current correctional scale  Low  Maintain anti-hyperglycemic dose as follows-   Antihyperglycemics (From admission, onward)    Start     Stop Route Frequency Ordered    01/10/25 1851  insulin aspart U-100 pen 0-5 Units         -- SubQ Before meals & nightly PRN 01/10/25 1752        Hold Oral hypoglycemics while patient is in the hospital.

## 2025-01-11 NOTE — OP NOTE
Pre Op Diagnosis: abnormal chest x ray     Post Op Diagnosis: same     Procedure:  right thoracentesis     Procedure performed by: Alexis BYRNE, Patsy VERMA     Written Informed Consent Obtained: Yes     Specimen Removed:  yes     Estimated Blood Loss:  minimal     Findings: Local anesthesia     Sedation:  no     The patient tolerated the procedure well and there were no complications.      Disposition:  F/U in clinic or with ordering physician    Discharge instructions:  Light activity for 24 hours.  Remove band aid in 24 hours.  No baths (showers are appropriate).      Sterile technique was performed in the posterior right thorax, lidocaine was used as a local anesthetic.  Less than 1 ccs bloody fluid obtained and sent for culture. Minimal free flowing fluid in the pleural space, mostly loculated.  Pt tolerated the procedure well without immediate complications.  Please see radiologist report for details. F/u with PCP and/or ordering physician.

## 2025-01-11 NOTE — PLAN OF CARE
[facility]  Initial Discharge Assessment       Primary Care Provider: No, Primary Doctor    Admission Diagnosis: Screening for cardiovascular condition [Z13.6]  Influenza A [J10.1]  Hypoxia [R09.02]  Chest pain [R07.9]  Aspiration pneumonia, unspecified aspiration pneumonia type, unspecified laterality, unspecified part of lung [J69.0]    Admission Date: 1/10/2025  Expected Discharge Date:     Transition of Care Barriers: None    Payor: [employer] [facility] / Plan: [employer] / Product Type: Medicare Advantage /     Extended Emergency Contact Information  Primary Emergency Contact: Luna Saavedra  Address: [address] [place] [address] [place]  Mobile Phone: [phone]  Relation: Daughter  Secondary Emergency Contact: Toni Owens  Mobile Phone: [phone]  Relation: Son  Preferred language: English   needed? No    Discharge Plan A: Home with family         [facility] [address]  [facility] [address]  Phone: [phone] Fax: [phone]    [facility] [address] [address]  Phone: [phone] Fax: [phone]      Initial Assessment (most recent)       Adult Discharge Assessment - 01/11/25 1530          Discharge Assessment    Assessment Type Discharge Planning Assessment     Confirmed/corrected address, phone number and insurance Yes     Confirmed Demographics Correct on Facesheet     Source of Information patient     Does patient/caregiver understand observation status Yes     Communicated ARPAN with patient/caregiver Date not available/Unable to determine     People in Home child(guanako), adult     Do you expect to return to your current living situation? Yes     Do you have help at home or someone to help you manage your care at home? No     Prior to hospitilization cognitive status: Alert/Oriented     Current cognitive status: Alert/Oriented     Walking or  Climbing Stairs Difficulty no     Dressing/Bathing Difficulty no     Equipment Currently Used at Home walker, rolling     Readmission within 30 days? No     Patient currently being followed by outpatient case management? No     Do you currently have service(s) that help you manage your care at home? Yes     Name and Contact number of agency [facility]     Is the pt/caregiver preference to resume services with current agency Yes     Do you take prescription medications? Yes     Do you have prescription coverage? Yes     Do you have any problems affording any of your prescribed medications? No     Is the patient taking medications as prescribed? yes     Who is going to help you get home at discharge? family     How do you get to doctors appointments? family or friend will provide     Are you on dialysis? No     Do you take coumadin? No     Discharge Plan A Home with family     DME Needed Upon Discharge  none     Discharge Plan discussed with: Patient     Transition of Care Barriers None

## 2025-01-11 NOTE — SUBJECTIVE & OBJECTIVE
Interval History: f/u respiratory failure  unable to have US guided thoracentesis plan CT guided on Monday    Review of Systems  Objective:     Vital Signs (Most Recent):  Temp: 97.8 °F (36.6 °C) (01/11/25 1235)  Pulse: 104 (01/11/25 1302)  Resp: 16 (01/11/25 1235)  BP: (!) 147/67 (01/11/25 1235)  SpO2: (!) 82 % (01/11/25 1235) Vital Signs (24h Range):  Temp:  [97.5 °F (36.4 °C)-98.9 °F (37.2 °C)] 97.8 °F (36.6 °C)  Pulse:  [] 104  Resp:  [16-27] 16  SpO2:  [77 %-93 %] 82 %  BP: (105-149)/(53-67) 147/67     Weight: 79.6 kg (175 lb 7.8 oz)  Body mass index is 34.27 kg/m².    Intake/Output Summary (Last 24 hours) at 1/11/2025 1450  Last data filed at 1/11/2025 0031  Gross per 24 hour   Intake 610.11 ml   Output --   Net 610.11 ml         Physical Exam  HENT:      Head: Normocephalic and atraumatic.   Cardiovascular:      Rate and Rhythm: Regular rhythm. Tachycardia present.      Heart sounds: No murmur heard.  Pulmonary:      Effort: Pulmonary effort is normal. No respiratory distress.      Breath sounds: Decreased breath sounds present. No wheezing.   Abdominal:      General: Bowel sounds are normal. There is no distension.      Palpations: Abdomen is soft.      Tenderness: There is no abdominal tenderness.   Musculoskeletal:         General: No swelling.   Skin:     General: Skin is warm and dry.   Neurological:      Mental Status: She is alert and oriented to person, place, and time. Mental status is at baseline.             Significant Labs: All pertinent labs within the past 24 hours have been reviewed.  Recent Lab Results  (Last 5 results in the past 24 hours)        01/11/25  1120   01/11/25  0655   01/11/25  0525   01/11/25  0040   01/10/25  1906        Albumin   2.4             ALP   283             ALT   49             Anion Gap   10             Appearance, UA         Clear       AST   40             Baso #   0.03             Basophil %   0.2             Bilirubin (UA)         Negative       BILIRUBIN  TOTAL   0.5  Comment: For infants and newborns, interpretation of results should be based  on gestational age, weight and in agreement with clinical  observations.    Premature Infant recommended reference ranges:  Up to 24 hours.............<8.0 mg/dL  Up to 48 hours............<12.0 mg/dL  3-5 days..................<15.0 mg/dL  6-29 days.................<15.0 mg/dL               BUN   10             Calcium   9.0             Chloride   103             CO2   25             Color, UA         Yellow       Creatinine   0.7             Differential Method   Automated             eGFR   >60             Eos #   0.0             Eos %   0.0             Glucose   140             Glucose, UA         Negative       Gran # (ANC)   11.1             Gran %   87.7             Hematocrit   27.6             Hemoglobin   8.5             Immature Grans (Abs)   0.47  Comment: Mild elevation in immature granulocytes is non specific and   can be seen in a variety of conditions including stress response,   acute inflammation, trauma and pregnancy. Correlation with other   laboratory and clinical findings is essential.               Immature Granulocytes   3.7             Ketones, UA         1+       Leukocyte Esterase, UA         Negative       Lymph #   0.6             Lymph %   4.9             Magnesium    1.5             MCH   28.6             MCHC   30.8             MCV   93             Mono #   0.4             Mono %   3.5             MPV   9.5             NITRITE UA         Negative       nRBC   0             Blood, UA         Negative       pH, UA         8.0       Phosphorus Level   3.7             Platelet Estimate   Appears normal             Platelet Count   149             POCT Glucose 177     201   221         Poly   Occasional             Potassium   4.3             PROTEIN TOTAL   5.9             Protein, UA         Trace  Comment: Recommend a 24 hour urine protein or a urine   protein/creatinine ratio if globulin induced  proteinuria is  clinically suspected.         RBC   2.97             RDW   16.9             Sodium   138             Spec Grav UA         1.015       Specimen UA         Urine, Catheterized       Teardrop Cells   Occasional             UROBILINOGEN UA         Negative       WBC   12.66                                    Significant Imaging: I have reviewed all pertinent imaging results/findings within the past 24 hours.

## 2025-01-11 NOTE — ASSESSMENT & PLAN NOTE
Patient with Hypoxic Respiratory failure which is Acute.  she is not on home oxygen. Supplemental oxygen was provided and noted-  currently on 5 L NC    .   Signs/symptoms of respiratory failure include- tachypnea, increased work of breathing, and use of accessory muscles. Contributing diagnoses includes - Pleural effusion and Pneumonia Labs and images were reviewed. Patient Has not had a recent ABG. Will treat underlying causes and adjust management of respiratory failure as follows- thoracentesis  -supplemental oxygen  -treat influenza  -antibiotics  -IV lasix

## 2025-01-11 NOTE — PROGRESS NOTES
S/P thoracentesis today  Post chest radiograph shows worsening interstitial infiltrates  Eribulin induced ILD (3% risk) vs Influenza A pneumonia (most likely)  Numerous enlarging pleural based mets in right pleural sapce by recent CT  Overall progression of disease despite treatment  Worsening oxygen requirements today  On dexamethasone  Poor prognosis  Additional diuretics not likely to help much  Goals of care need to be discussed in light of worsening oncologic disease and influenza pneumonia

## 2025-01-11 NOTE — PROGRESS NOTES
[facility] Medicine  Progress Note    Patient Name: Sherlyn Saavedra  MRN: [id number]  Patient Class: IP- Inpatient   Admission Date: 1/10/2025  Length of Stay: 1 days  Attending Physician: Serjio Flores MD  Primary Care Provider: Nan, Primary Doctor        Subjective     Principal Problem:Acute hypoxemic respiratory failure        HPI:  The patient is a 66 yo female with past medical history of diabetes, hypertension, metastatic breast cancer (mets to brain and lungs)  who presented to the ED with shortness of breath for 1 day. She was seen at palliative care clinic yesterday and started on Zpack. Her symptoms did not improve. She noted low oxygen saturations. She also has cough, leg swelling and abdominal pain. Chest x-ray with increasing right pleural effusion. She also tested positive for Influenza A. Patient noted to have oxygen saturation of 80%. Hospital medicine consulted for admission.    Overview/Hospital Course:  No notes on file    Interval History: f/u respiratory failure  unable to have US guided thoracentesis plan CT guided on Monday    Review of Systems  Objective:     Vital Signs (Most Recent):  Temp: 97.8 °F (36.6 °C) (01/11/25 1235)  Pulse: 104 (01/11/25 1302)  Resp: 16 (01/11/25 1235)  BP: (!) 147/67 (01/11/25 1235)  SpO2: (!) 82 % (01/11/25 1235) Vital Signs (24h Range):  Temp:  [97.5 °F (36.4 °C)-98.9 °F (37.2 °C)] 97.8 °F (36.6 °C)  Pulse:  [] 104  Resp:  [16-27] 16  SpO2:  [77 %-93 %] 82 %  BP: (105-149)/(53-67) 147/67     Weight: 79.6 kg (175 lb 7.8 oz)  Body mass index is 34.27 kg/m².    Intake/Output Summary (Last 24 hours) at 1/11/2025 1450  Last data filed at 1/11/2025 0031  Gross per 24 hour   Intake 610.11 ml   Output --   Net 610.11 ml         Physical Exam  HENT:      Head: Normocephalic and atraumatic.   Cardiovascular:      Rate and Rhythm: Regular rhythm. Tachycardia present.      Heart sounds: No murmur heard.  Pulmonary:      Effort: Pulmonary effort is  normal. No respiratory distress.      Breath sounds: Decreased breath sounds present. No wheezing.   Abdominal:      General: Bowel sounds are normal. There is no distension.      Palpations: Abdomen is soft.      Tenderness: There is no abdominal tenderness.   Musculoskeletal:         General: No swelling.   Skin:     General: Skin is warm and dry.   Neurological:      Mental Status: She is alert and oriented to person, place, and time. Mental status is at baseline.             Significant Labs: All pertinent labs within the past 24 hours have been reviewed.  Recent Lab Results  (Last 5 results in the past 24 hours)        01/11/25  1120   01/11/25  0655   01/11/25  0525   01/11/25  0040   01/10/25  1906        Albumin   2.4             ALP   283             ALT   49             Anion Gap   10             Appearance, UA         Clear       AST   40             Baso #   0.03             Basophil %   0.2             Bilirubin (UA)         Negative       BILIRUBIN TOTAL   0.5  Comment: For infants and newborns, interpretation of results should be based  on gestational age, weight and in agreement with clinical  observations.    Premature Infant recommended reference ranges:  Up to 24 hours.............<8.0 mg/dL  Up to 48 hours............<12.0 mg/dL  3-5 days..................<15.0 mg/dL  6-29 days.................<15.0 mg/dL               BUN   10             Calcium   9.0             Chloride   103             CO2   25             Color, UA         Yellow       Creatinine   0.7             Differential Method   Automated             eGFR   >60             Eos #   0.0             Eos %   0.0             Glucose   140             Glucose, UA         Negative       Gran # (ANC)   11.1             Gran %   87.7             Hematocrit   27.6             Hemoglobin   8.5             Immature Grans (Abs)   0.47  Comment: Mild elevation in immature granulocytes is non specific and   can be seen in a variety of conditions  including stress response,   acute inflammation, trauma and pregnancy. Correlation with other   laboratory and clinical findings is essential.               Immature Granulocytes   3.7             Ketones, UA         1+       Leukocyte Esterase, UA         Negative       Lymph #   0.6             Lymph %   4.9             Magnesium    1.5             MCH   28.6             MCHC   30.8             MCV   93             Mono #   0.4             Mono %   3.5             MPV   9.5             NITRITE UA         Negative       nRBC   0             Blood, UA         Negative       pH, UA         8.0       Phosphorus Level   3.7             Platelet Estimate   Appears normal             Platelet Count   149             POCT Glucose 177     201   221         Poly   Occasional             Potassium   4.3             PROTEIN TOTAL   5.9             Protein, UA         Trace  Comment: Recommend a 24 hour urine protein or a urine   protein/creatinine ratio if globulin induced proteinuria is  clinically suspected.         RBC   2.97             RDW   16.9             Sodium   138             Spec Grav UA         1.015       Specimen UA         Urine, Catheterized       Teardrop Cells   Occasional             UROBILINOGEN UA         Negative       WBC   12.66                                    Significant Imaging: I have reviewed all pertinent imaging results/findings within the past 24 hours.    Assessment and Plan     * Acute hypoxemic respiratory failure  Patient with Hypoxic Respiratory failure which is Acute.  she is not on home oxygen. Supplemental oxygen was provided and noted-  currently on 5 L NC    .   Signs/symptoms of respiratory failure include- tachypnea, increased work of breathing, and use of accessory muscles. Contributing diagnoses includes - Pleural effusion and Pneumonia Labs and images were reviewed. Patient Has not had a recent ABG. Will treat underlying causes and adjust management of respiratory failure as  follows- thoracentesis  -supplemental oxygen  -treat influenza  -antibiotics  -IV lasix    Influenza A  Tamiflu   Supportive care      Neoplasm related pain  -continue home medication  -prn IV dilaudid for breakthrough pain      Type 2 diabetes mellitus with diabetic polyneuropathy, without long-term current use of insulin  Patient's FSGs are controlled on current medication regimen.  Last A1c reviewed-   Lab Results   Component Value Date    HGBA1C 5.5 11/28/2024     Most recent fingerstick glucose reviewed-   Recent Labs   Lab 01/10/25  1818 01/11/25  0040 01/11/25  0525 01/11/25  1120   POCTGLUCOSE 130* 221* 201* 177*     Current correctional scale  Low  Maintain anti-hyperglycemic dose as follows-   Antihyperglycemics (From admission, onward)      Start     Stop Route Frequency Ordered    01/10/25 1851  insulin aspart U-100 pen 0-5 Units         -- SubQ Before meals & nightly PRN 01/10/25 1752          Hold Oral hypoglycemics while patient is in the hospital.        Secondary malignancy of parietal pleura  Patient has metastatic cancer of breast primary. The cancer has metastasized to brain and lungs. The patient is under the care of an outpatient oncologist. The patient is undergoing active chemotherapy as follows- [unfilled] .Their staging information is listed below.   Cancer Staging   Primary malignant neoplasm of upper outer quadrant of breast, left  Staging form: Breast, AJCC 8th Edition  - Pathologic stage from 10/10/2023: No Stage Recommended (ypT2, pN0, cM0, G3, ER-, MT-, HER2-) - Signed by Nino Hines MD on 10/10/2023        Primary malignant neoplasm of upper outer quadrant of breast, left  -followed by Dr. Hoffmann and palliative care        VTE Risk Mitigation (From admission, onward)           Ordered     IP VTE HIGH RISK PATIENT  Once         01/10/25 1752     Place sequential compression device  Until discontinued         01/10/25 1752                    Discharge Planning   ARPAN:      Code  Status: Full Code   Medical Readiness for Discharge Date:                            Serjio Flores MD  Department of Hospital Medicine   '[facility]

## 2025-01-12 LAB
ALBUMIN SERPL BCP-MCNC: 2.4 G/DL (ref 3.5–5.2)
ALP SERPL-CCNC: 357 U/L (ref 40–150)
ALT SERPL W/O P-5'-P-CCNC: 49 U/L (ref 10–44)
ANION GAP SERPL CALC-SCNC: 15 MMOL/L (ref 8–16)
AST SERPL-CCNC: 42 U/L (ref 10–40)
BASOPHILS # BLD AUTO: 0.04 K/UL (ref 0–0.2)
BASOPHILS NFR BLD: 0.3 % (ref 0–1.9)
BILIRUB SERPL-MCNC: 0.5 MG/DL (ref 0.1–1)
BUN SERPL-MCNC: 12 MG/DL (ref 8–23)
CALCIUM SERPL-MCNC: 8.8 MG/DL (ref 8.7–10.5)
CHLORIDE SERPL-SCNC: 100 MMOL/L (ref 95–110)
CO2 SERPL-SCNC: 22 MMOL/L (ref 23–29)
CREAT SERPL-MCNC: 0.7 MG/DL (ref 0.5–1.4)
DIFFERENTIAL METHOD BLD: ABNORMAL
EOSINOPHIL # BLD AUTO: 0 K/UL (ref 0–0.5)
EOSINOPHIL NFR BLD: 0 % (ref 0–8)
ERYTHROCYTE [DISTWIDTH] IN BLOOD BY AUTOMATED COUNT: 16.8 % (ref 11.5–14.5)
EST. GFR  (NO RACE VARIABLE): >60 ML/MIN/1.73 M^2
GLUCOSE SERPL-MCNC: 202 MG/DL (ref 70–110)
HCT VFR BLD AUTO: 26.2 % (ref 37–48.5)
HGB BLD-MCNC: 8.5 G/DL (ref 12–16)
IMM GRANULOCYTES # BLD AUTO: 0.52 K/UL (ref 0–0.04)
IMM GRANULOCYTES NFR BLD AUTO: 3.9 % (ref 0–0.5)
LYMPHOCYTES # BLD AUTO: 0.9 K/UL (ref 1–4.8)
LYMPHOCYTES NFR BLD: 6.8 % (ref 18–48)
MAGNESIUM SERPL-MCNC: 1.8 MG/DL (ref 1.6–2.6)
MCH RBC QN AUTO: 29.6 PG (ref 27–31)
MCHC RBC AUTO-ENTMCNC: 32.4 G/DL (ref 32–36)
MCV RBC AUTO: 91 FL (ref 82–98)
MONOCYTES # BLD AUTO: 0.5 K/UL (ref 0.3–1)
MONOCYTES NFR BLD: 3.6 % (ref 4–15)
NEUTROPHILS # BLD AUTO: 11.3 K/UL (ref 1.8–7.7)
NEUTROPHILS NFR BLD: 85.4 % (ref 38–73)
NRBC BLD-RTO: 1 /100 WBC
PHOSPHATE SERPL-MCNC: 2.4 MG/DL (ref 2.7–4.5)
PLATELET # BLD AUTO: 135 K/UL (ref 150–450)
PMV BLD AUTO: 10 FL (ref 9.2–12.9)
POCT GLUCOSE: 190 MG/DL (ref 70–110)
POCT GLUCOSE: 207 MG/DL (ref 70–110)
POCT GLUCOSE: 226 MG/DL (ref 70–110)
POCT GLUCOSE: 329 MG/DL (ref 70–110)
POTASSIUM SERPL-SCNC: 4 MMOL/L (ref 3.5–5.1)
PROT SERPL-MCNC: 6 G/DL (ref 6–8.4)
RBC # BLD AUTO: 2.87 M/UL (ref 4–5.4)
SODIUM SERPL-SCNC: 137 MMOL/L (ref 136–145)
VANCOMYCIN TROUGH SERPL-MCNC: 12.2 UG/ML (ref 10–22)
WBC # BLD AUTO: 13.25 K/UL (ref 3.9–12.7)

## 2025-01-12 PROCEDURE — 84100 ASSAY OF PHOSPHORUS: CPT | Performed by: INTERNAL MEDICINE

## 2025-01-12 PROCEDURE — 63600175 PHARM REV CODE 636 W HCPCS: Performed by: INTERNAL MEDICINE

## 2025-01-12 PROCEDURE — 36415 COLL VENOUS BLD VENIPUNCTURE: CPT | Performed by: INTERNAL MEDICINE

## 2025-01-12 PROCEDURE — 85025 COMPLETE CBC W/AUTO DIFF WBC: CPT | Performed by: INTERNAL MEDICINE

## 2025-01-12 PROCEDURE — 94799 UNLISTED PULMONARY SVC/PX: CPT

## 2025-01-12 PROCEDURE — 80202 ASSAY OF VANCOMYCIN: CPT | Performed by: INTERNAL MEDICINE

## 2025-01-12 PROCEDURE — 83735 ASSAY OF MAGNESIUM: CPT | Performed by: INTERNAL MEDICINE

## 2025-01-12 PROCEDURE — 87081 CULTURE SCREEN ONLY: CPT | Performed by: INTERNAL MEDICINE

## 2025-01-12 PROCEDURE — 25000003 PHARM REV CODE 250: Performed by: INTERNAL MEDICINE

## 2025-01-12 PROCEDURE — 25000003 PHARM REV CODE 250: Performed by: EMERGENCY MEDICINE

## 2025-01-12 PROCEDURE — 21400001 HC TELEMETRY ROOM

## 2025-01-12 PROCEDURE — 63700000 PHARM REV CODE 250 ALT 637 W/O HCPCS: Performed by: INTERNAL MEDICINE

## 2025-01-12 PROCEDURE — 63600175 PHARM REV CODE 636 W HCPCS: Performed by: EMERGENCY MEDICINE

## 2025-01-12 PROCEDURE — 94761 N-INVAS EAR/PLS OXIMETRY MLT: CPT

## 2025-01-12 PROCEDURE — 27100171 HC OXYGEN HIGH FLOW UP TO 24 HOURS

## 2025-01-12 PROCEDURE — 99900035 HC TECH TIME PER 15 MIN (STAT)

## 2025-01-12 PROCEDURE — 80053 COMPREHEN METABOLIC PANEL: CPT | Performed by: INTERNAL MEDICINE

## 2025-01-12 RX ORDER — INSULIN GLARGINE 100 [IU]/ML
10 INJECTION, SOLUTION SUBCUTANEOUS NIGHTLY
Status: DISCONTINUED | OUTPATIENT
Start: 2025-01-12 | End: 2025-01-14 | Stop reason: HOSPADM

## 2025-01-12 RX ORDER — MUPIROCIN 20 MG/G
OINTMENT TOPICAL 2 TIMES DAILY
Status: DISCONTINUED | OUTPATIENT
Start: 2025-01-12 | End: 2025-01-14 | Stop reason: HOSPADM

## 2025-01-12 RX ADMIN — MUPIROCIN: 20 OINTMENT TOPICAL at 09:01

## 2025-01-12 RX ADMIN — GABAPENTIN 300 MG: 300 CAPSULE ORAL at 04:01

## 2025-01-12 RX ADMIN — VANCOMYCIN HYDROCHLORIDE 1000 MG: 1 INJECTION, POWDER, LYOPHILIZED, FOR SOLUTION INTRAVENOUS at 04:01

## 2025-01-12 RX ADMIN — INSULIN ASPART 4 UNITS: 100 INJECTION, SOLUTION INTRAVENOUS; SUBCUTANEOUS at 04:01

## 2025-01-12 RX ADMIN — MORPHINE SULFATE 60 MG: 30 TABLET, FILM COATED, EXTENDED RELEASE ORAL at 09:01

## 2025-01-12 RX ADMIN — GABAPENTIN 300 MG: 300 CAPSULE ORAL at 09:01

## 2025-01-12 RX ADMIN — INSULIN GLARGINE 10 UNITS: 100 INJECTION, SOLUTION SUBCUTANEOUS at 09:01

## 2025-01-12 RX ADMIN — OSELTAMAVIR PHOSPHATE 75 MG: 75 CAPSULE ORAL at 09:01

## 2025-01-12 RX ADMIN — INSULIN ASPART 2 UNITS: 100 INJECTION, SOLUTION INTRAVENOUS; SUBCUTANEOUS at 12:01

## 2025-01-12 RX ADMIN — DEXAMETHASONE 6 MG: 4 TABLET ORAL at 09:01

## 2025-01-12 RX ADMIN — MEROPENEM 2 G: 2 INJECTION, POWDER, FOR SOLUTION INTRAVENOUS at 12:01

## 2025-01-12 RX ADMIN — MEROPENEM 2 G: 2 INJECTION, POWDER, FOR SOLUTION INTRAVENOUS at 09:01

## 2025-01-12 RX ADMIN — MEROPENEM 2 G: 2 INJECTION, POWDER, FOR SOLUTION INTRAVENOUS at 05:01

## 2025-01-12 RX ADMIN — AZITHROMYCIN DIHYDRATE 250 MG: 250 TABLET ORAL at 09:01

## 2025-01-12 RX ADMIN — VANCOMYCIN HYDROCHLORIDE 750 MG: 750 INJECTION, POWDER, LYOPHILIZED, FOR SOLUTION INTRAVENOUS at 03:01

## 2025-01-12 NOTE — PLAN OF CARE
Discussed poc with pt, pt verbalized understanding    Purposeful rounding every 2hours    VS wnl  Cardiac monitoring in use, pt is NSR, tele monitor # [id number]  On vapothern 70%, 30L  Blood glucose monitoring, coverage given as needed  Fall precautions in place, remains injury free  Pt denies c/o pain    Accurate I&Os  Abx given as prescribed  Bed locked at lowest position  Call light within reach    Chart check complete  Will cont with POC

## 2025-01-12 NOTE — PLAN OF CARE
Fall precautions maintained, family attentive to pt. Call bell and personal items within reach. VSS. Pain controlled with PRN and scheduled meds. Glucose monitoring continued. Vapotherm increased to 85% and 30L, Pts O2 91 %. Chart check complete.     Problem: Adult Inpatient Plan of Care  Goal: Plan of Care Review  Outcome: Progressing     Problem: Adult Inpatient Plan of Care  Goal: Patient-Specific Goal (Individualized)  Outcome: Progressing  Flowsheets (Taken 1/12/2025 0721)  Individualized Care Needs: bed alarm /stand by assist  Anxieties, Fears or Concerns: Prognosis     Problem: Adult Inpatient Plan of Care  Goal: Absence of Hospital-Acquired Illness or Injury  Outcome: Progressing     Problem: Adult Inpatient Plan of Care  Goal: Optimal Comfort and Wellbeing  Outcome: Progressing

## 2025-01-12 NOTE — PROGRESS NOTES
Pharmacokinetic Assessment Follow Up: IV Vancomycin    Vancomycin serum concentration assessment(s):    The trough level was drawn correctly and can be used to guide therapy at this time. The measurement is below the desired definitive target range of 15 to 20 mcg/mL.    Vancomycin Regimen Plan:    Change regimen to Vancomycin 1000 mg IV every 12 hours with next serum trough concentration measured at 0230 prior to fourth dose on 1/14/25.    Drug levels (last 3 results):  Recent Labs   Lab Result Units 01/12/25  0308   Vancomycin-Trough ug/mL 12.2       Pharmacy will continue to follow and monitor vancomycin.    Please contact pharmacy at extension [phone] for questions regarding this assessment.    Thank you for the consult,   Jhon Akdinsry       Patient brief summary:  Sherlyn Saavedra is a 65 y.o. female initiated on antimicrobial therapy with IV Vancomycin for treatment of lower respiratory infection      Drug Allergies:   Review of patient's allergies indicates:   Allergen Reactions    Ace inhibitors Swelling    Lisinopril Rash    Hydrocodone Itching     Hives    Hydrocodone-acetaminoph-supp11 Itching    Percocet [oxycodone-acetaminophen] Itching     Pt had to take an antihistamine to improve itching     Cephalexin      Hives    Pantoprazole Hives    Propoxyphene      Hives    Propoxyphene n-acetaminophen     Propoxyphene-acetaminophen        Actual Body Weight:   79.6 kg    Renal Function:   Estimated Creatinine Clearance: 74.8 mL/min (based on SCr of 0.7 mg/dL).,     Dialysis Method (if applicable):  N/A    CBC (last 72 hours):  Recent Labs   Lab Result Units 01/09/25  0950 01/10/25  1314 01/11/25  0655 01/12/25  0308   WBC K/uL 11.22 12.46 12.66 13.25*   Hemoglobin g/dL 9.2* 8.7* 8.5* 8.5*   Hematocrit % 29.3* 27.7* 27.6* 26.2*   Platelets K/uL 199 157 149* 135*   Gran % % 83.8* 84.4* 87.7* 85.4*   Lymph % % 7.0* 6.7* 4.9* 6.8*   Mono % % 4.3 3.0* 3.5* 3.6*   Eosinophil % % 0.0 1.1 0.0 0.0   Basophil % % 0.3 0.3  0.2 0.3   Differential Method  Automated Automated Automated Automated       Metabolic Panel (last 72 hours):  Recent Labs   Lab Result Units 01/09/25  0950 01/10/25  1314 01/10/25  1906 01/11/25  0655 01/12/25  0308   Sodium mmol/L 139 138  --  138 137   Potassium mmol/L 4.4 3.8  --  4.3 4.0   Chloride mmol/L 98 101  --  103 100   CO2 mmol/L 28 23  --  25 22*   Glucose mg/dL 143* 104  --  140* 202*   Glucose, UA   --   --  Negative  --   --    BUN mg/dL 12 12  --  10 12   Creatinine mg/dL 0.7 0.6  --  0.7 0.7   Albumin g/dL 3.1* 2.7*  --  2.4* 2.4*   Total Bilirubin mg/dL 0.5 0.7  --  0.5 0.5   Alkaline Phosphatase U/L 376* 313*  --  283* 357*   AST U/L 39 40  --  40 42*   ALT U/L 70* 55*  --  49* 49*   Magnesium mg/dL  --   --   --  1.5* 1.8   Phosphorus mg/dL  --   --   --  3.7 2.4*       Vancomycin Administrations:  vancomycin given in the last 96 hours                     vancomycin 750 mg in 0.9% NaCl 250 mL IVPB (admixture device) (mg) 750 mg New Bag 01/12/25 0329     750 mg New Bag 01/11/25 1535    vancomycin 2 g in 0.9% sodium chloride 500 mL IVPB (mg) 2,000 mg New Bag 01/10/25 1612                    Microbiologic Results:  Microbiology Results (last 7 days)       Procedure Component Value Units Date/Time    Culture, MRSA [[id number]]     Order Status: No result Specimen: MRSA source from Nares, Left     Blood culture x two cultures. Draw prior to antibiotics. [[id number]] Collected: 01/10/25 1226    Order Status: Completed Specimen: Blood from Peripheral, Forearm, Right Updated: 01/12/25 0345     Blood Culture, Routine No Growth to date    Narrative:      Aerobic and anaerobic    Blood culture x two cultures. Draw prior to antibiotics. [[id number]] Collected: 01/10/25 1220    Order Status: Completed Specimen: Blood from Peripheral, Antecubital, Right Updated: 01/11/25 3087     Blood Culture, Routine No Growth to date      No Growth to date    Narrative:      Aerobic and anaerobic    Gram stain  [[id number]] Collected: 01/11/25 1204    Order Status: Completed Specimen: Body Fluid from Pleural Fluid Updated: 01/11/25 2209     Gram Stain Result No WBC's, epithelial cells or organisms seen    Aerobic culture [[id number]] Collected: 01/11/25 1204    Order Status: No result Specimen: Body Fluid from Pleural Fluid Updated: 01/11/25 2142    Culture, body fluid - Bactec [[id number]] Collected: 01/11/25 1204    Order Status: Canceled Specimen: Body Fluid from Pleural Fluid     Influenza A & B by Molecular [[id number]]  (Abnormal) Collected: 01/10/25 1223    Order Status: Completed Specimen: Nasopharyngeal Swab Updated: 01/10/25 1312     Influenza A, Molecular Positive     Influenza B, Molecular Negative     Flu A & B Source Nasal swab

## 2025-01-13 PROBLEM — Z51.5 ENCOUNTER FOR PALLIATIVE CARE: Status: ACTIVE | Noted: 2025-01-13

## 2025-01-13 LAB
ALBUMIN SERPL BCP-MCNC: 2.4 G/DL (ref 3.5–5.2)
ALP SERPL-CCNC: 385 U/L (ref 40–150)
ALT SERPL W/O P-5'-P-CCNC: 42 U/L (ref 10–44)
ANION GAP SERPL CALC-SCNC: 9 MMOL/L (ref 8–16)
AST SERPL-CCNC: 38 U/L (ref 10–40)
BASOPHILS # BLD AUTO: 0.04 K/UL (ref 0–0.2)
BASOPHILS NFR BLD: 0.3 % (ref 0–1.9)
BILIRUB SERPL-MCNC: 0.6 MG/DL (ref 0.1–1)
BUN SERPL-MCNC: 16 MG/DL (ref 8–23)
CALCIUM SERPL-MCNC: 8.8 MG/DL (ref 8.7–10.5)
CHLORIDE SERPL-SCNC: 103 MMOL/L (ref 95–110)
CO2 SERPL-SCNC: 28 MMOL/L (ref 23–29)
CREAT SERPL-MCNC: 0.6 MG/DL (ref 0.5–1.4)
DIFFERENTIAL METHOD BLD: ABNORMAL
EOSINOPHIL # BLD AUTO: 0 K/UL (ref 0–0.5)
EOSINOPHIL NFR BLD: 0 % (ref 0–8)
ERYTHROCYTE [DISTWIDTH] IN BLOOD BY AUTOMATED COUNT: 16.9 % (ref 11.5–14.5)
EST. GFR  (NO RACE VARIABLE): >60 ML/MIN/1.73 M^2
GLUCOSE SERPL-MCNC: 121 MG/DL (ref 70–110)
HCT VFR BLD AUTO: 27.8 % (ref 37–48.5)
HGB BLD-MCNC: 8.5 G/DL (ref 12–16)
IMM GRANULOCYTES # BLD AUTO: 0.58 K/UL (ref 0–0.04)
IMM GRANULOCYTES NFR BLD AUTO: 4.9 % (ref 0–0.5)
LYMPHOCYTES # BLD AUTO: 0.9 K/UL (ref 1–4.8)
LYMPHOCYTES NFR BLD: 7.7 % (ref 18–48)
MAGNESIUM SERPL-MCNC: 1.6 MG/DL (ref 1.6–2.6)
MCH RBC QN AUTO: 28.3 PG (ref 27–31)
MCHC RBC AUTO-ENTMCNC: 30.6 G/DL (ref 32–36)
MCV RBC AUTO: 93 FL (ref 82–98)
MONOCYTES # BLD AUTO: 0.4 K/UL (ref 0.3–1)
MONOCYTES NFR BLD: 3.3 % (ref 4–15)
NEUTROPHILS # BLD AUTO: 9.9 K/UL (ref 1.8–7.7)
NEUTROPHILS NFR BLD: 83.8 % (ref 38–73)
NRBC BLD-RTO: 2 /100 WBC
PHOSPHATE SERPL-MCNC: 2.4 MG/DL (ref 2.7–4.5)
PLATELET # BLD AUTO: 118 K/UL (ref 150–450)
PLATELET BLD QL SMEAR: ABNORMAL
PMV BLD AUTO: 10.8 FL (ref 9.2–12.9)
POCT GLUCOSE: 139 MG/DL (ref 70–110)
POCT GLUCOSE: 168 MG/DL (ref 70–110)
POCT GLUCOSE: 201 MG/DL (ref 70–110)
POCT GLUCOSE: 219 MG/DL (ref 70–110)
POTASSIUM SERPL-SCNC: 4 MMOL/L (ref 3.5–5.1)
PROT SERPL-MCNC: 6 G/DL (ref 6–8.4)
RBC # BLD AUTO: 3 M/UL (ref 4–5.4)
SODIUM SERPL-SCNC: 140 MMOL/L (ref 136–145)
WBC # BLD AUTO: 11.86 K/UL (ref 3.9–12.7)

## 2025-01-13 PROCEDURE — 63600175 PHARM REV CODE 636 W HCPCS: Performed by: EMERGENCY MEDICINE

## 2025-01-13 PROCEDURE — 80053 COMPREHEN METABOLIC PANEL: CPT | Performed by: INTERNAL MEDICINE

## 2025-01-13 PROCEDURE — 84100 ASSAY OF PHOSPHORUS: CPT | Performed by: INTERNAL MEDICINE

## 2025-01-13 PROCEDURE — 99223 1ST HOSP IP/OBS HIGH 75: CPT | Mod: ,,, | Performed by: NURSE PRACTITIONER

## 2025-01-13 PROCEDURE — 83735 ASSAY OF MAGNESIUM: CPT | Performed by: INTERNAL MEDICINE

## 2025-01-13 PROCEDURE — 27000207 HC ISOLATION

## 2025-01-13 PROCEDURE — 99497 ADVNCD CARE PLAN 30 MIN: CPT | Mod: 25,,, | Performed by: NURSE PRACTITIONER

## 2025-01-13 PROCEDURE — 99900035 HC TECH TIME PER 15 MIN (STAT)

## 2025-01-13 PROCEDURE — 63700000 PHARM REV CODE 250 ALT 637 W/O HCPCS: Performed by: INTERNAL MEDICINE

## 2025-01-13 PROCEDURE — 25000003 PHARM REV CODE 250: Performed by: EMERGENCY MEDICINE

## 2025-01-13 PROCEDURE — 99498 ADVNCD CARE PLAN ADDL 30 MIN: CPT | Mod: ,,, | Performed by: NURSE PRACTITIONER

## 2025-01-13 PROCEDURE — 25000003 PHARM REV CODE 250: Performed by: INTERNAL MEDICINE

## 2025-01-13 PROCEDURE — 36415 COLL VENOUS BLD VENIPUNCTURE: CPT | Performed by: INTERNAL MEDICINE

## 2025-01-13 PROCEDURE — 25000003 PHARM REV CODE 250: Performed by: FAMILY MEDICINE

## 2025-01-13 PROCEDURE — 94799 UNLISTED PULMONARY SVC/PX: CPT

## 2025-01-13 PROCEDURE — 27100171 HC OXYGEN HIGH FLOW UP TO 24 HOURS

## 2025-01-13 PROCEDURE — 94761 N-INVAS EAR/PLS OXIMETRY MLT: CPT

## 2025-01-13 PROCEDURE — 21400001 HC TELEMETRY ROOM

## 2025-01-13 PROCEDURE — 85025 COMPLETE CBC W/AUTO DIFF WBC: CPT | Performed by: INTERNAL MEDICINE

## 2025-01-13 PROCEDURE — 63600175 PHARM REV CODE 636 W HCPCS: Performed by: INTERNAL MEDICINE

## 2025-01-13 RX ORDER — SYRING-NEEDL,DISP,INSUL,0.3 ML 29 G X1/2"
296 SYRINGE, EMPTY DISPOSABLE MISCELLANEOUS ONCE
Status: DISCONTINUED | OUTPATIENT
Start: 2025-01-13 | End: 2025-01-14 | Stop reason: HOSPADM

## 2025-01-13 RX ORDER — POLYETHYLENE GLYCOL 3350 17 G/17G
17 POWDER, FOR SOLUTION ORAL DAILY
Status: DISCONTINUED | OUTPATIENT
Start: 2025-01-13 | End: 2025-01-14 | Stop reason: HOSPADM

## 2025-01-13 RX ADMIN — INSULIN ASPART 2 UNITS: 100 INJECTION, SOLUTION INTRAVENOUS; SUBCUTANEOUS at 04:01

## 2025-01-13 RX ADMIN — MORPHINE SULFATE 60 MG: 30 TABLET, FILM COATED, EXTENDED RELEASE ORAL at 08:01

## 2025-01-13 RX ADMIN — AZITHROMYCIN DIHYDRATE 250 MG: 250 TABLET ORAL at 08:01

## 2025-01-13 RX ADMIN — VANCOMYCIN HYDROCHLORIDE 1000 MG: 1 INJECTION, POWDER, LYOPHILIZED, FOR SOLUTION INTRAVENOUS at 04:01

## 2025-01-13 RX ADMIN — OSELTAMAVIR PHOSPHATE 75 MG: 75 CAPSULE ORAL at 09:01

## 2025-01-13 RX ADMIN — HYDROMORPHONE HYDROCHLORIDE 1 MG: 1 INJECTION, SOLUTION INTRAMUSCULAR; INTRAVENOUS; SUBCUTANEOUS at 04:01

## 2025-01-13 RX ADMIN — MEROPENEM 2 G: 2 INJECTION, POWDER, FOR SOLUTION INTRAVENOUS at 09:01

## 2025-01-13 RX ADMIN — GABAPENTIN 300 MG: 300 CAPSULE ORAL at 04:01

## 2025-01-13 RX ADMIN — MORPHINE SULFATE 60 MG: 30 TABLET, FILM COATED, EXTENDED RELEASE ORAL at 09:01

## 2025-01-13 RX ADMIN — VANCOMYCIN HYDROCHLORIDE 1000 MG: 1 INJECTION, POWDER, LYOPHILIZED, FOR SOLUTION INTRAVENOUS at 03:01

## 2025-01-13 RX ADMIN — GABAPENTIN 300 MG: 300 CAPSULE ORAL at 08:01

## 2025-01-13 RX ADMIN — POLYETHYLENE GLYCOL 3350 17 G: 17 POWDER, FOR SOLUTION ORAL at 04:01

## 2025-01-13 RX ADMIN — MUPIROCIN: 20 OINTMENT TOPICAL at 09:01

## 2025-01-13 RX ADMIN — INSULIN GLARGINE 10 UNITS: 100 INJECTION, SOLUTION SUBCUTANEOUS at 09:01

## 2025-01-13 RX ADMIN — MEROPENEM 2 G: 2 INJECTION, POWDER, FOR SOLUTION INTRAVENOUS at 11:01

## 2025-01-13 RX ADMIN — MUPIROCIN: 20 OINTMENT TOPICAL at 11:01

## 2025-01-13 RX ADMIN — MEROPENEM 2 G: 2 INJECTION, POWDER, FOR SOLUTION INTRAVENOUS at 03:01

## 2025-01-13 RX ADMIN — DEXAMETHASONE 6 MG: 4 TABLET ORAL at 08:01

## 2025-01-13 RX ADMIN — OSELTAMAVIR PHOSPHATE 75 MG: 75 CAPSULE ORAL at 08:01

## 2025-01-13 RX ADMIN — GABAPENTIN 300 MG: 300 CAPSULE ORAL at 09:01

## 2025-01-13 NOTE — CONSULTS
[facility]  Wound Care    Patient Name:  Sherlyn Saavedra   MRN:  [id number]  Date: 2025  Diagnosis: Acute hypoxemic respiratory failure    History:     Past Medical History:   Diagnosis Date    Allergy     Anemia     Breast cancer     primary malignant neoplasm of upper outer quadrant of breast - progressive metastatic triple negative breast cancer. Patient has progressive 2 lines of therapy.    HLD (hyperlipidemia) 2015    Hyperlipidemia (Problem)      Hypertension     Primary malignant neoplasm of upper outer quadrant of breast, left 10/10/2023    Secondary malignancy of parietal pleura 2024    Type 2 diabetes mellitus with diabetic polyneuropathy, without long-term current use of insulin 2015       Social History     Socioeconomic History    Marital status: Single   Tobacco Use    Smoking status: Former     Current packs/day: 0.00     Types: Cigarettes     Quit date:      Years since quittin.0    Smokeless tobacco: Never   Substance and Sexual Activity    Alcohol use: Not Currently    Drug use: Never    Sexual activity: Not Currently     Social Drivers of Health     Financial Resource Strain: Low Risk  (2025)    Overall Financial Resource Strain (CARDIA)     Difficulty of Paying Living Expenses: Not very hard   Food Insecurity: No Food Insecurity (2025)    Hunger Vital Sign     Worried About Running Out of Food in the Last Year: Never true     Ran Out of Food in the Last Year: Never true   Transportation Needs: No Transportation Needs (2025)    TRANSPORTATION NEEDS     Transportation : No   Physical Activity: Inactive (2024)    Exercise Vital Sign     Days of Exercise per Week: 0 days     Minutes of Exercise per Session: 0 min   Stress: Stress Concern Present (2025)    [employer] [place] [employer] - Occupational Stress Questionnaire     Feeling of Stress : To some extent   Housing Stability: Low Risk  (2025)    Housing Stability Vital  Sign     Unable to Pay for Housing in the Last Year: No     Homeless in the Last Year: No       Precautions:     Allergies as of 01/10/2025 - Reviewed 01/10/2025   Allergen Reaction Noted    Ace inhibitors Swelling 08/02/2024    Lisinopril Rash 06/11/2024    Hydrocodone Itching 11/02/2023    Hydrocodone-acetaminoph-supp11 Itching 10/10/2023    Percocet [oxycodone-acetaminophen] Itching 09/04/2024    Cephalexin  10/10/2023    Pantoprazole Hives 11/04/2024    Propoxyphene  11/02/2023    Propoxyphene n-acetaminophen  08/23/2024    Propoxyphene-acetaminophen  10/10/2023       WOC Assessment Details/Treatment     Consulted on this 64 y/o female patient for wound to the right upper back. Patient was admitted 1/10/25 for acute hypoxemic respiratory failure. PMH significant for allergies, anemia, breast cancer, hyperlipidemia, hypertension, primary malignant neoplasm of upper outer quadrant of breast , secondary malignancy of parietal pleura, and diabetes mellitus type 2.   Patient on droplet precautions, proper PPE donned prior to entering.   Patient sitting up in bed, awake and alert.   Daughter at bedside states wound began in September of 2024 but did not become open until December of 2024. States she was receiving radiation to the area which has now been completed. At home they were changing the dressing every other day and crushing flagyl and sprinkling it into the wound bed per physician recommendations for odor control.   --Dressing gently removed from Right upper back. 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 Area is tan and moist with red granular tissue noted around the opening that has slight bleeding. No strong odor noted at this time. Cleansed with vashe, allowed to dwell. Patted dry. Painted tee wound with cavilon. Applied bordered foam dressing. Recommend applying vashe moistened gauze under foam for odor control and changing dressing daily.     Plan to F/U in a week.       01/13/25 0952   WOCN Assessment   WOCN Total Time (mins) 30   Visit Date 01/13/25   Visit Time 0952   Consult Type New   WOCN Speciality Wound   Wound other  (fungating tumor)   Number of Wounds 1   Intervention assessed;changed;applied;chart review;orders   Teaching on-going        Wound 11/27/24 2308 Other (comment) Thoracic spine #1   Date First Assessed/Time First Assessed: 11/27/24 2308   Present on Original Admission: Yes  Primary Wound Type: (c) Other (comment)  Location: Thoracic spine  Wound Number: #1  Is this injury device related?: No   Wound Image    Dressing Appearance Moist drainage   Drainage Amount Scant   Drainage Characteristics/Odor Sanguineous   Appearance Red;Tan;Moist;Bleeding   Periwound Area Intact   Wound Edges Open   Wound Length (cm) 4 cm   Wound Width (cm) 4 cm   Wound Depth (cm) 0.1 cm   Wound Volume (cm^3) 1.6 cm^3   Wound Surface Area (cm^2) 16 cm^2   Care Cleansed with:;Antimicrobial agent;Applied:;Skin Barrier   Dressing Applied;Foam   Dressing Change Due 01/14/25 01/13/2025

## 2025-01-13 NOTE — PLAN OF CARE
Discussed poc with pt, pt verbalized understanding     Remains on Vapotherm w/ flow of 30ml/min @ an O2 concentration of 85  Cardiac monitoring in use, pt is ST  Blood glucose monitoring   Fall precautions in place, remains injury free  Pain under control with PRN meds     Accurate I&Os  Abx given as prescribed  Bed locked at lowest position  Call light within reach     Chart check complete  Will cont with POC

## 2025-01-13 NOTE — ASSESSMENT & PLAN NOTE
Patient with Hypoxic Respiratory failure which is Acute.  she is not on home oxygen. Supplemental oxygen was provided and noted- Oxygen Concentration (%):  [70-85] 85    .   Signs/symptoms of respiratory failure include- tachypnea, increased work of breathing, and use of accessory muscles. Contributing diagnoses includes - Pleural effusion and Pneumonia Labs and images were reviewed. Patient Has not had a recent ABG. Will treat underlying causes and adjust management of respiratory failure as follows- thoracentesis  -supplemental oxygen  -treat influenza  -antibiotics

## 2025-01-13 NOTE — HPI
The patient is a 66 yo female with past medical history of diabetes, hypertension, metastatic breast cancer (mets to brain and lungs)  who presented to the ED with shortness of breath for 1 day. She was seen at palliative care clinic yesterday and started on Zpack. Her symptoms did not improve. She noted low oxygen saturations. She also has cough, leg swelling and abdominal pain. Chest x-ray with increasing right pleural effusion. She also tested positive for Influenza A. Patient noted to have oxygen saturation of 80%. Hospital medicine consulted for admission.    12/26/24 oncology:  Patient's counts are acceptable for treatment proceed with treatment as outlined reviewed scans from 12/10 demonstrating clear progression from October of 2024. Case was discussed peer to peer MD Monson concur with plan of action discussed other chemo therapeutic options including doxorubicin variant previous exposure doxorubicin feel at this point with we can condition that high likelihood of possibility of increased toxicity. Told patient that at this point she has a treatable but not curable malignancy. In that therapeutic options for limited      Overview/Hospital Course:  The patient presented with shortness of breath. She was noted to be hypoxic and tachycardic. She was placed on supplemental oxygen. Workup revealed Influenza A positive. Chest x-ray with increasing pleural effusion. US thoracentesis not successful. Possible CT guided. Patient's oxygen requirements significantly increased. She is currently on Vapotherm. Pulmonology following.     1/12/25: f/u respiratory failure  discussed treatment options with patient she wants to do everything possible to continue to live         Urogynecology and Pelvic Reconstructive Surgery  [facility]    Initial History and Physical    Patient: Shalonda Elizalde Date: 2018   : 1946 Attending: No att. providers found   71 year old female      HPI\CC: This 71-year-old multiparous female who comes in with continuous urinary leakage lots of fecal urgency and inconsistent stools. She's had a previous hysterectomy with repairs and a retropubic urethropexy performed in . Over the last several years her incontinence is become increasingly worse and she asked for an adult diaper to protect her self. She is also fearful of losing stool on a regular basis.    Allergies:   ALLERGIES:   Allergen Reactions   • Aleve SWELLING   • Atorvastatin HEADACHES   • Estradiol Other (See Comments)     Hot flashes   • Oxybutynin Other (See Comments)     Hair loss  Dry skin   • Tolterodine GI UPSET     Abdominal cramping       Family History :   Family History   Problem Relation Age of Onset   • Goiter Mother    • Cancer Mother         lymphoma   • Emphysema Father    • Aneurysm Father         abdominal aneurysm   • Cancer, Prostate Father    • Heart disease Father         coronoray atherosclerosis   • Rheumatoid Arthritis Sister    • Pneumonia Maternal Grandfather    • Hypothyroid Daughter        Social History :   Social History     Social History   • Marital status:      Spouse name: Stephen   • Number of children: 3   • Years of education: N/A     Occupational History   • 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[employer]: [employer]      Social History Main Topics   • Smoking status: Never Smoker   • Smokeless tobacco: Never Used   • Alcohol use 8.4 oz/week     14 Cans of beer per week      Comment: 2 cans beer per day   • Drug use: No   • Sexual activity: Yes     Partners: Male     Birth control/ protection: Surgical      Comment: hysterectomy     Other Topics Concern   • Caffeine Concern Yes     1 cup coffee, and 2 coca-ana rosa per day   • Exercise Yes     \"on the  go\"   • Seat Belt Yes     Social History Narrative   • None       Medical History:   Past Medical History:   Diagnosis Date   • Arthritis    • Basal cell carcinoma    • Fecal incontinence    • History of measles    • History of varicella    • Hyperlipidemia    • Hypertension    • Hypothyroidism    • IFG (impaired fasting glucose)    • Plantar fasciitis    • Urinary incontinence        Surgical History:   Past Surgical History:   Procedure Laterality Date   • Bilateral salpingoophorectomy  10/16/2001    Dr. Yeh   • Santana procedure  10/16/2001    Dr. Yeh   • Colonoscopy  2014   • Skin lesion excision Right 2014    Basal cell carcinoma behind right ear   • Tonsillectomy  as a child   • Vaginal hysterectomy w/ anterior and posterior vaginal repair  10/06/2001    Dr. Yeh   • [place] tooth extraction         Obstetrical History:   Obstetric History       T0      L3     SAB1   TAB0   Ectopic0   Molar0   Multiple0   Live Births3        Medications   Current Outpatient Prescriptions   Medication Sig   • IBUPROFEN PO Take by mouth as needed.   • rosuvastatin (CRESTOR) 10 MG tablet Take 10 mg by mouth daily.   • levothyroxine (SYNTHROID, LEVOTHROID) 88 MCG tablet Take 88 mcg by mouth daily.   • Cholecalciferol (VITAMIN D3) 5000 units Tab Take 1 tablet by mouth daily.   • Multiple Vitamins-Minerals (MULTIVITAMIN WOMEN 50+) Tab Take by mouth daily.     No current facility-administered medications for this visit.            Sexually active: yes  Dypareunia: no  Pelvic Pain: no          General Urogynecology                 Yes/No               Quantify   Symptom of SI yes continous   Symptom of UI yes continous        Nocturia (wakes to void) yes 1-2   Enuresis {no    Protection yes uses diapers all the time   UTI hx no    Hematuria hx no    Voiding  Continuous urinary stream, post void fullness and double void              Past consultation for incontinence yes      Bowel Symptoms                   Yes/No    Falling out/bulging/heaviness yes    Sensation of incomplete evacuation yes    Constipation no    Terminal constipation yes    Fecal incontinence  lots of fecal urgency  no fecal incontinence                  Exam:    Visit Vitals  BP (!) 172/98 (BP Location: [facility] [place] Patient Position: Sitting, Cuff Size: Regular)   Pulse 74   Ht 5' 6\" (1.676 m)   Wt 88.3 kg   BMI 31.42 kg/m²         Vital Today Admit   Weight 88.3 kg (08/29/18 1127) Weight: 88.3 kg (08/29/18 1127)   Height N/A Height: 5' 6\" (167.6 cm) (08/29/18 1127)     post void residual by straight catheter  5 cc urine dip negative for leukocytes and nitrites      Review of Systems:    A comprehensive 14 point review of systems was negative.    Physical Exam:     Head is normocephalic and atraumatic.    Neck is supple.  Thyroid not enlarged.  No thyroid nodules.  Lungs are clear to bases.  No wheezes, rales or rhonchi.  Heart has a regular rate and rhythmin.  Normal S1 and S2.  Abdomen is soft and nontender. The liver is normal size.  Can not feel a spleen.  No masses or fluid wave.  Extremities are without cyanosis, clubbing or edema.    Pelvic Exam:     Lumbo sacral nerve root reflexes are: normal    Pelvic Support     Urethra midline/non tender: yes   Diverticulum:  no        Bladder Neck:  -, 1   hypermobile           Stress incontinence noted by cough stress test:  yes    Anterior Compartment:  -, 1  midline defect      Apical Compartment:   8    Cervix:  surgically absent  Uterus: surgically absent  Adnexa: unable to palpate      Enterocele: suggestion of enterocele on exam    Posterior Compartment:  0  midline defect      Prolapse Stage:  II    Perineal Body: widely disrupted      Estrogen status:  atrophic appearing tissues               Impression:     Fecal urgency  Urinary incontinence  History Santana  Stage II pelvic relaxation  Suspect rectal prolapse  Symptoms disproportionate to physical exam findings        Plans/Recommendations:      urodynamics  colorectal consult                                   26-May-2021 06:30

## 2025-01-13 NOTE — PLAN OF CARE
PROCEDURE COMPLETE. CATHETER REMOVED AND DSG APPLIED TO RIGHT UPPER BACK - CDI. ~ 1 CC OF BLOODY FLUID REMOVED FROM THORACENTESIS. VSS AND NADN. PT HAS NO COMPLAINTS OF PAIN OR N/V.

## 2025-01-13 NOTE — HOSPITAL COURSE
The patient presented with shortness of breath. She was noted to be hypoxic and tachycardic. She was placed on supplemental oxygen. Workup revealed Influenza A positive. Chest x-ray with increasing pleural effusion. US thoracentesis not successful.  Patient's oxygen requirements significantly increased. She is currently on Vapotherm. Pulmonology following.  Due to the increased oxygen demand Dr. Isabel did speak with the patient and family regarding her inability to undergo a VATS procedure for the loculated pleural effusions.  Due to the lack of improvement despite several days of antibiotics and high-dose oxygen recommendation was to proceed with hospice.  Daughter at bedside was in agreement given her comorbidities and lack of improvement.  Patient has gone to the [facility] with [facility] on BiPAP 12/6 at 100% FiO2.  She will be able to be on Airvo while eating although she does desaturate into the low 80s while on Vapotherm.  Patient is appropriate for transfer to inpatient hospice at this time.

## 2025-01-13 NOTE — SUBJECTIVE & OBJECTIVE
Interval History: f/u respiratory failure  discussed treatment options with patient she wants to do everything possible to continue to live    Review of Systems  Objective:     Vital Signs (Most Recent):  Temp: 98.3 °F (36.8 °C) (01/12/25 1608)  Pulse: (!) 115 (01/12/25 1655)  Resp: 20 (01/12/25 1655)  BP: 139/63 (01/12/25 1608)  SpO2: (!) 90 % (01/12/25 1655) Vital Signs (24h Range):  Temp:  [98.3 °F (36.8 °C)-98.8 °F (37.1 °C)] 98.3 °F (36.8 °C)  Pulse:  [100-117] 115  Resp:  [17-20] 20  SpO2:  [90 %-94 %] 90 %  BP: (102-139)/(49-63) 139/63     Weight: 79.6 kg (175 lb 7.8 oz)  Body mass index is 34.27 kg/m².    Intake/Output Summary (Last 24 hours) at 1/12/2025 1828  Last data filed at 1/11/2025 1837  Gross per 24 hour   Intake --   Output 1 ml   Net -1 ml         Physical Exam  HENT:      Head: Normocephalic and atraumatic.   Cardiovascular:      Rate and Rhythm: Regular rhythm. Tachycardia present.      Heart sounds: No murmur heard.  Pulmonary:      Effort: Pulmonary effort is normal. No respiratory distress.      Breath sounds: Decreased breath sounds present. No wheezing.   Abdominal:      General: Bowel sounds are normal. There is no distension.      Palpations: Abdomen is soft.      Tenderness: There is no abdominal tenderness.   Musculoskeletal:         General: No swelling.   Skin:     General: Skin is warm and dry.   Neurological:      Mental Status: She is alert and oriented to person, place, and time. Mental status is at baseline.             Significant Labs: All pertinent labs within the past 24 hours have been reviewed.  Recent Lab Results  (Last 5 results in the past 24 hours)        01/12/25  1612   01/12/25  1216   01/12/25  0614   01/12/25  0308   01/11/25  2032        Albumin       2.4         ALP       357         ALT       49         Anion Gap       15         AST       42         Baso #       0.04         Basophil %       0.3         BILIRUBIN TOTAL       0.5  Comment: For infants and  newborns, interpretation of results should be based  on gestational age, weight and in agreement with clinical  observations.    Premature Infant recommended reference ranges:  Up to 24 hours.............<8.0 mg/dL  Up to 48 hours............<12.0 mg/dL  3-5 days..................<15.0 mg/dL  6-29 days.................<15.0 mg/dL           BUN       12         Calcium       8.8         Chloride       100         CO2       22         Creatinine       0.7         Differential Method       Automated         eGFR       >60         Eos #       0.0         Eos %       0.0         Glucose       202         Gran # (ANC)       11.3         Gran %       85.4         Hematocrit       26.2         Hemoglobin       8.5         Immature Grans (Abs)       0.52  Comment: Mild elevation in immature granulocytes is non specific and   can be seen in a variety of conditions including stress response,   acute inflammation, trauma and pregnancy. Correlation with other   laboratory and clinical findings is essential.           Immature Granulocytes       3.9         Lymph #       0.9         Lymph %       6.8         Magnesium        1.8         MCH       29.6         MCHC       32.4         MCV       91         Mono #       0.5         Mono %       3.6         MPV       10.0         nRBC       1         Phosphorus Level       2.4         Platelet Count       135         POCT Glucose 329   207   190     317       Potassium       4.0         PROTEIN TOTAL       6.0         RBC       2.87         RDW       16.8         Sodium       137         Vancomycin-Trough       12.2         WBC       13.25                                Significant Imaging: I have reviewed all pertinent imaging results/findings within the past 24 hours.

## 2025-01-13 NOTE — SUBJECTIVE & OBJECTIVE
Interval History:  Follow up for influenza a and respiratory failure.  Patient and daughter are leaning towards hospice care given her continued decline.  She remains on Vapotherm at 90/30 and satting approximately 80%.  Patient does complain of dyspnea but denies any chest pain.    Review of Systems  Objective:     Vital Signs (Most Recent):  Temp: 98.3 °F (36.8 °C) (01/13/25 1649)  Pulse: 110 (01/13/25 1649)  Resp: 20 (01/13/25 1649)  BP: (!) 183/81 (01/13/25 1649)  SpO2: (!) 88 % (01/13/25 1649) Vital Signs (24h Range):  Temp:  [97.9 °F (36.6 °C)-98.7 °F (37.1 °C)] 98.3 °F (36.8 °C)  Pulse:  [] 110  Resp:  [16-24] 20  SpO2:  [80 %-94 %] 88 %  BP: (131-183)/(62-81) 183/81     Weight: 79.6 kg (175 lb 7.8 oz)  Body mass index is 34.27 kg/m².    Intake/Output Summary (Last 24 hours) at 1/13/2025 1729  Last data filed at 1/13/2025 1121  Gross per 24 hour   Intake 1775.5 ml   Output 200 ml   Net 1575.5 ml         Physical Exam  Vitals and nursing note reviewed.   Constitutional:       Appearance: Normal appearance.   HENT:      Head: Normocephalic and atraumatic.      Nose: Nose normal.      Mouth/Throat:      Mouth: Mucous membranes are moist.   Eyes:      Extraocular Movements: Extraocular movements intact.      Conjunctiva/sclera: Conjunctivae normal.   Cardiovascular:      Rate and Rhythm: Normal rate and regular rhythm.      Pulses: Normal pulses.      Heart sounds: Normal heart sounds.   Pulmonary:      Effort: Respiratory distress present.      Breath sounds: Rhonchi present.   Abdominal:      General: Abdomen is flat. Bowel sounds are normal.      Palpations: Abdomen is soft.   Musculoskeletal:         General: Normal range of motion.      Cervical back: Normal range of motion and neck supple.   Skin:     General: Skin is warm.      Capillary Refill: Capillary refill takes less than 2 seconds.   Neurological:      Mental Status: She is alert and oriented to person, place, and time. Mental status is at  baseline.   Psychiatric:         Mood and Affect: Mood normal.         Behavior: Behavior normal.             Significant Labs: All pertinent labs within the past 24 hours have been reviewed.  Recent Lab Results  (Last 5 results in the past 24 hours)        01/13/25  1627   01/13/25  1114   01/13/25  0540   01/13/25  0507   01/12/25  2118        Albumin     2.4           ALP     385           ALT     42           Anion Gap     9           AST     38           Baso #     0.04           Basophil %     0.3           BILIRUBIN TOTAL     0.6  Comment: For infants and newborns, interpretation of results should be based  on gestational age, weight and in agreement with clinical  observations.    Premature Infant recommended reference ranges:  Up to 24 hours.............<8.0 mg/dL  Up to 48 hours............<12.0 mg/dL  3-5 days..................<15.0 mg/dL  6-29 days.................<15.0 mg/dL             BUN     16           Calcium     8.8           Chloride     103           CO2     28           Creatinine     0.6           Differential Method     Automated           eGFR     >60           Eos #     0.0           Eos %     0.0           Glucose     121           Gran # (ANC)     9.9           Gran %     83.8           Hematocrit     27.8           Hemoglobin     8.5           Immature Grans (Abs)     0.58  Comment: Mild elevation in immature granulocytes is non specific and   can be seen in a variety of conditions including stress response,   acute inflammation, trauma and pregnancy. Correlation with other   laboratory and clinical findings is essential.             Immature Granulocytes     4.9           Lymph #     0.9           Lymph %     7.7           Magnesium      1.6           MCH     28.3           MCHC     30.6           MCV     93           Mono #     0.4           Mono %     3.3           MPV     10.8           nRBC     2           Phosphorus Level     2.4           Platelet Estimate     Decreased            Platelet Count     118           POCT Glucose 219   168     139   226       Potassium     4.0           PROTEIN TOTAL     6.0           RBC     3.00           RDW     16.9           Sodium     140           WBC     11.86                                  Significant Imaging:   US Thoracentesis with Imaging, Aspiration Only   Final Result      Right thoracentesis with only 1 cc of bloody fluid aspirated, this was sent for culture and Gram stain.         Electronically signed by: Charles Forbes   Date:    01/13/2025   Time:    12:59      X-Ray Chest 1 View   Final Result      1.  Negative for pneumothorax status post right thoracentesis.      2.  Worsening alveolar opacities throughout the lungs, concerning for worsening pulmonary edema versus pneumonia versus ARDS.  Clinical correlation is advised.      3.  Follow-up radiographs recommended.         Electronically signed by: Favian Wright MD   Date:    01/11/2025   Time:    12:12      X-Ray Chest AP Portable   Final Result      1.  Interval development of a reticular interstitial changes throughout the lungs concerning for CHF versus interstitial infectious process.  Superimposed on these changes are multiple nodular patchy opacities throughout the periphery of the right upper lobe and throughout the left lung as well as dense infiltrate involving the middle lobe and right lower lobe.  Infectious inflammatory processes must be considered.  Less likely could all of these changes be related to the patient's underlying neoplastic process.      2.  Large right effusion again seen.      3.  Stable findings as noted above.         Electronically signed by: Favian Wright MD   Date:    01/10/2025   Time:    13:25

## 2025-01-13 NOTE — PLAN OF CARE
01/13/25 1451   Post-Acute Status   Post-Acute Authorization Hospice   Hospice Status Referrals Sent   Discharge Plan   Discharge Plan A Hospice/home     Per palliative pt and family want to speak with [facility] regarding home hospice. SW sent hospice referral and spoke with Ashley at [facility]. Ashley will have someone to speak with pt and family. Pending [facility] to meet with family.

## 2025-01-13 NOTE — CONSULTS
[facility]  Palliative Medicine  Consult Note    Patient Name: Sherlyn Saavedra  MRN: [id number]  Admission Date: 1/10/2025  Hospital Length of Stay: 3 days  Code Status: DNR   Attending Provider: Candelario Carbajal MD  Consulting Provider: Hoa Eldridge NP  Primary Care Physician: Nan, Primary Doctor  Principal Problem:Acute hypoxemic respiratory failure    Patient information was obtained from patient, relative(s), past medical records, and primary team.      Inpatient consult to Palliative Care  Consult performed by: Hoa Eldridge NP  Consult ordered by: Serjio Flores MD  Reason for consult: GOC/ACP  Assessment/Recommendations: Ms Saavedra desires goal of comfort. She is strongly considering transition to hospice care. She would like to meet with hospice provider to discuss their services prior to committing.  Recommend referral to [facility] to discuss their services with pt/family and discharge to hospice if in agreement.   Thank you for this consult - our team will follow pt.        Assessment/Plan:     Pulmonary  * Acute hypoxemic respiratory failure  Followed by HM.  Still dyspneic on vapotherm.   Desires DNR    Oncology  Neoplasm related pain  Continues MS Contin.   I've encouraged her to ask for dilaudid PRN.    Primary malignant neoplasm of upper outer quadrant of breast, left  Unfortunately progressive despite treatments.  Considering transition to hospice.     Palliative Care  Encounter for palliative care  Impression:  Symptoms:  dyspnea, fatigue, pain  Medicolegal: Ms Sherlyn Saavedra has decision making capacity.  Luna Saavedra is HCPOA.  Psychosocial:  support system consists of daughter, family members  Prognostication: We discussed prognosis of weeks.  Understanding of disease and Illness Trajectory: Patient and Family  has  adequate understanding of her illness, they can benefit from continued education on what to expect in the future.  Goals of care:  Ms Saavedra desires goal of comfort.  She is strongly considering transition to hospice care. She would like to meet with hospice provider to discuss their services prior to committing.     Summary and recommendations: Referral to hospice to answer family member's questions and address concerns.  Admit to hospice when pt/family in agreement.                 Thank you for your consult.  Our team will follow patient.    Subjective:     HPI:   The patient is a 66 yo female with past medical history of diabetes, hypertension, metastatic breast cancer (mets to brain and lungs)  who presented to the ED with shortness of breath for 1 day. She was seen at palliative care clinic yesterday and started on Zpack. Her symptoms did not improve. She noted low oxygen saturations. She also has cough, leg swelling and abdominal pain. Chest x-ray with increasing right pleural effusion. She also tested positive for Influenza A. Patient noted to have oxygen saturation of 80%. Hospital medicine consulted for admission.    12/26/24 oncology:  Patient's counts are acceptable for treatment proceed with treatment as outlined reviewed scans from 12/10 demonstrating clear progression from October of 2024. Case was discussed peer to peer MD Monson concur with plan of action discussed other chemo therapeutic options including doxorubicin variant previous exposure doxorubicin feel at this point with we can condition that high likelihood of possibility of increased toxicity. Told patient that at this point she has a treatable but not curable malignancy. In that therapeutic options for limited      Overview/Hospital Course:  The patient presented with shortness of breath. She was noted to be hypoxic and tachycardic. She was placed on supplemental oxygen. Workup revealed Influenza A positive. Chest x-ray with increasing pleural effusion. US thoracentesis not successful. Possible CT guided. Patient's oxygen requirements significantly increased. She is currently on Vapotherm.  Pulmonology following.     1/12/25: f/u respiratory failure  discussed treatment options with patient she wants to do everything possible to continue to live          Hospital Course:  No notes on file    Interval History:    Progressive metastatic breast cancer noted on imaging last month involving head, lungs, kidney, lymph nodes.     Ms. Saavedra has been followed by our outpatient palliative care provider: Pt and family had deferred GOC/ACP and had opted for full code. She is aware treatment for cancer is palliative.     Supine in bed today with daughter Luna at bedside. Vapotherm 30% O2 in place, still some dyspnea at rest. She attributes this to transferring to commode 30 minutes prior to our visit.     I engaged with Ms. Saavedra and her daughter voluntary conversation about palliative care, goals of care and advanced care planning.  We discussed the progression of her metastatic breast cancer and what treatment she has had so far.  Her daughter is hopeful she will be able to take Adriamycin. She expresses frustration because she believes Ms. Saavedra does not have a treatment plan in place. We discussed with profound debility, dyspnea and active infection chemotherapy would not be an option at this time. I addressed their concerns regarding thoracentesis. Daughter has questions about surgical intervention for pleural effusion. We discussed further mom was not dyspneic at that time of previous procedure and did not have known malignant pleural effusion so I do not believe surgery is an option, nor would improve her outcome.    Ms. Saavedra states she is tired and she just wants to be comfortable.  Her daughter will support Ms. Saavedra with whatever decision she makes. We discussed options to manage sx and provide comfort such as palliative care and hospice. She is strongly considering hospice. Her daughter is planning to move from [place] to stay with mother during illness. They would like to speak to hospice provider to  discuss their services prior to committing. [facility].       Past Medical History:   Diagnosis Date    Allergy     Anemia     Breast cancer     primary malignant neoplasm of upper outer quadrant of breast - progressive metastatic triple negative breast cancer. Patient has progressive 2 lines of therapy.    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    Type 2 diabetes mellitus with diabetic polyneuropathy, without long-term current use of insulin 03/04/2015       Past Surgical History:   Procedure Laterality Date    HYSTERECTOMY      tubaligation  1987       Review of patient's allergies indicates:   Allergen Reactions    Ace inhibitors Swelling    Lisinopril Rash    Hydrocodone Itching     Hives    Hydrocodone-acetaminoph-supp11 Itching    Percocet [oxycodone-acetaminophen] Itching     Pt had to take an antihistamine to improve itching     Cephalexin      Hives    Pantoprazole Hives    Propoxyphene      Hives    Propoxyphene n-acetaminophen     Propoxyphene-acetaminophen        Medications:  Continuous Infusions:  Scheduled Meds:   azithromycin  250 mg Oral Daily    dexAMETHasone  6 mg Oral Daily    gabapentin  300 mg Oral TID    insulin glargine U-100  10 Units Subcutaneous QHS    magnesium citrate  296 mL Oral Once    meropenem IV (PEDS and ADULTS)  2 g Intravenous Q8H    morphine  60 mg Oral BID    mupirocin   Nasal BID    oseltamivir  75 mg Oral BID    polyethylene glycol  17 g Oral Daily    vancomycin (VANCOCIN) IV (PEDS and ADULTS)  1,000 mg Intravenous Q12H     PRN Meds:  Current Facility-Administered Medications:     acetaminophen, 650 mg, Oral, Q4H PRN    glucagon (human recombinant), 1 mg, Intramuscular, PRN    glucose, 16 g, Oral, PRN    glucose, 24 g, Oral, PRN    HYDROmorphone, 1 mg, Intravenous, Q4H PRN     HYDROmorphone, 4 mg, Oral, Q3H PRN    insulin aspart U-100, 0-5 Units, Subcutaneous, QID (AC + HS) PRN    naloxone, 0.02 mg, Intravenous, PRN    ondansetron, 4 mg, Intravenous, Q6H PRN    sodium chloride 0.9%, 10 mL, Intravenous, Q12H PRN    Pharmacy to dose Vancomycin consult, , , Once **AND** vancomycin - pharmacy to dose, , Intravenous, pharmacy to manage frequency    Family History    None       Tobacco Use    Smoking status: Former     Current packs/day: 0.00     Types: Cigarettes     Quit date:      Years since quittin.0    Smokeless tobacco: Never   Substance and Sexual Activity    Alcohol use: Not Currently    Drug use: Never    Sexual activity: Not Currently       Review of Systems   Constitutional:  Positive for activity change.   Respiratory:  Positive for shortness of breath.      Objective:     Vital Signs (Most Recent):  Temp: 97.9 °F (36.6 °C) (25 1248)  Pulse: (!) 115 (25 1300)  Resp: 20 (25 1300)  BP: (!) 150/72 (25 1248)  SpO2: (!) 89 % (25 1420) Vital Signs (24h Range):  Temp:  [97.9 °F (36.6 °C)-98.7 °F (37.1 °C)] 97.9 °F (36.6 °C)  Pulse:  [] 115  Resp:  [16-24] 20  SpO2:  [80 %-94 %] 89 %  BP: (131-157)/(62-80) 150/72     Weight: 79.6 kg (175 lb 7.8 oz)  Body mass index is 34.27 kg/m².       Physical Exam  Constitutional:       General: She is in acute distress (mildly dyspneic at rest.).      Appearance: She is ill-appearing.   Eyes:      General: No scleral icterus.  Skin:     General: Skin is warm and dry.   Neurological:      Mental Status: She is alert.      Comments: Alert, oriented to situation, engaged in conversation.             Review of Symptoms      Symptom Assessment (ESAS 0-10 Scale)  Unable to complete assessment due to Other         Pain Assessment in Advanced Demential Scale (PAINAD)   Breathing - Independent of vocalization:  0  Negative vocalization:  1  Facial expression:  1  Body language:  1  Consolability:  1  Total:   4    Performance Status:  20    Living Arrangements:  Lives alone    Psychosocial/Cultural:   See Palliative Psychosocial Note: Yes  Unmarried. Lives alone. Multiple family members in [facility]. Daughter lives in [place].  **Primary  to Follow**  Palliative Care  Consult: No        Advance Care Planning  Advance Directives:   Living Will: No    LaPOST: No    Medical Power of : Yes      Decision Making:  Patient answered questions  Goals of Care: The patient and family endorses that what is most important right now is to focus on improvement in condition but with limits to invasive therapies    Accordingly, we have decided that the best plan to meet the patient's goals includes hospice versus palliative care; discussions ongoing. She is strongly considering hospice care.           Significant Labs: All pertinent labs within the past 24 hours have been reviewed.  CBC:   Recent Labs   Lab 01/13/25  0540   WBC 11.86   HGB 8.5*   HCT 27.8*   MCV 93   *     BMP:  Recent Labs   Lab 01/13/25  0540   *      K 4.0      CO2 28   BUN 16   CREATININE 0.6   CALCIUM 8.8   MG 1.6     LFT:  Lab Results   Component Value Date    AST 38 01/13/2025    ALKPHOS 385 (H) 01/13/2025    BILITOT 0.6 01/13/2025     Albumin:   Albumin   Date Value Ref Range Status   01/13/2025 2.4 (L) 3.5 - 5.2 g/dL Final     Protein:   Total Protein   Date Value Ref Range Status   01/13/2025 6.0 6.0 - 8.4 g/dL Final     Lactic acid:   Lab Results   Component Value Date    LACTATE 1.7 01/10/2025    LACTATE 2.0 01/10/2025       Significant Imaging:  reviewed  CT CAP:  1. In this patient with metastatic breast cancer there is similar involvement of the pleura of the right lung base, increased size and number of liver lesions, increased size of matted masses extending into the posterior right subphrenic/subhepatic space, increased size of right axillary lymph node, and increased size of posterior right  chest/abdominal wall mass.  Findings overall consistent with worsening metastatic disease.  2. Additional grossly stable right inguinal and right iliac chain lymph nodes.  3. Right lateral abdominal wall heterogeneously enhancing lesion within the subcutaneous fat, not completely included within field of view of prior imaging and also likely representing metastatic disease.  4. Increased size of heterogeneous lesions involving the mid right kidney and inferior pole of the left kidney, concerning for additional foci of metastatic disease.  5. Interval increased mass effect upon the posteroinferior aspect of the right atrium, superior most aspect of the IVC and lesser extent intrahepatic portion of the IVC which also shows heterogeneous opacification at these levels presumably related to mixing of blood pool and contrast bolus and timing of contrast bolus; however, local invasion and/or bland or tumor thrombus not excluded.  Further evaluation/follow-up as warranted.  6. Small volume of loculated right pleural fluid, similar to prior.  7. Additional findings, as above.  This report was flagged in Epic as abnormal.     Electronically signed by resident: Fanny Roberts  Date:                                            12/10/2024    I spent a total of 120 minutes on the day of the visit. This includes face to face time in discussion of goals of care, symptom assessment, coordination of care and emotional support.  This also includes non-face to face time preparing to see the patient (eg, review of tests/imaging), obtaining and/or reviewing separately obtained history, documenting clinical information in the electronic or other health record, independently interpreting results and communicating results to the patient/family/caregiver, or care coordinator.    Additional 50 min time spent on a voluntary advance care planning and /or goals of care discussion, providing emotional support, formulating and communicating prognosis  and exploring burden/benefit of various approaches of treatment.       Hoa Eldridge NP  Palliative Medicine  [facility] - Med S[facility]

## 2025-01-13 NOTE — PROGRESS NOTES
[facility] Medicine  Progress Note    Patient Name: Sherlyn Saavedra  MRN: [id number]  Patient Class: IP- Inpatient   Admission Date: 1/10/2025  Length of Stay: 3 days  Attending Physician: Candelario Carbajal MD  Primary Care Provider: Nan, Primary Doctor        Subjective     Principal Problem:Acute hypoxemic respiratory failure        HPI:  The patient is a 66 yo female with past medical history of diabetes, hypertension, metastatic breast cancer (mets to brain and lungs)  who presented to the ED with shortness of breath for 1 day. She was seen at palliative care clinic yesterday and started on Zpack. Her symptoms did not improve. She noted low oxygen saturations. She also has cough, leg swelling and abdominal pain. Chest x-ray with increasing right pleural effusion. She also tested positive for Influenza A. Patient noted to have oxygen saturation of 80%. Hospital medicine consulted for admission.    Overview/Hospital Course:  The patient presented with shortness of breath. She was noted to be hypoxic and tachycardic. She was placed on supplemental oxygen. Workup revealed Influenza A positive. Chest x-ray with increasing pleural effusion. US thoracentesis not successful. Possible CT guided. Patient's oxygen requirements significantly increased. She is currently on Vapotherm. Pulmonology following.     Interval History:  Follow up for influenza a and respiratory failure.  Patient and daughter are leaning towards hospice care given her continued decline.  She remains on Vapotherm at 90/30 and satting approximately 80%.  Patient does complain of dyspnea but denies any chest pain.    Review of Systems  Objective:     Vital Signs (Most Recent):  Temp: 98.3 °F (36.8 °C) (01/13/25 1649)  Pulse: 110 (01/13/25 1649)  Resp: 20 (01/13/25 1649)  BP: (!) 183/81 (01/13/25 1649)  SpO2: (!) 88 % (01/13/25 1649) Vital Signs (24h Range):  Temp:  [97.9 °F (36.6 °C)-98.7 °F (37.1 °C)] 98.3 °F (36.8 °C)  Pulse:  []  110  Resp:  [16-24] 20  SpO2:  [80 %-94 %] 88 %  BP: (131-183)/(62-81) 183/81     Weight: 79.6 kg (175 lb 7.8 oz)  Body mass index is 34.27 kg/m².    Intake/Output Summary (Last 24 hours) at 1/13/2025 1729  Last data filed at 1/13/2025 1121  Gross per 24 hour   Intake 1775.5 ml   Output 200 ml   Net 1575.5 ml         Physical Exam  Vitals and nursing note reviewed.   Constitutional:       Appearance: Normal appearance.   HENT:      Head: Normocephalic and atraumatic.      Nose: Nose normal.      Mouth/Throat:      Mouth: Mucous membranes are moist.   Eyes:      Extraocular Movements: Extraocular movements intact.      Conjunctiva/sclera: Conjunctivae normal.   Cardiovascular:      Rate and Rhythm: Normal rate and regular rhythm.      Pulses: Normal pulses.      Heart sounds: Normal heart sounds.   Pulmonary:      Effort: Respiratory distress present.      Breath sounds: Rhonchi present.   Abdominal:      General: Abdomen is flat. Bowel sounds are normal.      Palpations: Abdomen is soft.   Musculoskeletal:         General: Normal range of motion.      Cervical back: Normal range of motion and neck supple.   Skin:     General: Skin is warm.      Capillary Refill: Capillary refill takes less than 2 seconds.   Neurological:      Mental Status: She is alert and oriented to person, place, and time. Mental status is at baseline.   Psychiatric:         Mood and Affect: Mood normal.         Behavior: Behavior normal.             Significant Labs: All pertinent labs within the past 24 hours have been reviewed.  Recent Lab Results  (Last 5 results in the past 24 hours)        01/13/25  1627   01/13/25  1114   01/13/25  0540   01/13/25  0507   01/12/25  2118        Albumin     2.4           ALP     385           ALT     42           Anion Gap     9           AST     38           Baso #     0.04           Basophil %     0.3           BILIRUBIN TOTAL     0.6  Comment: For infants and newborns, interpretation of results should be  based  on gestational age, weight and in agreement with clinical  observations.    Premature Infant recommended reference ranges:  Up to 24 hours.............<8.0 mg/dL  Up to 48 hours............<12.0 mg/dL  3-5 days..................<15.0 mg/dL  6-29 days.................<15.0 mg/dL             BUN     16           Calcium     8.8           Chloride     103           CO2     28           Creatinine     0.6           Differential Method     Automated           eGFR     >60           Eos #     0.0           Eos %     0.0           Glucose     121           Gran # (ANC)     9.9           Gran %     83.8           Hematocrit     27.8           Hemoglobin     8.5           Immature Grans (Abs)     0.58  Comment: Mild elevation in immature granulocytes is non specific and   can be seen in a variety of conditions including stress response,   acute inflammation, trauma and pregnancy. Correlation with other   laboratory and clinical findings is essential.             Immature Granulocytes     4.9           Lymph #     0.9           Lymph %     7.7           Magnesium      1.6           MCH     28.3           MCHC     30.6           MCV     93           Mono #     0.4           Mono %     3.3           MPV     10.8           nRBC     2           Phosphorus Level     2.4           Platelet Estimate     Decreased           Platelet Count     118           POCT Glucose 219   168     139   226       Potassium     4.0           PROTEIN TOTAL     6.0           RBC     3.00           RDW     16.9           Sodium     140           WBC     11.86                                  Significant Imaging:   US Thoracentesis with Imaging, Aspiration Only   Final Result      Right thoracentesis with only 1 cc of bloody fluid aspirated, this was sent for culture and Gram stain.         Electronically signed by: Charles Forbes   Date:    01/13/2025   Time:    12:59      X-Ray Chest 1 View   Final Result      1.  Negative for pneumothorax  status post right thoracentesis.      2.  Worsening alveolar opacities throughout the lungs, concerning for worsening pulmonary edema versus pneumonia versus ARDS.  Clinical correlation is advised.      3.  Follow-up radiographs recommended.         Electronically signed by: Favian Wright MD   Date:    01/11/2025   Time:    12:12      X-Ray Chest AP Portable   Final Result      1.  Interval development of a reticular interstitial changes throughout the lungs concerning for CHF versus interstitial infectious process.  Superimposed on these changes are multiple nodular patchy opacities throughout the periphery of the right upper lobe and throughout the left lung as well as dense infiltrate involving the middle lobe and right lower lobe.  Infectious inflammatory processes must be considered.  Less likely could all of these changes be related to the patient's underlying neoplastic process.      2.  Large right effusion again seen.      3.  Stable findings as noted above.         Electronically signed by: Favian Wright MD   Date:    01/10/2025   Time:    13:25           Assessment and Plan     * Acute hypoxemic respiratory failure  Patient with Hypoxic Respiratory failure which is Acute.  she is not on home oxygen. Supplemental oxygen was provided and noted- Oxygen Concentration (%):  [85-90] 85    .   Signs/symptoms of respiratory failure include- tachypnea, increased work of breathing, and use of accessory muscles. Contributing diagnoses includes - Pleural effusion and Pneumonia Labs and images were reviewed. Patient Has not had a recent ABG. Will treat underlying causes and adjust management of respiratory failure as follows- thoracentesis  -supplemental oxygen-currently on Vapotherm  -treat influenza  -antibiotics  -patient and daughter leaning towards hospice care.  Palliative Care has been consulted and has sent the referral to [facility].  Plan for discharge once hospice care set.      Influenza A  Tamiflu    Supportive care      Neoplasm related pain  -continue home medication  -prn IV dilaudid for breakthrough pain      Type 2 diabetes mellitus with diabetic polyneuropathy, without long-term current use of insulin  Patient's FSGs are controlled on current medication regimen.  Last A1c reviewed-   Lab Results   Component Value Date    HGBA1C 5.5 11/28/2024     Most recent fingerstick glucose reviewed-   Recent Labs   Lab 01/12/25  2118 01/13/25  0507 01/13/25  1114 01/13/25  1627   POCTGLUCOSE 226* 139* 168* 219*       Current correctional scale  Low  Maintain anti-hyperglycemic dose as follows-   Antihyperglycemics (From admission, onward)      Start     Stop Route Frequency Ordered    01/12/25 2100  insulin glargine U-100 (Lantus) pen 10 Units         -- SubQ Nightly 01/12/25 1830    01/10/25 1851  insulin aspart U-100 pen 0-5 Units         -- SubQ Before meals & nightly PRN 01/10/25 1752          Hold Oral hypoglycemics while patient is in the hospital.        Secondary malignancy of parietal pleura  Patient has metastatic cancer of breast primary. The cancer has metastasized to brain and lungs. The patient is under the care of an outpatient oncologist. The patient is undergoing active chemotherapy as follows- [unfilled] .Their staging information is listed below.   Cancer Staging   Primary malignant neoplasm of upper outer quadrant of breast, left  Staging form: Breast, AJCC 8th Edition  - Pathologic stage from 10/10/2023: No Stage Recommended (ypT2, pN0, cM0, G3, ER-, ID-, HER2-) - Signed by Nino Hines MD on 10/10/2023        Primary malignant neoplasm of upper outer quadrant of breast, left  -followed by Dr. Hoffmann and palliative care  -palliative care on board        VTE Risk Mitigation (From admission, onward)           Ordered     IP VTE HIGH RISK PATIENT  Once         01/10/25 1752     Place sequential compression device  Until discontinued         01/10/25 1752                    Discharge Planning    ARPAN:      Code Status: DNR   Medical Readiness for Discharge Date:   Discharge Plan A: Hospice/home                        Candelario Carbajal MD  Department of Hospital Medicine   [facility]

## 2025-01-13 NOTE — ASSESSMENT & PLAN NOTE
Patient with Hypoxic Respiratory failure which is Acute.  she is not on home oxygen. Supplemental oxygen was provided and noted- Oxygen Concentration (%):  [85-90] 85    .   Signs/symptoms of respiratory failure include- tachypnea, increased work of breathing, and use of accessory muscles. Contributing diagnoses includes - Pleural effusion and Pneumonia Labs and images were reviewed. Patient Has not had a recent ABG. Will treat underlying causes and adjust management of respiratory failure as follows- thoracentesis  -supplemental oxygen-currently on Vapotherm  -treat influenza  -antibiotics  -patient and daughter leaning towards hospice care.  Palliative Care has been consulted and has sent the referral to [facility].  Plan for discharge once hospice care set.

## 2025-01-13 NOTE — PLAN OF CARE
Pt is stable. On continuous pulse ox and vapo therm   Blood glucose monitored  C/O back pain, PRN pain medication administered  Awaiting palliative consulted  Purposeful rounding, able to walk to bedside commode with some assistance   Abx given as ordered  Cardiac monitor: [id number]; NS    Chart orders reviewed. Bed in lowest position, wheels locked, call light within reach. Pt remains injury free. Will cont POC

## 2025-01-13 NOTE — ASSESSMENT & PLAN NOTE
Impression:  Symptoms:  dyspnea, fatigue, pain  Medicolegal: Ms Sherlyn Saavedra has decision making capacity.  Luna Saavedra is HCPOA.  Psychosocial:  support system consists of daughter, family members  Prognostication: We discussed prognosis of weeks.  Understanding of disease and Illness Trajectory: Patient and Family  has  adequate understanding of her illness, they can benefit from continued education on what to expect in the future.  Goals of care:  Ms Saavedra desires goal of comfort. She is strongly considering transition to hospice care. She would like to meet with hospice provider to discuss their services prior to committing.     Summary and recommendations: Referral to hospice to answer family member's questions and address concerns.  Admit to hospice when pt/family in agreement.

## 2025-01-13 NOTE — PROGRESS NOTES
[facility] Medicine  Progress Note    Patient Name: Sherlyn Saavedra  MRN: [id number]  Patient Class: IP- Inpatient   Admission Date: 1/10/2025  Length of Stay: 2 days  Attending Physician: Serjio Flores MD  Primary Care Provider: Nan, Primary Doctor        Subjective     Principal Problem:Acute hypoxemic respiratory failure        HPI:  The patient is a 64 yo female with past medical history of diabetes, hypertension, metastatic breast cancer (mets to brain and lungs)  who presented to the ED with shortness of breath for 1 day. She was seen at palliative care clinic yesterday and started on Zpack. Her symptoms did not improve. She noted low oxygen saturations. She also has cough, leg swelling and abdominal pain. Chest x-ray with increasing right pleural effusion. She also tested positive for Influenza A. Patient noted to have oxygen saturation of 80%. Hospital medicine consulted for admission.    Overview/Hospital Course:  The patient presented with shortness of breath. She was noted to be hypoxic and tachycardic. She was placed on supplemental oxygen. Workup revealed Influenza A positive. Chest x-ray with increasing pleural effusion. US thoracentesis not successful. Possible CT guided. Patient's oxygen requirements significantly increased. She is currently on Vapotherm. Pulmonology following.     Interval History: f/u respiratory failure  discussed treatment options with patient she wants to do everything possible to continue to live    Review of Systems  Objective:     Vital Signs (Most Recent):  Temp: 98.3 °F (36.8 °C) (01/12/25 1608)  Pulse: (!) 115 (01/12/25 1655)  Resp: 20 (01/12/25 1655)  BP: 139/63 (01/12/25 1608)  SpO2: (!) 90 % (01/12/25 1655) Vital Signs (24h Range):  Temp:  [98.3 °F (36.8 °C)-98.8 °F (37.1 °C)] 98.3 °F (36.8 °C)  Pulse:  [100-117] 115  Resp:  [17-20] 20  SpO2:  [90 %-94 %] 90 %  BP: (102-139)/(49-63) 139/63     Weight: 79.6 kg (175 lb 7.8 oz)  Body mass index is 34.27  kg/m².    Intake/Output Summary (Last 24 hours) at 1/12/2025 1828  Last data filed at 1/11/2025 1837  Gross per 24 hour   Intake --   Output 1 ml   Net -1 ml         Physical Exam  HENT:      Head: Normocephalic and atraumatic.   Cardiovascular:      Rate and Rhythm: Regular rhythm. Tachycardia present.      Heart sounds: No murmur heard.  Pulmonary:      Effort: Pulmonary effort is normal. No respiratory distress.      Breath sounds: Decreased breath sounds present. No wheezing.   Abdominal:      General: Bowel sounds are normal. There is no distension.      Palpations: Abdomen is soft.      Tenderness: There is no abdominal tenderness.   Musculoskeletal:         General: No swelling.   Skin:     General: Skin is warm and dry.   Neurological:      Mental Status: She is alert and oriented to person, place, and time. Mental status is at baseline.             Significant Labs: All pertinent labs within the past 24 hours have been reviewed.  Recent Lab Results  (Last 5 results in the past 24 hours)        01/12/25  1612   01/12/25  1216   01/12/25  0614   01/12/25  0308   01/11/25 2032        Albumin       2.4         ALP       357         ALT       49         Anion Gap       15         AST       42         Baso #       0.04         Basophil %       0.3         BILIRUBIN TOTAL       0.5  Comment: For infants and newborns, interpretation of results should be based  on gestational age, weight and in agreement with clinical  observations.    Premature Infant recommended reference ranges:  Up to 24 hours.............<8.0 mg/dL  Up to 48 hours............<12.0 mg/dL  3-5 days..................<15.0 mg/dL  6-29 days.................<15.0 mg/dL           BUN       12         Calcium       8.8         Chloride       100         CO2       22         Creatinine       0.7         Differential Method       Automated         eGFR       >60         Eos #       0.0         Eos %       0.0         Glucose       202         Gran #  (ANC)       11.3         Gran %       85.4         Hematocrit       26.2         Hemoglobin       8.5         Immature Grans (Abs)       0.52  Comment: Mild elevation in immature granulocytes is non specific and   can be seen in a variety of conditions including stress response,   acute inflammation, trauma and pregnancy. Correlation with other   laboratory and clinical findings is essential.           Immature Granulocytes       3.9         Lymph #       0.9         Lymph %       6.8         Magnesium        1.8         MCH       29.6         MCHC       32.4         MCV       91         Mono #       0.5         Mono %       3.6         MPV       10.0         nRBC       1         Phosphorus Level       2.4         Platelet Count       135         POCT Glucose 329   207   190     317       Potassium       4.0         PROTEIN TOTAL       6.0         RBC       2.87         RDW       16.8         Sodium       137         Vancomycin-Trough       12.2         WBC       13.25                                Significant Imaging: I have reviewed all pertinent imaging results/findings within the past 24 hours.    Assessment and Plan     * Acute hypoxemic respiratory failure  Patient with Hypoxic Respiratory failure which is Acute.  she is not on home oxygen. Supplemental oxygen was provided and noted- Oxygen Concentration (%):  [70-85] 85    .   Signs/symptoms of respiratory failure include- tachypnea, increased work of breathing, and use of accessory muscles. Contributing diagnoses includes - Pleural effusion and Pneumonia Labs and images were reviewed. Patient Has not had a recent ABG. Will treat underlying causes and adjust management of respiratory failure as follows- thoracentesis  -supplemental oxygen  -treat influenza  -antibiotics      Influenza A  Tamiflu   Supportive care      Neoplasm related pain  -continue home medication  -prn IV dilaudid for breakthrough pain      Type 2 diabetes mellitus with diabetic  polyneuropathy, without long-term current use of insulin  Patient's FSGs are controlled on current medication regimen.  Last A1c reviewed-   Lab Results   Component Value Date    HGBA1C 5.5 11/28/2024     Most recent fingerstick glucose reviewed-   Recent Labs   Lab 01/11/25  2032 01/12/25  0614 01/12/25  1216 01/12/25  1612   POCTGLUCOSE 317* 190* 207* 329*     Current correctional scale  Low  Maintain anti-hyperglycemic dose as follows-   Antihyperglycemics (From admission, onward)      Start     Stop Route Frequency Ordered    01/12/25 2100  insulin glargine U-100 (Lantus) pen 10 Units         -- SubQ Nightly 01/12/25 1830    01/10/25 1851  insulin aspart U-100 pen 0-5 Units         -- SubQ Before meals & nightly PRN 01/10/25 1752          Hold Oral hypoglycemics while patient is in the hospital.        Secondary malignancy of parietal pleura  Patient has metastatic cancer of breast primary. The cancer has metastasized to brain and lungs. The patient is under the care of an outpatient oncologist. The patient is undergoing active chemotherapy as follows- [unfilled] .Their staging information is listed below.   Cancer Staging   Primary malignant neoplasm of upper outer quadrant of breast, left  Staging form: Breast, AJCC 8th Edition  - Pathologic stage from 10/10/2023: No Stage Recommended (ypT2, pN0, cM0, G3, ER-, LA-, HER2-) - Signed by Nino Hines MD on 10/10/2023        Primary malignant neoplasm of upper outer quadrant of breast, left  -followed by Dr. Hoffmann and palliative care  -palliative care consulted for tomorrow        VTE Risk Mitigation (From admission, onward)           Ordered     IP VTE HIGH RISK PATIENT  Once         01/10/25 1752     Place sequential compression device  Until discontinued         01/10/25 1752                    Discharge Planning   ARPAN:      Code Status: Full Code   Medical Readiness for Discharge Date:   Discharge Plan A: Home with family                        Serjio BRADFORD  MD Mark  Department of Hospital Medicine   '[facility] Surg

## 2025-01-13 NOTE — SUBJECTIVE & OBJECTIVE
Interval History:    Progressive metastatic breast cancer noted on imaging last month involving head, lungs, kidney, lymph nodes.     Ms. Saavedra has been followed by our outpatient palliative care provider: Pt and family had deferred GOC/ACP and had opted for full code. She is aware treatment for cancer is palliative.     Supine in bed today with daughter Luna at bedside. Vapotherm 30% O2 in place, still some dyspnea at rest. She attributes this to transferring to commode 30 minutes prior to our visit.     I engaged with Ms. Saavedra and her daughter voluntary conversation about palliative care, goals of care and advanced care planning.  We discussed the progression of her metastatic breast cancer and what treatment she has had so far.  Her daughter is hopeful she will be able to take Adriamycin. She expresses frustration because she believes Ms. Saavedra does not have a treatment plan in place. We discussed with profound debility, dyspnea and active infection chemotherapy would not be an option at this time. I addressed their concerns regarding thoracentesis. Daughter has questions about surgical intervention for pleural effusion. We discussed further mom was not dyspneic at that time of previous procedure and did not have known malignant pleural effusion so I do not believe surgery is an option, nor would improve her outcome.    Ms. Saavedra states she is tired and she just wants to be comfortable.  Her daughter will support Ms. Saavedra with whatever decision she makes. We discussed options to manage sx and provide comfort such as palliative care and hospice. She is strongly considering hospice. Her daughter is planning to move from [place] to stay with mother during illness. They would like to speak to hospice provider to discuss their services prior to committing. [facility].       Past Medical History:   Diagnosis Date    Allergy     Anemia     Breast cancer     primary malignant neoplasm of upper outer  quadrant of breast - progressive metastatic triple negative breast cancer. Patient has progressive 2 lines of therapy.    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    Type 2 diabetes mellitus with diabetic polyneuropathy, without long-term current use of insulin 03/04/2015       Past Surgical History:   Procedure Laterality Date    HYSTERECTOMY      tubaligation  1987       Review of patient's allergies indicates:   Allergen Reactions    Ace inhibitors Swelling    Lisinopril Rash    Hydrocodone Itching     Hives    Hydrocodone-acetaminoph-supp11 Itching    Percocet [oxycodone-acetaminophen] Itching     Pt had to take an antihistamine to improve itching     Cephalexin      Hives    Pantoprazole Hives    Propoxyphene      Hives    Propoxyphene n-acetaminophen     Propoxyphene-acetaminophen        Medications:  Continuous Infusions:  Scheduled Meds:   azithromycin  250 mg Oral Daily    dexAMETHasone  6 mg Oral Daily    gabapentin  300 mg Oral TID    insulin glargine U-100  10 Units Subcutaneous QHS    magnesium citrate  296 mL Oral Once    meropenem IV (PEDS and ADULTS)  2 g Intravenous Q8H    morphine  60 mg Oral BID    mupirocin   Nasal BID    oseltamivir  75 mg Oral BID    polyethylene glycol  17 g Oral Daily    vancomycin (VANCOCIN) IV (PEDS and ADULTS)  1,000 mg Intravenous Q12H     PRN Meds:  Current Facility-Administered Medications:     acetaminophen, 650 mg, Oral, Q4H PRN    glucagon (human recombinant), 1 mg, Intramuscular, PRN    glucose, 16 g, Oral, PRN    glucose, 24 g, Oral, PRN    HYDROmorphone, 1 mg, Intravenous, Q4H PRN    HYDROmorphone, 4 mg, Oral, Q3H PRN    insulin aspart U-100, 0-5 Units, Subcutaneous, QID (AC + HS) PRN    naloxone, 0.02 mg, Intravenous, PRN    ondansetron, 4 mg, Intravenous, Q6H PRN    sodium chloride 0.9%, 10  mL, Intravenous, Q12H PRN    Pharmacy to dose Vancomycin consult, , , Once **AND** vancomycin - pharmacy to dose, , Intravenous, pharmacy to manage frequency    Family History    None       Tobacco Use    Smoking status: Former     Current packs/day: 0.00     Types: Cigarettes     Quit date:      Years since quittin.0    Smokeless tobacco: Never   Substance and Sexual Activity    Alcohol use: Not Currently    Drug use: Never    Sexual activity: Not Currently       Review of Systems   Constitutional:  Positive for activity change.   Respiratory:  Positive for shortness of breath.      Objective:     Vital Signs (Most Recent):  Temp: 97.9 °F (36.6 °C) (25 1248)  Pulse: (!) 115 (25 1300)  Resp: 20 (25 1300)  BP: (!) 150/72 (25 1248)  SpO2: (!) 89 % (25 1420) Vital Signs (24h Range):  Temp:  [97.9 °F (36.6 °C)-98.7 °F (37.1 °C)] 97.9 °F (36.6 °C)  Pulse:  [] 115  Resp:  [16-24] 20  SpO2:  [80 %-94 %] 89 %  BP: (131-157)/(62-80) 150/72     Weight: 79.6 kg (175 lb 7.8 oz)  Body mass index is 34.27 kg/m².       Physical Exam  Constitutional:       General: She is in acute distress (mildly dyspneic at rest.).      Appearance: She is ill-appearing.   Eyes:      General: No scleral icterus.  Skin:     General: Skin is warm and dry.   Neurological:      Mental Status: She is alert.      Comments: Alert, oriented to situation, engaged in conversation.             Review of Symptoms      Symptom Assessment (ESAS 0-10 Scale)  Unable to complete assessment due to Other         Pain Assessment in Advanced Demential Scale (PAINAD)   Breathing - Independent of vocalization:  0  Negative vocalization:  1  Facial expression:  1  Body language:  1  Consolability:  1  Total:  4    Performance Status:  20    Living Arrangements:  Lives alone    Psychosocial/Cultural:   See Palliative Psychosocial Note: Yes  Unmarried. Lives alone. Multiple family members in [facility]. Daughter lives in  [place].  **Primary  to Follow**  Palliative Care  Consult: No        Advance Care Planning   Advance Directives:   Living Will: No    LaPOST: No    Medical Power of : Yes      Decision Making:  Patient answered questions  Goals of Care: The patient and family endorses that what is most important right now is to focus on improvement in condition but with limits to invasive therapies    Accordingly, we have decided that the best plan to meet the patient's goals includes hospice versus palliative care; discussions ongoing. She is strongly considering hospice care.           Significant Labs: All pertinent labs within the past 24 hours have been reviewed.  CBC:   Recent Labs   Lab 01/13/25  0540   WBC 11.86   HGB 8.5*   HCT 27.8*   MCV 93   *     BMP:  Recent Labs   Lab 01/13/25  0540   *      K 4.0      CO2 28   BUN 16   CREATININE 0.6   CALCIUM 8.8   MG 1.6     LFT:  Lab Results   Component Value Date    AST 38 01/13/2025    ALKPHOS 385 (H) 01/13/2025    BILITOT 0.6 01/13/2025     Albumin:   Albumin   Date Value Ref Range Status   01/13/2025 2.4 (L) 3.5 - 5.2 g/dL Final     Protein:   Total Protein   Date Value Ref Range Status   01/13/2025 6.0 6.0 - 8.4 g/dL Final     Lactic acid:   Lab Results   Component Value Date    LACTATE 1.7 01/10/2025    LACTATE 2.0 01/10/2025       Significant Imaging:  reviewed  CT CAP:  1. In this patient with metastatic breast cancer there is similar involvement of the pleura of the right lung base, increased size and number of liver lesions, increased size of matted masses extending into the posterior right subphrenic/subhepatic space, increased size of right axillary lymph node, and increased size of posterior right chest/abdominal wall mass.  Findings overall consistent with worsening metastatic disease.  2. Additional grossly stable right inguinal and right iliac chain lymph nodes.  3. Right lateral abdominal wall heterogeneously  enhancing lesion within the subcutaneous fat, not completely included within field of view of prior imaging and also likely representing metastatic disease.  4. Increased size of heterogeneous lesions involving the mid right kidney and inferior pole of the left kidney, concerning for additional foci of metastatic disease.  5. Interval increased mass effect upon the posteroinferior aspect of the right atrium, superior most aspect of the IVC and lesser extent intrahepatic portion of the IVC which also shows heterogeneous opacification at these levels presumably related to mixing of blood pool and contrast bolus and timing of contrast bolus; however, local invasion and/or bland or tumor thrombus not excluded.  Further evaluation/follow-up as warranted.  6. Small volume of loculated right pleural fluid, similar to prior.  7. Additional findings, as above.  This report was flagged in Epic as abnormal.     Electronically signed by resident: Fanny Roberts  Date:                                            12/10/2024

## 2025-01-13 NOTE — ASSESSMENT & PLAN NOTE
Patient's FSGs are controlled on current medication regimen.  Last A1c reviewed-   Lab Results   Component Value Date    HGBA1C 5.5 11/28/2024     Most recent fingerstick glucose reviewed-   Recent Labs   Lab 01/11/25  2032 01/12/25  0614 01/12/25  1216 01/12/25  1612   POCTGLUCOSE 317* 190* 207* 329*     Current correctional scale  Low  Maintain anti-hyperglycemic dose as follows-   Antihyperglycemics (From admission, onward)      Start     Stop Route Frequency Ordered    01/12/25 2100  insulin glargine U-100 (Lantus) pen 10 Units         -- SubQ Nightly 01/12/25 1830    01/10/25 1851  insulin aspart U-100 pen 0-5 Units         -- SubQ Before meals & nightly PRN 01/10/25 1752          Hold Oral hypoglycemics while patient is in the hospital.

## 2025-01-14 ENCOUNTER — PATIENT MESSAGE (OUTPATIENT)
Dept: PALLIATIVE MEDICINE | Facility: CLINIC | Age: 65
End: 2025-01-14
Payer: MEDICARE

## 2025-01-14 VITALS
SYSTOLIC BLOOD PRESSURE: 123 MMHG | OXYGEN SATURATION: 98 % | DIASTOLIC BLOOD PRESSURE: 59 MMHG | HEART RATE: 116 BPM | WEIGHT: 175.5 LBS | TEMPERATURE: 98 F | RESPIRATION RATE: 33 BRPM | BODY MASS INDEX: 34.46 KG/M2 | HEIGHT: 60 IN

## 2025-01-14 LAB
BACTERIA SPEC AEROBE CULT: NO GROWTH
MRSA SPEC QL CULT: NORMAL
POCT GLUCOSE: 84 MG/DL (ref 70–110)
POCT GLUCOSE: 85 MG/DL (ref 70–110)
VANCOMYCIN TROUGH SERPL-MCNC: 17.5 UG/ML (ref 10–22)

## 2025-01-14 PROCEDURE — 5A09357 ASSISTANCE WITH RESPIRATORY VENTILATION, LESS THAN 24 CONSECUTIVE HOURS, CONTINUOUS POSITIVE AIRWAY PRESSURE: ICD-10-PCS | Performed by: FAMILY MEDICINE

## 2025-01-14 PROCEDURE — 63600175 PHARM REV CODE 636 W HCPCS: Performed by: EMERGENCY MEDICINE

## 2025-01-14 PROCEDURE — 99900035 HC TECH TIME PER 15 MIN (STAT)

## 2025-01-14 PROCEDURE — 94660 CPAP INITIATION&MGMT: CPT

## 2025-01-14 PROCEDURE — 25000003 PHARM REV CODE 250: Performed by: INTERNAL MEDICINE

## 2025-01-14 PROCEDURE — 36415 COLL VENOUS BLD VENIPUNCTURE: CPT | Performed by: INTERNAL MEDICINE

## 2025-01-14 PROCEDURE — 63600175 PHARM REV CODE 636 W HCPCS: Performed by: INTERNAL MEDICINE

## 2025-01-14 PROCEDURE — 94799 UNLISTED PULMONARY SVC/PX: CPT

## 2025-01-14 PROCEDURE — 27000190 HC CPAP FULL FACE MASK W/VALVE

## 2025-01-14 PROCEDURE — 80202 ASSAY OF VANCOMYCIN: CPT | Performed by: INTERNAL MEDICINE

## 2025-01-14 PROCEDURE — 25000003 PHARM REV CODE 250: Performed by: FAMILY MEDICINE

## 2025-01-14 PROCEDURE — 25000003 PHARM REV CODE 250: Performed by: EMERGENCY MEDICINE

## 2025-01-14 PROCEDURE — 63700000 PHARM REV CODE 250 ALT 637 W/O HCPCS: Performed by: INTERNAL MEDICINE

## 2025-01-14 PROCEDURE — 27100171 HC OXYGEN HIGH FLOW UP TO 24 HOURS

## 2025-01-14 PROCEDURE — 94761 N-INVAS EAR/PLS OXIMETRY MLT: CPT

## 2025-01-14 RX ORDER — OSELTAMIVIR PHOSPHATE 75 MG/1
75 CAPSULE ORAL 2 TIMES DAILY
Status: DISCONTINUED | OUTPATIENT
Start: 2025-01-14 | End: 2025-01-14 | Stop reason: HOSPADM

## 2025-01-14 RX ORDER — PREDNISONE 5 MG/1
TABLET ORAL
Qty: 28 TABLET | Refills: 0 | Status: SHIPPED | OUTPATIENT
Start: 2025-01-14 | End: 2025-01-26

## 2025-01-14 RX ADMIN — MORPHINE SULFATE 60 MG: 30 TABLET, FILM COATED, EXTENDED RELEASE ORAL at 08:01

## 2025-01-14 RX ADMIN — MEROPENEM 2 G: 2 INJECTION, POWDER, FOR SOLUTION INTRAVENOUS at 12:01

## 2025-01-14 RX ADMIN — AZITHROMYCIN DIHYDRATE 250 MG: 250 TABLET ORAL at 09:01

## 2025-01-14 RX ADMIN — HYDROMORPHONE HYDROCHLORIDE 4 MG: 2 TABLET ORAL at 12:01

## 2025-01-14 RX ADMIN — MUPIROCIN: 20 OINTMENT TOPICAL at 09:01

## 2025-01-14 RX ADMIN — GABAPENTIN 300 MG: 300 CAPSULE ORAL at 09:01

## 2025-01-14 RX ADMIN — HYDROMORPHONE HYDROCHLORIDE 1 MG: 1 INJECTION, SOLUTION INTRAMUSCULAR; INTRAVENOUS; SUBCUTANEOUS at 03:01

## 2025-01-14 RX ADMIN — POLYETHYLENE GLYCOL 3350 17 G: 17 POWDER, FOR SOLUTION ORAL at 09:01

## 2025-01-14 RX ADMIN — DEXAMETHASONE 6 MG: 4 TABLET ORAL at 09:01

## 2025-01-14 RX ADMIN — ONDANSETRON 4 MG: 2 INJECTION INTRAMUSCULAR; INTRAVENOUS at 01:01

## 2025-01-14 RX ADMIN — GABAPENTIN 300 MG: 300 CAPSULE ORAL at 03:01

## 2025-01-14 RX ADMIN — OSELTAMAVIR PHOSPHATE 75 MG: 75 CAPSULE ORAL at 09:01

## 2025-01-14 RX ADMIN — MEROPENEM 2 G: 2 INJECTION, POWDER, FOR SOLUTION INTRAVENOUS at 03:01

## 2025-01-14 RX ADMIN — VANCOMYCIN HYDROCHLORIDE 1000 MG: 1 INJECTION, POWDER, LYOPHILIZED, FOR SOLUTION INTRAVENOUS at 03:01

## 2025-01-14 NOTE — PLAN OF CARE
Discussed poc with pt, pt verbalized understanding    Purposeful rounding every 2hours    VS wnl  Cardiac monitoring in use, pt is tachy, tele monitor # [id number]  Blood glucose monitoring   Fall precautions in place, remains injury free  Pain under control with PRN meds  Pt now on BiPap.   Pending discharge to hospice.    IVFs  Accurate I&Os  Abx given as prescribed  Bed locked at lowest position  Call light within reach    Chart check complete  Will cont with POC

## 2025-01-14 NOTE — PLAN OF CARE
Tylor spoke with [facility] regarding pt being on Vaporthem. TYLOR was told that [facility] couldn't ccept pt on Vaporthem. [facility] offered to send pt to [facility]. TYLOR then spoke with pt's daughter regarding trying an different hospice.   Pt's daughter stated the laison at [facility] told her pt didn't qualify for ip hospice and could do home with hospice. TYLOR asked pt's daughter if she wanted to try an different hospice company. Pt and daughter are in agreement. TYLOR reached out to Symone at [facility] who asked if pt could be on an high frequency of Bipap. MD changed pt to Bipap to see if pt could tolerate it. Favian with [facility] then spoke with pt's daughter who stated [facility] called to say they are also able to accommodate Bipap. Pt's daughter changed her decision back to [facility]. TYLOR notified [facility] and [facility] of this change. [facility] stated they needed to order pt's bipap and have it set up.  2:35pm Pt's daughter spoke with tylor stating she didn't know [facility] worked on pt's dc plan to accommodate bipap. Pt's daughter stated she would like to go with [facility]. TYLOR notified [facility] and [facility].

## 2025-01-14 NOTE — PROGRESS NOTES
Pharmacokinetic Assessment Follow Up: IV Vancomycin    Vancomycin serum concentration assessment(s):    The trough level was drawn correctly and can be used to guide therapy at this time. The measurement is within the desired definitive target range of 15 to 20 mcg/mL.    Vancomycin Regimen Plan:    Continue vancomycin 1000 mg IV every 12 hours and obtain a vancomycin trough level at 0230 on 1/16/25.    Drug levels (last 3 results):  Recent Labs   Lab Result Units 01/12/25  0308 01/14/25  0234   Vancomycin-Trough ug/mL 12.2 17.5       Pharmacy will continue to follow and monitor vancomycin.    Please contact pharmacy at extension [phone] for questions regarding this assessment.    Thank you for the consult,   Jhon Cancino       Patient brief summary:  Sherlyn Saavedra is a 65 y.o. female initiated on antimicrobial therapy with IV Vancomycin for treatment of lower respiratory infection    Drug Allergies:   Review of patient's allergies indicates:   Allergen Reactions    Ace inhibitors Swelling    Lisinopril Rash    Hydrocodone Itching     Hives    Hydrocodone-acetaminoph-supp11 Itching    Percocet [oxycodone-acetaminophen] Itching     Pt had to take an antihistamine to improve itching     Cephalexin      Hives    Pantoprazole Hives    Propoxyphene      Hives    Propoxyphene n-acetaminophen     Propoxyphene-acetaminophen        Actual Body Weight:   79.6 kg    Renal Function:   Estimated Creatinine Clearance: 87.2 mL/min (based on SCr of 0.6 mg/dL).,     Dialysis Method (if applicable):  N/A    CBC (last 72 hours):  Recent Labs   Lab Result Units 01/11/25  0655 01/12/25  0308 01/13/25  0540   WBC K/uL 12.66 13.25* 11.86   Hemoglobin g/dL 8.5* 8.5* 8.5*   Hematocrit % 27.6* 26.2* 27.8*   Platelets K/uL 149* 135* 118*   Gran % % 87.7* 85.4* 83.8*   Lymph % % 4.9* 6.8* 7.7*   Mono % % 3.5* 3.6* 3.3*   Eosinophil % % 0.0 0.0 0.0   Basophil % % 0.2 0.3 0.3   Differential Method  Automated Automated Automated       Metabolic  Panel (last 72 hours):  Recent Labs   Lab Result Units 01/11/25  0655 01/12/25  0308 01/13/25  0540   Sodium mmol/L 138 137 140   Potassium mmol/L 4.3 4.0 4.0   Chloride mmol/L 103 100 103   CO2 mmol/L 25 22* 28   Glucose mg/dL 140* 202* 121*   BUN mg/dL 10 12 16   Creatinine mg/dL 0.7 0.7 0.6   Albumin g/dL 2.4* 2.4* 2.4*   Total Bilirubin mg/dL 0.5 0.5 0.6   Alkaline Phosphatase U/L 283* 357* 385*   AST U/L 40 42* 38   ALT U/L 49* 49* 42   Magnesium mg/dL 1.5* 1.8 1.6   Phosphorus mg/dL 3.7 2.4* 2.4*       Vancomycin Administrations:  vancomycin given in the last 96 hours                     vancomycin (VANCOCIN) 1,000 mg in 0.9% NaCl 250 mL IVPB (admixture device) (mg) 1,000 mg New Bag 01/14/25 0335     1,000 mg New Bag 01/13/25 1630     1,000 mg New Bag  0333     1,000 mg New Bag 01/12/25 1609    vancomycin 750 mg in 0.9% NaCl 250 mL IVPB (admixture device) (mg) 750 mg New Bag 01/12/25 0329     750 mg New Bag 01/11/25 1535    vancomycin 2 g in 0.9% sodium chloride 500 mL IVPB (mg) 2,000 mg New Bag 01/10/25 1612                    Microbiologic Results:  Microbiology Results (last 7 days)       Procedure Component Value Units Date/Time    Blood culture x two cultures. Draw prior to antibiotics. [[id number]] Collected: 01/10/25 1220    Order Status: Completed Specimen: Blood from Peripheral, Antecubital, Right Updated: 01/13/25 2312     Blood Culture, Routine No Growth to date      No Growth to date      No Growth to date      No Growth to date    Narrative:      Aerobic and anaerobic    Blood culture x two cultures. Draw prior to antibiotics. [[id number]] Collected: 01/10/25 1226    Order Status: Completed Specimen: Blood from Peripheral, Forearm, Right Updated: 01/13/25 2212     Blood Culture, Routine No Growth to date      No Growth to date      No Growth to date    Narrative:      Aerobic and anaerobic    Aerobic culture [[id number]] Collected: 01/11/25 1204    Order Status: Completed Specimen: Body Fluid from  Pleural Fluid Updated: 01/13/25 1228     Aerobic Bacterial Culture No growth    Culture, MRSA [[id number]] Collected: 01/12/25 0524    Order Status: Sent Specimen: MRSA source from Nares, Left Updated: 01/12/25 1125    Gram stain [[id number]] Collected: 01/11/25 1204    Order Status: Completed Specimen: Body Fluid from Pleural Fluid Updated: 01/11/25 2209     Gram Stain Result No WBC's, epithelial cells or organisms seen    Culture, body fluid - Bactec [[id number]] Collected: 01/11/25 1204    Order Status: Canceled Specimen: Body Fluid from Pleural Fluid     Influenza A & B by Molecular [[id number]]  (Abnormal) Collected: 01/10/25 1223    Order Status: Completed Specimen: Nasopharyngeal Swab Updated: 01/10/25 1312     Influenza A, Molecular Positive     Influenza B, Molecular Negative     Flu A & B Source Nasal swab

## 2025-01-14 NOTE — PLAN OF CARE
[facility] - Med Surg  Discharge Final Note    Primary Care Provider: No, Primary Doctor    Expected Discharge Date: 1/14/2025    Final Discharge Note (most recent)       Final Note - 01/14/25 1236          Final Note    Assessment Type Final Discharge Note     Anticipated Discharge Disposition Hospice/Medical Facility        Post-Acute Status    Post-Acute Authorization Hospice     Hospice Status Set-up Complete/Auth obtained                              Contact Info       [facility]   Specialty: Hospice Services, Hospice and Palliative Medicine    [address] [address]   Phone: [phone]       Next Steps: Follow up          Pt Is discharging to [facility].

## 2025-01-14 NOTE — PLAN OF CARE
01/14/25 0857   Rounds   Attendance Provider;;Charge nurse;Physical therapist   Discharge Plan A Hospice/home   Why the patient remains in the hospital Requires continued medical care   Transition of Care Barriers None     Pending being weaned of Vaportherm. Pt will dc home with [place].

## 2025-01-14 NOTE — PLAN OF CARE
Pt is stable. However, when pt begins to talk, eat, and move she desats into the 70s and even the high 60s. After ambulating to the bedside commode around 0030, O2 was 69-71 for several minutes. It took about 15-20 minutes to rise back up to the 80s  In response, an order for a purewick was placed  Pt to be D/C to hospice care today   Daughter at bedside  Pain controlled with PRN meds  Blood glucose monitored  Droplet precautions maintained   Vapotherm (30, 85) in use   Abx given as ordered  Cardiac monitor: 8612: NS, sinus tach    Chart orders reviewed. Bed in lowest position, wheels locked, call light within reach. Pt remains injury free. Will cont POC

## 2025-01-15 LAB — BACTERIA BLD CULT: NORMAL

## 2025-01-15 NOTE — ASSESSMENT & PLAN NOTE
Stable  Continue MSContin 60mg BID- Titrate or rotate to Oxycontin per pt's response  Hydromorphone IR 4mg Q3H PRN- Max 6 doses per day  Narcan RX given and explained use to pt and family. Pt and family verbalized understanding.    Miralax 1pack/day or Senna 2 tabs at bedtime for opioid-induced constipation.   MOM if Miralax/senna are not effective.   Pain contract- 09/04/2024  UDS- 10/17/2024- Consistent w/ prescribed Hydromorphone and Morphine. Inconsistent with Marijuana. Pt aware she can obtain medical marijuana card.

## 2025-01-15 NOTE — ASSESSMENT & PLAN NOTE
Followed by Dr. Hoffmann, [facility], Neurosurgery. Previous oncologist at [facility].   S/p Keytruda, left lumpectomy (Aug. 2023) w/ residual disease post resection, radiation (Nov. 2023).    On Eribulin Q3W  Stopped Sacituzumab-Govitecan-due to progressive disease  Stopped Paclitaxel D/C'd due to progressive disease.   S/P right VATS washout with partial decortication, pleural biopsy, intercostal nerve blocks, Pleurx catheter insertion Aug 2024. Pleurx removed due to low output.   CT Head: 12/10/2024: 1. Similar areas of edema involving the posterior left frontal lobe, inferior left frontal lobe, right occipital lobe, and left cerebellum, corresponding to metastatic lesions better characterized on MRI from 11/27/2024.  2. Mass effect resulting in partial effacement of the occipital horn of the right lateral ventricle without evidence of hydrocephalus, unchanged compared to the most recent MRI from 11/27/2024.  3. Additional findings, as above.    CT A/P: 12/10/2024: 1. In this patient with metastatic breast cancer there is similar involvement of the pleura of the right lung base, increased size and number of liver lesions, increased size of matted masses extending into the posterior right subphrenic/subhepatic space, increased size of right axillary lymph node, and increased size of posterior right chest/abdominal wall mass.  Findings overall consistent with worsening metastatic disease.  2. Additional grossly stable right inguinal and right iliac chain lymph nodes.  3. Right lateral abdominal wall heterogeneously enhancing lesion within the subcutaneous fat, not completely included within field of view of prior imaging and also likely representing metastatic disease.  4. Increased size of heterogeneous lesions involving the mid right kidney and inferior pole of the left kidney, concerning for additional foci of metastatic disease.  5. Interval increased mass effect upon the posteroinferior aspect of the right atrium,  superior most aspect of the IVC and lesser extent intrahepatic portion of the IVC which also shows heterogeneous opacification at these levels presumably related to mixing of blood pool and contrast bolus and timing of contrast bolus; however, local invasion and/or bland or tumor thrombus not excluded.  Further evaluation/follow-up as warranted.  6. Small volume of loculated right pleural fluid, similar to prior.  7. Additional findings, as above.  MRI Brain: 11/27/2024: Interval progression of metastatic disease with significant interval increase in size of several ring-enhancing lesions in both cerebral hemispheres and the left cerebellum as compared to the prior with significant surrounding edema.  New punctate focus of enhancement in the left parietal lobe as compared to the prior.  Interval increase in size of the ventricular system with some effacement of the sulci bilaterally is concerning for developing hydrocephalus.  Recommend neurosurgical consultation.  Patient was instructed to present to the emergency room for further evaluation.  Findings discussed with Dr. Paul via secure chat at 15:35 on 11/27/2024.

## 2025-01-15 NOTE — ASSESSMENT & PLAN NOTE
-Code status: Full Code. Pt and daughter wish to revisit at a later time  -HCPOA: Daughter: Luna West: [phone]. Pt has 1 adult son-lives in [place]. Pt is single  -GOC:  Continue cancer treatment, symptom management, maintain independence and functional status. Pt aware treatment intent is palliative.   -See HPI for further details

## 2025-01-15 NOTE — ASSESSMENT & PLAN NOTE
Patient has metastatic cancer of breast primary. The cancer has metastasized to brain and lungs. The patient is under the care of an outpatient oncologist. The patient is undergoing active chemotherapy as follows- [unfilled] .Their staging information is listed below.    Cancer Staging   Primary malignant neoplasm of upper outer quadrant of breast, left  Staging form: Breast, AJCC 8th Edition  - Pathologic stage from 10/10/2023: No Stage Recommended (ypT2, pN0, cM0, G3, ER-, MO-, HER2-) - Signed by Nino Hines MD on 10/10/2023

## 2025-01-15 NOTE — DISCHARGE SUMMARY
[facility] Medicine  Discharge Summary      Patient Name: Sherlyn Saavedra  MRN: [id number]  ARTEMIO: [id number]  Patient Class: IP- Inpatient  Admission Date: 1/10/2025  Hospital Length of Stay: 4 days  Discharge Date and Time: 1/14/2025  5:44 PM  Attending Physician: Nan att. providers found   Discharging Provider: Candelario Carbajal MD  Primary Care Provider: Nan, Primary Doctor    Primary Care Team: Networked reference to record PCT     HPI:   The patient is a 66 yo female with past medical history of diabetes, hypertension, metastatic breast cancer (mets to brain and lungs)  who presented to the ED with shortness of breath for 1 day. She was seen at palliative care clinic yesterday and started on Zpack. Her symptoms did not improve. She noted low oxygen saturations. She also has cough, leg swelling and abdominal pain. Chest x-ray with increasing right pleural effusion. She also tested positive for Influenza A. Patient noted to have oxygen saturation of 80%. Hospital medicine consulted for admission.    * No surgery found *      Hospital Course:   The patient presented with shortness of breath. She was noted to be hypoxic and tachycardic. She was placed on supplemental oxygen. Workup revealed Influenza A positive. Chest x-ray with increasing pleural effusion. US thoracentesis not successful.  Patient's oxygen requirements significantly increased. She is currently on Vapotherm. Pulmonology following.  Due to the increased oxygen demand Dr. Isabel did speak with the patient and family regarding her inability to undergo a VATS procedure for the loculated pleural effusions.  Due to the lack of improvement despite several days of antibiotics and high-dose oxygen recommendation was to proceed with hospice.  Daughter at bedside was in agreement given her comorbidities and lack of improvement.  Patient has gone to the [facility] with [facility] hospice on BiPAP 12/6 at 100% FiO2.  She will be able to be on Airvo while  eating although she does desaturate into the low 80s while on Vapotherm.  Patient is appropriate for transfer to inpatient hospice at this time.     Goals of Care Treatment Preferences:  Code Status: DNR    Health care agent: Daughter: Luna Saavedra  [facility] agent number: [phone]          What is most important right now is to focus on improvement in condition but with limits to invasive therapies.  Accordingly, we have decided that the best plan to meet the patient's goals includes comfort care only.      SDOH Screening:  The patient was screened for utility difficulties, food insecurity, transport difficulties, housing insecurity, and interpersonal safety and there were no concerns identified this admission.     Consults:   Consults (From admission, onward)          Status Ordering Provider     Inpatient consult to Social Work  Once        Provider:  (Not yet assigned)    Completed GREG ELDRIDGE     Inpatient consult to Palliative Care  Once        Provider:  Greg Eldridge, NP    Completed SAMIRA TSE     Inpatient consult to Critical Care Medicine  Once        Provider:  Augustus Isabel MD    Completed COTY RUIZ            * Acute hypoxemic respiratory failure  Patient with Hypoxic Respiratory failure which is Acute.  she is not on home oxygen. Supplemental oxygen was provided and noted- Oxygen Concentration (%):  [85-90] 85    .   Signs/symptoms of respiratory failure include- tachypnea, increased work of breathing, and use of accessory muscles. Contributing diagnoses includes - Pleural effusion and Pneumonia Labs and images were reviewed. Patient Has not had a recent ABG. Will treat underlying causes and adjust management of respiratory failure as follows- thoracentesis  -supplemental oxygen-currently on Vapotherm  -treat influenza  -antibiotics  -patient and daughter leaning towards hospice care.  Palliative Care has been consulted and has sent the referral to [facility].  Plan for discharge once hospice care set.      Influenza A  Tamiflu   Supportive care      Neoplasm related pain  -continue home medication  -prn IV dilaudid for breakthrough pain and respiratory comfort.      Type 2 diabetes mellitus with diabetic polyneuropathy, without long-term current use of insulin  Patient's FSGs are controlled on current medication regimen.  Last A1c reviewed-   Lab Results   Component Value Date    HGBA1C 5.5 11/28/2024     Most recent fingerstick glucose reviewed-   Recent Labs   Lab 01/12/25  2118 01/13/25  0507 01/13/25  1114 01/13/25  1627   POCTGLUCOSE 226* 139* 168* 219*       Current correctional scale  Low  Maintain anti-hyperglycemic dose as follows-   Antihyperglycemics (From admission, onward)      Start     Stop Route Frequency Ordered    01/12/25 2100  insulin glargine U-100 (Lantus) pen 10 Units         -- SubQ Nightly 01/12/25 1830    01/10/25 1851  insulin aspart U-100 pen 0-5 Units         -- SubQ Before meals & nightly PRN 01/10/25 1752          Hold Oral hypoglycemics while patient is in the hospital.        Secondary malignancy of parietal pleura  Patient has metastatic cancer of breast primary. The cancer has metastasized to brain and lungs. The patient is under the care of an outpatient oncologist. The patient is undergoing active chemotherapy as follows- [unfilled] .Their staging information is listed below.    Cancer Staging   Primary malignant neoplasm of upper outer quadrant of breast, left  Staging form: Breast, AJCC 8th Edition  - Pathologic stage from 10/10/2023: No Stage Recommended (ypT2, pN0, cM0, G3, ER-, NE-, HER2-) - Signed by Nino Hines MD on 10/10/2023        Primary malignant neoplasm of upper outer quadrant of breast, left  -followed by Dr. 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.    Specialties: Hospice Services, Hospice and Palliative Medicine  Contact information:  [address] [facility] [address] [phone]                       Contact information for after-discharge care       Destination       [facility] .    Service: Inpatient Hospice  Contact information:  [address] [facility] [address] [phone]                                 Patient Instructions:      Diet Adult Regular   Order Comments: Pleasure feeds     Activity as tolerated       Significant Diagnostic Studies: Labs: BMP:   Recent Labs   Lab 01/13/25  0540   *      K 4.0      CO2 28   BUN 16   CREATININE 0.6   CALCIUM 8.8   MG 1.6    and CBC   Recent Labs   Lab 01/13/25  0540   WBC 11.86   HGB 8.5*   HCT 27.8*   *     Radiology:   US Thoracentesis with Imaging, Aspiration Only   Final Result      Right thoracentesis with only 1 cc of bloody fluid aspirated, this was sent for culture and Gram stain.         Electronically signed by: Charles Forbes   Date:    01/13/2025   Time:    12:59      X-Ray Chest 1 View   Final Result      1.  Negative for pneumothorax status post right thoracentesis.      2.   "Worsening alveolar opacities throughout the lungs, concerning for worsening pulmonary edema versus pneumonia versus ARDS.  Clinical correlation is advised.      3.  Follow-up radiographs recommended.         Electronically signed by: Favian Wright MD   Date:    01/11/2025   Time:    12:12      X-Ray Chest AP Portable   Final Result      1.  Interval development of a reticular interstitial changes throughout the lungs concerning for CHF versus interstitial infectious process.  Superimposed on these changes are multiple nodular patchy opacities throughout the periphery of the right upper lobe and throughout the left lung as well as dense infiltrate involving the middle lobe and right lower lobe.  Infectious inflammatory processes must be considered.  Less likely could all of these changes be related to the patient's underlying neoplastic process.      2.  Large right effusion again seen.      3.  Stable findings as noted above.         Electronically signed by: Favian Wright MD   Date:    01/10/2025   Time:    13:25           Pending Diagnostic Studies:       None           Medications:  Reconciled Home Medications:      Medication List        START taking these medications      predniSONE 5 MG tablet  Commonly known as: DELTASONE  Take 4 tablets (20 mg total) by mouth once daily for 4 days, THEN 2 tablets (10 mg total) once daily for 4 days, THEN 1 tablet (5 mg total) once daily for 4 days.  Start taking on: January 14, 2025            CONTINUE taking these medications      (MAGIC MOUTHWASH) 1:1:1 BENADRYL 12.5MG/5ML LIQ, ALUMINUM & MAGNESIUM  Swish and spit 15 mLs every 4 (four) hours as needed (mouth ulcers). for mouth sores     BD ULTRA-FINE DIMITRIS PEN NEEDLE 32 gauge x 5/32" Ndle  Generic drug: pen needle, diabetic  Use with insulin as directed 4 times a day     DEXCOM G7  Misc  Generic drug: blood-glucose meter,continuous  use as directed     folic acid 1 MG tablet  Commonly known as: FOLVITE  take 1 " tablet by mouth every morning     FREESTYLE TEST Strp  Generic drug: blood sugar diagnostic  Test 4 times per day     gabapentin 300 MG capsule  Commonly known as: NEURONTIN  Take 300 mg by mouth 3 (three) times daily. Patient takes once a day     HumaLOG KwikPen Insulin 100 unit/mL pen  Generic drug: insulin lispro  Inject 6 Units under the skin 3 (three) times daily as needed for blood sugar over 150 with meals, if over 250 give 12 units with meal     HYDROmorphone 4 MG tablet  Commonly known as: DILAUDID  Take 1 tablet (4 mg total) by mouth every 3 (three) hours as needed for Pain (Max 6 doses/day).     LANTUS SOLOSTAR U-100 INSULIN 100 unit/mL (3 mL) Inpn pen  Generic drug: insulin glargine U-100 (Lantus)  Inject 30 Units under the skin daily with breakfast.     morphine 60 MG 12 hr tablet  Commonly known as: MS CONTIN  Take 1 tablet (60 mg total) by mouth 2 (two) times daily. for 14 days            STOP taking these medications      amLODIPine 5 MG tablet  Commonly known as: NORVASC     atorvastatin 40 MG tablet  Commonly known as: LIPITOR     azithromycin 250 MG tablet  Commonly known as: Z-JESUSITA     busPIRone 15 MG tablet  Commonly known as: BUSPAR     doxycycline 100 MG capsule  Commonly known as: MONODOX     LIDOcaine-prilocaine cream  Commonly known as: EMLA     magnesium oxide 400 mg (241.3 mg magnesium) tablet  Commonly known as: MAG-OX     MEGARED ADV TOTAL BODY REFRESH ORAL     metFORMIN 500 MG ER 24hr tablet  Commonly known as: GLUCOPHAGE-XR     metroNIDAZOLE 500 MG tablet  Commonly known as: FLAGYL     naloxone 4 mg/actuation Spry  Commonly known as: NARCAN     ondansetron 8 MG Tbdl  Commonly known as: ZOFRAN-ODT     pyridoxine (vitamin B6) 200 mg Tbsr     vitamin D3-folic acid 125 mcg (5,000 unit)-1 mg Tab              Indwelling Lines/Drains at time of discharge:   Lines/Drains/Airways       Central Venous Catheter Line  Duration             Port A Cath Single Lumen 08/23/24 1100 Atrial Left 144 days               Drain  Duration             Female External Urinary Catheter w/ Suction 01/14/25 0145 <1 day                    Time spent on the discharge of patient: 40 minutes         Candelario Carbajal MD  Department of Hospital Medicine  '[facility] Surg

## 2025-01-16 ENCOUNTER — DOCUMENTATION ONLY (OUTPATIENT)
Dept: HEMATOLOGY/ONCOLOGY | Facility: CLINIC | Age: 65
End: 2025-01-16
Payer: MEDICARE

## 2025-01-16 LAB — BACTERIA BLD CULT: NORMAL

## 2025-01-16 NOTE — PROGRESS NOTES
[facility] staff called to report of patient's passing. SWER will update patient's medical team. SWER will osvaldo patient  in Epic.

## 2025-04-04 ENCOUNTER — EXTERNAL HOME HEALTH (OUTPATIENT)
Dept: HOME HEALTH SERVICES | Facility: HOSPITAL | Age: 65
End: 2025-04-04
Payer: MEDICARE